# Patient Record
Sex: FEMALE | Race: WHITE | NOT HISPANIC OR LATINO | Employment: FULL TIME | URBAN - METROPOLITAN AREA
[De-identification: names, ages, dates, MRNs, and addresses within clinical notes are randomized per-mention and may not be internally consistent; named-entity substitution may affect disease eponyms.]

---

## 2017-06-28 ENCOUNTER — OFFICE VISIT (OUTPATIENT)
Dept: OPTOMETRY | Facility: CLINIC | Age: 62
End: 2017-06-28
Payer: OTHER GOVERNMENT

## 2017-06-28 DIAGNOSIS — H52.7 REFRACTIVE ERROR: ICD-10-CM

## 2017-06-28 DIAGNOSIS — H25.13 NUCLEAR SCLEROSIS, BILATERAL: Primary | ICD-10-CM

## 2017-06-28 DIAGNOSIS — H26.9 CORTICAL CATARACT: ICD-10-CM

## 2017-06-28 PROCEDURE — 92004 COMPRE OPH EXAM NEW PT 1/>: CPT | Mod: S$PBB,,, | Performed by: OPTOMETRIST

## 2017-06-28 PROCEDURE — 99201 *HC E&M-NEW PATIENT-LVL I: CPT | Mod: PBBFAC,PO | Performed by: OPTOMETRIST

## 2017-06-28 PROCEDURE — 99999 PR PBB SHADOW E&M-NEW PATIENT-LVL I: CPT | Mod: PBBFAC,,, | Performed by: OPTOMETRIST

## 2017-06-28 NOTE — LETTER
June 28, 2017      Mono Ku MD  [address] [facility] [address] [facility]  [address]  [facility]  [place] [address]           [facility] [facility]  [address] [place] [address] [address]  [place] [address]  Phone: [phone]          Patient: Suma Butterfield   MR Number: [id number]   YOB: 1955   Date of Visit: 6/28/2017       Dear Dr. Mono Ku:    Thank you for referring Suma Butterfield to me for evaluation. Attached you will find relevant portions of my assessment and plan of care.    If you have questions, please do not hesitate to call me. I look forward to following Suma Butterfield along with you.    Sincerely,    Kp Mendoza, OD    Enclosure  CC:  No Recipients    If you would like to receive this communication electronically, please contact [email or web address] or [phone] to request more information on [employer] Link access.    For providers and/or their staff who would like to refer a patient to [facility], please contact us through our one-stop-shop provider referral line, [facility], at [phone].    If you feel you have received this communication in error or would no longer like to receive these types of communications, please e-mail [email or web address]

## 2017-06-28 NOTE — PROGRESS NOTES
HPI     Presenting Complaint: Pt here today for yearly ocualr health check.     Pt uses OTC reader for small print. Pt states distance vision is stable.     (-) headaches  (-) diplopia   (-) flashes / (+) hx of floaters both eyes      Last edited by Arthur Ley on 6/28/2017  3:01 PM. (History)        ROS     Negative for: Constitutional, Gastrointestinal, Neurological, Skin,   Genitourinary, Musculoskeletal, HENT, Endocrine, Cardiovascular, Eyes,   Respiratory, Psychiatric, Allergic/Imm, Heme/Lymph    Last edited by Kp Mendoza, OD on 6/28/2017  3:23 PM. (History)        Assessment /Plan     For exam results, see Encounter Report.    Nuclear sclerosis, bilateral    Cortical cataract    Refractive error      Mild cataracts OU. Discussed possible ocular affects of cataracts. Acceptable BCVA OU. Discussed treatment options. Surgery not recommended at this time. Monitor yearly.     Pt happy with uncorrected distance vision and OTC readers prn for near. Denies refraction. Return prn for spec Rx.      RTC in 1 year for comprehensive eye exam, or sooner prn.

## 2018-12-06 ENCOUNTER — OFFICE VISIT (OUTPATIENT)
Dept: OPTOMETRY | Facility: CLINIC | Age: 63
End: 2018-12-06
Payer: OTHER GOVERNMENT

## 2018-12-06 DIAGNOSIS — H52.7 REFRACTIVE ERROR: ICD-10-CM

## 2018-12-06 DIAGNOSIS — H26.9 CORTICAL CATARACT: ICD-10-CM

## 2018-12-06 DIAGNOSIS — H25.13 NUCLEAR SCLEROSIS, BILATERAL: Primary | ICD-10-CM

## 2018-12-06 PROCEDURE — 99212 OFFICE O/P EST SF 10 MIN: CPT | Mod: PBBFAC,PO | Performed by: OPTOMETRIST

## 2018-12-06 PROCEDURE — 92014 COMPRE OPH EXAM EST PT 1/>: CPT | Mod: S$PBB,,, | Performed by: OPTOMETRIST

## 2018-12-06 PROCEDURE — 99999 PR PBB SHADOW E&M-EST. PATIENT-LVL II: CPT | Mod: PBBFAC,,, | Performed by: OPTOMETRIST

## 2018-12-06 RX ORDER — ALENDRONATE SODIUM 70 MG/1
TABLET ORAL
Refills: 5 | COMMUNITY
Start: 2018-09-28 | End: 2020-03-23 | Stop reason: SDUPTHER

## 2018-12-06 NOTE — PROGRESS NOTES
HPI     Presenting Complaint: Pt here today for yearly eye exam. Pt has noticed   distance vision is blurry. Uses OTC readers for small print.     Ophthalmic medication / drops: None    (-) headaches  (-) diplopia   (-) flashes / (-) floaters      Last edited by Arthur Ley on 12/6/2018  9:43 AM. (History)            Assessment /Plan     For exam results, see Encounter Report.    Nuclear sclerosis, bilateral    Cortical cataract    Refractive error      Mild cataracts OU. Not visually significant OU. Discussed possible ocular affects of cataracts. Acceptable BCVA OU. Discussed treatment options. Surgery not recommended at this time. Monitor yearly.     Patient overall happy with uncorrected distance vision and otc readers at near. Denies refraction at this time. Return as needed for spec Rx.      RTC in 1 year for comprehensive eye exam, or sooner prn.

## 2019-12-05 ENCOUNTER — TELEPHONE (OUTPATIENT)
Dept: FAMILY MEDICINE | Facility: CLINIC | Age: 64
End: 2019-12-05

## 2019-12-05 DIAGNOSIS — R92.8 ABNORMAL SCREENING MAMMOGRAM: Primary | ICD-10-CM

## 2019-12-05 NOTE — TELEPHONE ENCOUNTER
----- Message from Janeth Rivera MA sent at 12/2/2019  5:50 PM CST -----      ----- Message -----  From: Hilary Smith  Sent: 12/2/2019   9:17 AM CST  To: Hector Colin Staff    RADIOLOGY, D.I.S. 11/27/19

## 2019-12-05 NOTE — TELEPHONE ENCOUNTER
Pt needs a diagnostic mammo with ultrasound ordered for further evaluation of incomplete screening mammo

## 2019-12-06 ENCOUNTER — TELEPHONE (OUTPATIENT)
Dept: FAMILY MEDICINE | Facility: CLINIC | Age: 64
End: 2019-12-06

## 2019-12-06 DIAGNOSIS — R92.8 ABNORMAL MAMMOGRAM: Primary | ICD-10-CM

## 2019-12-06 NOTE — TELEPHONE ENCOUNTER
----- Message from Sissy Fuentes sent at 12/6/2019  9:33 AM CST -----  Pt wants mammogram results? Pt #[phone]

## 2019-12-06 NOTE — TELEPHONE ENCOUNTER
----- Message from Abhay Penaloza sent at 12/6/2019 11:09 AM CST -----  mammo orders are needing to go to [facility] not [facility]  Pt [phone]

## 2019-12-12 ENCOUNTER — TELEPHONE (OUTPATIENT)
Dept: FAMILY MEDICINE | Facility: CLINIC | Age: 64
End: 2019-12-12

## 2019-12-12 NOTE — TELEPHONE ENCOUNTER
----- Message from Hiral Robb sent at 12/12/2019  2:40 PM CST -----  [facility] calling for the patient they said the order is incorrect and needs someone to call them . Please call DEON at [phone] the fax number is [phone] the patients appointment is tomorrow at 10

## 2019-12-17 ENCOUNTER — TELEPHONE (OUTPATIENT)
Dept: FAMILY MEDICINE | Facility: CLINIC | Age: 64
End: 2019-12-17

## 2019-12-17 NOTE — TELEPHONE ENCOUNTER
Findings are likely benign. Radiologist recommends diagnostic mammo again in 6 mos to document stability.

## 2019-12-17 NOTE — TELEPHONE ENCOUNTER
----- Message from Janeth Rivera MA sent at 12/16/2019 12:29 PM CST -----      ----- Message -----  From: Hilary Smith  Sent: 12/16/2019  11:27 AM CST  To: Mono Ku Staff    RADIOLOGY, D.IALLEY POTTER, 12/13/19

## 2019-12-18 NOTE — TELEPHONE ENCOUNTER
Spoke with pt and advised.  Pt verbalized understanding and has reported that she has already scheduled her follow up mammo and confirmed her appt with Andrea on 12/23.

## 2019-12-23 ENCOUNTER — OFFICE VISIT (OUTPATIENT)
Dept: FAMILY MEDICINE | Facility: CLINIC | Age: 64
End: 2019-12-23
Payer: OTHER GOVERNMENT

## 2019-12-23 VITALS
HEART RATE: 68 BPM | BODY MASS INDEX: 23.59 KG/M2 | HEIGHT: 62 IN | WEIGHT: 128.19 LBS | DIASTOLIC BLOOD PRESSURE: 106 MMHG | SYSTOLIC BLOOD PRESSURE: 142 MMHG

## 2019-12-23 DIAGNOSIS — I10 ESSENTIAL HYPERTENSION: Primary | ICD-10-CM

## 2019-12-23 PROCEDURE — 99213 OFFICE O/P EST LOW 20 MIN: CPT | Mod: S$GLB,,, | Performed by: PHYSICIAN ASSISTANT

## 2019-12-23 PROCEDURE — 99213 PR OFFICE/OUTPT VISIT, EST, LEVL III, 20-29 MIN: ICD-10-PCS | Mod: S$GLB,,, | Performed by: PHYSICIAN ASSISTANT

## 2019-12-23 RX ORDER — METOPROLOL SUCCINATE 50 MG/1
50 TABLET, EXTENDED RELEASE ORAL DAILY
Qty: 30 TABLET | Refills: 2 | Status: SHIPPED | OUTPATIENT
Start: 2019-12-23 | End: 2020-03-23 | Stop reason: SDUPTHER

## 2019-12-23 RX ORDER — METOPROLOL SUCCINATE 25 MG/1
25 TABLET, EXTENDED RELEASE ORAL DAILY
Refills: 2 | COMMUNITY
Start: 2019-11-24 | End: 2019-12-23 | Stop reason: SDUPTHER

## 2019-12-23 NOTE — PATIENT INSTRUCTIONS
Established High Blood Pressure    High blood pressure (hypertension) is a chronic disease. Often, healthcare providers dont know what causes it. But it can be caused by certain health conditions and medicines.  If you have high blood pressure, you may not have any symptoms. If you do have symptoms, they may include headache, dizziness, changes in your vision, chest pain, and shortness of breath. But even without symptoms, high blood pressure thats not treated raises your risk for heart attack and stroke. High blood pressure is a serious health risk and shouldnt be ignored.  A blood pressure reading is made up of two numbers: a higher number over a lower number. The top number is the systolic pressure. The bottom number is the diastolic pressure. A normal blood pressure is a systolic pressure of  less than 120 over a diastolic pressure of less than 80. You will see your blood pressure readings written together. For example, a person with a systolic pressure of 188 and a diastolic pressure of 78 will have 118/78 written in the medical record.  High blood pressure is when either the top number is 140 or higher, or the bottom number is 90 or higher. This must be the result when taking your blood pressure a number of times. The blood pressures between normal and high are called prehypertension.  Home care  If you have high blood pressure, you should do what is listed below to lower your blood pressure. If you are taking medicines for high blood pressure, these methods may reduce or end your need for medicines in the future.  · Begin a weight-loss program if you are overweight.  · Cut back on how much salt you get in your diet. Heres how to do this:  ¨ Dont eat foods that have a lot of salt. These include olives, pickles, smoked meats, and salted potato chips.  ¨ Dont add salt to your food at the table.  ¨ Use only small amounts of salt when cooking.  · Start an exercise program. Talk with your healthcare  provider about the type of exercise program that would be best for you. It doesn't have to be hard. Even brisk walking for 20 minutes 3 times a week is a good form of exercise.  · Dont take medicines that stimulate the heart. This includes many over-the-counter cold and sinus decongestant pills and sprays, as well as diet pills. Check the warnings about hypertension on the label. Before buying any over-the-counter medicines or supplements, always ask the pharmacist about the product's potential interaction with your high blood pressure and your high blood pressure medicines.  · Stimulants such as amphetamine or cocaine could be deadly for someone with high blood pressure. Never take these.  · Limit how much caffeine you get in your diet. Switch to caffeine-free products.  · Stop smoking. If you are a long-time smoker, this can be hard. Talk to your healthcare provider about medicines and nicotine replacement options to help you. Also, enroll in a stop-smoking program to make it more likely that you will quit for good.  · Learn how to handle stress. This is an important part of any program to lower blood pressure. Learn about relaxation methods like meditation, yoga, or biofeedback.  · If your provider prescribed medicines, take them exactly as directed. Missing doses may cause your blood pressure get out of control.  · If you miss a dose or doses, check with your healthcare provider or pharmacist about what to do.  · Consider buying an automatic blood pressure machine. Ask your provider for a recommendation. You can get one of these at most pharmacies.     The American Heart Association recommends the following guidelines for home blood pressure monitoring:  · Don't smoke or drink coffee for 30 minutes before taking your blood pressure.  · Go to the bathroom before the test.  · Relax for 5 minutes before taking the measurement.  · Sit with your back supported (don't sit on a couch or soft chair); keep your feet on  the floor uncrossed. Place your arm on a solid flat surface (like a table) with the upper part of the arm at heart level. Place the middle of the cuff directly above the eye of the elbow. Check the monitor's instruction manual for an illustration.  · Take multiple readings. When you measure, take 2 to 3 readings one minute apart and record all of the results.  · Take your blood pressure at the same time every day, or as your healthcare provider recommends.  · Record the date, time, and blood pressure reading.  · Take the record with you to your next medical appointment. If your blood pressure monitor has a built-in memory, simply take the monitor with you to your next appointment.  · Call your provider if you have several high readings. Don't be frightened by a single high blood pressure reading, but if you get several high readings, check in with your healthcare provider.  · Note: When blood pressure reaches a systolic (top number) of 180 or higher OR diastolic (bottom number) of 110 or higher, seek emergency medical treatment.  Follow-up care  You will need to see your healthcare provider regularly. This is to check your blood pressure and to make changes to your medicines. Make a follow-up appointment as directed. Bring the record of your home blood pressure readings to the appointment.  When to seek medical advice  Call your healthcare provider right away if any of these occur:  · Blood pressure reaches a systolic (upper number) of 180 or higher OR a diastolic (bottom number) of 110 or higher  · Chest pain or shortness of breath  · Severe headache  · Throbbing or rushing sound in the ears  · Nosebleed  · Sudden severe pain in your belly (abdomen)  · Extreme drowsiness, confusion, or fainting  · Dizziness or spinning sensation (vertigo)  · Weakness of an arm or leg or one side of the face  · You have problems speaking or seeing   Date Last Reviewed: 12/1/2016  © 5949-8022 [employer]. [address] [place] [address]  [place] [address]. All rights reserved. This information is not intended as a substitute for professional medical care. Always follow your healthcare professional's instructions.

## 2020-01-18 LAB
ALBUMIN SERPL-MCNC: 4.7 G/DL (ref 3.6–5.1)
ALBUMIN/GLOB SERPL: 1.8 (CALC) (ref 1–2.5)
ALP SERPL-CCNC: 62 U/L (ref 33–130)
ALT SERPL-CCNC: 93 U/L (ref 6–29)
AST SERPL-CCNC: 67 U/L (ref 10–35)
BASOPHILS # BLD AUTO: 72 CELLS/UL (ref 0–200)
BASOPHILS NFR BLD AUTO: 1.3 %
BILIRUB SERPL-MCNC: 0.8 MG/DL (ref 0.2–1.2)
BUN SERPL-MCNC: 17 MG/DL (ref 7–25)
BUN/CREAT SERPL: ABNORMAL (CALC) (ref 6–22)
CALCIUM SERPL-MCNC: 9.7 MG/DL (ref 8.6–10.4)
CHLORIDE SERPL-SCNC: 102 MMOL/L (ref 98–110)
CHOLEST SERPL-MCNC: 256 MG/DL
CHOLEST/HDLC SERPL: 2.5 (CALC)
CO2 SERPL-SCNC: 26 MMOL/L (ref 20–32)
CREAT SERPL-MCNC: 0.62 MG/DL (ref 0.5–0.99)
EOSINOPHIL # BLD AUTO: 204 CELLS/UL (ref 15–500)
EOSINOPHIL NFR BLD AUTO: 3.7 %
ERYTHROCYTE [DISTWIDTH] IN BLOOD BY AUTOMATED COUNT: 11.3 % (ref 11–15)
GFRSERPLBLD MDRD-ARVRAT: 97 ML/MIN/1.73M2
GLOBULIN SER CALC-MCNC: 2.6 G/DL (CALC) (ref 1.9–3.7)
GLUCOSE SERPL-MCNC: 90 MG/DL (ref 65–99)
HCT VFR BLD AUTO: 48.6 % (ref 35–45)
HDLC SERPL-MCNC: 103 MG/DL
HGB BLD-MCNC: 17 G/DL (ref 11.7–15.5)
LDLC SERPL CALC-MCNC: 131 MG/DL (CALC)
LYMPHOCYTES # BLD AUTO: 2211 CELLS/UL (ref 850–3900)
LYMPHOCYTES NFR BLD AUTO: 40.2 %
MCH RBC QN AUTO: 35.6 PG (ref 27–33)
MCHC RBC AUTO-ENTMCNC: 35 G/DL (ref 32–36)
MCV RBC AUTO: 101.7 FL (ref 80–100)
MONOCYTES # BLD AUTO: 567 CELLS/UL (ref 200–950)
MONOCYTES NFR BLD AUTO: 10.3 %
NEUTROPHILS # BLD AUTO: 2448 CELLS/UL (ref 1500–7800)
NEUTROPHILS NFR BLD AUTO: 44.5 %
NONHDLC SERPL-MCNC: 153 MG/DL (CALC)
PLATELET # BLD AUTO: 174 THOUSAND/UL (ref 140–400)
PMV BLD REES-ECKER: 10 FL (ref 7.5–12.5)
POTASSIUM SERPL-SCNC: 4.5 MMOL/L (ref 3.5–5.3)
PROT SERPL-MCNC: 7.3 G/DL (ref 6.1–8.1)
RBC # BLD AUTO: 4.78 MILLION/UL (ref 3.8–5.1)
SODIUM SERPL-SCNC: 138 MMOL/L (ref 135–146)
TRIGL SERPL-MCNC: 110 MG/DL
TSH SERPL-ACNC: 1.01 MIU/L (ref 0.4–4.5)
WBC # BLD AUTO: 5.5 THOUSAND/UL (ref 3.8–10.8)

## 2020-01-20 LAB
ALBUMIN/CREAT UR: 13 MCG/MG CREAT
APPEARANCE UR: CLEAR
BACTERIA #/AREA URNS HPF: ABNORMAL /HPF
BACTERIA UR CULT: ABNORMAL
BACTERIA UR CULT: NORMAL
BILIRUB UR QL STRIP: NEGATIVE
COLOR UR: YELLOW
CREAT UR-MCNC: 84 MG/DL (ref 20–275)
GLUCOSE UR QL STRIP: NEGATIVE
HGB UR QL STRIP: NEGATIVE
HYALINE CASTS #/AREA URNS LPF: ABNORMAL /LPF
KETONES UR QL STRIP: ABNORMAL
LEUKOCYTE ESTERASE UR QL STRIP: ABNORMAL
MICROALBUMIN UR-MCNC: 1.1 MG/DL
NITRITE UR QL STRIP: NEGATIVE
PH UR STRIP: 7 [PH] (ref 5–8)
PROT UR QL STRIP: NEGATIVE
RBC #/AREA URNS HPF: ABNORMAL /HPF
SP GR UR STRIP: 1.02 (ref 1–1.03)
SQUAMOUS #/AREA URNS HPF: ABNORMAL /HPF
WBC #/AREA URNS HPF: ABNORMAL /HPF

## 2020-01-24 ENCOUNTER — OFFICE VISIT (OUTPATIENT)
Dept: FAMILY MEDICINE | Facility: CLINIC | Age: 65
End: 2020-01-24
Payer: OTHER GOVERNMENT

## 2020-01-24 VITALS
BODY MASS INDEX: 22.85 KG/M2 | HEART RATE: 68 BPM | SYSTOLIC BLOOD PRESSURE: 138 MMHG | DIASTOLIC BLOOD PRESSURE: 92 MMHG | HEIGHT: 62 IN | WEIGHT: 124.19 LBS

## 2020-01-24 DIAGNOSIS — I10 ESSENTIAL HYPERTENSION: Primary | ICD-10-CM

## 2020-01-24 DIAGNOSIS — Z00.00 ROUTINE PHYSICAL EXAMINATION: ICD-10-CM

## 2020-01-24 PROBLEM — B18.2 CHRONIC HEPATITIS C: Status: ACTIVE | Noted: 2017-06-08

## 2020-01-24 PROCEDURE — 99396 PREV VISIT EST AGE 40-64: CPT | Mod: S$GLB,,, | Performed by: PHYSICIAN ASSISTANT

## 2020-01-24 PROCEDURE — 99396 PR PREVENTIVE VISIT,EST,40-64: ICD-10-PCS | Mod: S$GLB,,, | Performed by: PHYSICIAN ASSISTANT

## 2020-01-24 RX ORDER — VALSARTAN 40 MG/1
40 TABLET ORAL DAILY
Qty: 30 TABLET | Refills: 2 | Status: SHIPPED | OUTPATIENT
Start: 2020-01-24 | End: 2020-03-06 | Stop reason: SDUPTHER

## 2020-01-24 NOTE — PATIENT INSTRUCTIONS
Established High Blood Pressure    High blood pressure (hypertension) is a chronic disease. Often, healthcare providers dont know what causes it. But it can be caused by certain health conditions and medicines.  If you have high blood pressure, you may not have any symptoms. If you do have symptoms, they may include headache, dizziness, changes in your vision, chest pain, and shortness of breath. But even without symptoms, high blood pressure thats not treated raises your risk for heart attack and stroke. High blood pressure is a serious health risk and shouldnt be ignored.  A blood pressure reading is made up of two numbers: a higher number over a lower number. The top number is the systolic pressure. The bottom number is the diastolic pressure. A normal blood pressure is a systolic pressure of  less than 120 over a diastolic pressure of less than 80. You will see your blood pressure readings written together. For example, a person with a systolic pressure of 188 and a diastolic pressure of 78 will have 118/78 written in the medical record.  High blood pressure is when either the top number is 140 or higher, or the bottom number is 90 or higher. This must be the result when taking your blood pressure a number of times. The blood pressures between normal and high are called prehypertension.  Home care  If you have high blood pressure, you should do what is listed below to lower your blood pressure. If you are taking medicines for high blood pressure, these methods may reduce or end your need for medicines in the future.  · Begin a weight-loss program if you are overweight.  · Cut back on how much salt you get in your diet. Heres how to do this:  ¨ Dont eat foods that have a lot of salt. These include olives, pickles, smoked meats, and salted potato chips.  ¨ Dont add salt to your food at the table.  ¨ Use only small amounts of salt when cooking.  · Start an exercise program. Talk with your healthcare  provider about the type of exercise program that would be best for you. It doesn't have to be hard. Even brisk walking for 20 minutes 3 times a week is a good form of exercise.  · Dont take medicines that stimulate the heart. This includes many over-the-counter cold and sinus decongestant pills and sprays, as well as diet pills. Check the warnings about hypertension on the label. Before buying any over-the-counter medicines or supplements, always ask the pharmacist about the product's potential interaction with your high blood pressure and your high blood pressure medicines.  · Stimulants such as amphetamine or cocaine could be deadly for someone with high blood pressure. Never take these.  · Limit how much caffeine you get in your diet. Switch to caffeine-free products.  · Stop smoking. If you are a long-time smoker, this can be hard. Talk to your healthcare provider about medicines and nicotine replacement options to help you. Also, enroll in a stop-smoking program to make it more likely that you will quit for good.  · Learn how to handle stress. This is an important part of any program to lower blood pressure. Learn about relaxation methods like meditation, yoga, or biofeedback.  · If your provider prescribed medicines, take them exactly as directed. Missing doses may cause your blood pressure get out of control.  · If you miss a dose or doses, check with your healthcare provider or pharmacist about what to do.  · Consider buying an automatic blood pressure machine. Ask your provider for a recommendation. You can get one of these at most pharmacies.     The American Heart Association recommends the following guidelines for home blood pressure monitoring:  · Don't smoke or drink coffee for 30 minutes before taking your blood pressure.  · Go to the bathroom before the test.  · Relax for 5 minutes before taking the measurement.  · Sit with your back supported (don't sit on a couch or soft chair); keep your feet on  the floor uncrossed. Place your arm on a solid flat surface (like a table) with the upper part of the arm at heart level. Place the middle of the cuff directly above the eye of the elbow. Check the monitor's instruction manual for an illustration.  · Take multiple readings. When you measure, take 2 to 3 readings one minute apart and record all of the results.  · Take your blood pressure at the same time every day, or as your healthcare provider recommends.  · Record the date, time, and blood pressure reading.  · Take the record with you to your next medical appointment. If your blood pressure monitor has a built-in memory, simply take the monitor with you to your next appointment.  · Call your provider if you have several high readings. Don't be frightened by a single high blood pressure reading, but if you get several high readings, check in with your healthcare provider.  · Note: When blood pressure reaches a systolic (top number) of 180 or higher OR diastolic (bottom number) of 110 or higher, seek emergency medical treatment.  Follow-up care  You will need to see your healthcare provider regularly. This is to check your blood pressure and to make changes to your medicines. Make a follow-up appointment as directed. Bring the record of your home blood pressure readings to the appointment.  When to seek medical advice  Call your healthcare provider right away if any of these occur:  · Blood pressure reaches a systolic (upper number) of 180 or higher OR a diastolic (bottom number) of 110 or higher  · Chest pain or shortness of breath  · Severe headache  · Throbbing or rushing sound in the ears  · Nosebleed  · Sudden severe pain in your belly (abdomen)  · Extreme drowsiness, confusion, or fainting  · Dizziness or spinning sensation (vertigo)  · Weakness of an arm or leg or one side of the face  · You have problems speaking or seeing   Date Last Reviewed: 12/1/2016  © 5568-9859 [employer]. [address] [place] [address]  [place] [address]. All rights reserved. This information is not intended as a substitute for professional medical care. Always follow your healthcare professional's instructions.

## 2020-01-24 NOTE — PROGRESS NOTES
SUBJECTIVE:    Patient ID: Suma Butterfield is a 64 y.o. female.    Chief Complaint: Hypertension (4 week follow up.....mlr) and Results (Labs completed 12/23/2019.....mlr)    64-year-old white female presents today for 4 week checkup.  I follow her along with her cardiologist, Dr. Lorenzo.  Most recently her pressure has been significantly elevated and we doubled her dose of metoprolol.  It does not appear to have had that much effect on her blood pressure as it is only slightly lower from previous visit.  She did just do some blood work for me and is here to review. Most labs are stable except for a slight elevation in her liver enzymes.  She does admit to heavy alcohol intake recently with football season coming to a close. She does admit hx of Hep C and was treated at age 52. Liver enzymes have been slightly high since. MRI and ultrasounds have been done.      Office Visit on 12/23/2019   Component Date Value Ref Range Status    WBC 01/17/2020 5.5  3.8 - 10.8 Thousand/uL Final    RBC 01/17/2020 4.78  3.80 - 5.10 Million/uL Final    Hemoglobin 01/17/2020 17.0* 11.7 - 15.5 g/dL Final    Hematocrit 01/17/2020 48.6* 35.0 - 45.0 % Final    Mean Corpuscular Volume 01/17/2020 101.7* 80.0 - 100.0 fL Final    Mean Corpuscular Hemoglobin 01/17/2020 35.6* 27.0 - 33.0 pg Final    Mean Corpuscular Hemoglobin Conc 01/17/2020 35.0  32.0 - 36.0 g/dL Final    RDW 01/17/2020 11.3  11.0 - 15.0 % Final    Platelets 01/17/2020 174  140 - 400 Thousand/uL Final    MPV 01/17/2020 10.0  7.5 - 12.5 fL Final    Neutrophils Absolute 01/17/2020 2,448  1,500 - 7,800 cells/uL Final    Lymph # 01/17/2020 2,211  850 - 3,900 cells/uL Final    Mono # 01/17/2020 567  200 - 950 cells/uL Final    Eos # 01/17/2020 204  15 - 500 cells/uL Final    Baso # 01/17/2020 72  0 - 200 cells/uL Final    Neutrophils Relative 01/17/2020 44.5  % Final    Lymph% 01/17/2020 40.2  % Final    Mono% 01/17/2020 10.3  % Final    Eosinophil% 01/17/2020  3.7  % Final    Basophil% 01/17/2020 1.3  % Final    Glucose 01/17/2020 90  65 - 99 mg/dL Final    BUN, Bld 01/17/2020 17  7 - 25 mg/dL Final    Creatinine 01/17/2020 0.62  0.50 - 0.99 mg/dL Final    eGFR if non African American 01/17/2020 97  > OR = 60 mL/min/1.73m2 Final    eGFR if African American 01/17/2020 112  > OR = 60 mL/min/1.73m2 Final    BUN/Creatinine Ratio 01/17/2020 NOT APPLICABLE  6 - 22 (calc) Final    Sodium 01/17/2020 138  135 - 146 mmol/L Final    Potassium 01/17/2020 4.5  3.5 - 5.3 mmol/L Final    Chloride 01/17/2020 102  98 - 110 mmol/L Final    CO2 01/17/2020 26  20 - 32 mmol/L Final    Calcium 01/17/2020 9.7  8.6 - 10.4 mg/dL Final    Total Protein 01/17/2020 7.3  6.1 - 8.1 g/dL Final    Albumin 01/17/2020 4.7  3.6 - 5.1 g/dL Final    Globulin, Total 01/17/2020 2.6  1.9 - 3.7 g/dL (calc) Final    Albumin/Globulin Ratio 01/17/2020 1.8  1.0 - 2.5 (calc) Final    Total Bilirubin 01/17/2020 0.8  0.2 - 1.2 mg/dL Final    Alkaline Phosphatase 01/17/2020 62  33 - 130 U/L Final    AST 01/17/2020 67* 10 - 35 U/L Final    ALT 01/17/2020 93* 6 - 29 U/L Final    Cholesterol 01/17/2020 256* <200 mg/dL Final    HDL 01/17/2020 103  >50 mg/dL Final    Triglycerides 01/17/2020 110  <150 mg/dL Final    LDL Cholesterol 01/17/2020 131* mg/dL (calc) Final    Hdl/Cholesterol Ratio 01/17/2020 2.5  <5.0 (calc) Final    Non HDL Chol. (LDL+VLDL) 01/17/2020 153* <130 mg/dL (calc) Final    TSH w/reflex to FT4 01/17/2020 1.01  0.40 - 4.50 mIU/L Final    Color, UA 01/17/2020 YELLOW  YELLOW Final    Appearance, UA 01/17/2020 CLEAR  CLEAR Final    Specific Gravity, UA 01/17/2020 1.017  1.001 - 1.035 Final    pH, UA 01/17/2020 7.0  5.0 - 8.0 Final    Glucose, UA 01/17/2020 NEGATIVE  NEGATIVE Final    Bilirubin, UA 01/17/2020 NEGATIVE  NEGATIVE Final    Ketones, UA 01/17/2020 1+* NEGATIVE Final    Occult Blood UA 01/17/2020 NEGATIVE  NEGATIVE Final    Protein, UA 01/17/2020 NEGATIVE   NEGATIVE Final    Nitrite, UA 01/17/2020 NEGATIVE  NEGATIVE Final    Leukocytes, UA 01/17/2020 TRACE* NEGATIVE Final    WBC Casts, UA 01/17/2020 0-5  < OR = 5 /HPF Final    RBC Casts, UA 01/17/2020 NONE SEEN  < OR = 2 /HPF Final    Squam Epithel, UA 01/17/2020 0-5  < OR = 5 /HPF Final    Bacteria, UA 01/17/2020 NONE SEEN  NONE SEEN /HPF Final    Hyaline Casts, UA 01/17/2020 NONE SEEN  NONE SEEN /LPF Final    Reflexive Urine Culture 01/17/2020 CULTURE INDICATED - RESULTS TO FOLLOW   Final    Creatinine, Random Ur 01/17/2020 84  20 - 275 mg/dL Final    Microalb, Ur 01/17/2020 1.1  See Note: mg/dL Final    Microalb Creat Ratio 01/17/2020 13  <30 mcg/mg creat Final    Urine Culture, Routine 01/17/2020    Final       Past Medical History:   Diagnosis Date    Hypertension      History reviewed. No pertinent surgical history.  Family History   Problem Relation Age of Onset    Glaucoma Neg Hx     Macular degeneration Neg Hx     Retinal detachment Neg Hx        Marital Status:   Alcohol History:  reports that she drinks alcohol.  Tobacco History:  reports that she has been smoking cigarettes. She has never used smokeless tobacco.  Drug History:  reports that she does not use drugs.    Review of patient's allergies indicates:  No Known Allergies    Current Outpatient Medications:     alendronate (FOSAMAX) 70 MG tablet, TK 1 T PO 1 TIME Q WK, Disp: , Rfl: 5    metoprolol succinate (TOPROL-XL) 50 MG 24 hr tablet, Take 1 tablet (50 mg total) by mouth once daily., Disp: 30 tablet, Rfl: 2    valsartan (DIOVAN) 40 MG tablet, Take 1 tablet (40 mg total) by mouth once daily., Disp: 30 tablet, Rfl: 2    Review of Systems   Constitutional: Negative for appetite change, chills, fatigue, fever and unexpected weight change.   HENT: Negative for congestion.    Respiratory: Negative for cough, chest tightness and shortness of breath.    Cardiovascular: Negative for chest pain and palpitations.   Gastrointestinal:  "Negative for abdominal distention and abdominal pain.   Endocrine: Negative for cold intolerance and heat intolerance.   Genitourinary: Negative for difficulty urinating and dysuria.   Musculoskeletal: Negative for arthralgias and back pain.   Neurological: Negative for dizziness, weakness and headaches.          Objective:      Vitals:    01/24/20 0722 01/24/20 0726   BP: (!) 136/100 (!) 138/92   Pulse: 68    Weight: 56.3 kg (124 lb 3.2 oz)    Height: 5' 2" (1.575 m)      Physical Exam   Constitutional: She is oriented to person, place, and time. She appears well-developed and well-nourished. No distress.   HENT:   Head: Normocephalic and atraumatic.   Eyes: Pupils are equal, round, and reactive to light. Conjunctivae and EOM are normal.   Neck: Normal range of motion. Neck supple. No thyromegaly present.   Cardiovascular: Normal rate, regular rhythm, normal heart sounds and intact distal pulses.   Pulmonary/Chest: Effort normal and breath sounds normal.   Abdominal: Soft. Bowel sounds are normal. She exhibits no distension. There is no tenderness.   Musculoskeletal: Normal range of motion.   Neurological: She is alert and oriented to person, place, and time. No cranial nerve deficit.   Skin: Skin is warm and dry. No erythema.   Psychiatric: She has a normal mood and affect.         Assessment:       1. Essential hypertension    2. Routine physical examination         Plan:       Essential hypertension  Comments:  Not at goal today.  I am going to start her on low-dose valsartan.  She will keep a log and follow up with me again in 6 weeks.  Orders:  -     valsartan (DIOVAN) 40 MG tablet; Take 1 tablet (40 mg total) by mouth once daily.  Dispense: 30 tablet; Refill: 2    Routine physical examination  Comments:  Overall, patient is doing well.  Labs just with minor liver elevation secondary to chronic hepatitis-C.  Underwent treatment 12 years ago.      Follow up in about 6 weeks (around 3/6/2020) for BP Check-Up.    "     1/24/2020 Hector Colin PA-C

## 2020-03-06 ENCOUNTER — OFFICE VISIT (OUTPATIENT)
Dept: FAMILY MEDICINE | Facility: CLINIC | Age: 65
End: 2020-03-06
Payer: OTHER GOVERNMENT

## 2020-03-06 VITALS
DIASTOLIC BLOOD PRESSURE: 94 MMHG | SYSTOLIC BLOOD PRESSURE: 148 MMHG | HEIGHT: 62 IN | WEIGHT: 126 LBS | HEART RATE: 64 BPM | BODY MASS INDEX: 23.19 KG/M2

## 2020-03-06 DIAGNOSIS — I10 ESSENTIAL HYPERTENSION: Primary | ICD-10-CM

## 2020-03-06 PROCEDURE — 99213 OFFICE O/P EST LOW 20 MIN: CPT | Mod: S$GLB,,, | Performed by: PHYSICIAN ASSISTANT

## 2020-03-06 PROCEDURE — 99213 PR OFFICE/OUTPT VISIT, EST, LEVL III, 20-29 MIN: ICD-10-PCS | Mod: S$GLB,,, | Performed by: PHYSICIAN ASSISTANT

## 2020-03-06 RX ORDER — VALSARTAN 80 MG/1
80 TABLET ORAL DAILY
Qty: 30 TABLET | Refills: 2 | Status: SHIPPED | OUTPATIENT
Start: 2020-03-06 | End: 2020-06-23 | Stop reason: SDUPTHER

## 2020-03-06 NOTE — PROGRESS NOTES
SUBJECTIVE:    Patient ID: Suma Butterfield is a 64 y.o. female.    Chief Complaint: Hypertension (no bottles// SW)    64-year-old white female presents today for 6 week follow-up of her blood pressure.  I started valsartan at the last visit.  It does appear that her pressure has improved somewhat, however she is not at goal yet. She is tolerating the medication just fine. OK with increasing the dose.      Office Visit on 12/23/2019   Component Date Value Ref Range Status    WBC 01/17/2020 5.5  3.8 - 10.8 Thousand/uL Final    RBC 01/17/2020 4.78  3.80 - 5.10 Million/uL Final    Hemoglobin 01/17/2020 17.0* 11.7 - 15.5 g/dL Final    Hematocrit 01/17/2020 48.6* 35.0 - 45.0 % Final    Mean Corpuscular Volume 01/17/2020 101.7* 80.0 - 100.0 fL Final    Mean Corpuscular Hemoglobin 01/17/2020 35.6* 27.0 - 33.0 pg Final    Mean Corpuscular Hemoglobin Conc 01/17/2020 35.0  32.0 - 36.0 g/dL Final    RDW 01/17/2020 11.3  11.0 - 15.0 % Final    Platelets 01/17/2020 174  140 - 400 Thousand/uL Final    MPV 01/17/2020 10.0  7.5 - 12.5 fL Final    Neutrophils Absolute 01/17/2020 2,448  1,500 - 7,800 cells/uL Final    Lymph # 01/17/2020 2,211  850 - 3,900 cells/uL Final    Mono # 01/17/2020 567  200 - 950 cells/uL Final    Eos # 01/17/2020 204  15 - 500 cells/uL Final    Baso # 01/17/2020 72  0 - 200 cells/uL Final    Neutrophils Relative 01/17/2020 44.5  % Final    Lymph% 01/17/2020 40.2  % Final    Mono% 01/17/2020 10.3  % Final    Eosinophil% 01/17/2020 3.7  % Final    Basophil% 01/17/2020 1.3  % Final    Glucose 01/17/2020 90  65 - 99 mg/dL Final    BUN, Bld 01/17/2020 17  7 - 25 mg/dL Final    Creatinine 01/17/2020 0.62  0.50 - 0.99 mg/dL Final    eGFR if non African American 01/17/2020 97  > OR = 60 mL/min/1.73m2 Final    eGFR if African American 01/17/2020 112  > OR = 60 mL/min/1.73m2 Final    BUN/Creatinine Ratio 01/17/2020 NOT APPLICABLE  6 - 22 (calc) Final    Sodium 01/17/2020 138  135 - 146 mmol/L  Final    Potassium 01/17/2020 4.5  3.5 - 5.3 mmol/L Final    Chloride 01/17/2020 102  98 - 110 mmol/L Final    CO2 01/17/2020 26  20 - 32 mmol/L Final    Calcium 01/17/2020 9.7  8.6 - 10.4 mg/dL Final    Total Protein 01/17/2020 7.3  6.1 - 8.1 g/dL Final    Albumin 01/17/2020 4.7  3.6 - 5.1 g/dL Final    Globulin, Total 01/17/2020 2.6  1.9 - 3.7 g/dL (calc) Final    Albumin/Globulin Ratio 01/17/2020 1.8  1.0 - 2.5 (calc) Final    Total Bilirubin 01/17/2020 0.8  0.2 - 1.2 mg/dL Final    Alkaline Phosphatase 01/17/2020 62  33 - 130 U/L Final    AST 01/17/2020 67* 10 - 35 U/L Final    ALT 01/17/2020 93* 6 - 29 U/L Final    Cholesterol 01/17/2020 256* <200 mg/dL Final    HDL 01/17/2020 103  >50 mg/dL Final    Triglycerides 01/17/2020 110  <150 mg/dL Final    LDL Cholesterol 01/17/2020 131* mg/dL (calc) Final    Hdl/Cholesterol Ratio 01/17/2020 2.5  <5.0 (calc) Final    Non HDL Chol. (LDL+VLDL) 01/17/2020 153* <130 mg/dL (calc) Final    TSH w/reflex to FT4 01/17/2020 1.01  0.40 - 4.50 mIU/L Final    Color, UA 01/17/2020 YELLOW  YELLOW Final    Appearance, UA 01/17/2020 CLEAR  CLEAR Final    Specific Gravity, UA 01/17/2020 1.017  1.001 - 1.035 Final    pH, UA 01/17/2020 7.0  5.0 - 8.0 Final    Glucose, UA 01/17/2020 NEGATIVE  NEGATIVE Final    Bilirubin, UA 01/17/2020 NEGATIVE  NEGATIVE Final    Ketones, UA 01/17/2020 1+* NEGATIVE Final    Occult Blood UA 01/17/2020 NEGATIVE  NEGATIVE Final    Protein, UA 01/17/2020 NEGATIVE  NEGATIVE Final    Nitrite, UA 01/17/2020 NEGATIVE  NEGATIVE Final    Leukocytes, UA 01/17/2020 TRACE* NEGATIVE Final    WBC Casts, UA 01/17/2020 0-5  < OR = 5 /HPF Final    RBC Casts, UA 01/17/2020 NONE SEEN  < OR = 2 /HPF Final    Squam Epithel, UA 01/17/2020 0-5  < OR = 5 /HPF Final    Bacteria, UA 01/17/2020 NONE SEEN  NONE SEEN /HPF Final    Hyaline Casts, UA 01/17/2020 NONE SEEN  NONE SEEN /LPF Final    Reflexive Urine Culture 01/17/2020 CULTURE INDICATED -  "RESULTS TO FOLLOW   Final    Creatinine, Random Ur 01/17/2020 84  20 - 275 mg/dL Final    Microalb, Ur 01/17/2020 1.1  See Note: mg/dL Final    Microalb Creat Ratio 01/17/2020 13  <30 mcg/mg creat Final    Urine Culture, Routine 01/17/2020    Final       Past Medical History:   Diagnosis Date    Hypertension      History reviewed. No pertinent surgical history.  Family History   Problem Relation Age of Onset    Glaucoma Neg Hx     Macular degeneration Neg Hx     Retinal detachment Neg Hx        Marital Status:   Alcohol History:  reports that she drinks alcohol.  Tobacco History:  reports that she has been smoking cigarettes. She has never used smokeless tobacco.  Drug History:  reports that she does not use drugs.    Review of patient's allergies indicates:  No Known Allergies    Current Outpatient Medications:     alendronate (FOSAMAX) 70 MG tablet, TK 1 T PO 1 TIME Q WK, Disp: , Rfl: 5    metoprolol succinate (TOPROL-XL) 50 MG 24 hr tablet, Take 1 tablet (50 mg total) by mouth once daily., Disp: 30 tablet, Rfl: 2    valsartan (DIOVAN) 80 MG tablet, Take 1 tablet (80 mg total) by mouth once daily., Disp: 30 tablet, Rfl: 2    Review of Systems   Constitutional: Negative for appetite change, chills, fatigue, fever and unexpected weight change.   HENT: Negative for congestion.    Respiratory: Negative for cough, chest tightness and shortness of breath.    Cardiovascular: Negative for chest pain and palpitations.   Gastrointestinal: Negative for abdominal distention and abdominal pain.   Endocrine: Negative for cold intolerance and heat intolerance.   Genitourinary: Negative for difficulty urinating and dysuria.   Musculoskeletal: Negative for arthralgias and back pain.   Neurological: Negative for dizziness, weakness and headaches.          Objective:      Vitals:    03/06/20 0725 03/06/20 0729   BP: (!) 152/96 (!) 148/94   Pulse: 64    Weight: 57.2 kg (126 lb)    Height: 5' 2" (1.575 m)  "     Physical Exam   Constitutional: She is oriented to person, place, and time. She appears well-developed and well-nourished. No distress.   HENT:   Head: Normocephalic and atraumatic.   Eyes: Pupils are equal, round, and reactive to light. Conjunctivae and EOM are normal.   Neck: Normal range of motion. Neck supple. No thyromegaly present.   Cardiovascular: Normal rate, regular rhythm, normal heart sounds and intact distal pulses.   Pulmonary/Chest: Effort normal and breath sounds normal.   Abdominal: Soft. Bowel sounds are normal. She exhibits no distension. There is no tenderness.   Musculoskeletal: Normal range of motion.   Neurological: She is alert and oriented to person, place, and time. No cranial nerve deficit.   Skin: Skin is warm and dry. No erythema.   Psychiatric: She has a normal mood and affect.         Assessment:       1. Essential hypertension         Plan:       Essential hypertension  Comments:  Blood pressure remains not at goal today.  I am going to double the valsartan to 80 mg and she will let me know how she does.  Will follow up in person in 3 mos  Orders:  -     valsartan (DIOVAN) 80 MG tablet; Take 1 tablet (80 mg total) by mouth once daily.  Dispense: 30 tablet; Refill: 2      Follow up in about 3 months (around 6/6/2020) for BP Check-Up.        3/6/2020 Hector Colin PA-C

## 2020-03-06 NOTE — PATIENT INSTRUCTIONS
Established High Blood Pressure    High blood pressure (hypertension) is a chronic disease. Often, healthcare providers dont know what causes it. But it can be caused by certain health conditions and medicines.  If you have high blood pressure, you may not have any symptoms. If you do have symptoms, they may include headache, dizziness, changes in your vision, chest pain, and shortness of breath. But even without symptoms, high blood pressure thats not treated raises your risk for heart attack and stroke. High blood pressure is a serious health risk and shouldnt be ignored.  A blood pressure reading is made up of two numbers: a higher number over a lower number. The top number is the systolic pressure. The bottom number is the diastolic pressure. A normal blood pressure is a systolic pressure of  less than 120 over a diastolic pressure of less than 80. You will see your blood pressure readings written together. For example, a person with a systolic pressure of 188 and a diastolic pressure of 78 will have 118/78 written in the medical record.  High blood pressure is when either the top number is 140 or higher, or the bottom number is 90 or higher. This must be the result when taking your blood pressure a number of times. The blood pressures between normal and high are called prehypertension.  Home care  If you have high blood pressure, you should do what is listed below to lower your blood pressure. If you are taking medicines for high blood pressure, these methods may reduce or end your need for medicines in the future.  · Begin a weight-loss program if you are overweight.  · Cut back on how much salt you get in your diet. Heres how to do this:  ¨ Dont eat foods that have a lot of salt. These include olives, pickles, smoked meats, and salted potato chips.  ¨ Dont add salt to your food at the table.  ¨ Use only small amounts of salt when cooking.  · Start an exercise program. Talk with your healthcare  provider about the type of exercise program that would be best for you. It doesn't have to be hard. Even brisk walking for 20 minutes 3 times a week is a good form of exercise.  · Dont take medicines that stimulate the heart. This includes many over-the-counter cold and sinus decongestant pills and sprays, as well as diet pills. Check the warnings about hypertension on the label. Before buying any over-the-counter medicines or supplements, always ask the pharmacist about the product's potential interaction with your high blood pressure and your high blood pressure medicines.  · Stimulants such as amphetamine or cocaine could be deadly for someone with high blood pressure. Never take these.  · Limit how much caffeine you get in your diet. Switch to caffeine-free products.  · Stop smoking. If you are a long-time smoker, this can be hard. Talk to your healthcare provider about medicines and nicotine replacement options to help you. Also, enroll in a stop-smoking program to make it more likely that you will quit for good.  · Learn how to handle stress. This is an important part of any program to lower blood pressure. Learn about relaxation methods like meditation, yoga, or biofeedback.  · If your provider prescribed medicines, take them exactly as directed. Missing doses may cause your blood pressure get out of control.  · If you miss a dose or doses, check with your healthcare provider or pharmacist about what to do.  · Consider buying an automatic blood pressure machine. Ask your provider for a recommendation. You can get one of these at most pharmacies.     The American Heart Association recommends the following guidelines for home blood pressure monitoring:  · Don't smoke or drink coffee for 30 minutes before taking your blood pressure.  · Go to the bathroom before the test.  · Relax for 5 minutes before taking the measurement.  · Sit with your back supported (don't sit on a couch or soft chair); keep your feet on  the floor uncrossed. Place your arm on a solid flat surface (like a table) with the upper part of the arm at heart level. Place the middle of the cuff directly above the eye of the elbow. Check the monitor's instruction manual for an illustration.  · Take multiple readings. When you measure, take 2 to 3 readings one minute apart and record all of the results.  · Take your blood pressure at the same time every day, or as your healthcare provider recommends.  · Record the date, time, and blood pressure reading.  · Take the record with you to your next medical appointment. If your blood pressure monitor has a built-in memory, simply take the monitor with you to your next appointment.  · Call your provider if you have several high readings. Don't be frightened by a single high blood pressure reading, but if you get several high readings, check in with your healthcare provider.  · Note: When blood pressure reaches a systolic (top number) of 180 or higher OR diastolic (bottom number) of 110 or higher, seek emergency medical treatment.  Follow-up care  You will need to see your healthcare provider regularly. This is to check your blood pressure and to make changes to your medicines. Make a follow-up appointment as directed. Bring the record of your home blood pressure readings to the appointment.  When to seek medical advice  Call your healthcare provider right away if any of these occur:  · Blood pressure reaches a systolic (upper number) of 180 or higher OR a diastolic (bottom number) of 110 or higher  · Chest pain or shortness of breath  · Severe headache  · Throbbing or rushing sound in the ears  · Nosebleed  · Sudden severe pain in your belly (abdomen)  · Extreme drowsiness, confusion, or fainting  · Dizziness or spinning sensation (vertigo)  · Weakness of an arm or leg or one side of the face  · You have problems speaking or seeing   Date Last Reviewed: 12/1/2016  © 9222-8702 [employer]. [address] [place] [address]  [place] [address]. All rights reserved. This information is not intended as a substitute for professional medical care. Always follow your healthcare professional's instructions.

## 2020-03-23 ENCOUNTER — PATIENT MESSAGE (OUTPATIENT)
Dept: FAMILY MEDICINE | Facility: CLINIC | Age: 65
End: 2020-03-23

## 2020-03-23 DIAGNOSIS — I10 ESSENTIAL HYPERTENSION: ICD-10-CM

## 2020-03-23 RX ORDER — ALENDRONATE SODIUM 70 MG/1
TABLET ORAL
Qty: 4 TABLET | Refills: 3 | Status: SHIPPED | OUTPATIENT
Start: 2020-03-23 | End: 2020-07-13 | Stop reason: SDUPTHER

## 2020-03-23 RX ORDER — METOPROLOL SUCCINATE 50 MG/1
50 TABLET, EXTENDED RELEASE ORAL DAILY
Qty: 30 TABLET | Refills: 2 | Status: SHIPPED | OUTPATIENT
Start: 2020-03-23 | End: 2020-06-23 | Stop reason: SDUPTHER

## 2020-05-06 ENCOUNTER — TELEPHONE (OUTPATIENT)
Dept: FAMILY MEDICINE | Facility: CLINIC | Age: 65
End: 2020-05-06

## 2020-05-06 NOTE — TELEPHONE ENCOUNTER
Spoke with pt regarding her appointment next week with Dr. Ku. States she has an iPhone 5 which is not compatible with the My Chart virtual visit. She does not have any other way of doing the visit and states it was for just a regular check up, she does not need anything so she just wants to cancel at this time. Refused a phone visit and does not want to come in.

## 2020-06-01 ENCOUNTER — HOSPITAL ENCOUNTER (OUTPATIENT)
Facility: HOSPITAL | Age: 65
Discharge: LEFT BEFORE DISCHARGE | End: 2020-06-01
Attending: EMERGENCY MEDICINE | Admitting: INTERNAL MEDICINE
Payer: OTHER GOVERNMENT

## 2020-06-01 VITALS
HEIGHT: 62 IN | SYSTOLIC BLOOD PRESSURE: 176 MMHG | RESPIRATION RATE: 20 BRPM | DIASTOLIC BLOOD PRESSURE: 83 MMHG | TEMPERATURE: 99 F | HEART RATE: 71 BPM | OXYGEN SATURATION: 97 % | BODY MASS INDEX: 23.9 KG/M2 | WEIGHT: 129.88 LBS

## 2020-06-01 DIAGNOSIS — R07.9 CHEST PAIN, UNSPECIFIED TYPE: ICD-10-CM

## 2020-06-01 DIAGNOSIS — R00.2 PALPITATIONS: ICD-10-CM

## 2020-06-01 DIAGNOSIS — I47.10 SVT (SUPRAVENTRICULAR TACHYCARDIA): Primary | ICD-10-CM

## 2020-06-01 DIAGNOSIS — R07.9 CHEST PAIN: ICD-10-CM

## 2020-06-01 DIAGNOSIS — R06.02 SOB (SHORTNESS OF BREATH): ICD-10-CM

## 2020-06-01 DIAGNOSIS — R74.01 TRANSAMINITIS: ICD-10-CM

## 2020-06-01 LAB
ALBUMIN SERPL BCP-MCNC: 4.1 G/DL (ref 3.5–5.2)
ALP SERPL-CCNC: 76 U/L (ref 55–135)
ALT SERPL W/O P-5'-P-CCNC: 327 U/L (ref 10–44)
ANION GAP SERPL CALC-SCNC: 11 MMOL/L (ref 8–16)
AST SERPL-CCNC: 383 U/L (ref 10–40)
BASOPHILS # BLD AUTO: 0.06 K/UL (ref 0–0.2)
BASOPHILS NFR BLD: 0.9 % (ref 0–1.9)
BILIRUB SERPL-MCNC: 1.5 MG/DL (ref 0.1–1)
BUN SERPL-MCNC: 16 MG/DL (ref 8–23)
CALCIUM SERPL-MCNC: 9.2 MG/DL (ref 8.7–10.5)
CHLORIDE SERPL-SCNC: 103 MMOL/L (ref 95–110)
CHOLEST SERPL-MCNC: 194 MG/DL (ref 120–199)
CHOLEST/HDLC SERPL: 2.7 {RATIO} (ref 2–5)
CO2 SERPL-SCNC: 20 MMOL/L (ref 23–29)
CREAT SERPL-MCNC: 0.7 MG/DL (ref 0.5–1.4)
DIFFERENTIAL METHOD: ABNORMAL
EOSINOPHIL # BLD AUTO: 0.1 K/UL (ref 0–0.5)
EOSINOPHIL NFR BLD: 2.1 % (ref 0–8)
ERYTHROCYTE [DISTWIDTH] IN BLOOD BY AUTOMATED COUNT: 11.4 % (ref 11.5–14.5)
EST. GFR  (AFRICAN AMERICAN): >60 ML/MIN/1.73 M^2
EST. GFR  (NON AFRICAN AMERICAN): >60 ML/MIN/1.73 M^2
GLUCOSE SERPL-MCNC: 142 MG/DL (ref 70–110)
HCT VFR BLD AUTO: 46.5 % (ref 37–48.5)
HDLC SERPL-MCNC: 73 MG/DL (ref 40–75)
HDLC SERPL: 37.6 % (ref 20–50)
HGB BLD-MCNC: 16.3 G/DL (ref 12–16)
IMM GRANULOCYTES # BLD AUTO: 0.02 K/UL (ref 0–0.04)
IMM GRANULOCYTES NFR BLD AUTO: 0.3 % (ref 0–0.5)
INR PPP: 1.1
LDLC SERPL CALC-MCNC: 106 MG/DL (ref 63–159)
LYMPHOCYTES # BLD AUTO: 2.8 K/UL (ref 1–4.8)
LYMPHOCYTES NFR BLD: 42.2 % (ref 18–48)
MAGNESIUM SERPL-MCNC: 1.9 MG/DL (ref 1.6–2.6)
MCH RBC QN AUTO: 35.7 PG (ref 27–31)
MCHC RBC AUTO-ENTMCNC: 35.1 G/DL (ref 32–36)
MCV RBC AUTO: 102 FL (ref 82–98)
MONOCYTES # BLD AUTO: 0.6 K/UL (ref 0.3–1)
MONOCYTES NFR BLD: 8.8 % (ref 4–15)
NEUTROPHILS # BLD AUTO: 3 K/UL (ref 1.8–7.7)
NEUTROPHILS NFR BLD: 45.7 % (ref 38–73)
NONHDLC SERPL-MCNC: 121 MG/DL
NRBC BLD-RTO: 0 /100 WBC
PLATELET # BLD AUTO: 175 K/UL (ref 150–350)
PMV BLD AUTO: 9.8 FL (ref 9.2–12.9)
POTASSIUM SERPL-SCNC: 4.4 MMOL/L (ref 3.5–5.1)
PROT SERPL-MCNC: 6.9 G/DL (ref 6–8.4)
PROTHROMBIN TIME: 13.5 SEC (ref 10.6–14.8)
RBC # BLD AUTO: 4.57 M/UL (ref 4–5.4)
SARS-COV-2 RDRP RESP QL NAA+PROBE: NEGATIVE
SODIUM SERPL-SCNC: 134 MMOL/L (ref 136–145)
TRIGL SERPL-MCNC: 75 MG/DL (ref 30–150)
TROPONIN I SERPL DL<=0.01 NG/ML-MCNC: <0.03 NG/ML
WBC # BLD AUTO: 6.61 K/UL (ref 3.9–12.7)

## 2020-06-01 PROCEDURE — 93005 ELECTROCARDIOGRAM TRACING: CPT | Performed by: INTERNAL MEDICINE

## 2020-06-01 PROCEDURE — 99291 CRITICAL CARE FIRST HOUR: CPT | Mod: 25

## 2020-06-01 PROCEDURE — 85025 COMPLETE CBC W/AUTO DIFF WBC: CPT

## 2020-06-01 PROCEDURE — 84484 ASSAY OF TROPONIN QUANT: CPT

## 2020-06-01 PROCEDURE — 85610 PROTHROMBIN TIME: CPT

## 2020-06-01 PROCEDURE — G0378 HOSPITAL OBSERVATION PER HR: HCPCS

## 2020-06-01 PROCEDURE — 96376 TX/PRO/DX INJ SAME DRUG ADON: CPT

## 2020-06-01 PROCEDURE — 25000003 PHARM REV CODE 250: Performed by: EMERGENCY MEDICINE

## 2020-06-01 PROCEDURE — 99900035 HC TECH TIME PER 15 MIN (STAT)

## 2020-06-01 PROCEDURE — 80061 LIPID PANEL: CPT

## 2020-06-01 PROCEDURE — 96374 THER/PROPH/DIAG INJ IV PUSH: CPT

## 2020-06-01 PROCEDURE — 80053 COMPREHEN METABOLIC PANEL: CPT

## 2020-06-01 PROCEDURE — 94761 N-INVAS EAR/PLS OXIMETRY MLT: CPT

## 2020-06-01 PROCEDURE — 63600175 PHARM REV CODE 636 W HCPCS: Performed by: EMERGENCY MEDICINE

## 2020-06-01 PROCEDURE — 83735 ASSAY OF MAGNESIUM: CPT

## 2020-06-01 PROCEDURE — 96361 HYDRATE IV INFUSION ADD-ON: CPT

## 2020-06-01 PROCEDURE — 25000003 PHARM REV CODE 250: Performed by: INTERNAL MEDICINE

## 2020-06-01 PROCEDURE — 36415 COLL VENOUS BLD VENIPUNCTURE: CPT

## 2020-06-01 PROCEDURE — U0002 COVID-19 LAB TEST NON-CDC: HCPCS

## 2020-06-01 RX ORDER — TALC
9 POWDER (GRAM) TOPICAL NIGHTLY PRN
Status: DISCONTINUED | OUTPATIENT
Start: 2020-06-01 | End: 2020-06-01 | Stop reason: HOSPADM

## 2020-06-01 RX ORDER — ADENOSINE 3 MG/ML
6 INJECTION, SOLUTION INTRAVENOUS
Status: COMPLETED | OUTPATIENT
Start: 2020-06-01 | End: 2020-06-01

## 2020-06-01 RX ORDER — METOPROLOL TARTRATE 25 MG/1
25 TABLET, FILM COATED ORAL 2 TIMES DAILY
Status: DISCONTINUED | OUTPATIENT
Start: 2020-06-01 | End: 2020-06-01 | Stop reason: HOSPADM

## 2020-06-01 RX ORDER — IBUPROFEN 200 MG
24 TABLET ORAL
Status: DISCONTINUED | OUTPATIENT
Start: 2020-06-01 | End: 2020-06-01 | Stop reason: HOSPADM

## 2020-06-01 RX ORDER — METOPROLOL SUCCINATE 25 MG/1
50 TABLET, EXTENDED RELEASE ORAL DAILY
Status: DISCONTINUED | OUTPATIENT
Start: 2020-06-01 | End: 2020-06-01

## 2020-06-01 RX ORDER — ACETAMINOPHEN 325 MG/1
650 TABLET ORAL EVERY 4 HOURS PRN
Status: DISCONTINUED | OUTPATIENT
Start: 2020-06-01 | End: 2020-06-01 | Stop reason: HOSPADM

## 2020-06-01 RX ORDER — SODIUM CHLORIDE 0.9 % (FLUSH) 0.9 %
3 SYRINGE (ML) INJECTION EVERY 6 HOURS PRN
Status: DISCONTINUED | OUTPATIENT
Start: 2020-06-01 | End: 2020-06-01 | Stop reason: HOSPADM

## 2020-06-01 RX ORDER — ONDANSETRON 4 MG/1
8 TABLET, ORALLY DISINTEGRATING ORAL EVERY 8 HOURS PRN
Status: DISCONTINUED | OUTPATIENT
Start: 2020-06-01 | End: 2020-06-01 | Stop reason: HOSPADM

## 2020-06-01 RX ORDER — ADENOSINE 3 MG/ML
12 INJECTION, SOLUTION INTRAVENOUS
Status: DISCONTINUED | OUTPATIENT
Start: 2020-06-01 | End: 2020-06-01

## 2020-06-01 RX ORDER — HYDROCODONE BITARTRATE AND ACETAMINOPHEN 5; 325 MG/1; MG/1
1 TABLET ORAL EVERY 6 HOURS PRN
Status: DISCONTINUED | OUTPATIENT
Start: 2020-06-01 | End: 2020-06-01 | Stop reason: HOSPADM

## 2020-06-01 RX ORDER — METOPROLOL TARTRATE 50 MG/1
50 TABLET ORAL
Status: DISCONTINUED | OUTPATIENT
Start: 2020-06-01 | End: 2020-06-01

## 2020-06-01 RX ORDER — IBUPROFEN 200 MG
16 TABLET ORAL
Status: DISCONTINUED | OUTPATIENT
Start: 2020-06-01 | End: 2020-06-01 | Stop reason: HOSPADM

## 2020-06-01 RX ORDER — GLUCAGON 1 MG
1 KIT INJECTION
Status: DISCONTINUED | OUTPATIENT
Start: 2020-06-01 | End: 2020-06-01 | Stop reason: HOSPADM

## 2020-06-01 RX ORDER — VALSARTAN 80 MG/1
80 TABLET ORAL DAILY
Status: DISCONTINUED | OUTPATIENT
Start: 2020-06-01 | End: 2020-06-01 | Stop reason: HOSPADM

## 2020-06-01 RX ADMIN — ADENOSINE 6 MG: 3 INJECTION, SOLUTION INTRAVENOUS at 12:06

## 2020-06-01 RX ADMIN — VALSARTAN 80 MG: 80 TABLET ORAL at 09:06

## 2020-06-01 RX ADMIN — METOPROLOL TARTRATE 25 MG: 25 TABLET, FILM COATED ORAL at 01:06

## 2020-06-01 RX ADMIN — METOPROLOL TARTRATE 25 MG: 25 TABLET, FILM COATED ORAL at 09:06

## 2020-06-01 RX ADMIN — SODIUM CHLORIDE 1000 ML: 9 INJECTION, SOLUTION INTRAVENOUS at 01:06

## 2020-06-01 RX ADMIN — ADENOSINE 6 MG: 3 INJECTION, SOLUTION INTRAVENOUS at 01:06

## 2020-06-01 NOTE — ED PROVIDER NOTES
Encounter Date: 6/1/2020       History     Chief Complaint   Patient presents with    Palpitations     started around 5pm. Pt has hx of SVT. Pt is also sob     64-year-old female with history of hypertension and SVT on metoprolol XL 50 mg once daily and valsartan in a 80 mg once daily followed by Dr. Browne presents to the ER  due to an episode of SVT that began at 5:00 p.m..  Patient reports she has been having palpitations since that time.  She has tried multiple vagal maneuvers without improvement.  She denies any chest pain she reports she feels mildly short of breath and fatigued.  She took her metoprolol at 4:00 p.m..  She reports that known causes SVT for her include stressors and she has been under a great deal of stress since her  is very sick.  She also had 1 beer today and had drank 1-2 cups of coffee in the morning.  She reports cough he does not normally cause her SVT episodes.  In the past she has had multiple episodes sometimes even as frequently as 3 times per week.        Review of patient's allergies indicates:  No Known Allergies  Past Medical History:   Diagnosis Date    Hypertension     SVT (supraventricular tachycardia)      History reviewed. No pertinent surgical history.  Family History   Problem Relation Age of Onset    Glaucoma Neg Hx     Macular degeneration Neg Hx     Retinal detachment Neg Hx      Social History     Tobacco Use    Smoking status: Current Some Day Smoker     Types: Cigarettes    Smokeless tobacco: Never Used   Substance Use Topics    Alcohol use: Yes    Drug use: Never     Review of Systems   Constitutional: Positive for activity change and fatigue. Negative for appetite change, chills, diaphoresis and fever.   HENT: Negative for congestion, postnasal drip, rhinorrhea and sore throat.    Respiratory: Positive for shortness of breath. Negative for cough and chest tightness.    Cardiovascular: Positive for palpitations. Negative for chest pain.    Gastrointestinal: Negative for abdominal pain, nausea and vomiting.   Genitourinary: Negative for flank pain.   Musculoskeletal: Negative for arthralgias, back pain, myalgias and neck pain.   Skin: Negative for pallor.   Neurological: Negative for dizziness, syncope, weakness, light-headedness and headaches.   Hematological: Does not bruise/bleed easily.   Psychiatric/Behavioral: Negative for confusion. The patient is not nervous/anxious.    All other systems reviewed and are negative.      Physical Exam     Initial Vitals [06/01/20 0030]   BP Pulse Resp Temp SpO2   113/84 (!) 145 17 97.9 °F (36.6 °C) 100 %      MAP       --         Physical Exam    Nursing note and vitals reviewed.  Constitutional: She appears well-developed and well-nourished. She is not diaphoretic. No distress.   HENT:   Head: Normocephalic and atraumatic.   Right Ear: External ear normal.   Left Ear: External ear normal.   Nose: Nose normal.   Mouth/Throat: Oropharynx is clear and moist.   Eyes: Conjunctivae and EOM are normal. Pupils are equal, round, and reactive to light.   Neck: Normal range of motion. Neck supple. No tracheal deviation present.   Cardiovascular: Normal rate, regular rhythm, normal heart sounds and intact distal pulses. Exam reveals no gallop and no friction rub.    No murmur heard.  Heart rate 149 narrow complex regular rhythm consistent with SVT  Blood pressure 113/84   Pulmonary/Chest: Breath sounds normal. No stridor. No respiratory distress. She has no wheezes. She has no rhonchi. She has no rales. She exhibits no tenderness.   Sats 100% on room air respirations 17 clear breath sounds no respiratory distress   Abdominal: Soft. Bowel sounds are normal. She exhibits no distension and no mass. There is no tenderness. There is no rebound and no guarding.   Musculoskeletal: Normal range of motion. She exhibits no edema.   Neurological: She is alert and oriented to person, place, and time. She has normal strength. No  cranial nerve deficit or sensory deficit.   Skin: Skin is warm and dry. No rash noted. No erythema. No pallor.   Psychiatric: She has a normal mood and affect. Her behavior is normal. Judgment and thought content normal.         ED Course   Procedures  Labs Reviewed   CBC W/ AUTO DIFFERENTIAL - Abnormal; Notable for the following components:       Result Value    Hemoglobin 16.3 (*)     Mean Corpuscular Volume 102 (*)     Mean Corpuscular Hemoglobin 35.7 (*)     RDW 11.4 (*)     All other components within normal limits   COMPREHENSIVE METABOLIC PANEL - Abnormal; Notable for the following components:    Sodium 134 (*)     CO2 20 (*)     Glucose 142 (*)     Total Bilirubin 1.5 (*)      (*)      (*)     All other components within normal limits   PROTIME-INR   TROPONIN I   TROPONIN I   SARS-COV-2 RNA AMPLIFICATION, QUAL     EKG Readings: (Independently Interpreted)   Rhythm: Supraventricular Tachycardia (SVT).   First EKG done at 12:33 a.m. SVT with a rate of 143 mild ST depressions in inferior and lateral leads no ST elevations.   Other EKG Interpretations: Repeat EKG after cardioversion at 12:46 a.m. normal sinus rhythm rate of 71 no ST changes no signs ischemia       Imaging Results    None          Medical Decision Making:   Independently Interpreted Test(s):   I have ordered and independently interpreted EKG Reading(s) - see prior notes  Clinical Tests:   Lab Tests: Ordered and Reviewed  The following lab test(s) were unremarkable: PT and Troponin       <> Summary of Lab: Hemoglobin 16.3  Sodium 134 bicarb 20 glucose 142    Medical Tests: Ordered and Reviewed  ED Management:  Very friendly 64-year-old female with history of hypertension and SVT who takes metoprolol 50 mg daily and last took a dose at 4:00 p.m. as well as valsartan 80 mg presents to the ER due to an episode of SVT for the past 8 hr.  Patient was unable to convert herself with vagal maneuvers at home.  She reports she has had to have  adenosine in the past.  Here are her rate was 149.  Patient had ischemia in inferior lateral leads likely rate dependent.  She denied any chest pain.  She did feel short of breath and fatigued.  Sats were 100%.  Patient was placed on cardiac monitor as well as defibrillation pads if needed.  A L of IV fluids was hung.  And patient was treated with 6 mg of adenosine.  Patient converted to normal sinus rhythm.  She developed chest heaviness afterward.  A troponin was added.  After several minutes of being in normal sinus rhythm with an EKG did not show any ischemic changes she converted back to SVT.  At this time I spoke with Dr. Colon and he agree with trying adenosine 6 mg again and then using metoprolol orally to help control her rhythm.  She was converted with a prolonged pause of more than 20 sec back into normal sinus rhythm.  She reports she no longer has chest heaviness.  But now feels generally weak.  She will be given metoprolol 25 mg orally.  Blood pressure is currently 149/71.  Due to the recurrent SVT only several minutes after conversion and her chest heaviness I believe would be best to observe the patient the rest of the evening to ensure she does not return into SVT.  I will consult Hospital Medicine for observation stay.  Hailey Mao M.D.  1:34 AM 6/1/2020    The rest the CMP has resulted AST is 383  bilirubin 1.5  Troponin was negative I have spoken with Hospital Medicine for admission  Hailey Mao M.D.  2:13 AM 6/1/2020      Other:   I have discussed this case with another health care provider.              Attending Attestation:         Attending Critical Care:   Critical Care Times:   Direct Patient Care (initial evaluation, reassessments, and time considering the case)................................................................10 minutes.   Additional History from reviewing old medical records or taking additional history from the family, EMS, PCP,  etc.......................5 minutes.   Ordering, Reviewing, and Interpreting Diagnostic Studies...............................................................................................................5 minutes.   Documentation..................................................................................................................................................................................10 minutes.   Consultation with other Physicians. .................................................................................................................................................5 minutes.   Consultation with the patient's family directly relating to the patient's condition, care, and DNR status (when patient unable)......0 minutes.   ==============================================================  · Total Critical Care Time - exclusive of procedural time: 35 minutes.  ==============================================================  Critical care was necessary to treat or prevent imminent or life-threatening deterioration of the following conditions: cardiac arrhythmia.   Critical care was time spent personally by me on the following activities: obtaining history from patient or relative, examination of patient, review of old charts, ordering lab, x-rays, and/or EKG, development of treatment plan with patient or relative, ordering and performing treatments and interventions, evaluation of patient's response to treatment, discussion with consultants, interpretation of cardiac measurements and re-evaluation of patient's conition.   Critical Care Condition: life-threatening                             Clinical Impression:       ICD-10-CM ICD-9-CM   1. SVT (supraventricular tachycardia) I47.1 427.89   2. Chest pain R07.9 786.50   3. Palpitations R00.2 785.1   4. Chest pain, unspecified type R07.9 786.50   5. SOB (shortness of breath) R06.02 786.05   6. Transaminitis R74.0 790.4             ED  Disposition Condition    Admit                           Hailey Mao MD  06/01/20 0139       Hailey Mao MD  06/01/20 0214

## 2020-06-01 NOTE — PLAN OF CARE
06/01/20 0802   PRE-TX-O2   O2 Device (Oxygen Therapy) room air   SpO2 98 %   Pulse Oximetry Type Continuous   $ Pulse Oximetry - Multiple Charge Pulse Oximetry - Multiple   Pulse 65   Resp (!) 35   Respiratory Evaluation   $ Care Plan Tech Time 15 min

## 2020-06-01 NOTE — H&P
[facility] History & Physical Examination   Patient Name: Suma Butterfield  MRN: [id number]  Patient Class: OP- Observation   Admission Date: 6/1/2020 12:27 AM  Length of Stay: 0  Attending Physician: Dao Miles MD  Primary Care Provider: Mnoo Ku MD  Face-to-Face encounter date: 06/01/2020  Code Status:  Full code  Chief Complaint: Palpitations (started around 5pm. Pt has hx of SVT. Pt is also sob)        Patient information was obtained from patient, past medical records and ER records.   HISTORY OF PRESENT ILLNESS:   Suma Butterfield is a 64 y.o. White female who  has a past medical history of Hypertension and SVT (supraventricular tachycardia).. The patient presented to [facility] on 6/1/2020 with a primary complaint of Palpitations (started around 5pm. Pt has hx of SVT. Pt is also sob)    History was obtained from the patient and ER physician Sign-out.  Patient presented to the hospital with a chief complaint of palpitations.  She has a history of supraventricular tachycardia in started noticing palpitations around 5:00 p.m..  She tried to control the with vagal maneuvers without improvement.  Her symptoms persisted for 8 hr.  Around midnight she decided to come to the emergency room for further evaluation.  In the emergency room she was found to be in narrow complex supraventricular tachycardia.  She was given 6 mg of adenosine.  She was successfully converted to normal sinus rhythm.  However after 10 min she converted back to SVT.  At that time the emergency physician called cardiologist Dr. Colon who advised to give her another dose of 6 mg adenosine.  Following that, patient had sinus pause for 15 sec with resumption of normal sinus rhythm after that.  I was called for further evaluation and possible admission.  At the time of my evaluation patient was completely asymptomatic.  She reports having 2 cups of coffee and a bare during the day.  Denies any  "energy drinks. No change in vision, hearing, headache, fever, cough, congestion, runny nose, chest pain, shortness of breath, , abdominal pain, diarrhea, constipation, vomiting, dysuria, hematuria, joint pain and back pain.     In the emergency room, patient CBC was unremarkable. CMP was unremarkable. BNP and troponin was negative. Reviewed EKG and does not show new ischemic changes.     Decision to admit was taken and patient was informed about the plan of care.   REVIEW OF SYSTEMS:   10 Point Review of System was performed and was found to be negative except for that mentioned already in the HPI above.     PAST MEDICAL HISTORY:     Past Medical History:   Diagnosis Date    Hypertension     SVT (supraventricular tachycardia)        PAST SURGICAL HISTORY:   History reviewed. No pertinent surgical history.    ALLERGIES:   Patient has no known allergies.    FAMILY HISTORY:     Family History   Problem Relation Age of Onset    Glaucoma Neg Hx     Macular degeneration Neg Hx     Retinal detachment Neg Hx        SOCIAL HISTORY:     Social History     Tobacco Use    Smoking status: Current Some Day Smoker     Types: Cigarettes    Smokeless tobacco: Never Used   Substance Use Topics    Alcohol use: Yes        Social History     Substance and Sexual Activity   Sexual Activity Yes    Partners: Male        HOME MEDICATIONS:     Prior to Admission medications    Medication Sig Start Date End Date Taking? Authorizing Provider   alendronate (FOSAMAX) 70 MG tablet TK 1 T PO 1 TIME Q WK 3/23/20  Yes Hector Colin PA-C   metoprolol succinate (TOPROL-XL) 50 MG 24 hr tablet Take 1 tablet (50 mg total) by mouth once daily. 3/23/20 6/21/20 Yes Hector Colin PA-C   valsartan (DIOVAN) 80 MG tablet Take 1 tablet (80 mg total) by mouth once daily. 3/6/20 6/4/20 Yes Hector Colin PA-C         PHYSICAL EXAM:   /70   Pulse 69   Temp 97.9 °F (36.6 °C) (Oral)   Resp 15   Ht 5' 2" (1.575 m)   Wt 57.6 kg (127 " lb)   SpO2 96%   BMI 23.23 kg/m²   Vitals Reviewed  General appearance: Well-developed, well-nourished White female in no apparent distress.  Skin: No Rash.   Neuro: Motor and sensory exams grossly intact. Good tone. Power in all 4 extremities 5/5.   HENT: Atraumatic head. Moist mucous membranes of oral cavity.  Eyes: Normal extraocular movements.   Neck: Supple. No evidence of lymphadenopathy. No thyroidomegaly.  Lungs: Clear to auscultation bilaterally. No wheezing present.   Heart: Regular rate and rhythm. S1 and S2 present with no murmurs/gallop/rub. No pedal edema. No JVD present.   Abdomen: Soft, non-distended, non-tender. No rebound tenderness/guarding. No masses or organomegaly. Bowel sounds are normal. Bladder is not palpable.   Extremities: No cyanosis, clubbing.  Psych/mental status: Alert and oriented. Cooperative. Responds appropriately to questions.   EMERGENCY DEPARTMENT LABS AND IMAGING:     Labs Reviewed   CBC W/ AUTO DIFFERENTIAL - Abnormal; Notable for the following components:       Result Value    Hemoglobin 16.3 (*)     Mean Corpuscular Volume 102 (*)     Mean Corpuscular Hemoglobin 35.7 (*)     RDW 11.4 (*)     All other components within normal limits   COMPREHENSIVE METABOLIC PANEL - Abnormal; Notable for the following components:    Sodium 134 (*)     CO2 20 (*)     Glucose 142 (*)     Total Bilirubin 1.5 (*)      (*)      (*)     All other components within normal limits   PROTIME-INR   TROPONIN I   TROPONIN I   SARS-COV-2 RNA AMPLIFICATION, QUAL   MAGNESIUM   LIPID PANEL       No orders to display       ASSESSMENT & PLAN:   Suma Butterfield is a 64 y.o. female admitted for    1. SVT: Continue metoprolol 25 mg BID. Consult cardiology. D/c in the morning after cardiology evaluation.    2. Hypertension: continue home medications.     DVT Prophylaxis: will be placed on SCD for DVT prophylaxis and will be advised to be as mobile as possible and sit in a chair as tolerated.    ________________________________________________________________________________    Discharge Planning and Disposition: No mobility needs. Ambulating well. Patient will be discharged in 1 day  Face-to-Face encounter date: 06/01/2020  Encounter included review of the medical records, interviewing and examining the patient face-to-face, discussion with family and other health care providers including emergency medicine physician, admission orders, interpreting lab/test results and formulating a plan of care.   Medical Decision Making during this encounter was  [_] Low Complexity  [x] Moderate Complexity  [ High Complexity  _________________________________________________________________________________    INPATIENT LIST OF MEDICATIONS     Current Facility-Administered Medications:     acetaminophen tablet 650 mg, 650 mg, Oral, Q4H PRN, Dao Miles MD    dextrose 50% injection 12.5 g, 12.5 g, Intravenous, PRN, Dao Miles MD    dextrose 50% injection 25 g, 25 g, Intravenous, PRN, Dao Miles MD    glucagon (human recombinant) injection 1 mg, 1 mg, Intramuscular, PRN, Dao Miles MD    glucose chewable tablet 16 g, 16 g, Oral, PRN, Dao Miles MD    glucose chewable tablet 24 g, 24 g, Oral, PRN, Dao Miles MD    HYDROcodone-acetaminophen 5-325 mg per tablet 1 tablet, 1 tablet, Oral, Q6H PRN, Dao Miles MD    melatonin tablet 9 mg, 9 mg, Oral, Nightly PRN, Dao Miles MD    metoprolol tartrate (LOPRESSOR) tablet 25 mg, 25 mg, Oral, BID, Hailey Mao MD, 25 mg at 06/01/20 0132    ondansetron disintegrating tablet 8 mg, 8 mg, Oral, Q8H PRN, Dao Miles MD    sodium chloride 0.9% flush 3 mL, 3 mL, Intravenous, Q6H PRN, Dao Miles MD    valsartan tablet 80 mg, 80 mg, Oral, Daily, Dao Miles MD    Current Outpatient Medications:     alendronate (FOSAMAX) 70 MG tablet, TK 1 T PO 1 TIME Q WK, Disp: 4 tablet, Rfl:  3    metoprolol succinate (TOPROL-XL) 50 MG 24 hr tablet, Take 1 tablet (50 mg total) by mouth once daily., Disp: 30 tablet, Rfl: 2    valsartan (DIOVAN) 80 MG tablet, Take 1 tablet (80 mg total) by mouth once daily., Disp: 30 tablet, Rfl: 2      Scheduled Meds:   metoprolol tartrate  25 mg Oral BID    valsartan  80 mg Oral Daily     Continuous Infusions:  PRN Meds:.acetaminophen, dextrose 50%, dextrose 50%, glucagon (human recombinant), glucose, glucose, HYDROcodone-acetaminophen, melatonin, ondansetron, sodium chloride 0.9%      Dao Miles  [facility] Hospitalist  06/01/2020

## 2020-06-06 NOTE — DISCHARGE SUMMARY
[facility]  Discharge Summary      Patient Name: Suma Butterfield  MRN: [id number]  Admission Date: 6/1/2020  Discharge Date and Time: 6/1/2020  2:16 PM  Attending Physician: Erlin Jo MD  Primary Care Provider: Mono Ku MD    HPI:   Suma Butterfield is a 64 y.o. White female who  has a past medical history of Hypertension and SVT (supraventricular tachycardia).. The patient presented to [facility] on 6/1/2020 with a primary complaint of Palpitations (started around 5pm. Pt has hx of SVT. Pt is also sob)  History was obtained from the patient and ER physician Sign-out.  Patient presented to the hospital with a chief complaint of palpitations.  She has a history of supraventricular tachycardia in started noticing palpitations around 5:00 p.m..  She tried to control the with vagal maneuvers without improvement.  Her symptoms persisted for 8 hr.  Around midnight she decided to come to the emergency room for further evaluation.  In the emergency room she was found to be in narrow complex supraventricular tachycardia.  She was given 6 mg of adenosine.  She was successfully converted to normal sinus rhythm.  However after 10 min she converted back to SVT.  At that time the emergency physician called cardiologist Dr. Colon who advised to give her another dose of 6 mg adenosine. Following that, patient had sinus pause for 15 sec with resumption of normal sinus rhythm after that.  I was called for further evaluation and possible admission.  At the time of my evaluation patient was completely asymptomatic.  She reports having 2 cups of coffee and a bare during the day.  Denies any energy drinks. No change in vision, hearing, headache, fever, cough, congestion, runny nose, chest pain, shortness of breath, , abdominal pain, diarrhea, constipation, vomiting, dysuria, hematuria, joint pain and back pain.   In the emergency room, patient CBC was unremarkable. CMP was  unremarkable. BNP and troponin was negative. Reviewed EKG and does not show new ischemic changes.   Decision to admit was taken and patient was informed about the plan of care.     Hospital course:  Patient was admitted to the hospital early in the morning of June 1st for supraventricular tachycardia.  The patient had improvement in symptoms rapidly and became aggravated with ongoing hospitalization.  The patient left against medical advice before being evaluated by Cardiology.      Consults:  Cardiology consult pending    Final Active Diagnoses:    Diagnosis Date Noted POA    SVT (supraventricular tachycardia) [I47.1] 06/01/2020 Yes      Problems Resolved During this Admission:     Discharged Condition: against medical advice    Disposition: Left Against Medical Adv*    Erlin Jo MD  Department of Hospital Medicine  [facility]

## 2020-06-10 ENCOUNTER — TELEPHONE (OUTPATIENT)
Dept: FAMILY MEDICINE | Facility: CLINIC | Age: 65
End: 2020-06-10

## 2020-06-10 DIAGNOSIS — I47.10 SVT (SUPRAVENTRICULAR TACHYCARDIA): Primary | ICD-10-CM

## 2020-06-18 ENCOUNTER — TELEPHONE (OUTPATIENT)
Dept: FAMILY MEDICINE | Facility: CLINIC | Age: 65
End: 2020-06-18

## 2020-06-18 NOTE — TELEPHONE ENCOUNTER
----- Message from Sylvia Marie sent at 6/18/2020  9:56 AM CDT -----  Regarding:  Referral  Contact: Suma Cotter  Pt would like to know if her  referral was approved for her Mammogram ?? She is scheduled to get her mammo done tomorrow. Please give the patient a call back .  Pt# [phone]

## 2020-06-18 NOTE — TELEPHONE ENCOUNTER
----- Message from Sylvia Marie sent at 6/18/2020  2:32 PM CDT -----  Regarding: Needs orders faxed  Contact: Jyoti martinez/ LACEY Montes needs orders faxed over to her on the patient for her Mammo 6 month follow up Her appt is tomorrow 9:45:  Fax# [phone] Luis.   Tel:# [phone]

## 2020-06-19 ENCOUNTER — TELEPHONE (OUTPATIENT)
Dept: FAMILY MEDICINE | Facility: CLINIC | Age: 65
End: 2020-06-19

## 2020-06-19 DIAGNOSIS — R92.8 ABNORMAL SCREENING MAMMOGRAM: Primary | ICD-10-CM

## 2020-06-23 DIAGNOSIS — I10 ESSENTIAL HYPERTENSION: ICD-10-CM

## 2020-06-23 RX ORDER — VALSARTAN 80 MG/1
80 TABLET ORAL DAILY
Qty: 30 TABLET | Refills: 0 | Status: SHIPPED | OUTPATIENT
Start: 2020-06-23 | End: 2020-07-13

## 2020-06-23 RX ORDER — METOPROLOL SUCCINATE 50 MG/1
50 TABLET, EXTENDED RELEASE ORAL DAILY
Qty: 30 TABLET | Refills: 2 | Status: CANCELLED | OUTPATIENT
Start: 2020-06-23 | End: 2020-09-21

## 2020-06-23 RX ORDER — VALSARTAN 80 MG/1
80 TABLET ORAL DAILY
Qty: 30 TABLET | Refills: 2 | Status: CANCELLED | OUTPATIENT
Start: 2020-06-23 | End: 2020-09-21

## 2020-06-23 RX ORDER — METOPROLOL SUCCINATE 50 MG/1
50 TABLET, EXTENDED RELEASE ORAL DAILY
Qty: 30 TABLET | Refills: 0 | Status: SHIPPED | OUTPATIENT
Start: 2020-06-23 | End: 2020-07-13 | Stop reason: SDUPTHER

## 2020-06-26 ENCOUNTER — TELEPHONE (OUTPATIENT)
Dept: FAMILY MEDICINE | Facility: CLINIC | Age: 65
End: 2020-06-26

## 2020-06-26 NOTE — TELEPHONE ENCOUNTER
----- Message from Sylvia Marie sent at 6/26/2020  9:55 AM CDT -----  Regarding: Needs new referral  Contact: Suma russo  Pt had a referral to Dr. Milian and she went to the appt. She found out the referral was only good for 1 month and she needs a new on going referral because she goes to see Dr. Parra often . The only reason she saw Dr. Milian was because Dr. FORD wasn't available.   Pt# [phone]

## 2020-07-13 ENCOUNTER — OFFICE VISIT (OUTPATIENT)
Dept: FAMILY MEDICINE | Facility: CLINIC | Age: 65
End: 2020-07-13
Payer: OTHER GOVERNMENT

## 2020-07-13 VITALS
WEIGHT: 126 LBS | BODY MASS INDEX: 23.19 KG/M2 | SYSTOLIC BLOOD PRESSURE: 126 MMHG | DIASTOLIC BLOOD PRESSURE: 82 MMHG | HEART RATE: 60 BPM | TEMPERATURE: 99 F | HEIGHT: 62 IN

## 2020-07-13 DIAGNOSIS — I10 ESSENTIAL HYPERTENSION: ICD-10-CM

## 2020-07-13 DIAGNOSIS — R74.8 ELEVATED LIVER ENZYMES: Primary | ICD-10-CM

## 2020-07-13 DIAGNOSIS — I47.10 SVT (SUPRAVENTRICULAR TACHYCARDIA): ICD-10-CM

## 2020-07-13 PROCEDURE — 99214 PR OFFICE/OUTPT VISIT, EST, LEVL IV, 30-39 MIN: ICD-10-PCS | Mod: S$GLB,,, | Performed by: PHYSICIAN ASSISTANT

## 2020-07-13 PROCEDURE — 99214 OFFICE O/P EST MOD 30 MIN: CPT | Mod: S$GLB,,, | Performed by: PHYSICIAN ASSISTANT

## 2020-07-13 RX ORDER — METOPROLOL SUCCINATE 50 MG/1
50 TABLET, EXTENDED RELEASE ORAL DAILY
Qty: 90 TABLET | Refills: 1 | Status: SHIPPED | OUTPATIENT
Start: 2020-07-13 | End: 2021-01-15 | Stop reason: SDUPTHER

## 2020-07-13 RX ORDER — VALSARTAN AND HYDROCHLOROTHIAZIDE 80; 12.5 MG/1; MG/1
TABLET, FILM COATED ORAL
COMMUNITY
Start: 2020-06-24 | End: 2020-07-13 | Stop reason: SDUPTHER

## 2020-07-13 RX ORDER — VALSARTAN AND HYDROCHLOROTHIAZIDE 80; 12.5 MG/1; MG/1
1 TABLET, FILM COATED ORAL DAILY
Qty: 90 TABLET | Refills: 1 | Status: SHIPPED | OUTPATIENT
Start: 2020-07-13 | End: 2021-03-03 | Stop reason: ALTCHOICE

## 2020-07-13 RX ORDER — ALENDRONATE SODIUM 70 MG/1
TABLET ORAL
Qty: 4 TABLET | Refills: 3 | Status: SHIPPED | OUTPATIENT
Start: 2020-07-13 | End: 2020-11-20 | Stop reason: SDUPTHER

## 2020-07-13 NOTE — PROGRESS NOTES
SUBJECTIVE:    Patient ID: Suma Butterfield is a 64 y.o. female.    Chief Complaint: Hypertension (3 month checkup//brought bottles tb )    64-year-old female presents today for checkup. Her pressure looks to be doing MUCH better. At goal today in clinic. Recent bout with SVT. Followed by DR. Milian. She was admitted and did require adenosine for conversion. Added valsartan/HCTZ to metoprolol XL. She thinks that we have good control of her pressure now and has not had any further palps/SOB.  Of note in the ER her liver enzymes had spiked from 60-70 to 400s.  Patient has a history of hepatitis C that has been treated several years back.      Admission on 06/01/2020, Discharged on 06/01/2020   Component Date Value Ref Range Status    WBC 06/01/2020 6.61  3.90 - 12.70 K/uL Final    RBC 06/01/2020 4.57  4.00 - 5.40 M/uL Final    Hemoglobin 06/01/2020 16.3* 12.0 - 16.0 g/dL Final    Hematocrit 06/01/2020 46.5  37.0 - 48.5 % Final    Mean Corpuscular Volume 06/01/2020 102* 82 - 98 fL Final    Mean Corpuscular Hemoglobin 06/01/2020 35.7* 27.0 - 31.0 pg Final    Mean Corpuscular Hemoglobin Conc 06/01/2020 35.1  32.0 - 36.0 g/dL Final    RDW 06/01/2020 11.4* 11.5 - 14.5 % Final    Platelets 06/01/2020 175  150 - 350 K/uL Final    MPV 06/01/2020 9.8  9.2 - 12.9 fL Final    Immature Granulocytes 06/01/2020 0.3  0.0 - 0.5 % Final    Gran # (ANC) 06/01/2020 3.0  1.8 - 7.7 K/uL Final    Immature Grans (Abs) 06/01/2020 0.02  0.00 - 0.04 K/uL Final    Lymph # 06/01/2020 2.8  1.0 - 4.8 K/uL Final    Mono # 06/01/2020 0.6  0.3 - 1.0 K/uL Final    Eos # 06/01/2020 0.1  0.0 - 0.5 K/uL Final    Baso # 06/01/2020 0.06  0.00 - 0.20 K/uL Final    nRBC 06/01/2020 0  0 /100 WBC Final    Gran% 06/01/2020 45.7  38.0 - 73.0 % Final    Lymph% 06/01/2020 42.2  18.0 - 48.0 % Final    Mono% 06/01/2020 8.8  4.0 - 15.0 % Final    Eosinophil% 06/01/2020 2.1  0.0 - 8.0 % Final    Basophil% 06/01/2020 0.9  0.0 - 1.9 % Final     Differential Method 06/01/2020 Automated   Final    Sodium 06/01/2020 134* 136 - 145 mmol/L Final    Potassium 06/01/2020 4.4  3.5 - 5.1 mmol/L Final    Chloride 06/01/2020 103  95 - 110 mmol/L Final    CO2 06/01/2020 20* 23 - 29 mmol/L Final    Glucose 06/01/2020 142* 70 - 110 mg/dL Final    BUN, Bld 06/01/2020 16  8 - 23 mg/dL Final    Creatinine 06/01/2020 0.7  0.5 - 1.4 mg/dL Final    Calcium 06/01/2020 9.2  8.7 - 10.5 mg/dL Final    Total Protein 06/01/2020 6.9  6.0 - 8.4 g/dL Final    Albumin 06/01/2020 4.1  3.5 - 5.2 g/dL Final    Total Bilirubin 06/01/2020 1.5* 0.1 - 1.0 mg/dL Final    Alkaline Phosphatase 06/01/2020 76  55 - 135 U/L Final    AST 06/01/2020 383* 10 - 40 U/L Final    ALT 06/01/2020 327* 10 - 44 U/L Final    Anion Gap 06/01/2020 11  8 - 16 mmol/L Final    eGFR if African American 06/01/2020 >60.0  >60 mL/min/1.73 m^2 Final    eGFR if non African American 06/01/2020 >60.0  >60 mL/min/1.73 m^2 Final    PT 06/01/2020 13.5  10.6 - 14.8 sec Final    INR 06/01/2020 1.1   Final    Troponin I 06/01/2020 <0.030  <=0.040 ng/mL Final    SARS-CoV-2 RNA, Amplification, Qual 06/01/2020 Negative  Negative Final    Magnesium 06/01/2020 1.9  1.6 - 2.6 mg/dL Final    Cholesterol 06/01/2020 194  120 - 199 mg/dL Final    Triglycerides 06/01/2020 75  30 - 150 mg/dL Final    HDL 06/01/2020 73  40 - 75 mg/dL Final    LDL Cholesterol 06/01/2020 106.0  63.0 - 159.0 mg/dL Final    Hdl/Cholesterol Ratio 06/01/2020 37.6  20.0 - 50.0 % Final    Total Cholesterol/HDL Ratio 06/01/2020 2.7  2.0 - 5.0 Final    Non-HDL Cholesterol 06/01/2020 121  mg/dL Final       Past Medical History:   Diagnosis Date    Hypertension     SVT (supraventricular tachycardia)      History reviewed. No pertinent surgical history.  Family History   Problem Relation Age of Onset    Glaucoma Neg Hx     Macular degeneration Neg Hx     Retinal detachment Neg Hx        Marital Status:   Alcohol History:  reports  "current alcohol use.  Tobacco History:  reports that she has been smoking cigarettes. She has never used smokeless tobacco.  Drug History:  reports no history of drug use.    Review of patient's allergies indicates:  No Known Allergies    Current Outpatient Medications:     alendronate (FOSAMAX) 70 MG tablet, TK 1 T PO 1 TIME Q WK, Disp: 4 tablet, Rfl: 3    metoprolol succinate (TOPROL-XL) 50 MG 24 hr tablet, Take 1 tablet (50 mg total) by mouth once daily., Disp: 90 tablet, Rfl: 1    valsartan-hydrochlorothiazide (DIOVAN-HCT) 80-12.5 mg per tablet, Take 1 tablet by mouth once daily., Disp: 90 tablet, Rfl: 1    Review of Systems   Constitutional: Negative for appetite change, chills, fatigue, fever and unexpected weight change.   HENT: Negative for congestion.    Respiratory: Negative for cough, chest tightness and shortness of breath.    Cardiovascular: Negative for chest pain and palpitations.   Gastrointestinal: Negative for abdominal distention and abdominal pain.   Endocrine: Negative for cold intolerance and heat intolerance.   Genitourinary: Negative for difficulty urinating and dysuria.   Musculoskeletal: Negative for arthralgias and back pain.   Neurological: Negative for dizziness, weakness and headaches.          Objective:      Vitals:    07/13/20 1547   BP: 126/82   Pulse: 60   Temp: 98.7 °F (37.1 °C)   Weight: 57.2 kg (126 lb)   Height: 5' 2" (1.575 m)     Physical Exam  Constitutional:       General: She is not in acute distress.     Appearance: She is well-developed.   HENT:      Head: Normocephalic and atraumatic.   Eyes:      Conjunctiva/sclera: Conjunctivae normal.      Pupils: Pupils are equal, round, and reactive to light.   Neck:      Musculoskeletal: Normal range of motion and neck supple.      Thyroid: No thyromegaly.   Cardiovascular:      Rate and Rhythm: Normal rate and regular rhythm.      Heart sounds: Normal heart sounds.   Pulmonary:      Effort: Pulmonary effort is normal.      " Breath sounds: Normal breath sounds.   Abdominal:      General: Bowel sounds are normal. There is no distension.      Palpations: Abdomen is soft.      Tenderness: There is no abdominal tenderness.   Musculoskeletal: Normal range of motion.   Skin:     General: Skin is warm and dry.      Findings: No erythema.   Neurological:      Mental Status: She is alert and oriented to person, place, and time.      Cranial Nerves: No cranial nerve deficit.           Assessment:       1. Elevated liver enzymes    2. Essential hypertension    3. SVT (supraventricular tachycardia)         Plan:       Elevated liver enzymes  Comments:  Significantly elevated from earlier this year.  Prior history of hepatitis.  Will check acute hepatitis panel.  Liver ultrasound.  Consider GI referral pend lab  Orders:  -     Hepatitis Panel, Acute; Future; Expected date: 07/13/2020  -     Comprehensive metabolic panel; Future; Expected date: 07/13/2020  -     US Abdomen Limited; Future; Expected date: 07/13/2020    Essential hypertension  Comments:  Very well controlled.  Continue as is with current regimen  Orders:  -     metoprolol succinate (TOPROL-XL) 50 MG 24 hr tablet; Take 1 tablet (50 mg total) by mouth once daily.  Dispense: 90 tablet; Refill: 1  -     valsartan-hydrochlorothiazide (DIOVAN-HCT) 80-12.5 mg per tablet; Take 1 tablet by mouth once daily.  Dispense: 90 tablet; Refill: 1    SVT (supraventricular tachycardia)  -     metoprolol succinate (TOPROL-XL) 50 MG 24 hr tablet; Take 1 tablet (50 mg total) by mouth once daily.  Dispense: 90 tablet; Refill: 1    Other orders  -     alendronate (FOSAMAX) 70 MG tablet; TK 1 T PO 1 TIME Q WK  Dispense: 4 tablet; Refill: 3      Follow up in about 4 weeks (around 8/10/2020) for f/u liver.        7/13/2020 Hector Colin PA-C

## 2020-07-15 ENCOUNTER — TELEPHONE (OUTPATIENT)
Dept: FAMILY MEDICINE | Facility: CLINIC | Age: 65
End: 2020-07-15

## 2020-07-15 ENCOUNTER — TELEPHONE (OUTPATIENT)
Dept: OPHTHALMOLOGY | Facility: CLINIC | Age: 65
End: 2020-07-15

## 2020-07-15 DIAGNOSIS — Z01.00 EYE EXAM, ROUTINE: Primary | ICD-10-CM

## 2020-07-15 NOTE — TELEPHONE ENCOUNTER
----- Message from Mely Higginbotham sent at 7/15/2020  2:51 PM CDT -----  Regarding: FW: referral request  Contact: PT  Can someone please put a referral in for Dr Mendoza. Pts insurance requires one. Thanks.   ----- Message -----  From: Noemi Mosley  Sent: 7/15/2020   1:36 PM CDT  To: Kelly Goodrich Staff  Subject: referral request                                 Hugo insructed the patient to get a referral sent in from this office to the patient PCP Dr Mono Ku..    Any additional questions, feel free to reach out.

## 2020-07-15 NOTE — TELEPHONE ENCOUNTER
----- Message from Noemi Mosley sent at 7/15/2020  1:36 PM CDT -----  Regarding: referral request  Contact: TEDDY Arias insructed the patient to get a referral sent in from this office to the patient PCP Dr Mono Ku..    Any additional questions, feel free to reach out.

## 2020-07-18 ENCOUNTER — HOSPITAL ENCOUNTER (OUTPATIENT)
Dept: RADIOLOGY | Facility: HOSPITAL | Age: 65
Discharge: HOME | End: 2020-07-18
Attending: PHYSICIAN ASSISTANT
Payer: OTHER GOVERNMENT

## 2020-07-18 DIAGNOSIS — R74.8 ELEVATED LIVER ENZYMES: ICD-10-CM

## 2020-07-18 PROCEDURE — 76705 ECHO EXAM OF ABDOMEN: CPT | Mod: TC

## 2020-07-20 ENCOUNTER — TELEPHONE (OUTPATIENT)
Dept: FAMILY MEDICINE | Facility: CLINIC | Age: 65
End: 2020-07-20

## 2020-07-20 NOTE — TELEPHONE ENCOUNTER
----- Message from Hector Colin PA-C sent at 7/19/2020 12:05 PM CDT -----  There is a small area in the liver near gallbladder that is appearing slightly abnormal. Radiology wants to recheck ultrasound in 3 mos to make sure benign and nothing we need to worry with. Lets set remind me for 3 mos to recheck u/s.

## 2020-07-20 NOTE — PROGRESS NOTES
Spoke to patient with results verbatim per Andrea. Verbalized understanding to recheck ultrasound in 3 months. Remind me created.

## 2020-07-21 LAB
ALBUMIN SERPL-MCNC: 4.5 G/DL (ref 3.6–5.1)
ALBUMIN/GLOB SERPL: 1.6 (CALC) (ref 1–2.5)
ALP SERPL-CCNC: 64 U/L (ref 37–153)
ALT SERPL-CCNC: 112 U/L (ref 6–29)
AST SERPL-CCNC: 93 U/L (ref 10–35)
BILIRUB SERPL-MCNC: 1 MG/DL (ref 0.2–1.2)
BUN SERPL-MCNC: 16 MG/DL (ref 7–25)
BUN/CREAT SERPL: ABNORMAL (CALC) (ref 6–22)
CALCIUM SERPL-MCNC: 9.8 MG/DL (ref 8.6–10.4)
CHLORIDE SERPL-SCNC: 101 MMOL/L (ref 98–110)
CO2 SERPL-SCNC: 28 MMOL/L (ref 20–32)
COMMENT: ABNORMAL
CREAT SERPL-MCNC: 0.71 MG/DL (ref 0.5–0.99)
GFRSERPLBLD MDRD-ARVRAT: 91 ML/MIN/1.73M2
GLOBULIN SER CALC-MCNC: 2.8 G/DL (CALC) (ref 1.9–3.7)
GLUCOSE SERPL-MCNC: 108 MG/DL (ref 65–99)
HAV IGM SERPL QL IA: ABNORMAL
HBV CORE IGM SERPL QL IA: ABNORMAL
HBV SURFACE AG SERPL QL IA: ABNORMAL
HCV AB S/CO SERPL IA: 23.2
HCV AB SERPL QL IA: REACTIVE
HCV RNA SERPL NAA+PROBE-ACNC: ABNORMAL IU/ML
HCV RNA SERPL NAA+PROBE-LOG IU: ABNORMAL LOG IU/ML
POTASSIUM SERPL-SCNC: 4.4 MMOL/L (ref 3.5–5.3)
PROT SERPL-MCNC: 7.3 G/DL (ref 6.1–8.1)
SODIUM SERPL-SCNC: 137 MMOL/L (ref 135–146)

## 2020-07-22 ENCOUNTER — TELEPHONE (OUTPATIENT)
Dept: FAMILY MEDICINE | Facility: CLINIC | Age: 65
End: 2020-07-22

## 2020-08-04 ENCOUNTER — TELEPHONE (OUTPATIENT)
Dept: FAMILY MEDICINE | Facility: CLINIC | Age: 65
End: 2020-08-04

## 2020-08-04 NOTE — TELEPHONE ENCOUNTER
Spoke to pt regarding her appt on 8/11. Offered virtual visit pt agreed. I was able to switch pt to a virtual visit. Pt stated she will download the [employer] juan carlos. Walked pt through the steps on download the juan carlos and setting it up to do a virtual visit. Pt stated if she has any trouble with the appt that she would give us a call

## 2020-08-11 ENCOUNTER — OFFICE VISIT (OUTPATIENT)
Dept: FAMILY MEDICINE | Facility: CLINIC | Age: 65
End: 2020-08-11
Payer: OTHER GOVERNMENT

## 2020-08-11 VITALS
HEIGHT: 62 IN | BODY MASS INDEX: 23.37 KG/M2 | WEIGHT: 127 LBS | SYSTOLIC BLOOD PRESSURE: 127 MMHG | DIASTOLIC BLOOD PRESSURE: 78 MMHG

## 2020-08-11 DIAGNOSIS — F17.210 CIGARETTE NICOTINE DEPENDENCE WITHOUT COMPLICATION: ICD-10-CM

## 2020-08-11 DIAGNOSIS — R74.8 ELEVATED LIVER ENZYMES: Primary | ICD-10-CM

## 2020-08-11 PROCEDURE — 99213 PR OFFICE/OUTPT VISIT, EST, LEVL III, 20-29 MIN: ICD-10-PCS | Mod: 95,,, | Performed by: PHYSICIAN ASSISTANT

## 2020-08-11 PROCEDURE — 99213 OFFICE O/P EST LOW 20 MIN: CPT | Mod: 95,,, | Performed by: PHYSICIAN ASSISTANT

## 2020-08-11 RX ORDER — VARENICLINE TARTRATE 0.5 (11)-1
KIT ORAL
Qty: 1 PACKAGE | Refills: 0 | Status: SHIPPED | OUTPATIENT
Start: 2020-08-11 | End: 2020-11-20

## 2020-08-11 NOTE — PATIENT INSTRUCTIONS
How to Quit Smoking  Smoking is one of the hardest habits to break. About half of all people who have ever smoked have been able to quit. Most people who still smoke want to quit. Here are some of the best ways to stop smoking.    Keep trying  Most smokers make many attempts at quitting before they are successful. Its important not to give up.  Go cold turkey  Most former smokers quit cold turkey (all at once). Trying to cut back gradually doesn't seem to work as well, perhaps because it continues the smoking habit. Also, it is possible to inhale more while smoking fewer cigarettes. This results in the same amount of nicotine in your body.  Get support  Support programs can be a big help, especially for heavy smokers. These groups offer lectures, ways to change behavior, and peer support. Here are some ways to find a support program:  · Free national quitline: 800-QUIT-NOW (334-780-6821).  · Hospital quit-smoking programs.  · American Lung Association: (429.227.1730).  · American Cancer Society (017-147-1592).  Support at home is important too. Nonsmokers can offer praise and encouragement. If the smoker in your life finds it hard to quit, encourage them to keep trying.  Over-the-counter medicines  Nicotine replacement therapy may make quitting easier. Certain aids, such as the nicotine patch, gum, and lozenges, are available without a prescription. It is best to use these under a doctors care, though. The skin patch provides a steady supply of nicotine. Nicotine gum and lozenges give temporary bursts of low levels of nicotine. Both methods reduce the craving for cigarettes. Warning: If you have nausea, vomiting, dizziness, weakness, or a fast heartbeat, stop using these products and see your doctor.  Prescription medicines  After reviewing your smoking patterns and past attempts to quit, your doctor may offer a prescription medicine such as bupropion, varenicline, a nicotine inhaler, or nasal spray. Each has  "advantages and side effects. Your doctor can review these with you.  Health benefits of quitting  The benefits of quitting start right away and keep improving the longer you go without smoking. These benefits occur at any age.  So whether you are 17 or 70, quitting is a good decision. Some of the benefits include:  · 20 minutes: Blood pressure and pulse return to normal.  · 8 hours: Oxygen levels return to normal.  · 2 days: Ability to smell and taste begin to improve as damaged nerves regrow.  · 2 to 3 weeks: Circulation and lung function improve.  · 1 to 9 months: Coughing, congestion, and shortness of breath decrease; tiredness decreases.  · 1 year: Risk of heart attack decreases by half.  · 5 years: Risk of lung cancer decreases by half; risk of stroke becomes the same as a nonsmokers.  For more on how to quit smoking, try these online resources:   · Smokefree.gov  · "Clearing the Air" booklet from the National Cancer Salol: smokefree.gov/sites/default/files/pdf/clearing-the-air-accessible.pdf  Date Last Reviewed: 3/1/2017  © 4909-3470 [employer]. [address] [place] [address]. All rights reserved. This information is not intended as a substitute for professional medical care. Always follow your healthcare professional's instructions.        "

## 2020-08-11 NOTE — PROGRESS NOTES
Subjective:        The chief complaint leading to consultation is:  Follow-up  The patient location is:  Home  Visit type: Virtual visit with synchronous audio/video or audio only  This was a video visit in lieu of in-person visit due to the coronavirus emergency. Patient acknowledged and consented to the video visit encounter.     65-year-old female presents today for 4 week follow-up after having noticed transaminase elevation.  Her AST/ALT numbers have reduced significantly in the past month.  Liver ultrasound revealed fatty infiltrate.  As well as a focal abnormality near the gallbladder.  This is going to be recheck on ultrasound in a couple months.  Her hepatitis panel was positive for prior hep C infection.  There is no active hepatitis C.  She has previously been treated.  She says that she has been doing pretty well, staying isolated at home with her son.  She is aware that we need to recheck an ultrasound in a couple months for the liver.  She also reports that she would like to quit smoking again.  She has used Chantix in the past with good effect.  Aware of side effect of vivid dreams..      History reviewed. No pertinent surgical history.  Past Medical History:   Diagnosis Date    Hypertension     SVT (supraventricular tachycardia)      Family History   Problem Relation Age of Onset    Glaucoma Neg Hx     Macular degeneration Neg Hx     Retinal detachment Neg Hx         Social History:   Marital Status:   Alcohol History:  reports current alcohol use.  Tobacco History:  reports that she has been smoking cigarettes. She has never used smokeless tobacco.  Drug History:  reports no history of drug use.    Review of patient's allergies indicates:  No Known Allergies    Current Outpatient Medications   Medication Sig Dispense Refill    alendronate (FOSAMAX) 70 MG tablet TK 1 T PO 1 TIME Q WK 4 tablet 3    metoprolol succinate (TOPROL-XL) 50 MG 24 hr tablet Take 1 tablet (50 mg total) by  mouth once daily. 90 tablet 1    valsartan-hydrochlorothiazide (DIOVAN-HCT) 80-12.5 mg per tablet Take 1 tablet by mouth once daily. 90 tablet 1    varenicline (CHANTIX STARTING MONTH BOX) 0.5 mg (11)- 1 mg (42) tablet Take one 0.5mg tab by mouth once daily X3 days,then increase to one 0.5mg tab twice daily X4 days,then increase to one 1mg tab twice daily 1 Package 0     No current facility-administered medications for this visit.        Review of Systems   Constitutional: Negative for activity change and unexpected weight change.   HENT: Negative for hearing loss, rhinorrhea and trouble swallowing.    Eyes: Negative for discharge and visual disturbance.   Respiratory: Negative for chest tightness and wheezing.    Cardiovascular: Negative for chest pain and palpitations.   Gastrointestinal: Negative for blood in stool, constipation, diarrhea and vomiting.   Endocrine: Negative for polydipsia and polyuria.   Genitourinary: Negative for difficulty urinating, dysuria, hematuria and menstrual problem.   Musculoskeletal: Negative for arthralgias, joint swelling and neck pain.   Neurological: Positive for headaches. Negative for weakness.   Psychiatric/Behavioral: Negative for confusion and dysphoric mood.         Objective:        Physical Exam:   Physical Exam  Constitutional:       General: She is not in acute distress.     Appearance: She is well-developed.   HENT:      Head: Normocephalic and atraumatic.   Eyes:      Conjunctiva/sclera: Conjunctivae normal.      Pupils: Pupils are equal, round, and reactive to light.   Neck:      Thyroid: No thyromegaly.   Pulmonary:      Effort: Pulmonary effort is normal.   Abdominal:      General: There is no distension.      Palpations: Abdomen is soft.      Tenderness: There is no abdominal tenderness.   Musculoskeletal: Normal range of motion.   Skin:     General: Skin is warm and dry.      Findings: No erythema.   Neurological:      Mental Status: She is alert and oriented  to person, place, and time.      Cranial Nerves: No cranial nerve deficit.              Assessment:       1. Elevated liver enzymes    2. Cigarette nicotine dependence without complication      Plan:   Elevated liver enzymes  Comments:  Liver enzymes greatly improved from previous numbers.  Will recheck again in a couple months.  Also recheck liver ultrasound    Cigarette nicotine dependence without complication  Comments:  Patient wishes to start back on Chantix.  Will let me know how she does and will refill prescription in 3 weeks  Orders:  -     varenicline (CHANTIX STARTING MONTH BOX) 0.5 mg (11)- 1 mg (42) tablet; Take one 0.5mg tab by mouth once daily X3 days,then increase to one 0.5mg tab twice daily X4 days,then increase to one 1mg tab twice daily  Dispense: 1 Package; Refill: 0      Follow up in about 3 months (around 11/11/2020) for checkup.    Total time spent with patient: 16min    Each patient to whom he or she provides medical services by telemedicine is:  (1) informed of the relationship between the physician and patient and the respective role of any other health care provider with respect to management of the patient; and (2) notified that he or she may decline to receive medical services by telemedicine and may withdraw from such care at any time.    This note was created using [employer] voice recognition software that occasionally misinterprets phrases or words.

## 2020-08-17 ENCOUNTER — TELEPHONE (OUTPATIENT)
Dept: FAMILY MEDICINE | Facility: CLINIC | Age: 65
End: 2020-08-17

## 2020-08-18 NOTE — TELEPHONE ENCOUNTER
I would let the patient know that her insurance excludes Chantix.  One way she would be able to qualify for it would be to set up the smoking cessation trust.  She can go on line at [email or web address] and start the process if she wants.

## 2020-08-27 ENCOUNTER — OFFICE VISIT (OUTPATIENT)
Dept: OPTOMETRY | Facility: CLINIC | Age: 65
End: 2020-08-27
Payer: OTHER GOVERNMENT

## 2020-08-27 DIAGNOSIS — H52.7 REFRACTIVE ERROR: ICD-10-CM

## 2020-08-27 DIAGNOSIS — G43.109 OCULAR MIGRAINE: ICD-10-CM

## 2020-08-27 DIAGNOSIS — H25.13 NUCLEAR SCLEROSIS, BILATERAL: ICD-10-CM

## 2020-08-27 DIAGNOSIS — Z01.00 EYE EXAM, ROUTINE: Primary | ICD-10-CM

## 2020-08-27 PROCEDURE — 92015 DETERMINE REFRACTIVE STATE: CPT | Mod: ,,, | Performed by: OPTOMETRIST

## 2020-08-27 PROCEDURE — 99213 OFFICE O/P EST LOW 20 MIN: CPT | Mod: PBBFAC,PO | Performed by: OPTOMETRIST

## 2020-08-27 PROCEDURE — 99999 PR PBB SHADOW E&M-EST. PATIENT-LVL III: ICD-10-PCS | Mod: PBBFAC,,, | Performed by: OPTOMETRIST

## 2020-08-27 PROCEDURE — 92014 PR EYE EXAM, EST PATIENT,COMPREHESV: ICD-10-PCS | Mod: S$PBB,,, | Performed by: OPTOMETRIST

## 2020-08-27 PROCEDURE — 92014 COMPRE OPH EXAM EST PT 1/>: CPT | Mod: S$PBB,,, | Performed by: OPTOMETRIST

## 2020-08-27 PROCEDURE — 92015 PR REFRACTION: ICD-10-PCS | Mod: ,,, | Performed by: OPTOMETRIST

## 2020-08-27 PROCEDURE — 99999 PR PBB SHADOW E&M-EST. PATIENT-LVL III: CPT | Mod: PBBFAC,,, | Performed by: OPTOMETRIST

## 2020-08-27 NOTE — PROGRESS NOTES
HPI     Annual Exam      Additional comments: DLE 12-18 ([facility])   ocular health exam // pt   declines refraction              Blurred Vision      Additional comments: at near only, happy w/ OTC readers --- distance [facility]   good              Headache      Additional comments: ocular migraines w/ auras -- usually on outer left   peripheral          Last edited by Delia Hernandez on 8/27/2020  3:31 PM. (History)            Assessment /Plan     For exam results, see Encounter Report.    Eye exam, routine  -     Ambulatory referral/consult to Optometry    Nuclear sclerosis, bilateral    Refractive error    Ocular migraine      Mild to moderate NS OU. Not yet significant. Discussed possible ocular affects of cataracts. Acceptable BCVA OU. Discussed treatment options. Surgery not recommended at this time. Monitor yearly.     Dispensed updated spectacle Rx. Discussed various spectacle lens options. Discussed adaptation period to new specs.  Demonstrated new spec Rx vs uncorrected vision in phoropter with patient satisfaction.    Ocular migraine, recommend keeping journal. Return if worsening or any loss of vision lasting more than 30 min to an hour. Retina flat, intact, no holes, tears, breaks, RD.       RTC in 1 year for comprehensive eye exam, or sooner prn.

## 2020-08-27 NOTE — LETTER
August 27, 2020      Hector Colin PA-C  [address] [facility]  [address]  [place] [address]           [facility] - Optometry  [address] [place] [address] [address]  [place] [address]  Phone: [phone]          Patient: Suma Butterfield   MR Number: [id number]   YOB: 1955   Date of Visit: 8/27/2020       Dear Hector Colin:    Thank you for referring Suma Butterfield to me for evaluation. Attached you will find relevant portions of my assessment and plan of care.    If you have questions, please do not hesitate to call me. I look forward to following Suma Butterfield along with you.    Sincerely,    Kp Mendoza, OD    Enclosure  CC:  No Recipients    If you would like to receive this communication electronically, please contact [email or web address] or [phone] to request more information on [employer] Link access.    For providers and/or their staff who would like to refer a patient to [facility], please contact us through our one-stop-shop provider referral line, [facility], at [phone].    If you feel you have received this communication in error or would no longer like to receive these types of communications, please e-mail [email or web address]

## 2020-10-13 ENCOUNTER — TELEPHONE (OUTPATIENT)
Dept: FAMILY MEDICINE | Facility: CLINIC | Age: 65
End: 2020-10-13

## 2020-10-13 DIAGNOSIS — R93.5 ABNORMAL ABDOMINAL ULTRASOUND: Primary | ICD-10-CM

## 2020-10-13 NOTE — TELEPHONE ENCOUNTER
LMOR that repeat abdominal ultrasound is due and this is 3 month follow-up. Asked her to call with any questions. Order pended. Updated remind me.

## 2020-10-13 NOTE — TELEPHONE ENCOUNTER
----- Message from [facility], RT sent at 7/20/2020  1:00 PM CDT -----  Regarding: Ultrasound due  Hector Colin PA-C   7/19/2020 12:05 PM    There is a small area in the liver near gallbladder that is appearing slightly abnormal. Radiology wants to recheck ultrasound in 3 mos to make sure benign and nothing we need to worry with. Lets set remind me for 3 mos to recheck u/s.

## 2020-10-27 ENCOUNTER — TELEPHONE (OUTPATIENT)
Dept: FAMILY MEDICINE | Facility: CLINIC | Age: 65
End: 2020-10-27

## 2020-10-27 NOTE — TELEPHONE ENCOUNTER
Pt scheduled 10/29. I did not realize this before creating phone call. I just noticed it wasn't done yet. Updated remind me.

## 2020-10-27 NOTE — TELEPHONE ENCOUNTER
----- Message from [facility], RT sent at 7/20/2020  1:00 PM CDT -----  Regarding: Ultrasound due  Hector Colin PA-C   7/19/2020 12:05 PM    There is a small area in the liver near gallbladder that is appearing slightly abnormal. Radiology wants to recheck ultrasound in 3 mos to make sure benign and nothing we need to worry with. Lets set remind me for 3 mos to recheck u/s.

## 2020-11-04 ENCOUNTER — HOSPITAL ENCOUNTER (OUTPATIENT)
Dept: RADIOLOGY | Facility: HOSPITAL | Age: 65
Discharge: HOME | End: 2020-11-04
Attending: PHYSICIAN ASSISTANT
Payer: OTHER GOVERNMENT

## 2020-11-04 DIAGNOSIS — R93.5 ABNORMAL ABDOMINAL ULTRASOUND: ICD-10-CM

## 2020-11-04 PROCEDURE — 76705 ECHO EXAM OF ABDOMEN: CPT | Mod: TC,PO

## 2020-11-05 LAB
ALBUMIN SERPL-MCNC: 4.4 G/DL (ref 3.6–5.1)
ALBUMIN/GLOB SERPL: 1.8 (CALC) (ref 1–2.5)
ALP SERPL-CCNC: 57 U/L (ref 37–153)
ALT SERPL-CCNC: 78 U/L (ref 6–29)
AST SERPL-CCNC: 68 U/L (ref 10–35)
BILIRUB SERPL-MCNC: 0.5 MG/DL (ref 0.2–1.2)
BUN SERPL-MCNC: 14 MG/DL (ref 7–25)
BUN/CREAT SERPL: ABNORMAL (CALC) (ref 6–22)
CALCIUM SERPL-MCNC: 9.8 MG/DL (ref 8.6–10.4)
CHLORIDE SERPL-SCNC: 101 MMOL/L (ref 98–110)
CO2 SERPL-SCNC: 31 MMOL/L (ref 20–32)
CREAT SERPL-MCNC: 0.69 MG/DL (ref 0.5–0.99)
GFRSERPLBLD MDRD-ARVRAT: 93 ML/MIN/1.73M2
GLOBULIN SER CALC-MCNC: 2.4 G/DL (CALC) (ref 1.9–3.7)
GLUCOSE SERPL-MCNC: 103 MG/DL (ref 65–99)
POTASSIUM SERPL-SCNC: 4.4 MMOL/L (ref 3.5–5.3)
PROT SERPL-MCNC: 6.8 G/DL (ref 6.1–8.1)
SODIUM SERPL-SCNC: 138 MMOL/L (ref 135–146)

## 2020-11-12 ENCOUNTER — CLINICAL SUPPORT (OUTPATIENT)
Dept: FAMILY MEDICINE | Facility: CLINIC | Age: 65
End: 2020-11-12
Payer: OTHER GOVERNMENT

## 2020-11-12 DIAGNOSIS — Z23 NEED FOR INFLUENZA VACCINATION: Primary | ICD-10-CM

## 2020-11-12 PROCEDURE — 90682 RIV4 VACC RECOMBINANT DNA IM: CPT | Mod: S$GLB,,, | Performed by: PHYSICIAN ASSISTANT

## 2020-11-12 PROCEDURE — 90471 IMMUNIZATION ADMIN: CPT | Mod: S$GLB,,, | Performed by: PHYSICIAN ASSISTANT

## 2020-11-12 PROCEDURE — 90682 FLU VACCINE - QUADRIVALENT (RECOMBINANT) PRESERVATIVE FREE: ICD-10-PCS | Mod: S$GLB,,, | Performed by: PHYSICIAN ASSISTANT

## 2020-11-12 PROCEDURE — 90471 FLU VACCINE - QUADRIVALENT (RECOMBINANT) PRESERVATIVE FREE: ICD-10-PCS | Mod: S$GLB,,, | Performed by: PHYSICIAN ASSISTANT

## 2020-11-20 ENCOUNTER — OFFICE VISIT (OUTPATIENT)
Dept: FAMILY MEDICINE | Facility: CLINIC | Age: 65
End: 2020-11-20
Payer: OTHER GOVERNMENT

## 2020-11-20 VITALS
WEIGHT: 129 LBS | HEART RATE: 72 BPM | SYSTOLIC BLOOD PRESSURE: 122 MMHG | DIASTOLIC BLOOD PRESSURE: 82 MMHG | HEIGHT: 62 IN | BODY MASS INDEX: 23.74 KG/M2

## 2020-11-20 DIAGNOSIS — M81.0 AGE RELATED OSTEOPOROSIS, UNSPECIFIED PATHOLOGICAL FRACTURE PRESENCE: ICD-10-CM

## 2020-11-20 DIAGNOSIS — K76.0 FATTY LIVER: ICD-10-CM

## 2020-11-20 DIAGNOSIS — I10 ESSENTIAL HYPERTENSION: Primary | ICD-10-CM

## 2020-11-20 PROCEDURE — 99213 PR OFFICE/OUTPT VISIT, EST, LEVL III, 20-29 MIN: ICD-10-PCS | Mod: S$GLB,,, | Performed by: PHYSICIAN ASSISTANT

## 2020-11-20 PROCEDURE — 99213 OFFICE O/P EST LOW 20 MIN: CPT | Mod: S$GLB,,, | Performed by: PHYSICIAN ASSISTANT

## 2020-11-20 RX ORDER — ALENDRONATE SODIUM 70 MG/1
TABLET ORAL
Qty: 12 TABLET | Refills: 3 | Status: SHIPPED | OUTPATIENT
Start: 2020-11-20 | End: 2021-05-21 | Stop reason: SDUPTHER

## 2020-11-20 NOTE — PATIENT INSTRUCTIONS
Exercise for a Healthier Heart  You may wonder how you can improve the health of your heart. If youre thinking about exercise, youre on the right track. You dont need to become an athlete, but you do need a certain amount of brisk exercise to help strengthen your heart. If you have been diagnosed with a heart condition, your doctor may recommend exercise to help stabilize your condition. To help make exercise a habit, choose safe, fun activities.     Exercise with a friend. When activity is fun, you're more likely to stick with it.     Be sure to check with your healthcare provider before starting an exercise program.   Why exercise?  Exercising regularly offers many healthy rewards. It can help you do all of the following:  · Improve your blood cholesterol level to help prevent further heart trouble  · Lower your blood pressure to help prevent a stroke or heart attack  · Control diabetes, or reduce your risk of getting this disease  · Improve your heart and lung function  · Reach and maintain a healthy weight  · Make your muscles stronger and more limber so you can stay active  · Prevent falls and fractures by slowing the loss of bone mass (osteoporosis)  · Manage stress better  · Reduce your blood pressure  · Improve your sense of self and your body image  Exercise tips  Ease into your routine. Set small goals. Then build on them.  Exercise on most days. Aim for a total of 150 or more minutes of moderate to  vigorous intensity activity each week. Consider 40 minutes, 3 to 4 times a week. For best results, activity should last for 40 minutes on average. It is OK to work up to the 40 minute period over time. Examples of moderate-intensity activity is walking 1 mile in 15 minutes or 30 to 45 minutes of yard work.  Step up your daily activity level. Along with your exercise program, try being more active throughout the day. Walk instead of drive. Do more household tasks or yard work.  Choose one or more  activities you enjoy. Walking is one of the easiest things you can do. You can also try swimming, riding a bike, dancing, or taking an exercise class.  Stop exercising and call your doctor if you:  · Have chest pain or feel dizzy or lightheaded  · Feel burning, tightness, pressure, or heaviness in your chest, neck, shoulders, back, or arms  · Have unusual shortness of breath  · Have increased joint or muscle pain  · Have palpitations or an irregular heartbeat   Date Last Reviewed: 5/1/2016  © 6125-6144 [employer]. [address] [place] [address]. All rights reserved. This information is not intended as a substitute for professional medical care. Always follow your healthcare professional's instructions.

## 2020-11-20 NOTE — PROGRESS NOTES
SUBJECTIVE:    Patient ID: Suma Butterfield is a 65 y.o. female.    Chief Complaint: Follow-up (no bottles, no refills p/Pt-DJ)    This is a 65-year-old female who presents today for regular follow-up.  She reports that she has been doing better with regard to diet, alcohol use.  I did recheck a right upper quadrant ultrasound.  There has been no change in her fatty liver.  And the mild prominence at the gallbladder was unchanged as well. Says that she has been taking more milk thistle as well. NO new concerns at this time.      Orders Only on 11/04/2020   Component Date Value Ref Range Status    Glucose 11/04/2020 103* 65 - 99 mg/dL Final    BUN 11/04/2020 14  7 - 25 mg/dL Final    Creatinine 11/04/2020 0.69  0.50 - 0.99 mg/dL Final    eGFR if non African American 11/04/2020 93  > OR = 60 mL/min/1.73m2 Final    eGFR if African American 11/04/2020 107  > OR = 60 mL/min/1.73m2 Final    BUN/Creatinine Ratio 11/04/2020 NOT APPLICABLE  6 - 22 (calc) Final    Sodium 11/04/2020 138  135 - 146 mmol/L Final    Potassium 11/04/2020 4.4  3.5 - 5.3 mmol/L Final    Chloride 11/04/2020 101  98 - 110 mmol/L Final    CO2 11/04/2020 31  20 - 32 mmol/L Final    Calcium 11/04/2020 9.8  8.6 - 10.4 mg/dL Final    Total Protein 11/04/2020 6.8  6.1 - 8.1 g/dL Final    Albumin 11/04/2020 4.4  3.6 - 5.1 g/dL Final    Globulin, Total 11/04/2020 2.4  1.9 - 3.7 g/dL (calc) Final    Albumin/Globulin Ratio 11/04/2020 1.8  1.0 - 2.5 (calc) Final    Total Bilirubin 11/04/2020 0.5  0.2 - 1.2 mg/dL Final    Alkaline Phosphatase 11/04/2020 57  37 - 153 U/L Final    AST 11/04/2020 68* 10 - 35 U/L Final    ALT 11/04/2020 78* 6 - 29 U/L Final   Office Visit on 07/13/2020   Component Date Value Ref Range Status    Hep A IgM 07/18/2020 NON-REACTIVE  NON-REACTIVE Final    Comment 07/18/2020 For additional information, please refer to http://education.[employer]/faq/MKN315 (This link is being provided for informational/  educational purposes only.)   Final    Hepatitis B Surface Ag 07/18/2020 NON-REACTIVE  NON-REACTIVE Final    Hep B C IgM 07/18/2020 NON-REACTIVE  NON-REACTIVE Final    Hepatitis C Ab 07/18/2020 REACTIVE* NON-REACTIVE Final    Signal/Cutoff 07/18/2020 23.20* <1.00 Final    HCV RNA, Quantitative Real Time PCR 07/18/2020 <15 NOT DETECTED  NOT DETECTED IU/mL Final    HCV RNA Quant PCR 07/18/2020 <1.18 NOT DETECTED  NOT DETECTED Log IU/mL Final    Glucose 07/18/2020 108* 65 - 99 mg/dL Final    BUN 07/18/2020 16  7 - 25 mg/dL Final    Creatinine 07/18/2020 0.71  0.50 - 0.99 mg/dL Final    eGFR if non African American 07/18/2020 91  > OR = 60 mL/min/1.73m2 Final    eGFR if African American 07/18/2020 106  > OR = 60 mL/min/1.73m2 Final    BUN/Creatinine Ratio 07/18/2020 NOT APPLICABLE  6 - 22 (calc) Final    Sodium 07/18/2020 137  135 - 146 mmol/L Final    Potassium 07/18/2020 4.4  3.5 - 5.3 mmol/L Final    Chloride 07/18/2020 101  98 - 110 mmol/L Final    CO2 07/18/2020 28  20 - 32 mmol/L Final    Calcium 07/18/2020 9.8  8.6 - 10.4 mg/dL Final    Total Protein 07/18/2020 7.3  6.1 - 8.1 g/dL Final    Albumin 07/18/2020 4.5  3.6 - 5.1 g/dL Final    Globulin, Total 07/18/2020 2.8  1.9 - 3.7 g/dL (calc) Final    Albumin/Globulin Ratio 07/18/2020 1.6  1.0 - 2.5 (calc) Final    Total Bilirubin 07/18/2020 1.0  0.2 - 1.2 mg/dL Final    Alkaline Phosphatase 07/18/2020 64  37 - 153 U/L Final    AST 07/18/2020 93* 10 - 35 U/L Final    ALT 07/18/2020 112* 6 - 29 U/L Final   Admission on 06/01/2020, Discharged on 06/01/2020   Component Date Value Ref Range Status    WBC 06/01/2020 6.61  3.90 - 12.70 K/uL Final    RBC 06/01/2020 4.57  4.00 - 5.40 M/uL Final    Hemoglobin 06/01/2020 16.3* 12.0 - 16.0 g/dL Final    Hematocrit 06/01/2020 46.5  37.0 - 48.5 % Final    MCV 06/01/2020 102* 82 - 98 fL Final    MCH 06/01/2020 35.7* 27.0 - 31.0 pg Final    MCHC 06/01/2020 35.1  32.0 - 36.0 g/dL Final    RDW  06/01/2020 11.4* 11.5 - 14.5 % Final    Platelets 06/01/2020 175  150 - 350 K/uL Final    MPV 06/01/2020 9.8  9.2 - 12.9 fL Final    Immature Granulocytes 06/01/2020 0.3  0.0 - 0.5 % Final    Gran # (ANC) 06/01/2020 3.0  1.8 - 7.7 K/uL Final    Immature Grans (Abs) 06/01/2020 0.02  0.00 - 0.04 K/uL Final    Lymph # 06/01/2020 2.8  1.0 - 4.8 K/uL Final    Mono # 06/01/2020 0.6  0.3 - 1.0 K/uL Final    Eos # 06/01/2020 0.1  0.0 - 0.5 K/uL Final    Baso # 06/01/2020 0.06  0.00 - 0.20 K/uL Final    nRBC 06/01/2020 0  0 /100 WBC Final    Gran % 06/01/2020 45.7  38.0 - 73.0 % Final    Lymph % 06/01/2020 42.2  18.0 - 48.0 % Final    Mono % 06/01/2020 8.8  4.0 - 15.0 % Final    Eosinophil % 06/01/2020 2.1  0.0 - 8.0 % Final    Basophil % 06/01/2020 0.9  0.0 - 1.9 % Final    Differential Method 06/01/2020 Automated   Final    Sodium 06/01/2020 134* 136 - 145 mmol/L Final    Potassium 06/01/2020 4.4  3.5 - 5.1 mmol/L Final    Chloride 06/01/2020 103  95 - 110 mmol/L Final    CO2 06/01/2020 20* 23 - 29 mmol/L Final    Glucose 06/01/2020 142* 70 - 110 mg/dL Final    BUN 06/01/2020 16  8 - 23 mg/dL Final    Creatinine 06/01/2020 0.7  0.5 - 1.4 mg/dL Final    Calcium 06/01/2020 9.2  8.7 - 10.5 mg/dL Final    Total Protein 06/01/2020 6.9  6.0 - 8.4 g/dL Final    Albumin 06/01/2020 4.1  3.5 - 5.2 g/dL Final    Total Bilirubin 06/01/2020 1.5* 0.1 - 1.0 mg/dL Final    Alkaline Phosphatase 06/01/2020 76  55 - 135 U/L Final    AST 06/01/2020 383* 10 - 40 U/L Final    ALT 06/01/2020 327* 10 - 44 U/L Final    Anion Gap 06/01/2020 11  8 - 16 mmol/L Final    eGFR if African American 06/01/2020 >60.0  >60 mL/min/1.73 m^2 Final    eGFR if non African American 06/01/2020 >60.0  >60 mL/min/1.73 m^2 Final    PT 06/01/2020 13.5  10.6 - 14.8 sec Final    INR 06/01/2020 1.1   Final    Troponin I 06/01/2020 <0.030  <=0.040 ng/mL Final    SARS-CoV-2 RNA, Amplification, Qual 06/01/2020 Negative  Negative Final     Magnesium 06/01/2020 1.9  1.6 - 2.6 mg/dL Final    Cholesterol 06/01/2020 194  120 - 199 mg/dL Final    Triglycerides 06/01/2020 75  30 - 150 mg/dL Final    HDL 06/01/2020 73  40 - 75 mg/dL Final    LDL Cholesterol 06/01/2020 106.0  63.0 - 159.0 mg/dL Final    HDL/Cholesterol Ratio 06/01/2020 37.6  20.0 - 50.0 % Final    Total Cholesterol/HDL Ratio 06/01/2020 2.7  2.0 - 5.0 Final    Non-HDL Cholesterol 06/01/2020 121  mg/dL Final       Past Medical History:   Diagnosis Date    Hypertension     SVT (supraventricular tachycardia)      History reviewed. No pertinent surgical history.  Family History   Problem Relation Age of Onset    Glaucoma Neg Hx     Macular degeneration Neg Hx     Retinal detachment Neg Hx        Marital Status:   Alcohol History:  reports current alcohol use.  Tobacco History:  reports that she has been smoking cigarettes. She has never used smokeless tobacco.  Drug History:  reports no history of drug use.    Review of patient's allergies indicates:  No Known Allergies    Current Outpatient Medications:     alendronate (FOSAMAX) 70 MG tablet, TK 1 T PO 1 TIME Q WK, Disp: 12 tablet, Rfl: 3    metoprolol succinate (TOPROL-XL) 50 MG 24 hr tablet, Take 1 tablet (50 mg total) by mouth once daily., Disp: 90 tablet, Rfl: 1    valsartan-hydrochlorothiazide (DIOVAN-HCT) 80-12.5 mg per tablet, Take 1 tablet by mouth once daily., Disp: 90 tablet, Rfl: 1    Review of Systems   Constitutional: Negative for appetite change, chills, fatigue, fever and unexpected weight change.   HENT: Negative for congestion.    Respiratory: Negative for cough, chest tightness and shortness of breath.    Cardiovascular: Negative for chest pain and palpitations.   Gastrointestinal: Negative for abdominal distention and abdominal pain.   Endocrine: Negative for cold intolerance and heat intolerance.   Genitourinary: Negative for difficulty urinating and dysuria.   Musculoskeletal: Negative for arthralgias  "and back pain.   Neurological: Negative for dizziness, weakness and headaches.          Objective:      Vitals:    11/20/20 1109   BP: 122/82   Pulse: 72   Weight: 58.5 kg (129 lb)   Height: 5' 2" (1.575 m)     Physical Exam  Constitutional:       General: She is not in acute distress.     Appearance: She is well-developed.   HENT:      Head: Normocephalic and atraumatic.   Eyes:      Conjunctiva/sclera: Conjunctivae normal.      Pupils: Pupils are equal, round, and reactive to light.   Neck:      Musculoskeletal: Normal range of motion and neck supple.      Thyroid: No thyromegaly.   Cardiovascular:      Rate and Rhythm: Normal rate and regular rhythm.      Heart sounds: Normal heart sounds.   Pulmonary:      Effort: Pulmonary effort is normal.      Breath sounds: Normal breath sounds.   Abdominal:      General: Bowel sounds are normal. There is no distension.      Palpations: Abdomen is soft.      Tenderness: There is no abdominal tenderness.   Musculoskeletal: Normal range of motion.   Skin:     General: Skin is warm and dry.      Findings: No erythema.   Neurological:      Mental Status: She is alert and oriented to person, place, and time.      Cranial Nerves: No cranial nerve deficit.           Assessment:       1. Essential hypertension    2. Fatty liver    3. Age related osteoporosis, unspecified pathological fracture presence         Plan:       Essential hypertension  Comments:  well managed. continue as is.    Fatty liver  Comments:  Numbers continue to improve with regard to the liver. u/s stable. continue to watch alcohol use. Treated for Hep C about 10 years ago    Age related osteoporosis, unspecified pathological fracture presence  -     alendronate (FOSAMAX) 70 MG tablet; TK 1 T PO 1 TIME Q WK  Dispense: 12 tablet; Refill: 3      Follow up in about 6 months (around 5/20/2021) for Annual Physical.        11/20/2020 Hector Colin PA-C      "

## 2021-03-03 ENCOUNTER — OFFICE VISIT (OUTPATIENT)
Dept: CARDIOLOGY | Facility: CLINIC | Age: 66
End: 2021-03-03
Payer: OTHER GOVERNMENT

## 2021-03-03 VITALS
HEART RATE: 76 BPM | SYSTOLIC BLOOD PRESSURE: 135 MMHG | OXYGEN SATURATION: 98 % | HEIGHT: 62 IN | DIASTOLIC BLOOD PRESSURE: 85 MMHG | WEIGHT: 130 LBS | BODY MASS INDEX: 23.92 KG/M2

## 2021-03-03 DIAGNOSIS — I10 ESSENTIAL HYPERTENSION: ICD-10-CM

## 2021-03-03 DIAGNOSIS — I47.10 SVT (SUPRAVENTRICULAR TACHYCARDIA): ICD-10-CM

## 2021-03-03 PROCEDURE — 99214 OFFICE O/P EST MOD 30 MIN: CPT | Mod: S$GLB,,, | Performed by: INTERNAL MEDICINE

## 2021-03-03 PROCEDURE — 99214 PR OFFICE/OUTPT VISIT, EST, LEVL IV, 30-39 MIN: ICD-10-PCS | Mod: S$GLB,,, | Performed by: INTERNAL MEDICINE

## 2021-03-03 RX ORDER — LOSARTAN POTASSIUM 25 MG/1
25 TABLET ORAL DAILY
Qty: 90 TABLET | Refills: 3 | Status: SHIPPED | OUTPATIENT
Start: 2021-03-03 | End: 2022-02-20 | Stop reason: SDUPTHER

## 2021-04-23 ENCOUNTER — TELEPHONE (OUTPATIENT)
Dept: FAMILY MEDICINE | Facility: CLINIC | Age: 66
End: 2021-04-23

## 2021-04-23 DIAGNOSIS — Z79.899 ENCOUNTER FOR LONG-TERM (CURRENT) USE OF OTHER MEDICATIONS: Primary | ICD-10-CM

## 2021-04-29 ENCOUNTER — PATIENT MESSAGE (OUTPATIENT)
Dept: RESEARCH | Facility: HOSPITAL | Age: 66
End: 2021-04-29

## 2021-05-21 ENCOUNTER — OFFICE VISIT (OUTPATIENT)
Dept: FAMILY MEDICINE | Facility: CLINIC | Age: 66
End: 2021-05-21
Payer: OTHER GOVERNMENT

## 2021-05-21 VITALS
BODY MASS INDEX: 22.82 KG/M2 | HEIGHT: 62 IN | HEART RATE: 66 BPM | WEIGHT: 124 LBS | SYSTOLIC BLOOD PRESSURE: 142 MMHG | DIASTOLIC BLOOD PRESSURE: 84 MMHG

## 2021-05-21 DIAGNOSIS — Z12.39 ENCOUNTER FOR SCREENING FOR MALIGNANT NEOPLASM OF BREAST, UNSPECIFIED SCREENING MODALITY: ICD-10-CM

## 2021-05-21 DIAGNOSIS — M81.0 AGE RELATED OSTEOPOROSIS, UNSPECIFIED PATHOLOGICAL FRACTURE PRESENCE: ICD-10-CM

## 2021-05-21 DIAGNOSIS — I47.10 SVT (SUPRAVENTRICULAR TACHYCARDIA): ICD-10-CM

## 2021-05-21 DIAGNOSIS — I10 ESSENTIAL HYPERTENSION: ICD-10-CM

## 2021-05-21 DIAGNOSIS — M75.81 TENDINITIS OF RIGHT ROTATOR CUFF: Primary | ICD-10-CM

## 2021-05-21 PROCEDURE — 99214 PR OFFICE/OUTPT VISIT, EST, LEVL IV, 30-39 MIN: ICD-10-PCS | Mod: S$GLB,,, | Performed by: PHYSICIAN ASSISTANT

## 2021-05-21 PROCEDURE — 99214 OFFICE O/P EST MOD 30 MIN: CPT | Mod: S$GLB,,, | Performed by: PHYSICIAN ASSISTANT

## 2021-05-21 RX ORDER — METOPROLOL SUCCINATE 50 MG/1
50 TABLET, EXTENDED RELEASE ORAL DAILY
Qty: 90 TABLET | Refills: 1 | Status: SHIPPED | OUTPATIENT
Start: 2021-05-21 | End: 2021-07-16 | Stop reason: SDUPTHER

## 2021-05-21 RX ORDER — ALENDRONATE SODIUM 70 MG/1
TABLET ORAL
Qty: 12 TABLET | Refills: 3 | Status: SHIPPED | OUTPATIENT
Start: 2021-05-21 | End: 2022-02-05 | Stop reason: SDUPTHER

## 2021-05-23 LAB
ALBUMIN SERPL-MCNC: 4.3 G/DL (ref 3.6–5.1)
ALBUMIN/GLOB SERPL: 1.7 (CALC) (ref 1–2.5)
ALP SERPL-CCNC: 52 U/L (ref 37–153)
ALT SERPL-CCNC: 97 U/L (ref 6–29)
AST SERPL-CCNC: 77 U/L (ref 10–35)
BASOPHILS # BLD AUTO: 63 CELLS/UL (ref 0–200)
BASOPHILS NFR BLD AUTO: 1.1 %
BILIRUB SERPL-MCNC: 0.4 MG/DL (ref 0.2–1.2)
BUN SERPL-MCNC: 11 MG/DL (ref 7–25)
BUN/CREAT SERPL: ABNORMAL (CALC) (ref 6–22)
CALCIUM SERPL-MCNC: 9.6 MG/DL (ref 8.6–10.4)
CHLORIDE SERPL-SCNC: 104 MMOL/L (ref 98–110)
CHOLEST SERPL-MCNC: 222 MG/DL
CHOLEST/HDLC SERPL: 2.8 (CALC)
CO2 SERPL-SCNC: 27 MMOL/L (ref 20–32)
CREAT SERPL-MCNC: 0.66 MG/DL (ref 0.5–0.99)
EOSINOPHIL # BLD AUTO: 154 CELLS/UL (ref 15–500)
EOSINOPHIL NFR BLD AUTO: 2.7 %
ERYTHROCYTE [DISTWIDTH] IN BLOOD BY AUTOMATED COUNT: 11.3 % (ref 11–15)
GLOBULIN SER CALC-MCNC: 2.5 G/DL (CALC) (ref 1.9–3.7)
GLUCOSE SERPL-MCNC: 98 MG/DL (ref 65–99)
HCT VFR BLD AUTO: 45.5 % (ref 35–45)
HDLC SERPL-MCNC: 80 MG/DL
HGB BLD-MCNC: 15.5 G/DL (ref 11.7–15.5)
LDLC SERPL CALC-MCNC: 119 MG/DL (CALC)
LYMPHOCYTES # BLD AUTO: 2679 CELLS/UL (ref 850–3900)
LYMPHOCYTES NFR BLD AUTO: 47 %
MCH RBC QN AUTO: 35.7 PG (ref 27–33)
MCHC RBC AUTO-ENTMCNC: 34.1 G/DL (ref 32–36)
MCV RBC AUTO: 104.8 FL (ref 80–100)
MONOCYTES # BLD AUTO: 695 CELLS/UL (ref 200–950)
MONOCYTES NFR BLD AUTO: 12.2 %
NEUTROPHILS # BLD AUTO: 2109 CELLS/UL (ref 1500–7800)
NEUTROPHILS NFR BLD AUTO: 37 %
NONHDLC SERPL-MCNC: 142 MG/DL (CALC)
PLATELET # BLD AUTO: 151 THOUSAND/UL (ref 140–400)
PMV BLD REES-ECKER: 9.9 FL (ref 7.5–12.5)
POTASSIUM SERPL-SCNC: 4.2 MMOL/L (ref 3.5–5.3)
PROT SERPL-MCNC: 6.8 G/DL (ref 6.1–8.1)
RBC # BLD AUTO: 4.34 MILLION/UL (ref 3.8–5.1)
SODIUM SERPL-SCNC: 139 MMOL/L (ref 135–146)
TRIGL SERPL-MCNC: 119 MG/DL
TSH SERPL-ACNC: 0.8 MIU/L (ref 0.4–4.5)
WBC # BLD AUTO: 5.7 THOUSAND/UL (ref 3.8–10.8)

## 2021-05-24 ENCOUNTER — TELEPHONE (OUTPATIENT)
Dept: FAMILY MEDICINE | Facility: CLINIC | Age: 66
End: 2021-05-24

## 2021-05-27 LAB
ALBUMIN/CREAT UR: ABNORMAL MCG/MG CREAT
APPEARANCE UR: CLEAR
BACTERIA #/AREA URNS HPF: ABNORMAL /HPF
BACTERIA UR CULT: ABNORMAL
BILIRUB UR QL STRIP: NEGATIVE
COLOR UR: YELLOW
CREAT UR-MCNC: 10 MG/DL (ref 20–275)
GLUCOSE UR QL STRIP: NEGATIVE
HGB UR QL STRIP: NEGATIVE
HYALINE CASTS #/AREA URNS LPF: ABNORMAL /LPF
KETONES UR QL STRIP: NEGATIVE
LEUKOCYTE ESTERASE UR QL STRIP: NEGATIVE
MICROALBUMIN UR-MCNC: <0.2 MG/DL
NITRITE UR QL STRIP: NEGATIVE
PH UR STRIP: 6 [PH] (ref 5–8)
PROT UR QL STRIP: NEGATIVE
RBC #/AREA URNS HPF: ABNORMAL /HPF
SP GR UR STRIP: 1 (ref 1–1.03)
SQUAMOUS #/AREA URNS HPF: ABNORMAL /HPF
WBC #/AREA URNS HPF: ABNORMAL /HPF

## 2021-06-04 ENCOUNTER — CLINICAL SUPPORT (OUTPATIENT)
Dept: REHABILITATION | Facility: HOSPITAL | Age: 66
End: 2021-06-04
Payer: OTHER GOVERNMENT

## 2021-06-04 DIAGNOSIS — M25.611 DECREASED RIGHT SHOULDER RANGE OF MOTION: ICD-10-CM

## 2021-06-04 DIAGNOSIS — M62.89 MUSCLE TIGHTNESS: ICD-10-CM

## 2021-06-04 DIAGNOSIS — R29.898 UPPER EXTREMITY WEAKNESS: ICD-10-CM

## 2021-06-04 PROCEDURE — 97110 THERAPEUTIC EXERCISES: CPT | Mod: PN

## 2021-06-04 PROCEDURE — 97161 PT EVAL LOW COMPLEX 20 MIN: CPT | Mod: PN

## 2021-06-04 PROCEDURE — 97140 MANUAL THERAPY 1/> REGIONS: CPT | Mod: PN

## 2021-06-05 PROBLEM — R29.898 UPPER EXTREMITY WEAKNESS: Status: ACTIVE | Noted: 2021-06-05

## 2021-06-05 PROBLEM — M25.611 DECREASED RIGHT SHOULDER RANGE OF MOTION: Status: ACTIVE | Noted: 2021-06-05

## 2021-06-05 PROBLEM — M62.89 MUSCLE TIGHTNESS: Status: ACTIVE | Noted: 2021-06-05

## 2021-06-15 ENCOUNTER — CLINICAL SUPPORT (OUTPATIENT)
Dept: REHABILITATION | Facility: HOSPITAL | Age: 66
End: 2021-06-15
Payer: OTHER GOVERNMENT

## 2021-06-15 DIAGNOSIS — M62.89 MUSCLE TIGHTNESS: ICD-10-CM

## 2021-06-15 DIAGNOSIS — R29.898 UPPER EXTREMITY WEAKNESS: ICD-10-CM

## 2021-06-15 DIAGNOSIS — M25.611 DECREASED RIGHT SHOULDER RANGE OF MOTION: ICD-10-CM

## 2021-06-15 PROCEDURE — 97110 THERAPEUTIC EXERCISES: CPT | Mod: PN

## 2021-06-17 ENCOUNTER — CLINICAL SUPPORT (OUTPATIENT)
Dept: REHABILITATION | Facility: HOSPITAL | Age: 66
End: 2021-06-17
Payer: OTHER GOVERNMENT

## 2021-06-17 DIAGNOSIS — M62.89 MUSCLE TIGHTNESS: ICD-10-CM

## 2021-06-17 DIAGNOSIS — M25.611 DECREASED RIGHT SHOULDER RANGE OF MOTION: ICD-10-CM

## 2021-06-17 DIAGNOSIS — R29.898 UPPER EXTREMITY WEAKNESS: ICD-10-CM

## 2021-06-17 PROCEDURE — 97110 THERAPEUTIC EXERCISES: CPT | Mod: PN

## 2021-06-21 ENCOUNTER — HOSPITAL ENCOUNTER (OUTPATIENT)
Dept: RADIOLOGY | Facility: HOSPITAL | Age: 66
Discharge: HOME | End: 2021-06-21
Attending: PHYSICIAN ASSISTANT
Payer: OTHER GOVERNMENT

## 2021-06-21 DIAGNOSIS — Z12.39 ENCOUNTER FOR SCREENING FOR MALIGNANT NEOPLASM OF BREAST, UNSPECIFIED SCREENING MODALITY: ICD-10-CM

## 2021-06-21 PROCEDURE — 77067 SCR MAMMO BI INCL CAD: CPT | Mod: TC,PO

## 2021-06-22 ENCOUNTER — CLINICAL SUPPORT (OUTPATIENT)
Dept: REHABILITATION | Facility: HOSPITAL | Age: 66
End: 2021-06-22
Payer: OTHER GOVERNMENT

## 2021-06-22 DIAGNOSIS — M62.89 MUSCLE TIGHTNESS: ICD-10-CM

## 2021-06-22 DIAGNOSIS — R29.898 UPPER EXTREMITY WEAKNESS: ICD-10-CM

## 2021-06-22 DIAGNOSIS — M25.611 DECREASED RIGHT SHOULDER RANGE OF MOTION: ICD-10-CM

## 2021-06-22 PROCEDURE — 97140 MANUAL THERAPY 1/> REGIONS: CPT | Mod: PN

## 2021-06-22 PROCEDURE — 97110 THERAPEUTIC EXERCISES: CPT | Mod: PN

## 2021-06-29 ENCOUNTER — DOCUMENTATION ONLY (OUTPATIENT)
Dept: REHABILITATION | Facility: HOSPITAL | Age: 66
End: 2021-06-29

## 2021-06-29 ENCOUNTER — CLINICAL SUPPORT (OUTPATIENT)
Dept: REHABILITATION | Facility: HOSPITAL | Age: 66
End: 2021-06-29
Payer: OTHER GOVERNMENT

## 2021-06-29 DIAGNOSIS — R29.898 UPPER EXTREMITY WEAKNESS: ICD-10-CM

## 2021-06-29 DIAGNOSIS — M62.89 MUSCLE TIGHTNESS: ICD-10-CM

## 2021-06-29 DIAGNOSIS — M25.611 DECREASED RIGHT SHOULDER RANGE OF MOTION: ICD-10-CM

## 2021-06-29 PROCEDURE — 97110 THERAPEUTIC EXERCISES: CPT | Mod: PN,CQ

## 2021-07-01 ENCOUNTER — CLINICAL SUPPORT (OUTPATIENT)
Dept: REHABILITATION | Facility: HOSPITAL | Age: 66
End: 2021-07-01
Payer: OTHER GOVERNMENT

## 2021-07-01 DIAGNOSIS — R29.898 UPPER EXTREMITY WEAKNESS: ICD-10-CM

## 2021-07-01 DIAGNOSIS — M25.611 DECREASED RIGHT SHOULDER RANGE OF MOTION: ICD-10-CM

## 2021-07-01 DIAGNOSIS — M62.89 MUSCLE TIGHTNESS: ICD-10-CM

## 2021-07-01 PROCEDURE — 97110 THERAPEUTIC EXERCISES: CPT | Mod: PN,CQ

## 2021-07-06 ENCOUNTER — TELEPHONE (OUTPATIENT)
Dept: CARDIOLOGY | Facility: CLINIC | Age: 66
End: 2021-07-06

## 2021-07-09 ENCOUNTER — CLINICAL SUPPORT (OUTPATIENT)
Dept: REHABILITATION | Facility: HOSPITAL | Age: 66
End: 2021-07-09
Payer: OTHER GOVERNMENT

## 2021-07-09 DIAGNOSIS — M62.89 MUSCLE TIGHTNESS: ICD-10-CM

## 2021-07-09 DIAGNOSIS — M25.611 DECREASED RIGHT SHOULDER RANGE OF MOTION: ICD-10-CM

## 2021-07-09 DIAGNOSIS — R29.898 UPPER EXTREMITY WEAKNESS: ICD-10-CM

## 2021-07-09 PROCEDURE — 97140 MANUAL THERAPY 1/> REGIONS: CPT | Mod: PN

## 2021-07-09 PROCEDURE — 97110 THERAPEUTIC EXERCISES: CPT | Mod: PN

## 2021-07-09 PROCEDURE — 97112 NEUROMUSCULAR REEDUCATION: CPT | Mod: PN

## 2021-07-13 ENCOUNTER — DOCUMENTATION ONLY (OUTPATIENT)
Dept: REHABILITATION | Facility: HOSPITAL | Age: 66
End: 2021-07-13

## 2021-07-14 ENCOUNTER — OFFICE VISIT (OUTPATIENT)
Dept: CARDIOLOGY | Facility: CLINIC | Age: 66
End: 2021-07-14
Payer: OTHER GOVERNMENT

## 2021-07-14 VITALS
BODY MASS INDEX: 22.45 KG/M2 | HEIGHT: 62 IN | WEIGHT: 122 LBS | SYSTOLIC BLOOD PRESSURE: 122 MMHG | OXYGEN SATURATION: 97 % | HEART RATE: 69 BPM | DIASTOLIC BLOOD PRESSURE: 80 MMHG

## 2021-07-14 DIAGNOSIS — B18.2 CHRONIC HEPATITIS C WITHOUT HEPATIC COMA: ICD-10-CM

## 2021-07-14 DIAGNOSIS — I10 ESSENTIAL HYPERTENSION: ICD-10-CM

## 2021-07-14 DIAGNOSIS — I47.10 SVT (SUPRAVENTRICULAR TACHYCARDIA): ICD-10-CM

## 2021-07-14 DIAGNOSIS — E78.00 HYPERCHOLESTEROLEMIA: Chronic | ICD-10-CM

## 2021-07-14 PROCEDURE — 99214 PR OFFICE/OUTPT VISIT, EST, LEVL IV, 30-39 MIN: ICD-10-PCS | Mod: S$GLB,,, | Performed by: INTERNAL MEDICINE

## 2021-07-14 PROCEDURE — 99214 OFFICE O/P EST MOD 30 MIN: CPT | Mod: S$GLB,,, | Performed by: INTERNAL MEDICINE

## 2021-07-15 ENCOUNTER — CLINICAL SUPPORT (OUTPATIENT)
Dept: REHABILITATION | Facility: HOSPITAL | Age: 66
End: 2021-07-15
Payer: OTHER GOVERNMENT

## 2021-07-15 DIAGNOSIS — M25.611 DECREASED RIGHT SHOULDER RANGE OF MOTION: ICD-10-CM

## 2021-07-15 DIAGNOSIS — R29.898 UPPER EXTREMITY WEAKNESS: ICD-10-CM

## 2021-07-15 DIAGNOSIS — M62.89 MUSCLE TIGHTNESS: ICD-10-CM

## 2021-07-15 PROCEDURE — 97110 THERAPEUTIC EXERCISES: CPT | Mod: PN,CQ

## 2021-07-15 PROCEDURE — 97112 NEUROMUSCULAR REEDUCATION: CPT | Mod: PN,CQ

## 2021-07-19 ENCOUNTER — CLINICAL SUPPORT (OUTPATIENT)
Dept: REHABILITATION | Facility: HOSPITAL | Age: 66
End: 2021-07-19
Payer: OTHER GOVERNMENT

## 2021-07-19 DIAGNOSIS — M25.611 DECREASED RIGHT SHOULDER RANGE OF MOTION: ICD-10-CM

## 2021-07-19 DIAGNOSIS — R29.898 UPPER EXTREMITY WEAKNESS: ICD-10-CM

## 2021-07-19 DIAGNOSIS — M62.89 MUSCLE TIGHTNESS: ICD-10-CM

## 2021-07-19 PROCEDURE — 97110 THERAPEUTIC EXERCISES: CPT | Mod: PN,CQ

## 2021-07-19 PROCEDURE — 97140 MANUAL THERAPY 1/> REGIONS: CPT | Mod: PN,CQ

## 2021-07-22 ENCOUNTER — CLINICAL SUPPORT (OUTPATIENT)
Dept: REHABILITATION | Facility: HOSPITAL | Age: 66
End: 2021-07-22
Payer: OTHER GOVERNMENT

## 2021-07-22 DIAGNOSIS — M25.611 DECREASED RIGHT SHOULDER RANGE OF MOTION: ICD-10-CM

## 2021-07-22 DIAGNOSIS — R29.898 UPPER EXTREMITY WEAKNESS: ICD-10-CM

## 2021-07-22 DIAGNOSIS — M62.89 MUSCLE TIGHTNESS: ICD-10-CM

## 2021-07-22 PROCEDURE — 97140 MANUAL THERAPY 1/> REGIONS: CPT | Mod: PN

## 2021-07-22 PROCEDURE — 97110 THERAPEUTIC EXERCISES: CPT | Mod: PN

## 2021-08-03 ENCOUNTER — OFFICE VISIT (OUTPATIENT)
Dept: FAMILY MEDICINE | Facility: CLINIC | Age: 66
End: 2021-08-03
Payer: OTHER GOVERNMENT

## 2021-08-03 VITALS
HEIGHT: 62 IN | DIASTOLIC BLOOD PRESSURE: 86 MMHG | HEART RATE: 72 BPM | BODY MASS INDEX: 22.45 KG/M2 | WEIGHT: 122 LBS | SYSTOLIC BLOOD PRESSURE: 122 MMHG

## 2021-08-03 DIAGNOSIS — M25.611 DECREASED RIGHT SHOULDER RANGE OF MOTION: ICD-10-CM

## 2021-08-03 DIAGNOSIS — I10 ESSENTIAL HYPERTENSION: Primary | ICD-10-CM

## 2021-08-03 PROCEDURE — 99213 PR OFFICE/OUTPT VISIT, EST, LEVL III, 20-29 MIN: ICD-10-PCS | Mod: S$GLB,,, | Performed by: PHYSICIAN ASSISTANT

## 2021-08-03 PROCEDURE — 99213 OFFICE O/P EST LOW 20 MIN: CPT | Mod: S$GLB,,, | Performed by: PHYSICIAN ASSISTANT

## 2021-11-09 ENCOUNTER — PATIENT MESSAGE (OUTPATIENT)
Dept: FAMILY MEDICINE | Facility: CLINIC | Age: 66
End: 2021-11-09
Payer: OTHER GOVERNMENT

## 2022-01-04 ENCOUNTER — TELEPHONE (OUTPATIENT)
Dept: OPTOMETRY | Facility: CLINIC | Age: 67
End: 2022-01-04
Payer: OTHER GOVERNMENT

## 2022-01-04 ENCOUNTER — PATIENT MESSAGE (OUTPATIENT)
Dept: FAMILY MEDICINE | Facility: CLINIC | Age: 67
End: 2022-01-04
Payer: OTHER GOVERNMENT

## 2022-01-04 DIAGNOSIS — Z01.00 EYE EXAM, ROUTINE: Primary | ICD-10-CM

## 2022-01-04 NOTE — TELEPHONE ENCOUNTER
Name: Philly Triana  Seen for follow up of hyperPTH and hypothyroidism.    HPI:  1. Hyperparathyroidism status post parathyroidectomy:  Philly Triana is a 53 year old female who presents for the f/u evaluation of hyperPTH.  She underwent parathyroidectomy. Underwent Excision of right inferior and superior parathyroid glands, left neck exploration 3/14/16.  Final pathology revealed two right-sided parathyroid adenomas, weighing 140 mg in 330 mg, respectively. One of two parathyroid glands were identified on the left side, and this appeared grossly normal.  PTH dropped from 93 to 23 following surgery.     Following that repeat PTH was 109. In the setting of low normal vit D.  Vit D dose was increased to 2000 IU in 7/2016 and currently she takes 2000 IU/day  Vit D and PTH improved in follow up labs    No FH of MEN syndrome, parathyroid adenoma or MTC  Family History of pituitary adenoma, pancreas tumors, Zollinger-Hernandez syndrome, pheochromocytoma. No  History of Cancer:No  Thiazide Diuretic:No  Lithium use:No  Kidney stones:Yes (Please explain): h/o kidney stones. Follows up with urology.  Average Daily Calcium intake: 16 oz of milk every day. Minimal yogurt and cheese. Ice cream sparingly.  Ca and Vit D supplementation: Not on calcium supplements. Takes vit D supplement 2000 international units per day.     2. Hypothyroidism:  -- Currently she is on levothyroxine 150  g per day and Cytomel 5  g per day. Recent labs in normal range.  Clinically no major complaints.     3. Weight gain:  Body mass index is 35.67 kg/(m^2).   Wt Readings from Last 2 Encounters:   07/19/17 89.9 kg (198 lb 3.2 oz)   05/23/17 89.8 kg (197 lb 14.4 oz)   in previous visit 24 hr UFC was recommended but not done yet.  The patient is advised to Make better food choices: reduce carbs, Reduce portion size, weight loss and exercise 3-4 times a week.        PMH/PSH:  Past Medical History:   Diagnosis Date     ADHD (attention deficit  Spoke to pt to confirm that referrals are needed for all appts for  [facility].  Pt will call PCP to have referral put in system.   hyperactivity disorder)      Anxiety and depression      Arthritis     Kienbous right wrist, arthritis R knee     Dysthymic disorder      Esophageal reflux      Essential hypertension, benign      High serum parathyroid hormone (PTH) 10/8/2015     Hypercalcemia 10/8/2015     Other chronic pain     stenosis of the cervical, thoracici and lumbar spine     Sleep apnea      Uncomplicated asthma     exercise induced and from cats     Unspecified hypothyroidism      Past Surgical History:   Procedure Laterality Date     C NONSPECIFIC PROCEDURE      c section x 1     C NONSPECIFIC PROCEDURE      varcose veins stripped     CARPAL TUNNEL RELEASE RT/LT      bilat carpal tunnel     FUSION CERVICAL ANTERIOR ONE LEVEL Left 2015    Procedure: FUSION CERVICAL ANTERIOR ONE LEVEL;  Surgeon: Conrad Manley MD;  Location: [facility] OR     HC REMOVAL OF TONSILS,<11 Y/O       MAMMOPLASTY REDUCTION       PARATHYROIDECTOMY N/A 3/14/2016    Procedure: PARATHYROIDECTOMY;  Surgeon: Fermin Barnes MD;  Location: [facility] OR     WRIST SURGERY       Family Hx:  Family History   Problem Relation Age of Onset     HEART DISEASE Father           Hypertension Mother      Breast Cancer Mother      dx age 67     Chronic Obstructive Pulmonary Disease Mother      PAD     Hypertension Sister      Breast Cancer Paternal Grandmother           CANCER Maternal Grandfather       lung cancer       Social Hx:  Social History     Social History     Marital status: Single     Spouse name: N/A     Number of children: 1     Years of education: 12     Occupational History     Day Care      Self-employed     Social History Main Topics     Smoking status: Former Smoker     Years: 20.00     Smokeless tobacco: Never Used      Comment: quit smoking       Alcohol use 0.0 oz/week     0 Standard drinks or equivalent per week      Comment: BEER WEEKLY     Drug use: No     Sexual activity: Not Currently     Other Topics Concern      "Parent/Sibling W/ Cabg, Mi Or Angioplasty Before 65f 55m? Yes     dad  age 41 heart attack     Social History Narrative          MEDICATIONS:  has a current medication list which includes the following prescription(s): oxycodone, liothyronine, albuterol, levothyroxine, order for dme, oxycodone, gabapentin, metoprolol, valacyclovir, omeprazole, lisdexamfetamine dimesylate, medroxyprogesterone, cholecalciferol, zolpidem, aripiprazole, citalopram, and ibuprofen.    ROS     ROS: 10 point ROS neg other than the symptoms noted above in the HPI.    Physical Exam   VS: /80 (BP Location: Left arm, Patient Position: Chair, Cuff Size: Adult Large)  Pulse 69  Temp 98.5  F (36.9  C) (Oral)  Ht 1.588 m (5' 2.5\")  Wt 89.9 kg (198 lb 3.2 oz)  SpO2 97%  BMI 35.67 kg/m2 /80 (BP Location: Left arm, Patient Position: Chair, Cuff Size: Adult Large)  Pulse 69  Temp 98.5  F (36.9  C) (Oral)  Ht 1.588 m (5' 2.5\")  Wt 89.9 kg (198 lb 3.2 oz)  SpO2 97%  BMI 35.67 kg/m2    GENERAL: AXOX3, NAD, well dressed, answering questions appropriately, appears stated age.  HEENT: OP clear, no LAD, no TM, non-tender, no exopthalmous, no proptosis, EOMI, no lig lag, no retraction  NECK: Thyroid normal in size, non tender, no nodules were palpated  CV: RRR, no rubs, gallops, no murmurs  LUNGS: CTAB, no wheezes, rales, or ronchi  ABDOMEN: +BS  EXTREMITIES: no edema, +pulses, no rashes, no lesions  NEUROLOGY: CN grossly intact, + DTR upper and lower extremity, no tremors  MSK: grossly intact  SKIN: no rashes, no lesions    LABS:  Component      Latest Ref Rng & Units 2017   T4 Free      0.76 - 1.46 ng/dL 0.93   TSH      0.40 - 4.00 mU/L 2.49   Free T3      2.3 - 4.2 pg/mL 2.4   Parathyroid Hormone Intact      12 - 72 pg/mL 79 (H)   Phosphorus      2.5 - 4.5 mg/dL 2.9   Magnesium      1.6 - 2.3 mg/dL 2.4 (H)   Vitamin D Deficiency screening      20 - 75 ug/L 62   Calcium      8.5 - 10.1 mg/dL 9.6     BMP:  Last Basic Metabolic " Panel:  Lab Results   Component Value Date     04/10/2017      Lab Results   Component Value Date    POTASSIUM 4.2 04/10/2017     Lab Results   Component Value Date    CHLORIDE 108 04/10/2017     Lab Results   Component Value Date    LURDES 9.2 04/10/2017     Lab Results   Component Value Date    CO2 22 04/10/2017     Lab Results   Component Value Date    BUN 10 04/10/2017     Lab Results   Component Value Date    CR 0.82 04/10/2017     Lab Results   Component Value Date    GLC 92 04/10/2017       Calcium:  ENDO CALCIUM LABS-[facility] Latest Ref Rng & Units 4/10/2017 2/11/2017   CALCIUM 8.5 - 10.1 mg/dL 9.2 8.9     ENDO CALCIUM LABS-[facility] Latest Ref Rng & Units 11/10/2016 7/25/2016   CALCIUM 8.5 - 10.1 mg/dL 9.0 9.0     ENDO CALCIUM LABS[facility] Latest Ref Rng & Units 3/15/2016   CALCIUM 8.5 - 10.1 mg/dL 8.7     PTH:  ENDO CALCIUM LABS[facility] Latest Ref Rng & Units 4/10/2017 2/11/2017   PARATHYROID HORMONE INTACT 12 - 72 pg/mL 73 (H) 65     ENDO CALCIUM LABS[facility] Latest Ref Rng 11/10/2016 7/25/2016   PARATHYROID HORMONE INTACT 12 - 72 pg/mL 78 (H) 108 (H)       Vitamin D:  Lab Results   Component Value Date    VITDT 62 06/27/2017    VITDT 42 04/10/2017    VITDT 44 02/11/2017    VITDT 37 11/10/2016    VITDT 31 07/25/2016       TFTs:  END[facility] THYROID LABS[facility] Latest Ref Rng 11/10/2016 8/10/2016   TSH 0.40 - 4.00 mU/L 3.86 0.58   T4 FREE 0.76 - 1.46 ng/dL 0.88 0.90   FREE T3 2.3 - 4.2 pg/mL     TRIIODOTHYRONINE(T3) 60 - 181 ng/dL 100 101   THYR PEROXIDASE SKYE <35 IU/mL       END[facility] THYROID LAB[facility] Latest Ref Rng 7/25/2016 1/21/2016   TSH 0.40 - 4.00 mU/L 0.30 (L) 0.06 (L)   T4 FREE 0.76 - 1.46 ng/dL 0.94 1.12   FREE T3 2.3 - 4.2 pg/mL     TRIIODOTHYRONINE(T3) 60 - 181 ng/dL 99    THYR PEROXIDASE SKYE <35 IU/mL  114 (H)     ENDO THYROID LABS-[facility] Latest Ref Rng 10/8/2015 3/21/2015   TSH 0.40 - 4.00 mU/L 4.90 (H) 0.56   T4 FREE 0.76 - 1.46 ng/dL 0.82 0.90   FREE T3 2.3 - 4.2 pg/mL     TRIIODOTHYRONINE(T3) 60 - 181 ng/dL  112     ENDO  THYROID LABS-[facility] Latest Ref Rng 11/16/2013   TSH 0.40 - 4.00 mU/L 1.79   T4 FREE 0.76 - 1.46 ng/dL    FREE T3 2.3 - 4.2 pg/mL    TRIIODOTHYRONINE(T3) 60 - 181 ng/dL      CT Abdomen:  CT ABDOMEN/PELVIS WITHOUT CONTRAST 8/7/2015 2:58 PM  HISTORY: Gross hematuria.  TECHNIQUE: Scans were obtained from the diaphragm through the pelvis  without IV contrast.  COMPARISON: None.  FINDINGS: Mild hydronephrosis right kidney with dilatation of the  uppermost right ureter to the level of L4 where there is a 0.2 cm  obstructing calculus. Multiple small calcifications in both kidneys  which are consistent with nonobstructing calculi. No evidence for  ureteral obstruction or calculus on the left. Probable small cysts in  the liver. Liver, spleen, and pancreas are otherwise normal. Duodenal  diverticula. Colon and small bowel are unremarkable. Remainder of the  scan is negative.  IMPRESSION  IMPRESSION:   1. 0.2 cm obstructing calculus in the upper right ureter with mild  hydronephrosis.  2. Bilateral nonobstructing nephrolithiasis.  3. Duodenal diverticula.  4. Remainder of the scan is negative.      NM Parathyroid Scan:  NUCLEAR MEDICINE PARATHYROID SCAN 10/27/2015 2:12 PM   HISTORY: Hyperparathyroidism.  COMPARISON: None.  TECHNIQUE: 20.0 mCi Tc99m sestamibi were injected intravenously.  Anterior and bilateral anterior oblique planar images of the neck were  obtained at 20 minutes and 3 hours post injection.  FINDINGS: A subtle focus of slight relatively increased radiotracer  uptake projecting over the upper aspect of the right lobe of the  thyroid gland on the 20 minute images that is not visualized on the 3  hour images. The remainder of the radiotracer distribution throughout  the neck and upper chest is physiologic.  IMPRESSION  IMPRESSION:   1. A subtle focus of slight relatively increased radiotracer uptake  projecting over the upper aspect of the right lobe of the thyroid  gland. This is equivocal for a parathyroid  adenoma.  2. No other foci of abnormal radiotracer uptake that are suspicious  for a parathyroid adenoma.    Surgical path:  SPECIMEN(S):   A: Nodule, right inferior neck   B: Parathyroid gland, left inferior   C: Nodule, left superior neck   D: Nodule, right superior neck   E: Parathyroid gland, right superior   F: Nodules, left neck vs parathyroid     FINAL DIAGNOSIS:   A: Right inferior neck nodule, parathyroidectomy-   - Enlarge hypercellular parathyroid gland (0.14 gm); benign.   - See comment.     B: Left inferior parathyroid gland, biopsy-   - Parathyroid tissue present (0.002 gm); benign.   - See comment.     C: Left superior neck nodule, biopsy-   - Lymphoid tissue present, consistent with lymph node; no parathyroid   tissue identified; benign.     D: Right superior neck nodule, biopsy-   - Lymphoid tissue present, consistent with lymph node; no parathyroid   tissue identified; benign.     E: Right superior parathyroid gland, parathyroidectomy-   - Enlarge hypercellular parathyroid gland (0.33 gm); benign.   - See comment.     F: Left neck nodule, biopsy-   - Lymphoid tissue present, consistent with lymph node; no parathyroid   tissue identified.     COMMENT:   The features suggest multi-gland disease, compatible with parathyroid   hyperplasia.  However, both specimens A and E demonstrate nodular   hypercellular parathyroid nodules with eccentrically displaced   relatively normal-appearing parathyroid glandular tissue, raising the   possibility of multiple adenomas.  Please correlate with post operative   parathyroid hormone levels.  Surgery note is unavailable for review at   this time.     US thyroid:  ULTRASOUND THYROID April 26, 2017 1:38 PM      HISTORY: Atrophy of thyroid (acquired).      COMPARISON: Thyroid ultrasound 7/25/2015.     FINDINGS: Thyroid ultrasound demonstrates a normal sized gland. The  right lobe measures 3.9 x 0.9 x 1.2 cm. The left lobe measures 3.8 x  1.2 x 1.1 cm. The isthmus  mildly thickened at 0.7 cm, previously 0.8  cm. Thyroid parenchyma is heterogeneous in echotexture.     Thyroid nodules as follows:   Right Lobe: None.     Isthmus: None.     Left Lobe: None.         IMPRESSION: Normal-sized thyroid gland which is heterogeneous in  appearance. No discrete thyroid nodule is appreciated. Isthmus remains  mildly thickened in AP dimension. No change since prior exam.     All pertinent notes, labs, and images personally reviewed by me.     A/P  Ms.Lori Gala Triana is a 53 year old here for the evaluation of hyperacalemia with high PTH levels.    1. Hyperparathyroidism s/p parathyroidectomy:  -- PTH stable. Slightly high.  Calcium is in normal range  -- continue vit D to 4000 IU/day. Takes OTC  -- labs in 6 months  No FH of MEN syndrome, MTC.  Can consider screening for MEN syndrome given h/o >1 adenoma on surgical path    2.  Hypothyroidism (TPO +):  -- recent labs normal. Clinically looks euthyroid.  -- continue levothyroxine 150  g per day and continue Cytomel 5  g per day  -- Repeat labs in 6 months.     3. Obesity:  Body mass index is 35.67 kg/(m^2).  Not exercising.  Needs to cut down on carbs  H/o sleep apnea- does not wear CPAP  -- dieticina referral- she did not follow up  -- sleep study referral- she did not follow up. She snores at night.  -- 24 hr UFC--she did not follow up  Encouraged f/u  -- The patient is advised to Make better food choices: reduce carbs, Reduce portion size, weight loss and exercise 3-4 times a week.    4. Pain in neck:  -- thyroid US- was normal      More than 50% of the time spent with Ms. Triana on counseling / coordinating her care.  Total appointment time was 30 minutes.      Follow-up:  Follow up 3 months.    Ramila Tiwari MD  Endocrinology   [facility]    CC: Bola Roberts    Disclaimer: This note consists of symbols derived from keyboarding, dictation and/or voice recognition software. As a result, there may be errors in the  script that have gone undetected. Please consider this when interpreting information found in this chart.

## 2022-01-04 NOTE — TELEPHONE ENCOUNTER
----- Message from Bernadine Alcazar sent at 1/4/2022  9:26 AM CST -----  Type: Needs Medical Advice  Who Called:  Patient  Best Call Back Number:   Additional Information: Calling to find out if she would need a referral for her upcoming appointment. She is already an established patient of Dr Mendoza

## 2022-01-20 ENCOUNTER — TELEPHONE (OUTPATIENT)
Dept: FAMILY MEDICINE | Facility: CLINIC | Age: 67
End: 2022-01-20
Payer: OTHER GOVERNMENT

## 2022-01-20 NOTE — TELEPHONE ENCOUNTER
----- Message from Christina Madsen sent at 1/20/2022 11:32 AM CST -----  Patient called and stated that he son has an appointment with Andrea on 03/30 at 4 pm and she was wondering if she can come at the same time so she does not have to take two days off please give her a call at [phone]

## 2022-01-26 ENCOUNTER — OFFICE VISIT (OUTPATIENT)
Dept: OPTOMETRY | Facility: CLINIC | Age: 67
End: 2022-01-26
Payer: OTHER GOVERNMENT

## 2022-01-26 DIAGNOSIS — Z01.00 EYE EXAM, ROUTINE: Primary | ICD-10-CM

## 2022-01-26 DIAGNOSIS — H25.13 NUCLEAR SCLEROSIS, BILATERAL: ICD-10-CM

## 2022-01-26 DIAGNOSIS — H52.7 REFRACTIVE ERROR: ICD-10-CM

## 2022-01-26 DIAGNOSIS — G43.109 OCULAR MIGRAINE: ICD-10-CM

## 2022-01-26 PROCEDURE — 99213 OFFICE O/P EST LOW 20 MIN: CPT | Mod: PBBFAC,PO | Performed by: OPTOMETRIST

## 2022-01-26 PROCEDURE — 92014 COMPRE OPH EXAM EST PT 1/>: CPT | Mod: S$PBB,,, | Performed by: OPTOMETRIST

## 2022-01-26 PROCEDURE — 99999 PR PBB SHADOW E&M-EST. PATIENT-LVL III: CPT | Mod: PBBFAC,,, | Performed by: OPTOMETRIST

## 2022-01-26 PROCEDURE — 92015 DETERMINE REFRACTIVE STATE: CPT | Mod: ,,, | Performed by: OPTOMETRIST

## 2022-01-26 PROCEDURE — 99999 PR PBB SHADOW E&M-EST. PATIENT-LVL III: ICD-10-PCS | Mod: PBBFAC,,, | Performed by: OPTOMETRIST

## 2022-01-26 PROCEDURE — 92015 PR REFRACTION: ICD-10-PCS | Mod: ,,, | Performed by: OPTOMETRIST

## 2022-01-26 PROCEDURE — 92014 PR EYE EXAM, EST PATIENT,COMPREHESV: ICD-10-PCS | Mod: S$PBB,,, | Performed by: OPTOMETRIST

## 2022-01-26 NOTE — PROGRESS NOTES
HPI     Annual Exam      Additional comments: LDE: 08/2020              Comments     67 YO female presents today for an annual eye exam. Patient states that   everything seems to be blurred distance and near. Also has ocular   migraines that are happening more frequently lately.     OTC tears OU PRN           Last edited by Maggie Alejandra, PCT on 1/26/2022  8:30 AM. (History)            Assessment /Plan     For exam results, see Encounter Report.    Eye exam, routine  -     Ambulatory referral/consult to Optometry    Nuclear sclerosis, bilateral    Refractive error    Ocular migraine      1. Eye exam, routine    2. Nuclear sclerosis, bilateral  Mild, not yet significant. Discussed possible ocular affects of cataracts. Acceptable BCVA OU. Discussed treatment options. Surgery not recommended at this time. Monitor yearly.     3. Refractive error  Dispensed updated spectacle Rx. Discussed various spectacle lens options, trifocal vs PALs. Recommend anti-glare coating. Discussed adaptation period to new specs.  Demonstrated new spec Rx vs uncorrected vision in phoropter with patient satisfaction.    4. Ocular migraine  Recommend specs full time wear and keeping journal of occurrences. Return if worsening or any loss of vision lasting more than 30 min to an hour. Retina flat, intact, no holes, tears, breaks, RD.       RTC in 1 year for comprehensive eye exam, or sooner prn.

## 2022-02-05 DIAGNOSIS — M81.0 AGE RELATED OSTEOPOROSIS, UNSPECIFIED PATHOLOGICAL FRACTURE PRESENCE: ICD-10-CM

## 2022-02-07 RX ORDER — ALENDRONATE SODIUM 70 MG/1
TABLET ORAL
Qty: 12 TABLET | Refills: 3 | Status: SHIPPED | OUTPATIENT
Start: 2022-02-07 | End: 2022-05-03 | Stop reason: SDUPTHER

## 2022-02-18 ENCOUNTER — PATIENT MESSAGE (OUTPATIENT)
Dept: CARDIOLOGY | Facility: CLINIC | Age: 67
End: 2022-02-18
Payer: OTHER GOVERNMENT

## 2022-02-22 RX ORDER — LOSARTAN POTASSIUM 25 MG/1
25 TABLET ORAL DAILY
Qty: 90 TABLET | Refills: 3 | Status: SHIPPED | OUTPATIENT
Start: 2022-02-22 | End: 2022-05-03

## 2022-03-14 ENCOUNTER — TELEPHONE (OUTPATIENT)
Dept: FAMILY MEDICINE | Facility: CLINIC | Age: 67
End: 2022-03-14
Payer: OTHER GOVERNMENT

## 2022-03-14 DIAGNOSIS — E78.00 HYPERCHOLESTEROLEMIA: ICD-10-CM

## 2022-03-14 DIAGNOSIS — Z79.899 ENCOUNTER FOR LONG-TERM (CURRENT) USE OF OTHER MEDICATIONS: Primary | ICD-10-CM

## 2022-03-14 DIAGNOSIS — I10 ESSENTIAL HYPERTENSION: ICD-10-CM

## 2022-03-28 ENCOUNTER — TELEPHONE (OUTPATIENT)
Dept: FAMILY MEDICINE | Facility: CLINIC | Age: 67
End: 2022-03-28
Payer: OTHER GOVERNMENT

## 2022-03-28 NOTE — TELEPHONE ENCOUNTER
Her labs are ordered and in the system. Can she come get these done first before placing referral? Need to make sure reason is no obvious for me to treat before sending to derm or another specialist to workup hair loss.

## 2022-03-28 NOTE — TELEPHONE ENCOUNTER
Pt had an appointment on 3/31/22. Pt has been r/s. Pt is requesting a referral for her hair loss. Please load if ok. Send back to me

## 2022-03-30 LAB
ALBUMIN SERPL-MCNC: 4.3 G/DL (ref 3.6–5.1)
ALBUMIN/CREAT UR: 15 MCG/MG CREAT
ALBUMIN/GLOB SERPL: 1.7 (CALC) (ref 1–2.5)
ALP SERPL-CCNC: 58 U/L (ref 37–153)
ALT SERPL-CCNC: 76 U/L (ref 6–29)
APPEARANCE UR: CLEAR
AST SERPL-CCNC: 60 U/L (ref 10–35)
BACTERIA #/AREA URNS HPF: NORMAL /HPF
BACTERIA UR CULT: NORMAL
BASOPHILS # BLD AUTO: 83 CELLS/UL (ref 0–200)
BASOPHILS NFR BLD AUTO: 1.6 %
BILIRUB SERPL-MCNC: 0.8 MG/DL (ref 0.2–1.2)
BILIRUB UR QL STRIP: NEGATIVE
BUN SERPL-MCNC: 14 MG/DL (ref 7–25)
BUN/CREAT SERPL: ABNORMAL (CALC) (ref 6–22)
CALCIUM SERPL-MCNC: 9.2 MG/DL (ref 8.6–10.4)
CHLORIDE SERPL-SCNC: 103 MMOL/L (ref 98–110)
CHOLEST SERPL-MCNC: 227 MG/DL
CHOLEST/HDLC SERPL: 2.4 (CALC)
CO2 SERPL-SCNC: 30 MMOL/L (ref 20–32)
COLOR UR: YELLOW
CREAT SERPL-MCNC: 0.73 MG/DL (ref 0.5–0.99)
CREAT UR-MCNC: 13 MG/DL (ref 20–275)
EOSINOPHIL # BLD AUTO: 192 CELLS/UL (ref 15–500)
EOSINOPHIL NFR BLD AUTO: 3.7 %
ERYTHROCYTE [DISTWIDTH] IN BLOOD BY AUTOMATED COUNT: 11.4 % (ref 11–15)
GLOBULIN SER CALC-MCNC: 2.5 G/DL (CALC) (ref 1.9–3.7)
GLUCOSE SERPL-MCNC: 98 MG/DL (ref 65–99)
GLUCOSE UR QL STRIP: NEGATIVE
HCT VFR BLD AUTO: 46.8 % (ref 35–45)
HDLC SERPL-MCNC: 94 MG/DL
HGB BLD-MCNC: 16.2 G/DL (ref 11.7–15.5)
HGB UR QL STRIP: NEGATIVE
HYALINE CASTS #/AREA URNS LPF: NORMAL /LPF
KETONES UR QL STRIP: NEGATIVE
LDLC SERPL CALC-MCNC: 114 MG/DL (CALC)
LEUKOCYTE ESTERASE UR QL STRIP: NEGATIVE
LYMPHOCYTES # BLD AUTO: 2402 CELLS/UL (ref 850–3900)
LYMPHOCYTES NFR BLD AUTO: 46.2 %
MCH RBC QN AUTO: 36.3 PG (ref 27–33)
MCHC RBC AUTO-ENTMCNC: 34.6 G/DL (ref 32–36)
MCV RBC AUTO: 104.9 FL (ref 80–100)
MICROALBUMIN UR-MCNC: 0.2 MG/DL
MONOCYTES # BLD AUTO: 473 CELLS/UL (ref 200–950)
MONOCYTES NFR BLD AUTO: 9.1 %
NEUTROPHILS # BLD AUTO: 2049 CELLS/UL (ref 1500–7800)
NEUTROPHILS NFR BLD AUTO: 39.4 %
NITRITE UR QL STRIP: NEGATIVE
NONHDLC SERPL-MCNC: 133 MG/DL (CALC)
PH UR STRIP: 7.5 [PH] (ref 5–8)
PLATELET # BLD AUTO: 154 THOUSAND/UL (ref 140–400)
PMV BLD REES-ECKER: 10.2 FL (ref 7.5–12.5)
POTASSIUM SERPL-SCNC: 4.5 MMOL/L (ref 3.5–5.3)
PROT SERPL-MCNC: 6.8 G/DL (ref 6.1–8.1)
PROT UR QL STRIP: NEGATIVE
RBC # BLD AUTO: 4.46 MILLION/UL (ref 3.8–5.1)
RBC #/AREA URNS HPF: NORMAL /HPF
SODIUM SERPL-SCNC: 140 MMOL/L (ref 135–146)
SP GR UR STRIP: 1 (ref 1–1.03)
SQUAMOUS #/AREA URNS HPF: NORMAL /HPF
TRIGL SERPL-MCNC: 90 MG/DL
TSH SERPL-ACNC: 1.78 MIU/L (ref 0.4–4.5)
WBC # BLD AUTO: 5.2 THOUSAND/UL (ref 3.8–10.8)
WBC #/AREA URNS HPF: NORMAL /HPF

## 2022-04-04 ENCOUNTER — PATIENT MESSAGE (OUTPATIENT)
Dept: FAMILY MEDICINE | Facility: CLINIC | Age: 67
End: 2022-04-04
Payer: OTHER GOVERNMENT

## 2022-04-05 ENCOUNTER — PATIENT MESSAGE (OUTPATIENT)
Dept: FAMILY MEDICINE | Facility: CLINIC | Age: 67
End: 2022-04-05
Payer: OTHER GOVERNMENT

## 2022-04-05 ENCOUNTER — TELEPHONE (OUTPATIENT)
Dept: FAMILY MEDICINE | Facility: CLINIC | Age: 67
End: 2022-04-05
Payer: OTHER GOVERNMENT

## 2022-04-05 NOTE — TELEPHONE ENCOUNTER
Pt states she really want to wait for an appointment to see rhonda. Pt states there is no swelling, redness, heat at site. Pt states she just has intermediate leg pain that worsens when she stands. Pt advised if she starts to have swelling, redness, or increased pain to go to ER. She voiced understanding.

## 2022-04-05 NOTE — TELEPHONE ENCOUNTER
----- Message from Deana Kerns sent at 4/5/2022  1:51 PM CDT -----  - pt would like to talk to bayron   [phone]

## 2022-04-05 NOTE — TELEPHONE ENCOUNTER
Pt sister calling very upset that we have not offered pt an appointment. Pt was unaware that her sister contacted us regarding her care. Advised sister that I will call pt. Pt states leg pain has worsened that she is now unable to walk. Advised pt to go to the ER. Pt voiced understanding.

## 2022-04-07 ENCOUNTER — OFFICE VISIT (OUTPATIENT)
Dept: FAMILY MEDICINE | Facility: CLINIC | Age: 67
End: 2022-04-07
Payer: OTHER GOVERNMENT

## 2022-04-07 ENCOUNTER — HOSPITAL ENCOUNTER (OUTPATIENT)
Dept: RADIOLOGY | Facility: HOSPITAL | Age: 67
Discharge: HOME | End: 2022-04-07
Attending: PHYSICIAN ASSISTANT
Payer: OTHER GOVERNMENT

## 2022-04-07 VITALS
HEART RATE: 78 BPM | DIASTOLIC BLOOD PRESSURE: 82 MMHG | BODY MASS INDEX: 22.63 KG/M2 | SYSTOLIC BLOOD PRESSURE: 128 MMHG | HEIGHT: 62 IN | WEIGHT: 123 LBS | OXYGEN SATURATION: 98 %

## 2022-04-07 DIAGNOSIS — M54.16 LUMBAR RADICULOPATHY: Primary | ICD-10-CM

## 2022-04-07 DIAGNOSIS — L65.9 HAIR LOSS: ICD-10-CM

## 2022-04-07 DIAGNOSIS — M54.16 LUMBAR RADICULOPATHY: ICD-10-CM

## 2022-04-07 PROCEDURE — 99213 OFFICE O/P EST LOW 20 MIN: CPT | Mod: S$GLB,,, | Performed by: PHYSICIAN ASSISTANT

## 2022-04-07 PROCEDURE — 72110 X-RAY EXAM L-2 SPINE 4/>VWS: CPT | Mod: TC,PO

## 2022-04-07 PROCEDURE — 99213 PR OFFICE/OUTPT VISIT, EST, LEVL III, 20-29 MIN: ICD-10-PCS | Mod: S$GLB,,, | Performed by: PHYSICIAN ASSISTANT

## 2022-04-07 RX ORDER — ASCORBIC ACID 1000 MG
175 TABLET ORAL DAILY
COMMUNITY

## 2022-04-07 RX ORDER — METHOCARBAMOL 750 MG/1
750 TABLET, FILM COATED ORAL 4 TIMES DAILY
Qty: 40 TABLET | Refills: 0 | Status: SHIPPED | OUTPATIENT
Start: 2022-04-07 | End: 2022-04-17

## 2022-04-07 RX ORDER — METHYLPREDNISOLONE 4 MG/1
TABLET ORAL
Qty: 1 EACH | Refills: 0 | Status: SHIPPED | OUTPATIENT
Start: 2022-04-07 | End: 2022-04-28

## 2022-04-07 RX ORDER — FERROUS SULFATE, DRIED 160(50) MG
1 TABLET, EXTENDED RELEASE ORAL DAILY
COMMUNITY

## 2022-04-07 RX ORDER — AMOXICILLIN 500 MG
1 CAPSULE ORAL DAILY
COMMUNITY

## 2022-04-07 RX ORDER — CHOLECALCIFEROL (VITAMIN D3) 125 MCG
5000 CAPSULE ORAL DAILY
COMMUNITY

## 2022-04-07 RX ORDER — VITAMIN E 268 MG
400 CAPSULE ORAL DAILY
COMMUNITY

## 2022-04-07 RX ORDER — ZINC GLUCONATE 50 MG
50 TABLET ORAL DAILY
COMMUNITY

## 2022-04-07 RX ORDER — LANOLIN ALCOHOL/MO/W.PET/CERES
100 CREAM (GRAM) TOPICAL DAILY
COMMUNITY

## 2022-04-07 NOTE — PROGRESS NOTES
"  SUBJECTIVE:    Patient ID: Suma Butterfield is a 66 y.o. female.    Chief Complaint: Leg Pain (Right entire leg pain//hair loss worsening//brought med bottles//tc)    This is a 66-year-old female who presents today with chief complaint right leg pain. She reports pain down the entire leg. Numbness/tingling occasional. Denies any loss of bowel/bladder function. Pain is more like a "burning". Chiropractor had mentioned an arthritic lower back. Has not had any evaluation recently. She brings to my attention (as she is usually wearing a hat) The hair loss issue. Worsening. More noticeable. Nioxin shampoo/minoxidil not helpful at this point. Ready for further evaluation.      Telephone on 03/14/2022   Component Date Value Ref Range Status    WBC 03/29/2022 5.2  3.8 - 10.8 Thousand/uL Final    RBC 03/29/2022 4.46  3.80 - 5.10 Million/uL Final    Hemoglobin 03/29/2022 16.2 (A) 11.7 - 15.5 g/dL Final    Hematocrit 03/29/2022 46.8 (A) 35.0 - 45.0 % Final    MCV 03/29/2022 104.9 (A) 80.0 - 100.0 fL Final    MCH 03/29/2022 36.3 (A) 27.0 - 33.0 pg Final    MCHC 03/29/2022 34.6  32.0 - 36.0 g/dL Final    RDW 03/29/2022 11.4  11.0 - 15.0 % Final    Platelets 03/29/2022 154  140 - 400 Thousand/uL Final    MPV 03/29/2022 10.2  7.5 - 12.5 fL Final    Neutrophils, Abs 03/29/2022 2,049  1,500 - 7,800 cells/uL Final    Lymph # 03/29/2022 2,402  850 - 3,900 cells/uL Final    Mono # 03/29/2022 473  200 - 950 cells/uL Final    Eos # 03/29/2022 192  15 - 500 cells/uL Final    Baso # 03/29/2022 83  0 - 200 cells/uL Final    Neutrophils Relative 03/29/2022 39.4  % Final    Lymph % 03/29/2022 46.2  % Final    Mono % 03/29/2022 9.1  % Final    Eosinophil % 03/29/2022 3.7  % Final    Basophil % 03/29/2022 1.6  % Final    Glucose 03/29/2022 98  65 - 99 mg/dL Final    BUN 03/29/2022 14  7 - 25 mg/dL Final    Creatinine 03/29/2022 0.73  0.50 - 0.99 mg/dL Final    eGFR if non African American 03/29/2022 87  > OR = 60 " mL/min/1.73m2 Final    eGFR if African American 03/29/2022 101  > OR = 60 mL/min/1.73m2 Final    BUN/Creatinine Ratio 03/29/2022 NOT APPLICABLE  6 - 22 (calc) Final    Sodium 03/29/2022 140  135 - 146 mmol/L Final    Potassium 03/29/2022 4.5  3.5 - 5.3 mmol/L Final    Chloride 03/29/2022 103  98 - 110 mmol/L Final    CO2 03/29/2022 30  20 - 32 mmol/L Final    Calcium 03/29/2022 9.2  8.6 - 10.4 mg/dL Final    Total Protein 03/29/2022 6.8  6.1 - 8.1 g/dL Final    Albumin 03/29/2022 4.3  3.6 - 5.1 g/dL Final    Globulin, Total 03/29/2022 2.5  1.9 - 3.7 g/dL (calc) Final    Albumin/Globulin Ratio 03/29/2022 1.7  1.0 - 2.5 (calc) Final    Total Bilirubin 03/29/2022 0.8  0.2 - 1.2 mg/dL Final    Alkaline Phosphatase 03/29/2022 58  37 - 153 U/L Final    AST 03/29/2022 60 (A) 10 - 35 U/L Final    ALT 03/29/2022 76 (A) 6 - 29 U/L Final    Cholesterol 03/29/2022 227 (A) <200 mg/dL Final    HDL 03/29/2022 94  > OR = 50 mg/dL Final    Triglycerides 03/29/2022 90  <150 mg/dL Final    LDL Cholesterol 03/29/2022 114 (A) mg/dL (calc) Final    HDL/Cholesterol Ratio 03/29/2022 2.4  <5.0 (calc) Final    Non HDL Chol. (LDL+VLDL) 03/29/2022 133 (A) <130 mg/dL (calc) Final    TSH w/reflex to FT4 03/29/2022 1.78  0.40 - 4.50 mIU/L Final    Color, UA 03/29/2022 YELLOW  YELLOW Final    Appearance, UA 03/29/2022 CLEAR  CLEAR Final    Specific [place], UA 03/29/2022 1.003  1.001 - 1.035 Final    pH, UA 03/29/2022 7.5  5.0 - 8.0 Final    Glucose, UA 03/29/2022 NEGATIVE  NEGATIVE Final    Bilirubin, UA 03/29/2022 NEGATIVE  NEGATIVE Final    Ketones, UA 03/29/2022 NEGATIVE  NEGATIVE Final    Occult Blood UA 03/29/2022 NEGATIVE  NEGATIVE Final    Protein, UA 03/29/2022 NEGATIVE  NEGATIVE Final    Nitrite, UA 03/29/2022 NEGATIVE  NEGATIVE Final    Leukocytes, UA 03/29/2022 NEGATIVE  NEGATIVE Final    WBC Casts, UA 03/29/2022 NONE SEEN  < OR = 5 /HPF Final    RBC Casts, UA 03/29/2022 NONE SEEN  < OR = 2 /HPF Final     Squam Epithel, UA 03/29/2022 NONE SEEN  < OR = 5 /HPF Final    Bacteria, UA 03/29/2022 NONE SEEN  NONE SEEN /HPF Final    Hyaline Casts, UA 03/29/2022 NONE SEEN  NONE SEEN /LPF Final    Reflexive Urine Culture 03/29/2022    Final    Creatinine, Urine 03/29/2022 13 (A) 20 - 275 mg/dL Final    Microalb, Ur 03/29/2022 0.2  See Note: mg/dL Final    Microalb/Creat Ratio 03/29/2022 15  <30 mcg/mg creat Final       Past Medical History:   Diagnosis Date    Hepatitis C     Hypertension     SVT (supraventricular tachycardia)      No past surgical history on file.  Family History   Problem Relation Age of Onset    Glaucoma Neg Hx     Macular degeneration Neg Hx     Retinal detachment Neg Hx        Marital Status:   Alcohol History:  reports current alcohol use.  Tobacco History:  reports that she has quit smoking. Her smoking use included cigarettes. She has never used smokeless tobacco.  Drug History:  reports no history of drug use.    Review of patient's allergies indicates:  No Known Allergies    Current Outpatient Medications:     alendronate (FOSAMAX) 70 MG tablet, TK 1 T PO 1 TIME Q WK, Disp: 12 tablet, Rfl: 3    biotin 5,000 mcg TbDL, Take 5,000 mcg by mouth once daily., Disp: , Rfl:     calcium-vitamin D3 (CALCIUM 500 + D) 500 mg-5 mcg (200 unit) per tablet, Take 1 tablet by mouth 2 (two) times daily with meals., Disp: , Rfl:     cyanocobalamin (VITAMIN B-12) 1000 MCG tablet, Take 100 mcg by mouth once daily., Disp: , Rfl:     losartan (COZAAR) 25 MG tablet, Take 1 tablet (25 mg total) by mouth once daily., Disp: 90 tablet, Rfl: 3    metoprolol succinate (TOPROL-XL) 50 MG 24 hr tablet, Take 1 tablet (50 mg total) by mouth once daily., Disp: 90 tablet, Rfl: 1    milk thistle 175 mg tablet, Take 175 mg by mouth once daily., Disp: , Rfl:     omega-3 fatty acids/fish oil (FISH OIL-OMEGA-3 FATTY ACIDS) 300-1,000 mg capsule, Take 1 capsule by mouth once daily., Disp: , Rfl:     vitamin E 400  "UNIT capsule, Take 400 Units by mouth once daily., Disp: , Rfl:     zinc gluconate 50 mg tablet, Take 50 mg by mouth once daily., Disp: , Rfl:     losartan (COZAAR) 25 MG tablet, Take 1 tablet (25 mg total) by mouth once daily., Disp: 90 tablet, Rfl: 3    methocarbamoL (ROBAXIN) 750 MG Tab, Take 1 tablet (750 mg total) by mouth 4 (four) times daily. for 10 days, Disp: 40 tablet, Rfl: 0    methylPREDNISolone (MEDROL DOSEPACK) 4 mg tablet, use as directed, Disp: 1 each, Rfl: 0    Review of Systems   Musculoskeletal: Positive for back pain and myalgias.          Objective:      Vitals:    04/07/22 0921   BP: 128/82   Pulse: 78   SpO2: 98%   Weight: 55.8 kg (123 lb)   Height: 5' 2" (1.575 m)     Physical Exam  Constitutional:       General: She is not in acute distress.     Appearance: She is well-developed.   HENT:      Head: Normocephalic and atraumatic.   Eyes:      Conjunctiva/sclera: Conjunctivae normal.      Pupils: Pupils are equal, round, and reactive to light.   Neck:      Thyroid: No thyromegaly.   Cardiovascular:      Rate and Rhythm: Normal rate and regular rhythm.      Heart sounds: Normal heart sounds.   Pulmonary:      Effort: Pulmonary effort is normal.      Breath sounds: Normal breath sounds.   Abdominal:      General: Bowel sounds are normal. There is no distension.      Palpations: Abdomen is soft.      Tenderness: There is no abdominal tenderness.   Musculoskeletal:      Cervical back: Normal range of motion and neck supple.      Lumbar back: Spasms and tenderness present. Decreased range of motion.      Comments: Negative Rt SLR   Skin:     General: Skin is warm and dry.      Findings: No erythema.   Neurological:      Mental Status: She is alert and oriented to person, place, and time.      Cranial Nerves: No cranial nerve deficit.           Assessment:       1. Lumbar radiculopathy    2. Hair loss         Plan:       Lumbar radiculopathy  Comments:  check xrays now. medrol/robaxin to use " daily for the next week. PT pending xray results. Will consider MRI if sxs no better.  Orders:  -     X-Ray Lumbar Spine 5 View; Future; Expected date: 04/07/2022  -     methylPREDNISolone (MEDROL DOSEPACK) 4 mg tablet; use as directed  Dispense: 1 each; Refill: 0  -     methocarbamoL (ROBAXIN) 750 MG Tab; Take 1 tablet (750 mg total) by mouth 4 (four) times daily. for 10 days  Dispense: 40 tablet; Refill: 0    Hair loss  -     Ambulatory referral/consult to Dermatology      Follow up if symptoms worsen or fail to improve, for pending workup.        4/7/2022 Hector Colin PA-C

## 2022-04-08 ENCOUNTER — TELEPHONE (OUTPATIENT)
Dept: PULMONOLOGY | Facility: CLINIC | Age: 67
End: 2022-04-08
Payer: OTHER GOVERNMENT

## 2022-04-08 NOTE — TELEPHONE ENCOUNTER
Patient scheduled.       ----- Message from Dolly Jules MA sent at 4/8/2022  2:00 PM CDT -----  Patient is being referred to Dr Kaba for Hair loss [L65.9]. Please contact patient and make an appointment.      Thanks,    ROSEMARY Alberto

## 2022-05-03 ENCOUNTER — OFFICE VISIT (OUTPATIENT)
Dept: FAMILY MEDICINE | Facility: CLINIC | Age: 67
End: 2022-05-03
Payer: OTHER GOVERNMENT

## 2022-05-03 VITALS
BODY MASS INDEX: 23.07 KG/M2 | DIASTOLIC BLOOD PRESSURE: 94 MMHG | SYSTOLIC BLOOD PRESSURE: 140 MMHG | HEIGHT: 62 IN | WEIGHT: 125.38 LBS | HEART RATE: 69 BPM | OXYGEN SATURATION: 96 %

## 2022-05-03 DIAGNOSIS — I47.10 SVT (SUPRAVENTRICULAR TACHYCARDIA): ICD-10-CM

## 2022-05-03 DIAGNOSIS — I10 ESSENTIAL HYPERTENSION: ICD-10-CM

## 2022-05-03 DIAGNOSIS — M81.0 AGE RELATED OSTEOPOROSIS, UNSPECIFIED PATHOLOGICAL FRACTURE PRESENCE: ICD-10-CM

## 2022-05-03 PROCEDURE — 99214 PR OFFICE/OUTPT VISIT, EST, LEVL IV, 30-39 MIN: ICD-10-PCS | Mod: S$GLB,,, | Performed by: PHYSICIAN ASSISTANT

## 2022-05-03 PROCEDURE — 99214 OFFICE O/P EST MOD 30 MIN: CPT | Mod: S$GLB,,, | Performed by: PHYSICIAN ASSISTANT

## 2022-05-03 RX ORDER — LOSARTAN POTASSIUM 50 MG/1
50 TABLET ORAL DAILY
Qty: 90 TABLET | Refills: 1 | Status: SHIPPED | OUTPATIENT
Start: 2022-05-03 | End: 2022-08-05 | Stop reason: SDUPTHER

## 2022-05-03 RX ORDER — ALENDRONATE SODIUM 70 MG/1
TABLET ORAL
Qty: 12 TABLET | Refills: 3 | Status: SHIPPED | OUTPATIENT
Start: 2022-05-03 | End: 2022-10-03 | Stop reason: SDUPTHER

## 2022-05-03 RX ORDER — METOPROLOL SUCCINATE 50 MG/1
50 TABLET, EXTENDED RELEASE ORAL DAILY
Qty: 90 TABLET | Refills: 1 | Status: SHIPPED | OUTPATIENT
Start: 2022-05-03 | End: 2022-08-16

## 2022-05-03 NOTE — PROGRESS NOTES
SUBJECTIVE:    Patient ID: Suma Butterfield is a 66 y.o. female.    Chief Complaint: Hypertension (No bottles.//Pt is here for a B/P check and f/u appointment.//Pt states that right leg pain is getting better.//KYLAH)    This is a 66-year-old female who presents today for 4 week follow-up regarding low back pain and right leg pain. Reports that the steroids and muscle relaxants did help significantly. But after completing the medicine pain returned somewhat. She does not feel that it warrants any further intervention. RT sided burning has resolved.      Telephone on 03/14/2022   Component Date Value Ref Range Status    WBC 03/29/2022 5.2  3.8 - 10.8 Thousand/uL Final    RBC 03/29/2022 4.46  3.80 - 5.10 Million/uL Final    Hemoglobin 03/29/2022 16.2 (A) 11.7 - 15.5 g/dL Final    Hematocrit 03/29/2022 46.8 (A) 35.0 - 45.0 % Final    MCV 03/29/2022 104.9 (A) 80.0 - 100.0 fL Final    MCH 03/29/2022 36.3 (A) 27.0 - 33.0 pg Final    MCHC 03/29/2022 34.6  32.0 - 36.0 g/dL Final    RDW 03/29/2022 11.4  11.0 - 15.0 % Final    Platelets 03/29/2022 154  140 - 400 Thousand/uL Final    MPV 03/29/2022 10.2  7.5 - 12.5 fL Final    Neutrophils, Abs 03/29/2022 2,049  1,500 - 7,800 cells/uL Final    Lymph # 03/29/2022 2,402  850 - 3,900 cells/uL Final    Mono # 03/29/2022 473  200 - 950 cells/uL Final    Eos # 03/29/2022 192  15 - 500 cells/uL Final    Baso # 03/29/2022 83  0 - 200 cells/uL Final    Neutrophils Relative 03/29/2022 39.4  % Final    Lymph % 03/29/2022 46.2  % Final    Mono % 03/29/2022 9.1  % Final    Eosinophil % 03/29/2022 3.7  % Final    Basophil % 03/29/2022 1.6  % Final    Glucose 03/29/2022 98  65 - 99 mg/dL Final    BUN 03/29/2022 14  7 - 25 mg/dL Final    Creatinine 03/29/2022 0.73  0.50 - 0.99 mg/dL Final    eGFR if non African American 03/29/2022 87  > OR = 60 mL/min/1.73m2 Final    eGFR if African American 03/29/2022 101  > OR = 60 mL/min/1.73m2 Final    BUN/Creatinine Ratio 03/29/2022  NOT APPLICABLE  6 - 22 (calc) Final    Sodium 03/29/2022 140  135 - 146 mmol/L Final    Potassium 03/29/2022 4.5  3.5 - 5.3 mmol/L Final    Chloride 03/29/2022 103  98 - 110 mmol/L Final    CO2 03/29/2022 30  20 - 32 mmol/L Final    Calcium 03/29/2022 9.2  8.6 - 10.4 mg/dL Final    Total Protein 03/29/2022 6.8  6.1 - 8.1 g/dL Final    Albumin 03/29/2022 4.3  3.6 - 5.1 g/dL Final    Globulin, Total 03/29/2022 2.5  1.9 - 3.7 g/dL (calc) Final    Albumin/Globulin Ratio 03/29/2022 1.7  1.0 - 2.5 (calc) Final    Total Bilirubin 03/29/2022 0.8  0.2 - 1.2 mg/dL Final    Alkaline Phosphatase 03/29/2022 58  37 - 153 U/L Final    AST 03/29/2022 60 (A) 10 - 35 U/L Final    ALT 03/29/2022 76 (A) 6 - 29 U/L Final    Cholesterol 03/29/2022 227 (A) <200 mg/dL Final    HDL 03/29/2022 94  > OR = 50 mg/dL Final    Triglycerides 03/29/2022 90  <150 mg/dL Final    LDL Cholesterol 03/29/2022 114 (A) mg/dL (calc) Final    HDL/Cholesterol Ratio 03/29/2022 2.4  <5.0 (calc) Final    Non HDL Chol. (LDL+VLDL) 03/29/2022 133 (A) <130 mg/dL (calc) Final    TSH w/reflex to FT4 03/29/2022 1.78  0.40 - 4.50 mIU/L Final    Color, UA 03/29/2022 YELLOW  YELLOW Final    Appearance, UA 03/29/2022 CLEAR  CLEAR Final    Specific [place], UA 03/29/2022 1.003  1.001 - 1.035 Final    pH, UA 03/29/2022 7.5  5.0 - 8.0 Final    Glucose, UA 03/29/2022 NEGATIVE  NEGATIVE Final    Bilirubin, UA 03/29/2022 NEGATIVE  NEGATIVE Final    Ketones, UA 03/29/2022 NEGATIVE  NEGATIVE Final    Occult Blood UA 03/29/2022 NEGATIVE  NEGATIVE Final    Protein, UA 03/29/2022 NEGATIVE  NEGATIVE Final    Nitrite, UA 03/29/2022 NEGATIVE  NEGATIVE Final    Leukocytes, UA 03/29/2022 NEGATIVE  NEGATIVE Final    WBC Casts, UA 03/29/2022 NONE SEEN  < OR = 5 /HPF Final    RBC Casts, UA 03/29/2022 NONE SEEN  < OR = 2 /HPF Final    Squam Epithel, UA 03/29/2022 NONE SEEN  < OR = 5 /HPF Final    Bacteria, UA 03/29/2022 NONE SEEN  NONE SEEN /HPF Final     Hyaline Casts, UA 03/29/2022 NONE SEEN  NONE SEEN /LPF Final    Reflexive Urine Culture 03/29/2022    Final    Creatinine, Urine 03/29/2022 13 (A) 20 - 275 mg/dL Final    Microalb, Ur 03/29/2022 0.2  See Note: mg/dL Final    Microalb/Creat Ratio 03/29/2022 15  <30 mcg/mg creat Final       Past Medical History:   Diagnosis Date    Hepatitis C     Hypertension     SVT (supraventricular tachycardia)      History reviewed. No pertinent surgical history.  Family History   Problem Relation Age of Onset    Glaucoma Neg Hx     Macular degeneration Neg Hx     Retinal detachment Neg Hx        Marital Status:   Alcohol History:  reports current alcohol use.  Tobacco History:  reports that she has quit smoking. Her smoking use included cigarettes. She has never used smokeless tobacco.  Drug History:  reports no history of drug use.    Review of patient's allergies indicates:  No Known Allergies    Current Outpatient Medications:     alendronate (FOSAMAX) 70 MG tablet, TK 1 T PO 1 TIME Q WK, Disp: 12 tablet, Rfl: 3    biotin 5,000 mcg TbDL, Take 5,000 mcg by mouth once daily., Disp: , Rfl:     calcium-vitamin D3 (CALCIUM 500 + D) 500 mg-5 mcg (200 unit) per tablet, Take 1 tablet by mouth 2 (two) times daily with meals., Disp: , Rfl:     cyanocobalamin (VITAMIN B-12) 1000 MCG tablet, Take 100 mcg by mouth once daily., Disp: , Rfl:     losartan (COZAAR) 50 MG tablet, Take 1 tablet (50 mg total) by mouth once daily., Disp: 90 tablet, Rfl: 1    metoprolol succinate (TOPROL-XL) 50 MG 24 hr tablet, Take 1 tablet (50 mg total) by mouth once daily., Disp: 90 tablet, Rfl: 1    milk thistle 175 mg tablet, Take 175 mg by mouth once daily., Disp: , Rfl:     omega-3 fatty acids/fish oil (FISH OIL-OMEGA-3 FATTY ACIDS) 300-1,000 mg capsule, Take 1 capsule by mouth once daily., Disp: , Rfl:     vitamin E 400 UNIT capsule, Take 400 Units by mouth once daily., Disp: , Rfl:     zinc gluconate 50 mg tablet, Take 50 mg by  "mouth once daily., Disp: , Rfl:     Review of Systems   Constitutional: Negative for appetite change, chills, fatigue, fever and unexpected weight change.   HENT: Negative for congestion.    Respiratory: Negative for cough, chest tightness and shortness of breath.    Cardiovascular: Negative for chest pain and palpitations.   Gastrointestinal: Negative for abdominal distention and abdominal pain.   Endocrine: Negative for cold intolerance and heat intolerance.   Genitourinary: Negative for difficulty urinating and dysuria.   Musculoskeletal: Negative for arthralgias and back pain.   Neurological: Negative for dizziness, weakness and headaches.          Objective:      Vitals:    05/03/22 1515 05/03/22 1519   BP: (!) 142/94 (!) 140/94   Pulse: 69    SpO2: 96%    Weight: 56.9 kg (125 lb 6.4 oz)    Height: 5' 2" (1.575 m)      Physical Exam  Constitutional:       General: She is not in acute distress.     Appearance: She is well-developed.   HENT:      Head: Normocephalic and atraumatic.   Eyes:      Conjunctiva/sclera: Conjunctivae normal.      Pupils: Pupils are equal, round, and reactive to light.   Neck:      Thyroid: No thyromegaly.   Cardiovascular:      Rate and Rhythm: Normal rate and regular rhythm.      Heart sounds: Normal heart sounds.   Pulmonary:      Effort: Pulmonary effort is normal.      Breath sounds: Normal breath sounds.   Abdominal:      General: Bowel sounds are normal. There is no distension.      Palpations: Abdomen is soft.      Tenderness: There is no abdominal tenderness.   Musculoskeletal:         General: Normal range of motion.      Cervical back: Normal range of motion and neck supple.   Skin:     General: Skin is warm and dry.      Findings: No erythema.   Neurological:      Mental Status: She is alert and oriented to person, place, and time.      Cranial Nerves: No cranial nerve deficit.           Assessment:       1. Essential hypertension    2. SVT (supraventricular tachycardia)  "   3. Age related osteoporosis, unspecified pathological fracture presence         Plan:       Essential hypertension  Comments:  Pressure is not controlled. Managed by cards. I am going to increase her losartan now to 50mg. f/u in 3 mos.  Orders:  -     losartan (COZAAR) 50 MG tablet; Take 1 tablet (50 mg total) by mouth once daily.  Dispense: 90 tablet; Refill: 1  -     metoprolol succinate (TOPROL-XL) 50 MG 24 hr tablet; Take 1 tablet (50 mg total) by mouth once daily.  Dispense: 90 tablet; Refill: 1    SVT (supraventricular tachycardia)  -     metoprolol succinate (TOPROL-XL) 50 MG 24 hr tablet; Take 1 tablet (50 mg total) by mouth once daily.  Dispense: 90 tablet; Refill: 1    Age related osteoporosis, unspecified pathological fracture presence  Comments:  maintain on fosamax as is.  Orders:  -     alendronate (FOSAMAX) 70 MG tablet; TK 1 T PO 1 TIME Q WK  Dispense: 12 tablet; Refill: 3      No follow-ups on file.        5/3/2022 Hector Colin PA-C

## 2022-05-12 ENCOUNTER — OFFICE VISIT (OUTPATIENT)
Dept: DERMATOLOGY | Facility: CLINIC | Age: 67
End: 2022-05-12
Payer: OTHER GOVERNMENT

## 2022-05-12 DIAGNOSIS — R71.8 ELEVATED MCV: ICD-10-CM

## 2022-05-12 DIAGNOSIS — L65.9 ALOPECIA: Primary | ICD-10-CM

## 2022-05-12 PROCEDURE — 11104 PR PUNCH BIOPSY, SKIN, SINGLE LESION: ICD-10-PCS | Mod: S$PBB,,, | Performed by: STUDENT IN AN ORGANIZED HEALTH CARE EDUCATION/TRAINING PROGRAM

## 2022-05-12 PROCEDURE — 99204 OFFICE O/P NEW MOD 45 MIN: CPT | Mod: 25,S$PBB,, | Performed by: STUDENT IN AN ORGANIZED HEALTH CARE EDUCATION/TRAINING PROGRAM

## 2022-05-12 PROCEDURE — 99999 PR PBB SHADOW E&M-EST. PATIENT-LVL III: ICD-10-PCS | Mod: PBBFAC,,, | Performed by: STUDENT IN AN ORGANIZED HEALTH CARE EDUCATION/TRAINING PROGRAM

## 2022-05-12 PROCEDURE — 88305 TISSUE EXAM BY PATHOLOGIST: CPT | Mod: 26,,, | Performed by: DERMATOLOGY

## 2022-05-12 PROCEDURE — 11104 PUNCH BX SKIN SINGLE LESION: CPT | Mod: S$PBB,,, | Performed by: STUDENT IN AN ORGANIZED HEALTH CARE EDUCATION/TRAINING PROGRAM

## 2022-05-12 PROCEDURE — 88313 SPECIAL STAINS GROUP 2: CPT | Performed by: DERMATOLOGY

## 2022-05-12 PROCEDURE — 11104 PUNCH BX SKIN SINGLE LESION: CPT | Mod: PBBFAC,PO | Performed by: STUDENT IN AN ORGANIZED HEALTH CARE EDUCATION/TRAINING PROGRAM

## 2022-05-12 PROCEDURE — 99204 PR OFFICE/OUTPT VISIT, NEW, LEVL IV, 45-59 MIN: ICD-10-PCS | Mod: 25,S$PBB,, | Performed by: STUDENT IN AN ORGANIZED HEALTH CARE EDUCATION/TRAINING PROGRAM

## 2022-05-12 PROCEDURE — 88313 PR  SPECIAL STAINS,GROUP II: ICD-10-PCS | Mod: 26,,, | Performed by: DERMATOLOGY

## 2022-05-12 PROCEDURE — 88312 SPECIAL STAINS GROUP 1: CPT | Mod: 26,,, | Performed by: DERMATOLOGY

## 2022-05-12 PROCEDURE — 88312 PR  SPECIAL STAINS,GROUP I: ICD-10-PCS | Mod: 26,,, | Performed by: DERMATOLOGY

## 2022-05-12 PROCEDURE — 88313 SPECIAL STAINS GROUP 2: CPT | Mod: 26,,, | Performed by: DERMATOLOGY

## 2022-05-12 PROCEDURE — 99213 OFFICE O/P EST LOW 20 MIN: CPT | Mod: PBBFAC,PO,25 | Performed by: STUDENT IN AN ORGANIZED HEALTH CARE EDUCATION/TRAINING PROGRAM

## 2022-05-12 PROCEDURE — 88305 TISSUE EXAM BY PATHOLOGIST: CPT | Performed by: DERMATOLOGY

## 2022-05-12 PROCEDURE — 99999 PR PBB SHADOW E&M-EST. PATIENT-LVL III: CPT | Mod: PBBFAC,,, | Performed by: STUDENT IN AN ORGANIZED HEALTH CARE EDUCATION/TRAINING PROGRAM

## 2022-05-12 PROCEDURE — 88312 SPECIAL STAINS GROUP 1: CPT | Performed by: DERMATOLOGY

## 2022-05-12 PROCEDURE — 88305 TISSUE EXAM BY PATHOLOGIST: ICD-10-PCS | Mod: 26,,, | Performed by: DERMATOLOGY

## 2022-05-12 NOTE — PATIENT INSTRUCTIONS
Punch Biopsy Wound Care    Your doctor has performed a punch biopsy today.  A band aid and antibiotic ointment has been placed over the site.  This should remain in place for 24 hours.  It is recommended that you keep the area dry for the first 24 hours.  After 24 hours, you may remove the band aid and wash the area with warm soap and water and apply Vaseline jelly.  Many patients prefer to use Neosporin or Bacitracin ointment.  This is acceptable; however know that you can develop an allergy to this medication even if you have used it safely for years.  It is important to keep the area moist.  Letting it dry out and get air slows healing time, will worsen the scar, and make it more difficult to remove the stitches if they were placed.  Band aid is optional after first 24 hours.      If you notice increasing redness, tenderness, pain, or yellow drainage at the biopsy or surgical site, please notify your doctor.  These are signs of an infection.    If your biopsy/surgical site is bleeding, apply firm pressure for 15 minutes straight.  Repeat for another 15 minutes, if it is still bleeding.   If the surgical site continues to bleed, then please contact your doctor.      [address] [facility] [place] [address]/ [phone] FAX/ [email or web address]

## 2022-05-12 NOTE — PROGRESS NOTES
Subjective:       Patient ID:  Suma Butterfield is a 66 y.o. female who presents for   Chief Complaint   Patient presents with    Hair Loss     New patient    Patient here today for hair loss x several years with increased thinning in the past 6 months. Taking biotin supplements and using Minoxidil foam daily.    About 2 years ago she first noticed that her hair was thinning. Never noticed redness, flaking. Slowly more and more has been falling out. Does not noticed increased scar in her brush.     She started minoxidil 5% about 6-8 months.   Mom thinning hair in 70s. Dad with full head of hair.     transaminitis- Has hx of treated hep C s/p treatment, drinks alcohol daily    Current Outpatient Medications:     alendronate (FOSAMAX) 70 MG tablet, TK 1 T PO 1 TIME Q WK, Disp: 12 tablet, Rfl: 3    biotin 5,000 mcg TbDL, Take 5,000 mcg by mouth once daily., Disp: , Rfl:     calcium-vitamin D3 (OS- + D3) 500 mg-5 mcg (200 unit) per tablet, Take 1 tablet by mouth 2 (two) times daily with meals., Disp: , Rfl:     cyanocobalamin (VITAMIN B-12) 1000 MCG tablet, Take 100 mcg by mouth once daily., Disp: , Rfl:     losartan (COZAAR) 50 MG tablet, Take 1 tablet (50 mg total) by mouth once daily., Disp: 90 tablet, Rfl: 1    metoprolol succinate (TOPROL-XL) 50 MG 24 hr tablet, Take 1 tablet (50 mg total) by mouth once daily., Disp: 90 tablet, Rfl: 1    milk thistle 175 mg tablet, Take 175 mg by mouth once daily., Disp: , Rfl:     omega-3 fatty acids/fish oil (FISH OIL-OMEGA-3 FATTY ACIDS) 300-1,000 mg capsule, Take 1 capsule by mouth once daily., Disp: , Rfl:     vitamin E 400 UNIT capsule, Take 400 Units by mouth once daily., Disp: , Rfl:     zinc gluconate 50 mg tablet, Take 50 mg by mouth once daily., Disp: , Rfl:         Review of Systems   Constitutional: Negative for fever and chills.   Respiratory: Negative for cough and shortness of breath.    Gastrointestinal: Negative for nausea and vomiting.         Objective:    Physical Exam   Constitutional: She appears well-developed and well-nourished.   Neurological: She is alert and oriented to person, place, and time.   Psychiatric: She has a normal mood and affect.          Diagram Legend     Erythematous scaling macule/papule c/w actinic keratosis       Vascular papule c/w angioma      Pigmented verrucoid papule/plaque c/w seborrheic keratosis      Yellow umbilicated papule c/w sebaceous hyperplasia      Irregularly shaped tan macule c/w lentigo     1-2 mm smooth white papules consistent with Milia      Movable subcutaneous cyst with punctum c/w epidermal inclusion cyst      Subcutaneous movable cyst c/w pilar cyst      Firm pink to brown papule c/w dermatofibroma      Pedunculated fleshy papule(s) c/w skin tag(s)      Evenly pigmented macule c/w junctional nevus     Mildly variegated pigmented, slightly irregular-bordered macule c/w mildly atypical nevus      Flesh colored to evenly pigmented papule c/w intradermal nevus       Pink pearly papule/plaque c/w basal cell carcinoma      Erythematous hyperkeratotic cursted plaque c/w SCC      Surgical scar with no sign of skin cancer recurrence      Open and closed comedones      Inflammatory papules and pustules      Verrucoid papule consistent consistent with wart     Erythematous eczematous patches and plaques     Dystrophic onycholytic nail with subungual debris c/w onychomycosis     Umbilicated papule    Erythematous-base heme-crusted tan verrucoid plaque consistent with inflamed seborrheic keratosis     Erythematous Silvery Scaling Plaque c/w Psoriasis     See annotation                      Assessment / Plan:      Pathology Orders:     Normal Orders This Visit    Specimen to Pathology, Dermatology     Questions:    Procedure Type: Dermatology and skin neoplasms    Number of Specimens: 1    ------------------------: -------------------------    Spec 1 Procedure: Biopsy    Spec 1 Clinical Impression: ALOPECIA  PLEASE PROCESS 1 VERTICAL AND 1 HORIZONTAL dx: AA vs. trichotillania vs. LPP r/o scarring process    Spec 1 Source: scalp    Release to patient:         Alopecia  -     ZINC; Future; Expected date: 05/12/2022  -     VITAMIN B12; Future; Expected date: 05/12/2022  -     IRON AND TIBC; Future; Expected date: 05/12/2022  -     FERRITIN; Future; Expected date: 05/12/2022  -     Specimen to Pathology, Dermatology  Punch biopsy procedure note:  Punch biopsy performed after verbal consent obtained. Area marked and prepped with alcohol. Approximately 1cc of 3% lidocaine with epinephrine injected. 4 mm disposable punch used to remove lesion. Hemostasis obtained and biopsy site closed with 1 - 2 Prolene sutures. Wound care instructions reviewed with patient and handout given.  CBC reviewed- elevated H/H, elevated MCV  CMP - elevated LFTs- hx of hep C and current daily drinker.   Continue rogaine for now.     Elevated MCV  -     VITAMIN B12; Future; Expected date: 05/12/2022  -     IRON AND TIBC; Future; Expected date: 05/12/2022  -     FERRITIN; Future; Expected date: 05/12/2022           2 weeks for s/r  No follow-ups on file.

## 2022-05-14 ENCOUNTER — PATIENT MESSAGE (OUTPATIENT)
Dept: DERMATOLOGY | Facility: CLINIC | Age: 67
End: 2022-05-14
Payer: OTHER GOVERNMENT

## 2022-05-20 ENCOUNTER — LAB VISIT (OUTPATIENT)
Dept: LAB | Facility: HOSPITAL | Age: 67
End: 2022-05-20
Attending: STUDENT IN AN ORGANIZED HEALTH CARE EDUCATION/TRAINING PROGRAM
Payer: OTHER GOVERNMENT

## 2022-05-20 DIAGNOSIS — R71.8 ELEVATED MCV: ICD-10-CM

## 2022-05-20 DIAGNOSIS — L65.9 ALOPECIA: ICD-10-CM

## 2022-05-20 LAB
FERRITIN SERPL-MCNC: 984 NG/ML (ref 20–300)
IRON SERPL-MCNC: 141 UG/DL (ref 30–160)
SATURATED IRON: 36 % (ref 20–50)
TOTAL IRON BINDING CAPACITY: 388 UG/DL (ref 250–450)
TRANSFERRIN SERPL-MCNC: 262 MG/DL (ref 200–375)
VIT B12 SERPL-MCNC: >2000 PG/ML (ref 210–950)

## 2022-05-20 PROCEDURE — 84630 ASSAY OF ZINC: CPT | Performed by: STUDENT IN AN ORGANIZED HEALTH CARE EDUCATION/TRAINING PROGRAM

## 2022-05-20 PROCEDURE — 84466 ASSAY OF TRANSFERRIN: CPT | Performed by: STUDENT IN AN ORGANIZED HEALTH CARE EDUCATION/TRAINING PROGRAM

## 2022-05-20 PROCEDURE — 82607 VITAMIN B-12: CPT | Performed by: STUDENT IN AN ORGANIZED HEALTH CARE EDUCATION/TRAINING PROGRAM

## 2022-05-20 PROCEDURE — 36415 COLL VENOUS BLD VENIPUNCTURE: CPT | Performed by: STUDENT IN AN ORGANIZED HEALTH CARE EDUCATION/TRAINING PROGRAM

## 2022-05-20 PROCEDURE — 82728 ASSAY OF FERRITIN: CPT | Performed by: STUDENT IN AN ORGANIZED HEALTH CARE EDUCATION/TRAINING PROGRAM

## 2022-05-23 LAB — ZINC SERPL-MCNC: 79 UG/DL (ref 60–130)

## 2022-05-25 DIAGNOSIS — R79.89 ELEVATED FERRITIN: Primary | ICD-10-CM

## 2022-05-27 ENCOUNTER — CLINICAL SUPPORT (OUTPATIENT)
Dept: DERMATOLOGY | Facility: CLINIC | Age: 67
End: 2022-05-27
Payer: OTHER GOVERNMENT

## 2022-05-27 DIAGNOSIS — L65.9 ALOPECIA: Primary | ICD-10-CM

## 2022-05-27 PROCEDURE — 99024 POSTOP FOLLOW-UP VISIT: CPT | Mod: ,,, | Performed by: STUDENT IN AN ORGANIZED HEALTH CARE EDUCATION/TRAINING PROGRAM

## 2022-05-27 PROCEDURE — 99024 PR POST-OP FOLLOW-UP VISIT: ICD-10-PCS | Mod: ,,, | Performed by: STUDENT IN AN ORGANIZED HEALTH CARE EDUCATION/TRAINING PROGRAM

## 2022-05-31 ENCOUNTER — TELEPHONE (OUTPATIENT)
Dept: DERMATOLOGY | Facility: CLINIC | Age: 67
End: 2022-05-31
Payer: OTHER GOVERNMENT

## 2022-05-31 LAB
FINAL PATHOLOGIC DIAGNOSIS: NORMAL
GROSS: NORMAL
Lab: NORMAL
MICROSCOPIC EXAM: NORMAL

## 2022-05-31 NOTE — TELEPHONE ENCOUNTER
Noted.     ----- Message from Sonia Brady sent at 5/31/2022 10:16 AM CDT -----  Regarding:  Authorization  Good morning,     Our office received a referral for Hem/Onc ( Dr. Villa) or the patient listed above, we are just waiting on  authorization before we call the patient to schedule. Thank you for your assistance.     For any questions please call our office at [phone].

## 2022-06-29 ENCOUNTER — OFFICE VISIT (OUTPATIENT)
Dept: DERMATOLOGY | Facility: CLINIC | Age: 67
End: 2022-06-29
Payer: OTHER GOVERNMENT

## 2022-06-29 DIAGNOSIS — L64.9 ANDROGENIC ALOPECIA: Primary | ICD-10-CM

## 2022-06-29 DIAGNOSIS — R74.01 TRANSAMINITIS: ICD-10-CM

## 2022-06-29 DIAGNOSIS — L66.9 SCARRING ALOPECIA: ICD-10-CM

## 2022-06-29 DIAGNOSIS — R79.89 ELEVATED FERRITIN: ICD-10-CM

## 2022-06-29 DIAGNOSIS — D58.2 ELEVATED HEMOGLOBIN: ICD-10-CM

## 2022-06-29 PROCEDURE — 99999 PR PBB SHADOW E&M-EST. PATIENT-LVL II: CPT | Mod: PBBFAC,,, | Performed by: STUDENT IN AN ORGANIZED HEALTH CARE EDUCATION/TRAINING PROGRAM

## 2022-06-29 PROCEDURE — 99999 PR PBB SHADOW E&M-EST. PATIENT-LVL II: ICD-10-PCS | Mod: PBBFAC,,, | Performed by: STUDENT IN AN ORGANIZED HEALTH CARE EDUCATION/TRAINING PROGRAM

## 2022-06-29 PROCEDURE — 99214 OFFICE O/P EST MOD 30 MIN: CPT | Mod: 25,S$PBB,, | Performed by: STUDENT IN AN ORGANIZED HEALTH CARE EDUCATION/TRAINING PROGRAM

## 2022-06-29 PROCEDURE — 11900 INJECT SKIN LESIONS </W 7: CPT | Mod: PBBFAC,PO | Performed by: STUDENT IN AN ORGANIZED HEALTH CARE EDUCATION/TRAINING PROGRAM

## 2022-06-29 PROCEDURE — 99212 OFFICE O/P EST SF 10 MIN: CPT | Mod: PBBFAC,PO | Performed by: STUDENT IN AN ORGANIZED HEALTH CARE EDUCATION/TRAINING PROGRAM

## 2022-06-29 PROCEDURE — 11900 INJECT SKIN LESIONS </W 7: CPT | Mod: S$PBB,,, | Performed by: STUDENT IN AN ORGANIZED HEALTH CARE EDUCATION/TRAINING PROGRAM

## 2022-06-29 PROCEDURE — 99214 PR OFFICE/OUTPT VISIT, EST, LEVL IV, 30-39 MIN: ICD-10-PCS | Mod: 25,S$PBB,, | Performed by: STUDENT IN AN ORGANIZED HEALTH CARE EDUCATION/TRAINING PROGRAM

## 2022-06-29 PROCEDURE — 11900 PR INJECTION INTO SKIN LESIONS, UP TO 7: ICD-10-PCS | Mod: S$PBB,,, | Performed by: STUDENT IN AN ORGANIZED HEALTH CARE EDUCATION/TRAINING PROGRAM

## 2022-06-29 RX ORDER — FLUOCINONIDE TOPICAL SOLUTION USP, 0.05% 0.5 MG/ML
SOLUTION TOPICAL
Qty: 60 ML | Refills: 2 | Status: SHIPPED | OUTPATIENT
Start: 2022-06-29 | End: 2022-10-14 | Stop reason: SDUPTHER

## 2022-06-29 RX ORDER — FINASTERIDE 5 MG/1
5 TABLET, FILM COATED ORAL DAILY
Qty: 30 TABLET | Refills: 11 | Status: SHIPPED | OUTPATIENT
Start: 2022-06-29 | End: 2023-06-29 | Stop reason: SDUPTHER

## 2022-06-29 NOTE — PROGRESS NOTES
Subjective:       Patient ID:  Suma Butterfield is a 66 y.o. female who presents for   Chief Complaint   Patient presents with    Follow-up     Discuss bx and treatment     LOV 5/12/22    Patient here today to discuss biopsy results and treatment. She recently started men's Rogaine over whole scalp.     Final Pathologic Diagnosis Skin, scalp, biopsies:   -Cicatricial Alopecia, see comment   Comment: Given the clinical differential central centrifugal cicatricial   alopecia seems most appropriate in this context there are findings of   minaturized hairs present which represent a coexisting androgenic alopecia.   The findings of fibrosis of follicle tracts are nonspecfic, this could   represent a burnt out inflammatory hair process   Comment: Interp By Laura Armenta M.D., Signed on 05/31/2022 at 16:52    No hx of cancer, post-menopausal      transaminitis- Has hx of treated hep C s/p treatment, drinks alcohol daily        Review of Systems   Constitutional: Negative for fever, chills and fatigue.   Respiratory: Negative for cough and shortness of breath.    Gastrointestinal: Negative for nausea and vomiting.        Objective:    Physical Exam   Constitutional: She appears well-developed and well-nourished.   Neurological: She is alert and oriented to person, place, and time.   Psychiatric: She has a normal mood and affect.   Skin:   Areas Examined (abnormalities noted in diagram):   Scalp / Hair Palpated and Inspected              Diagram Legend     Erythematous scaling macule/papule c/w actinic keratosis       Vascular papule c/w angioma      Pigmented verrucoid papule/plaque c/w seborrheic keratosis      Yellow umbilicated papule c/w sebaceous hyperplasia      Irregularly shaped tan macule c/w lentigo     1-2 mm smooth white papules consistent with Milia      Movable subcutaneous cyst with punctum c/w epidermal inclusion cyst      Subcutaneous movable cyst c/w pilar cyst      Firm pink to brown papule c/w  dermatofibroma      Pedunculated fleshy papule(s) c/w skin tag(s)      Evenly pigmented macule c/w junctional nevus     Mildly variegated pigmented, slightly irregular-bordered macule c/w mildly atypical nevus      Flesh colored to evenly pigmented papule c/w intradermal nevus       Pink pearly papule/plaque c/w basal cell carcinoma      Erythematous hyperkeratotic cursted plaque c/w SCC      Surgical scar with no sign of skin cancer recurrence      Open and closed comedones      Inflammatory papules and pustules      Verrucoid papule consistent consistent with wart     Erythematous eczematous patches and plaques     Dystrophic onycholytic nail with subungual debris c/w onychomycosis     Umbilicated papule    Erythematous-base heme-crusted tan verrucoid plaque consistent with inflamed seborrheic keratosis     Erythematous Silvery Scaling Plaque c/w Psoriasis     See annotation              Assessment / Plan:        Alopecia  -     fluocinonide (LIDEX) 0.05 % external solution; Use on AA daily  Dispense: 60 mL; Refill: 2  -     finasteride (PROSCAR) 5 mg tablet; Take 1 tablet (5 mg total) by mouth once daily.  Dispense: 30 tablet; Refill: 11  - biopsy showed multifactorial alopecia- significant scarring likely due to CCCA as well as a component of androgenic alopecia  - discussed multiple treatment options as well as camouflage option- she has ordered a wig but it is back ordered  - discussed PO minoxidil, PO finasteride, ILK, topical steroids  - will start with ILK today + finasteride +  Continue rogaine 5% solution + start lidex solution daily   Intralesional Kenalog 5mg/cc (5.5 cc total) injected into >1 lesions on the scalp today after obtaining verbal consent including risk of surrounding hypopigmentation. Patient tolerated procedure well.    Units: 3  NDC for Kenalog 10mg/cc:  6281-4436-11  Finasteride is not approved by the FDA for use in women, however multiple studies have shown benefit when used to treat  female pattern hair loss in combination with topical minoxidil. Side effects are infrequent, usually mild, and reversible even with maintenance of treatment. They include decreased libido, breast tenderness, and headache. In premenopausal women, irregular menstruation and spotting may occur. Pregnancy must be avoided while on this medication (risk of feminization of the male fetus)    Transaminitis, elevated ferritin, elevated hemoglobin  - daily drinker, hx of hep C  - recommend f/u with PCP to further evaluate and treat- she expressed understanding and will call her pcp             No follow-ups on file.

## 2022-07-04 DIAGNOSIS — I10 ESSENTIAL HYPERTENSION: ICD-10-CM

## 2022-07-04 DIAGNOSIS — I47.10 SVT (SUPRAVENTRICULAR TACHYCARDIA): ICD-10-CM

## 2022-07-04 RX ORDER — METOPROLOL SUCCINATE 50 MG/1
50 TABLET, EXTENDED RELEASE ORAL DAILY
Qty: 90 TABLET | Refills: 1 | Status: CANCELLED | OUTPATIENT
Start: 2022-07-04 | End: 2022-10-02

## 2022-07-12 DIAGNOSIS — Z12.31 ENCOUNTER FOR SCREENING MAMMOGRAM FOR MALIGNANT NEOPLASM OF BREAST: Primary | ICD-10-CM

## 2022-07-15 ENCOUNTER — TELEPHONE (OUTPATIENT)
Dept: FAMILY MEDICINE | Facility: CLINIC | Age: 67
End: 2022-07-15

## 2022-07-15 DIAGNOSIS — L65.9 HAIR LOSS: Primary | ICD-10-CM

## 2022-07-15 NOTE — TELEPHONE ENCOUNTER
----- Message from Purnima Levin MA sent at 7/15/2022  9:07 AM CDT -----  Pt calling in need of a new referral for Dr. Mandy Kaba so she can be seen. CB#

## 2022-07-30 ENCOUNTER — PATIENT MESSAGE (OUTPATIENT)
Dept: FAMILY MEDICINE | Facility: CLINIC | Age: 67
End: 2022-07-30

## 2022-07-30 DIAGNOSIS — Z12.31 SCREENING MAMMOGRAM FOR BREAST CANCER: Primary | ICD-10-CM

## 2022-08-05 ENCOUNTER — OFFICE VISIT (OUTPATIENT)
Dept: FAMILY MEDICINE | Facility: CLINIC | Age: 67
End: 2022-08-05
Payer: MEDICARE

## 2022-08-05 VITALS
BODY MASS INDEX: 22.82 KG/M2 | DIASTOLIC BLOOD PRESSURE: 94 MMHG | WEIGHT: 124 LBS | HEART RATE: 68 BPM | HEIGHT: 62 IN | SYSTOLIC BLOOD PRESSURE: 142 MMHG

## 2022-08-05 DIAGNOSIS — I10 ESSENTIAL HYPERTENSION: Primary | ICD-10-CM

## 2022-08-05 PROCEDURE — 3066F NEPHROPATHY DOC TX: CPT | Mod: CPTII,S$GLB,, | Performed by: PHYSICIAN ASSISTANT

## 2022-08-05 PROCEDURE — 4010F PR ACE/ARB THEARPY RXD/TAKEN: ICD-10-PCS | Mod: CPTII,S$GLB,, | Performed by: PHYSICIAN ASSISTANT

## 2022-08-05 PROCEDURE — 3080F DIAST BP >= 90 MM HG: CPT | Mod: CPTII,S$GLB,, | Performed by: PHYSICIAN ASSISTANT

## 2022-08-05 PROCEDURE — 99213 OFFICE O/P EST LOW 20 MIN: CPT | Mod: S$GLB,,, | Performed by: PHYSICIAN ASSISTANT

## 2022-08-05 PROCEDURE — 3077F PR MOST RECENT SYSTOLIC BLOOD PRESSURE >= 140 MM HG: ICD-10-PCS | Mod: CPTII,S$GLB,, | Performed by: PHYSICIAN ASSISTANT

## 2022-08-05 PROCEDURE — 3008F PR BODY MASS INDEX (BMI) DOCUMENTED: ICD-10-PCS | Mod: CPTII,S$GLB,, | Performed by: PHYSICIAN ASSISTANT

## 2022-08-05 PROCEDURE — 99213 PR OFFICE/OUTPT VISIT, EST, LEVL III, 20-29 MIN: ICD-10-PCS | Mod: S$GLB,,, | Performed by: PHYSICIAN ASSISTANT

## 2022-08-05 PROCEDURE — 3080F PR MOST RECENT DIASTOLIC BLOOD PRESSURE >= 90 MM HG: ICD-10-PCS | Mod: CPTII,S$GLB,, | Performed by: PHYSICIAN ASSISTANT

## 2022-08-05 PROCEDURE — 3061F PR NEG MICROALBUMINURIA RESULT DOCUMENTED/REVIEW: ICD-10-PCS | Mod: CPTII,S$GLB,, | Performed by: PHYSICIAN ASSISTANT

## 2022-08-05 PROCEDURE — 4010F ACE/ARB THERAPY RXD/TAKEN: CPT | Mod: CPTII,S$GLB,, | Performed by: PHYSICIAN ASSISTANT

## 2022-08-05 PROCEDURE — 3066F PR DOCUMENTATION OF TREATMENT FOR NEPHROPATHY: ICD-10-PCS | Mod: CPTII,S$GLB,, | Performed by: PHYSICIAN ASSISTANT

## 2022-08-05 PROCEDURE — 3008F BODY MASS INDEX DOCD: CPT | Mod: CPTII,S$GLB,, | Performed by: PHYSICIAN ASSISTANT

## 2022-08-05 PROCEDURE — 1159F MED LIST DOCD IN RCRD: CPT | Mod: CPTII,S$GLB,, | Performed by: PHYSICIAN ASSISTANT

## 2022-08-05 PROCEDURE — 1159F PR MEDICATION LIST DOCUMENTED IN MEDICAL RECORD: ICD-10-PCS | Mod: CPTII,S$GLB,, | Performed by: PHYSICIAN ASSISTANT

## 2022-08-05 PROCEDURE — 3077F SYST BP >= 140 MM HG: CPT | Mod: CPTII,S$GLB,, | Performed by: PHYSICIAN ASSISTANT

## 2022-08-05 PROCEDURE — 3061F NEG MICROALBUMINURIA REV: CPT | Mod: CPTII,S$GLB,, | Performed by: PHYSICIAN ASSISTANT

## 2022-08-05 RX ORDER — LOSARTAN POTASSIUM 100 MG/1
100 TABLET ORAL DAILY
Qty: 30 TABLET | Refills: 2 | Status: SHIPPED | OUTPATIENT
Start: 2022-08-05 | End: 2022-10-29 | Stop reason: SDUPTHER

## 2022-08-05 NOTE — PROGRESS NOTES
"  SUBJECTIVE:    Patient ID: Suma Butterfield is a 66 y.o. female.    Chief Complaint: Hypertension (Did not bring bottles//mammogram scheduled for 8/10//colonoscopy in the works//bs)    This is a 66-year-old female who presents today for hypertension follow-up.  I increased her losartan to 50 mg at the last visit. She does maintain metoprolol 50mg also. She is open to increasing the dose. Reports home readings are in line with what I am getting here as well.      Lab Visit on 05/20/2022   Component Date Value Ref Range Status    Zinc 05/20/2022 79  60 - 130 ug/dL Final    Vitamin B-12 05/20/2022 >2000 (A) 210 - 950 pg/mL Final    Iron 05/20/2022 141  30 - 160 ug/dL Final    Transferrin 05/20/2022 262  200 - 375 mg/dL Final    TIBC 05/20/2022 388  250 - 450 ug/dL Final    Saturated Iron 05/20/2022 36  20 - 50 % Final    Ferritin 05/20/2022 984 (A) 20.0 - 300.0 ng/mL Final   Office Visit on 05/12/2022   Component Date Value Ref Range Status    Final Pathologic Diagnosis 05/12/2022    Final                    Value:Skin, scalp, biopsies:  -Cicatricial Alopecia, see comment  Comment: Given the clinical differential central centrifugal cicatricial  alopecia seems most appropriate in this context there are findings of  minaturized hairs present which represent a coexisting androgenic alopecia.  The findings of fibrosis of follicle tracts are nonspecfic, this could  represent a burnt out inflammatory hair process      Gross 05/12/2022    Final                    Value:Container Label: Clinic Number/AP Number:  [id number], and "scalp-alopecia"  Received in formalin are 2 pieces of 3 x 3 mm punch biopsy fragments of  hair-bearing tan skin, each excised to a depth of 3 mm.  One fragment is  bisected vertically, while the other fragment is bisected horizontally with  the cut surfaces inked red.  Entirely submitted in 2974 for text as follows:  A:  For vertical sectioning  B:  For horizontal sectioning, red ink " JUANCARLOS May      Microscopic Exam 05/12/2022    Final                    Value:Sections show skin of scalp with a moderate to severe decrease in the number  of hair follicles which are replaced by  broad fibrous tract remenants.  Of  the hair follicles present in sections the majority are minaturized.  Horizontal sections show absence of sebaceous glands with prominent  perifollicular fibrosis and numerous minaturized  follicles of various  diameter along with increased ratio of vellus to anagen and vellus to telogen  hair follicles.  Several deeper levels were examined.  PAS staining is  negative for fungal organisms. A Verhoff-van Gieson stain fails to show a  preserved elastic sheath within the fibrous tracts. All controls are  adequate.  Scarring: moderate to severe  Inflammation: mild, perivascular  Alopecia: moderate to severe      Disclaimer 05/12/2022    Final                    Value:Unless the case is a 'gross only' or additional testing only, the final  diagnosis for each specimen is based on a microscopic examination of  appropriate tissue sections.     Telephone on 03/14/2022   Component Date Value Ref Range Status    WBC 03/29/2022 5.2  3.8 - 10.8 Thousand/uL Final    RBC 03/29/2022 4.46  3.80 - 5.10 Million/uL Final    Hemoglobin 03/29/2022 16.2 (A) 11.7 - 15.5 g/dL Final    Hematocrit 03/29/2022 46.8 (A) 35.0 - 45.0 % Final    MCV 03/29/2022 104.9 (A) 80.0 - 100.0 fL Final    MCH 03/29/2022 36.3 (A) 27.0 - 33.0 pg Final    MCHC 03/29/2022 34.6  32.0 - 36.0 g/dL Final    RDW 03/29/2022 11.4  11.0 - 15.0 % Final    Platelets 03/29/2022 154  140 - 400 Thousand/uL Final    MPV 03/29/2022 10.2  7.5 - 12.5 fL Final    Neutrophils, Abs 03/29/2022 2,049  1,500 - 7,800 cells/uL Final    Lymph # 03/29/2022 2,402  850 - 3,900 cells/uL Final    Mono # 03/29/2022 473  200 - 950 cells/uL Final    Eos # 03/29/2022 192  15 - 500 cells/uL Final    Baso # 03/29/2022 83  0 - 200 cells/uL  Final    Neutrophils Relative 03/29/2022 39.4  % Final    Lymph % 03/29/2022 46.2  % Final    Mono % 03/29/2022 9.1  % Final    Eosinophil % 03/29/2022 3.7  % Final    Basophil % 03/29/2022 1.6  % Final    Glucose 03/29/2022 98  65 - 99 mg/dL Final    BUN 03/29/2022 14  7 - 25 mg/dL Final    Creatinine 03/29/2022 0.73  0.50 - 0.99 mg/dL Final    eGFR if non African American 03/29/2022 87  > OR = 60 mL/min/1.73m2 Final    eGFR if African American 03/29/2022 101  > OR = 60 mL/min/1.73m2 Final    BUN/Creatinine Ratio 03/29/2022 NOT APPLICABLE  6 - 22 (calc) Final    Sodium 03/29/2022 140  135 - 146 mmol/L Final    Potassium 03/29/2022 4.5  3.5 - 5.3 mmol/L Final    Chloride 03/29/2022 103  98 - 110 mmol/L Final    CO2 03/29/2022 30  20 - 32 mmol/L Final    Calcium 03/29/2022 9.2  8.6 - 10.4 mg/dL Final    Total Protein 03/29/2022 6.8  6.1 - 8.1 g/dL Final    Albumin 03/29/2022 4.3  3.6 - 5.1 g/dL Final    Globulin, Total 03/29/2022 2.5  1.9 - 3.7 g/dL (calc) Final    Albumin/Globulin Ratio 03/29/2022 1.7  1.0 - 2.5 (calc) Final    Total Bilirubin 03/29/2022 0.8  0.2 - 1.2 mg/dL Final    Alkaline Phosphatase 03/29/2022 58  37 - 153 U/L Final    AST 03/29/2022 60 (A) 10 - 35 U/L Final    ALT 03/29/2022 76 (A) 6 - 29 U/L Final    Cholesterol 03/29/2022 227 (A) <200 mg/dL Final    HDL 03/29/2022 94  > OR = 50 mg/dL Final    Triglycerides 03/29/2022 90  <150 mg/dL Final    LDL Cholesterol 03/29/2022 114 (A) mg/dL (calc) Final    HDL/Cholesterol Ratio 03/29/2022 2.4  <5.0 (calc) Final    Non HDL Chol. (LDL+VLDL) 03/29/2022 133 (A) <130 mg/dL (calc) Final    TSH w/reflex to FT4 03/29/2022 1.78  0.40 - 4.50 mIU/L Final    Color, UA 03/29/2022 YELLOW  YELLOW Final    Appearance, UA 03/29/2022 CLEAR  CLEAR Final    Specific [place], UA 03/29/2022 1.003  1.001 - 1.035 Final    pH, UA 03/29/2022 7.5  5.0 - 8.0 Final    Glucose, UA 03/29/2022 NEGATIVE  NEGATIVE Final    Bilirubin, UA 03/29/2022  NEGATIVE  NEGATIVE Final    Ketones, UA 03/29/2022 NEGATIVE  NEGATIVE Final    Occult Blood UA 03/29/2022 NEGATIVE  NEGATIVE Final    Protein, UA 03/29/2022 NEGATIVE  NEGATIVE Final    Nitrite, UA 03/29/2022 NEGATIVE  NEGATIVE Final    Leukocytes, UA 03/29/2022 NEGATIVE  NEGATIVE Final    WBC Casts, UA 03/29/2022 NONE SEEN  < OR = 5 /HPF Final    RBC Casts, UA 03/29/2022 NONE SEEN  < OR = 2 /HPF Final    Squam Epithel, UA 03/29/2022 NONE SEEN  < OR = 5 /HPF Final    Bacteria, UA 03/29/2022 NONE SEEN  NONE SEEN /HPF Final    Hyaline Casts, UA 03/29/2022 NONE SEEN  NONE SEEN /LPF Final    Reflexive Urine Culture 03/29/2022    Final    Creatinine, Urine 03/29/2022 13 (A) 20 - 275 mg/dL Final    Microalb, Ur 03/29/2022 0.2  See Note: mg/dL Final    Microalb/Creat Ratio 03/29/2022 15  <30 mcg/mg creat Final       Past Medical History:   Diagnosis Date    Hepatitis C     Hypertension     SVT (supraventricular tachycardia)      History reviewed. No pertinent surgical history.  Family History   Problem Relation Age of Onset    Glaucoma Neg Hx     Macular degeneration Neg Hx     Retinal detachment Neg Hx        Marital Status:   Alcohol History:  reports current alcohol use.  Tobacco History:  reports that she has quit smoking. Her smoking use included cigarettes. She has never used smokeless tobacco.  Drug History:  reports no history of drug use.    Review of patient's allergies indicates:  No Known Allergies    Current Outpatient Medications:     alendronate (FOSAMAX) 70 MG tablet, TK 1 T PO 1 TIME Q WK, Disp: 12 tablet, Rfl: 3    biotin 5,000 mcg TbDL, Take 5,000 mcg by mouth once daily., Disp: , Rfl:     calcium-vitamin D3 (OS- + D3) 500 mg-5 mcg (200 unit) per tablet, Take 1 tablet by mouth 2 (two) times daily with meals., Disp: , Rfl:     cyanocobalamin (VITAMIN B-12) 1000 MCG tablet, Take 100 mcg by mouth once daily., Disp: , Rfl:     finasteride (PROSCAR) 5 mg tablet, Take 1  "tablet (5 mg total) by mouth once daily., Disp: 30 tablet, Rfl: 11    fluocinonide (LIDEX) 0.05 % external solution, Use on AA daily, Disp: 60 mL, Rfl: 2    losartan (COZAAR) 100 MG tablet, Take 1 tablet (100 mg total) by mouth once daily., Disp: 30 tablet, Rfl: 2    metoprolol succinate (TOPROL-XL) 50 MG 24 hr tablet, Take 1 tablet (50 mg total) by mouth once daily., Disp: 90 tablet, Rfl: 1    milk thistle 175 mg tablet, Take 175 mg by mouth once daily., Disp: , Rfl:     omega-3 fatty acids/fish oil (FISH OIL-OMEGA-3 FATTY ACIDS) 300-1,000 mg capsule, Take 1 capsule by mouth once daily., Disp: , Rfl:     vitamin E 400 UNIT capsule, Take 400 Units by mouth once daily., Disp: , Rfl:     zinc gluconate 50 mg tablet, Take 50 mg by mouth once daily., Disp: , Rfl:     Review of Systems   Constitutional: Negative for appetite change, chills, fatigue, fever and unexpected weight change.   HENT: Negative for congestion.    Respiratory: Negative for cough, chest tightness and shortness of breath.    Cardiovascular: Negative for chest pain and palpitations.   Gastrointestinal: Negative for abdominal distention and abdominal pain.   Endocrine: Negative for cold intolerance and heat intolerance.   Genitourinary: Negative for difficulty urinating and dysuria.   Musculoskeletal: Negative for arthralgias and back pain.   Neurological: Negative for dizziness, weakness and headaches.          Objective:      Vitals:    08/05/22 0805 08/05/22 0810 08/05/22 0833   BP: (!) 142/88 (!) 146/100 (!) 142/94   Pulse: 68  68   Weight: 56.2 kg (124 lb)     Height: 5' 2" (1.575 m)       Physical Exam  Constitutional:       General: She is not in acute distress.     Appearance: She is well-developed.   HENT:      Head: Normocephalic and atraumatic.   Eyes:      Conjunctiva/sclera: Conjunctivae normal.      Pupils: Pupils are equal, round, and reactive to light.   Neck:      Thyroid: No thyromegaly.   Cardiovascular:      Rate and Rhythm: " Normal rate and regular rhythm.      Heart sounds: Normal heart sounds.   Pulmonary:      Effort: Pulmonary effort is normal.      Breath sounds: Normal breath sounds.   Abdominal:      General: Bowel sounds are normal. There is no distension.      Palpations: Abdomen is soft.      Tenderness: There is no abdominal tenderness.   Musculoskeletal:         General: Normal range of motion.      Cervical back: Normal range of motion and neck supple.   Skin:     General: Skin is warm and dry.      Findings: No erythema.   Neurological:      Mental Status: She is alert and oriented to person, place, and time.      Cranial Nerves: No cranial nerve deficit.           Assessment:       1. Essential hypertension         Plan:       Essential hypertension  Comments:  Pressure is not controlled. Managed by cards. I am going to increase her losartan now to 100mg. could add HCTZ if needed. Pt to call in 2 weeks.  Orders:  -     losartan (COZAAR) 100 MG tablet; Take 1 tablet (100 mg total) by mouth once daily.  Dispense: 30 tablet; Refill: 2      Follow up in about 6 weeks (around 9/16/2022) for BP Check-Up.        8/5/2022 Hector Colin PA-C

## 2022-08-10 ENCOUNTER — OFFICE VISIT (OUTPATIENT)
Dept: DERMATOLOGY | Facility: CLINIC | Age: 67
End: 2022-08-10
Payer: MEDICARE

## 2022-08-10 ENCOUNTER — HOSPITAL ENCOUNTER (OUTPATIENT)
Dept: RADIOLOGY | Facility: HOSPITAL | Age: 67
Discharge: HOME | End: 2022-08-10
Attending: PHYSICIAN ASSISTANT
Payer: MEDICARE

## 2022-08-10 VITALS — HEIGHT: 62 IN | WEIGHT: 123.88 LBS | BODY MASS INDEX: 22.8 KG/M2

## 2022-08-10 DIAGNOSIS — L66.8 CENTRAL CENTRIFUGAL SCARRING ALOPECIA: ICD-10-CM

## 2022-08-10 DIAGNOSIS — L64.9 ANDROGENIC ALOPECIA: Primary | ICD-10-CM

## 2022-08-10 DIAGNOSIS — Z12.31 ENCOUNTER FOR SCREENING MAMMOGRAM FOR MALIGNANT NEOPLASM OF BREAST: ICD-10-CM

## 2022-08-10 DIAGNOSIS — R79.89 ELEVATED FERRITIN: ICD-10-CM

## 2022-08-10 DIAGNOSIS — R74.01 TRANSAMINITIS: ICD-10-CM

## 2022-08-10 PROCEDURE — 99212 PR OFFICE/OUTPT VISIT, EST, LEVL II, 10-19 MIN: ICD-10-PCS | Mod: 25,S$GLB,, | Performed by: STUDENT IN AN ORGANIZED HEALTH CARE EDUCATION/TRAINING PROGRAM

## 2022-08-10 PROCEDURE — 4010F ACE/ARB THERAPY RXD/TAKEN: CPT | Mod: CPTII,S$GLB,, | Performed by: STUDENT IN AN ORGANIZED HEALTH CARE EDUCATION/TRAINING PROGRAM

## 2022-08-10 PROCEDURE — 3288F PR FALLS RISK ASSESSMENT DOCUMENTED: ICD-10-PCS | Mod: CPTII,S$GLB,, | Performed by: STUDENT IN AN ORGANIZED HEALTH CARE EDUCATION/TRAINING PROGRAM

## 2022-08-10 PROCEDURE — 1101F PT FALLS ASSESS-DOCD LE1/YR: CPT | Mod: CPTII,S$GLB,, | Performed by: STUDENT IN AN ORGANIZED HEALTH CARE EDUCATION/TRAINING PROGRAM

## 2022-08-10 PROCEDURE — 1159F PR MEDICATION LIST DOCUMENTED IN MEDICAL RECORD: ICD-10-PCS | Mod: CPTII,S$GLB,, | Performed by: STUDENT IN AN ORGANIZED HEALTH CARE EDUCATION/TRAINING PROGRAM

## 2022-08-10 PROCEDURE — 1160F RVW MEDS BY RX/DR IN RCRD: CPT | Mod: CPTII,S$GLB,, | Performed by: STUDENT IN AN ORGANIZED HEALTH CARE EDUCATION/TRAINING PROGRAM

## 2022-08-10 PROCEDURE — 77067 SCR MAMMO BI INCL CAD: CPT | Mod: TC,PO

## 2022-08-10 PROCEDURE — 11901 PR INJECTION, SKIN LESIONS, 8 OR MORE: ICD-10-PCS | Mod: S$GLB,,, | Performed by: STUDENT IN AN ORGANIZED HEALTH CARE EDUCATION/TRAINING PROGRAM

## 2022-08-10 PROCEDURE — 99999 PR PBB SHADOW E&M-EST. PATIENT-LVL III: CPT | Mod: PBBFAC,,, | Performed by: STUDENT IN AN ORGANIZED HEALTH CARE EDUCATION/TRAINING PROGRAM

## 2022-08-10 PROCEDURE — 4010F PR ACE/ARB THEARPY RXD/TAKEN: ICD-10-PCS | Mod: CPTII,S$GLB,, | Performed by: STUDENT IN AN ORGANIZED HEALTH CARE EDUCATION/TRAINING PROGRAM

## 2022-08-10 PROCEDURE — 1159F MED LIST DOCD IN RCRD: CPT | Mod: CPTII,S$GLB,, | Performed by: STUDENT IN AN ORGANIZED HEALTH CARE EDUCATION/TRAINING PROGRAM

## 2022-08-10 PROCEDURE — 1160F PR REVIEW ALL MEDS BY PRESCRIBER/CLIN PHARMACIST DOCUMENTED: ICD-10-PCS | Mod: CPTII,S$GLB,, | Performed by: STUDENT IN AN ORGANIZED HEALTH CARE EDUCATION/TRAINING PROGRAM

## 2022-08-10 PROCEDURE — 3066F NEPHROPATHY DOC TX: CPT | Mod: CPTII,S$GLB,, | Performed by: STUDENT IN AN ORGANIZED HEALTH CARE EDUCATION/TRAINING PROGRAM

## 2022-08-10 PROCEDURE — 3061F NEG MICROALBUMINURIA REV: CPT | Mod: CPTII,S$GLB,, | Performed by: STUDENT IN AN ORGANIZED HEALTH CARE EDUCATION/TRAINING PROGRAM

## 2022-08-10 PROCEDURE — 11901 INJECT SKIN LESIONS >7: CPT | Mod: S$GLB,,, | Performed by: STUDENT IN AN ORGANIZED HEALTH CARE EDUCATION/TRAINING PROGRAM

## 2022-08-10 PROCEDURE — 3288F FALL RISK ASSESSMENT DOCD: CPT | Mod: CPTII,S$GLB,, | Performed by: STUDENT IN AN ORGANIZED HEALTH CARE EDUCATION/TRAINING PROGRAM

## 2022-08-10 PROCEDURE — 99212 OFFICE O/P EST SF 10 MIN: CPT | Mod: 25,S$GLB,, | Performed by: STUDENT IN AN ORGANIZED HEALTH CARE EDUCATION/TRAINING PROGRAM

## 2022-08-10 PROCEDURE — 3061F PR NEG MICROALBUMINURIA RESULT DOCUMENTED/REVIEW: ICD-10-PCS | Mod: CPTII,S$GLB,, | Performed by: STUDENT IN AN ORGANIZED HEALTH CARE EDUCATION/TRAINING PROGRAM

## 2022-08-10 PROCEDURE — 3066F PR DOCUMENTATION OF TREATMENT FOR NEPHROPATHY: ICD-10-PCS | Mod: CPTII,S$GLB,, | Performed by: STUDENT IN AN ORGANIZED HEALTH CARE EDUCATION/TRAINING PROGRAM

## 2022-08-10 PROCEDURE — 99999 PR PBB SHADOW E&M-EST. PATIENT-LVL III: ICD-10-PCS | Mod: PBBFAC,,, | Performed by: STUDENT IN AN ORGANIZED HEALTH CARE EDUCATION/TRAINING PROGRAM

## 2022-08-10 PROCEDURE — 1101F PR PT FALLS ASSESS DOC 0-1 FALLS W/OUT INJ PAST YR: ICD-10-PCS | Mod: CPTII,S$GLB,, | Performed by: STUDENT IN AN ORGANIZED HEALTH CARE EDUCATION/TRAINING PROGRAM

## 2022-08-10 NOTE — PROGRESS NOTES
Subjective:       Patient ID:  Suma Butterfield is a 67 y.o. female who presents for   Chief Complaint   Patient presents with    Follow-up     Hair loss     LOV 6/29/22    Patient here today for f/u on hair loss.  Patient states she is unsure if she has any new growth.  Taking finasteride 5mg daily and using Lidex solution nightly.  Using rogaine 5% daily.   ILK 5mg/ml at last visit.  Has gotten a wig.       Final Pathologic Diagnosis     Skin, scalp, biopsies:   -Cicatricial Alopecia, see comment   Comment: Given the clinical differential central centrifugal cicatricial   alopecia seems most appropriate in this context there are findings of   minaturized hairs present which represent a coexisting androgenic alopecia.   The findings of fibrosis of follicle tracts are nonspecfic, this could   represent a burnt out inflammatory hair process   Comment: Interp By Laura Armenta M.D., Signed on 05/31/2022 at 16:52     No hx of cancer, post-menopausal        transaminitis- Has hx of treated hep C s/p treatment, drinks alcohol daily      Review of Systems   Constitutional: Negative for fever, chills and fatigue.   Respiratory: Negative for cough and shortness of breath.    Gastrointestinal: Negative for nausea and vomiting.        Objective:    Physical Exam   Constitutional: She appears well-developed and well-nourished.   Neurological: She is alert and oriented to person, place, and time.   Psychiatric: She has a normal mood and affect.          Diagram Legend     Erythematous scaling macule/papule c/w actinic keratosis       Vascular papule c/w angioma      Pigmented verrucoid papule/plaque c/w seborrheic keratosis      Yellow umbilicated papule c/w sebaceous hyperplasia      Irregularly shaped tan macule c/w lentigo     1-2 mm smooth white papules consistent with Milia      Movable subcutaneous cyst with punctum c/w epidermal inclusion cyst      Subcutaneous movable cyst c/w pilar cyst      Firm pink to brown papule  c/w dermatofibroma      Pedunculated fleshy papule(s) c/w skin tag(s)      Evenly pigmented macule c/w junctional nevus     Mildly variegated pigmented, slightly irregular-bordered macule c/w mildly atypical nevus      Flesh colored to evenly pigmented papule c/w intradermal nevus       Pink pearly papule/plaque c/w basal cell carcinoma      Erythematous hyperkeratotic cursted plaque c/w SCC      Surgical scar with no sign of skin cancer recurrence      Open and closed comedones      Inflammatory papules and pustules      Verrucoid papule consistent consistent with wart     Erythematous eczematous patches and plaques     Dystrophic onycholytic nail with subungual debris c/w onychomycosis     Umbilicated papule    Erythematous-base heme-crusted tan verrucoid plaque consistent with inflamed seborrheic keratosis     Erythematous Silvery Scaling Plaque c/w Psoriasis     See annotation              Assessment / Plan:         Central centrifugal scarring alopecia  Androgenic alopecia  -     Ambulatory referral/consult to Dermatology  - biopsy consistent with CCCA w/ co-existing androgenic alopecia  - some improvement s/p ILK at last visit   - will proceed w/ ilk today  - she has been using lidex solution qday and OTC rogaine qday.   - continue finasteride  Finasteride is not approved by the FDA for use in women, however multiple studies have shown benefit when used to treat female pattern hair loss in combination with topical minoxidil. Side effects are infrequent, usually mild, and reversible even with maintenance of treatment. They include decreased libido, breast tenderness, and headache. In premenopausal women, irregular menstruation and spotting may occur. Pregnancy must be avoided while on this medication (risk of feminization of the male fetus)  Intralesional Kenalog 5mg/cc (4 cc total) injected into 14 lesions on the scalp today after obtaining verbal consent including risk of surrounding hypopigmentation. Patient  tolerated procedure well.    Units: 3  NDC for Kenalog 10mg/cc:  6920-2976-23      Transaminitis  Elevated ferritin  - sent message to Dr. Ku and SHAWN Colin about significantly elevated ferritin and elevated LFTs. She should have this worked-up.   - reviewed importance of f/u.          No follow-ups on file.

## 2022-08-15 ENCOUNTER — PATIENT MESSAGE (OUTPATIENT)
Dept: FAMILY MEDICINE | Facility: CLINIC | Age: 67
End: 2022-08-15

## 2022-08-16 ENCOUNTER — CLINICAL SUPPORT (OUTPATIENT)
Dept: FAMILY MEDICINE | Facility: CLINIC | Age: 67
End: 2022-08-16
Payer: MEDICARE

## 2022-08-16 VITALS — DIASTOLIC BLOOD PRESSURE: 100 MMHG | SYSTOLIC BLOOD PRESSURE: 170 MMHG

## 2022-08-16 RX ORDER — HYDROCHLOROTHIAZIDE 12.5 MG/1
12.5 TABLET ORAL DAILY
Qty: 90 TABLET | Refills: 1 | Status: SHIPPED | OUTPATIENT
Start: 2022-08-16 | End: 2022-10-29 | Stop reason: SDUPTHER

## 2022-08-16 RX ORDER — METOPROLOL SUCCINATE 100 MG/1
100 TABLET, EXTENDED RELEASE ORAL DAILY
Qty: 90 TABLET | Refills: 1 | Status: SHIPPED | OUTPATIENT
Start: 2022-08-16 | End: 2023-01-06 | Stop reason: SDUPTHER

## 2022-08-16 NOTE — PROGRESS NOTES
Pt is here for a nurse visit blood pressure check. Pt states that since she has started taking the Losartan, she has been having some heart palpation.   Per Andrea, continue taken the losartan 100 mg. Andrea is increasing her Metoprolol from 50 mg to 100 mg and adding HCTZ 12.5 mg.  Will set up prescription for Andrea to sign.

## 2022-08-16 NOTE — TELEPHONE ENCOUNTER
Pt is here for a nurse visit blood pressure check. Per Andrea, continue taken the losartan 100 mg. Andrea is increasing her Metoprolol from 50 mg to 100 mg and adding HCTZ 12.5 mg.

## 2022-08-30 ENCOUNTER — CLINICAL SUPPORT (OUTPATIENT)
Dept: FAMILY MEDICINE | Facility: CLINIC | Age: 67
End: 2022-08-30
Payer: MEDICARE

## 2022-08-30 VITALS — OXYGEN SATURATION: 97 % | SYSTOLIC BLOOD PRESSURE: 132 MMHG | DIASTOLIC BLOOD PRESSURE: 86 MMHG | HEART RATE: 68 BPM

## 2022-08-30 NOTE — PROGRESS NOTES
Patient here for B/P NV. Blood pressure acceptable per rhonda. She did not bring B/P machine, but did bring her pressure readings log. To continue on current medications and return at scheduled appointment.

## 2022-10-03 ENCOUNTER — OFFICE VISIT (OUTPATIENT)
Dept: DERMATOLOGY | Facility: CLINIC | Age: 67
End: 2022-10-03
Payer: MEDICARE

## 2022-10-03 ENCOUNTER — OFFICE VISIT (OUTPATIENT)
Dept: FAMILY MEDICINE | Facility: CLINIC | Age: 67
End: 2022-10-03
Payer: MEDICARE

## 2022-10-03 VITALS
SYSTOLIC BLOOD PRESSURE: 138 MMHG | WEIGHT: 125 LBS | DIASTOLIC BLOOD PRESSURE: 78 MMHG | OXYGEN SATURATION: 98 % | HEART RATE: 72 BPM | RESPIRATION RATE: 19 BRPM | BODY MASS INDEX: 22.86 KG/M2

## 2022-10-03 DIAGNOSIS — D58.2 ELEVATED FERRITIN, HEMOGLOBIN, AND RED BLOOD CELL COUNT: ICD-10-CM

## 2022-10-03 DIAGNOSIS — L64.9 ANDROGENIC ALOPECIA: ICD-10-CM

## 2022-10-03 DIAGNOSIS — I10 ESSENTIAL HYPERTENSION: ICD-10-CM

## 2022-10-03 DIAGNOSIS — R71.8 ELEVATED FERRITIN, HEMOGLOBIN, AND RED BLOOD CELL COUNT: ICD-10-CM

## 2022-10-03 DIAGNOSIS — M81.0 AGE RELATED OSTEOPOROSIS, UNSPECIFIED PATHOLOGICAL FRACTURE PRESENCE: ICD-10-CM

## 2022-10-03 DIAGNOSIS — L66.9 CICATRICIAL ALOPECIA: Primary | ICD-10-CM

## 2022-10-03 DIAGNOSIS — L65.9 ALOPECIA: Primary | ICD-10-CM

## 2022-10-03 DIAGNOSIS — R79.89 ELEVATED FERRITIN, HEMOGLOBIN, AND RED BLOOD CELL COUNT: ICD-10-CM

## 2022-10-03 PROCEDURE — 4010F PR ACE/ARB THEARPY RXD/TAKEN: ICD-10-PCS | Mod: CPTII,S$GLB,, | Performed by: PHYSICIAN ASSISTANT

## 2022-10-03 PROCEDURE — 4010F ACE/ARB THERAPY RXD/TAKEN: CPT | Mod: CPTII,S$GLB,, | Performed by: PHYSICIAN ASSISTANT

## 2022-10-03 PROCEDURE — 4010F ACE/ARB THERAPY RXD/TAKEN: CPT | Mod: CPTII,S$GLB,, | Performed by: STUDENT IN AN ORGANIZED HEALTH CARE EDUCATION/TRAINING PROGRAM

## 2022-10-03 PROCEDURE — 3078F DIAST BP <80 MM HG: CPT | Mod: CPTII,S$GLB,, | Performed by: PHYSICIAN ASSISTANT

## 2022-10-03 PROCEDURE — 3061F NEG MICROALBUMINURIA REV: CPT | Mod: CPTII,S$GLB,, | Performed by: STUDENT IN AN ORGANIZED HEALTH CARE EDUCATION/TRAINING PROGRAM

## 2022-10-03 PROCEDURE — 3288F FALL RISK ASSESSMENT DOCD: CPT | Mod: CPTII,S$GLB,, | Performed by: STUDENT IN AN ORGANIZED HEALTH CARE EDUCATION/TRAINING PROGRAM

## 2022-10-03 PROCEDURE — 1159F MED LIST DOCD IN RCRD: CPT | Mod: CPTII,S$GLB,, | Performed by: PHYSICIAN ASSISTANT

## 2022-10-03 PROCEDURE — 3288F PR FALLS RISK ASSESSMENT DOCUMENTED: ICD-10-PCS | Mod: CPTII,S$GLB,, | Performed by: STUDENT IN AN ORGANIZED HEALTH CARE EDUCATION/TRAINING PROGRAM

## 2022-10-03 PROCEDURE — 1159F PR MEDICATION LIST DOCUMENTED IN MEDICAL RECORD: ICD-10-PCS | Mod: CPTII,S$GLB,, | Performed by: STUDENT IN AN ORGANIZED HEALTH CARE EDUCATION/TRAINING PROGRAM

## 2022-10-03 PROCEDURE — 3066F NEPHROPATHY DOC TX: CPT | Mod: CPTII,S$GLB,, | Performed by: STUDENT IN AN ORGANIZED HEALTH CARE EDUCATION/TRAINING PROGRAM

## 2022-10-03 PROCEDURE — 1160F PR REVIEW ALL MEDS BY PRESCRIBER/CLIN PHARMACIST DOCUMENTED: ICD-10-PCS | Mod: CPTII,S$GLB,, | Performed by: STUDENT IN AN ORGANIZED HEALTH CARE EDUCATION/TRAINING PROGRAM

## 2022-10-03 PROCEDURE — 3008F BODY MASS INDEX DOCD: CPT | Mod: CPTII,S$GLB,, | Performed by: PHYSICIAN ASSISTANT

## 2022-10-03 PROCEDURE — 1159F PR MEDICATION LIST DOCUMENTED IN MEDICAL RECORD: ICD-10-PCS | Mod: CPTII,S$GLB,, | Performed by: PHYSICIAN ASSISTANT

## 2022-10-03 PROCEDURE — 3061F PR NEG MICROALBUMINURIA RESULT DOCUMENTED/REVIEW: ICD-10-PCS | Mod: CPTII,S$GLB,, | Performed by: STUDENT IN AN ORGANIZED HEALTH CARE EDUCATION/TRAINING PROGRAM

## 2022-10-03 PROCEDURE — 3061F PR NEG MICROALBUMINURIA RESULT DOCUMENTED/REVIEW: ICD-10-PCS | Mod: CPTII,S$GLB,, | Performed by: PHYSICIAN ASSISTANT

## 2022-10-03 PROCEDURE — 3061F NEG MICROALBUMINURIA REV: CPT | Mod: CPTII,S$GLB,, | Performed by: PHYSICIAN ASSISTANT

## 2022-10-03 PROCEDURE — 11901 PR INJECTION, SKIN LESIONS, 8 OR MORE: ICD-10-PCS | Mod: S$GLB,,, | Performed by: STUDENT IN AN ORGANIZED HEALTH CARE EDUCATION/TRAINING PROGRAM

## 2022-10-03 PROCEDURE — 4010F PR ACE/ARB THEARPY RXD/TAKEN: ICD-10-PCS | Mod: CPTII,S$GLB,, | Performed by: STUDENT IN AN ORGANIZED HEALTH CARE EDUCATION/TRAINING PROGRAM

## 2022-10-03 PROCEDURE — 99999 PR PBB SHADOW E&M-EST. PATIENT-LVL II: ICD-10-PCS | Mod: PBBFAC,,, | Performed by: STUDENT IN AN ORGANIZED HEALTH CARE EDUCATION/TRAINING PROGRAM

## 2022-10-03 PROCEDURE — 3075F PR MOST RECENT SYSTOLIC BLOOD PRESS GE 130-139MM HG: ICD-10-PCS | Mod: CPTII,S$GLB,, | Performed by: PHYSICIAN ASSISTANT

## 2022-10-03 PROCEDURE — 1160F RVW MEDS BY RX/DR IN RCRD: CPT | Mod: CPTII,S$GLB,, | Performed by: STUDENT IN AN ORGANIZED HEALTH CARE EDUCATION/TRAINING PROGRAM

## 2022-10-03 PROCEDURE — 99214 PR OFFICE/OUTPT VISIT, EST, LEVL IV, 30-39 MIN: ICD-10-PCS | Mod: S$GLB,,, | Performed by: PHYSICIAN ASSISTANT

## 2022-10-03 PROCEDURE — 3008F PR BODY MASS INDEX (BMI) DOCUMENTED: ICD-10-PCS | Mod: CPTII,S$GLB,, | Performed by: PHYSICIAN ASSISTANT

## 2022-10-03 PROCEDURE — 3066F PR DOCUMENTATION OF TREATMENT FOR NEPHROPATHY: ICD-10-PCS | Mod: CPTII,S$GLB,, | Performed by: PHYSICIAN ASSISTANT

## 2022-10-03 PROCEDURE — 99999 PR PBB SHADOW E&M-EST. PATIENT-LVL II: CPT | Mod: PBBFAC,,, | Performed by: STUDENT IN AN ORGANIZED HEALTH CARE EDUCATION/TRAINING PROGRAM

## 2022-10-03 PROCEDURE — 1159F MED LIST DOCD IN RCRD: CPT | Mod: CPTII,S$GLB,, | Performed by: STUDENT IN AN ORGANIZED HEALTH CARE EDUCATION/TRAINING PROGRAM

## 2022-10-03 PROCEDURE — 3078F PR MOST RECENT DIASTOLIC BLOOD PRESSURE < 80 MM HG: ICD-10-PCS | Mod: CPTII,S$GLB,, | Performed by: PHYSICIAN ASSISTANT

## 2022-10-03 PROCEDURE — 3066F PR DOCUMENTATION OF TREATMENT FOR NEPHROPATHY: ICD-10-PCS | Mod: CPTII,S$GLB,, | Performed by: STUDENT IN AN ORGANIZED HEALTH CARE EDUCATION/TRAINING PROGRAM

## 2022-10-03 PROCEDURE — 3075F SYST BP GE 130 - 139MM HG: CPT | Mod: CPTII,S$GLB,, | Performed by: PHYSICIAN ASSISTANT

## 2022-10-03 PROCEDURE — 1101F PR PT FALLS ASSESS DOC 0-1 FALLS W/OUT INJ PAST YR: ICD-10-PCS | Mod: CPTII,S$GLB,, | Performed by: STUDENT IN AN ORGANIZED HEALTH CARE EDUCATION/TRAINING PROGRAM

## 2022-10-03 PROCEDURE — 99214 OFFICE O/P EST MOD 30 MIN: CPT | Mod: S$GLB,,, | Performed by: PHYSICIAN ASSISTANT

## 2022-10-03 PROCEDURE — 3066F NEPHROPATHY DOC TX: CPT | Mod: CPTII,S$GLB,, | Performed by: PHYSICIAN ASSISTANT

## 2022-10-03 PROCEDURE — 1101F PT FALLS ASSESS-DOCD LE1/YR: CPT | Mod: CPTII,S$GLB,, | Performed by: STUDENT IN AN ORGANIZED HEALTH CARE EDUCATION/TRAINING PROGRAM

## 2022-10-03 PROCEDURE — 99213 OFFICE O/P EST LOW 20 MIN: CPT | Mod: 25,S$GLB,, | Performed by: STUDENT IN AN ORGANIZED HEALTH CARE EDUCATION/TRAINING PROGRAM

## 2022-10-03 PROCEDURE — 11901 INJECT SKIN LESIONS >7: CPT | Mod: S$GLB,,, | Performed by: STUDENT IN AN ORGANIZED HEALTH CARE EDUCATION/TRAINING PROGRAM

## 2022-10-03 PROCEDURE — 99213 PR OFFICE/OUTPT VISIT, EST, LEVL III, 20-29 MIN: ICD-10-PCS | Mod: 25,S$GLB,, | Performed by: STUDENT IN AN ORGANIZED HEALTH CARE EDUCATION/TRAINING PROGRAM

## 2022-10-03 RX ORDER — ALENDRONATE SODIUM 70 MG/1
TABLET ORAL
Qty: 12 TABLET | Refills: 3 | Status: SHIPPED | OUTPATIENT
Start: 2022-10-03 | End: 2023-05-05 | Stop reason: SDUPTHER

## 2022-10-03 NOTE — PROGRESS NOTES
Subjective:       Patient ID:  Suma Butterfield is a 67 y.o. female who presents for   Chief Complaint   Patient presents with    Hair Loss     LOV 8/10/22    Patient here today for f/u on hair loss.  Patient states she has noticed some new growth and some longer pieces.  Taking finasteride 5mg daily and using Lidex solution nightly.  Using rogaine 5% daily.   ILK at last visit.        Final Pathologic Diagnosis     Skin, scalp, biopsies:   -Cicatricial Alopecia, see comment   Comment: Given the clinical differential central centrifugal cicatricial   alopecia seems most appropriate in this context there are findings of   minaturized hairs present which represent a coexisting androgenic alopecia.   The findings of fibrosis of follicle tracts are nonspecfic, this could   represent a burnt out inflammatory hair process   Comment: Interp By Laura Armenta M.D., Signed on 05/31/2022 at 16:52     No hx of cancer, post-menopausal        transaminitis- Has hx of treated hep C s/p treatment, drinks alcohol daily- did see GI doc outside [facility]- will get records     Has SVT and uncontrolled htn- but working w/ her PCP to get it under better control-       Review of Systems   Constitutional:  Negative for fever, chills and fatigue.   Respiratory:  Negative for cough and shortness of breath.    Gastrointestinal:  Negative for nausea and vomiting.      Objective:    Physical Exam   Constitutional: She appears well-developed and well-nourished.   Neurological: She is alert and oriented to person, place, and time.   Psychiatric: She has a normal mood and affect.        Diagram Legend     Erythematous scaling macule/papule c/w actinic keratosis       Vascular papule c/w angioma      Pigmented verrucoid papule/plaque c/w seborrheic keratosis      Yellow umbilicated papule c/w sebaceous hyperplasia      Irregularly shaped tan macule c/w lentigo     1-2 mm smooth white papules consistent with Milia      Movable subcutaneous cyst with  punctum c/w epidermal inclusion cyst      Subcutaneous movable cyst c/w pilar cyst      Firm pink to brown papule c/w dermatofibroma      Pedunculated fleshy papule(s) c/w skin tag(s)      Evenly pigmented macule c/w junctional nevus     Mildly variegated pigmented, slightly irregular-bordered macule c/w mildly atypical nevus      Flesh colored to evenly pigmented papule c/w intradermal nevus       Pink pearly papule/plaque c/w basal cell carcinoma      Erythematous hyperkeratotic cursted plaque c/w SCC      Surgical scar with no sign of skin cancer recurrence      Open and closed comedones      Inflammatory papules and pustules      Verrucoid papule consistent consistent with wart     Erythematous eczematous patches and plaques     Dystrophic onycholytic nail with subungual debris c/w onychomycosis     Umbilicated papule    Erythematous-base heme-crusted tan verrucoid plaque consistent with inflamed seborrheic keratosis     Erythematous Silvery Scaling Plaque c/w Psoriasis     See annotation                  Assessment / Plan:        Cicatricial alopecia  Intralesional Kenalog 5mg/cc (4 cc total) injected into 14 lesions on the scalp today after obtaining verbal consent including risk of surrounding hypopigmentation. Patient tolerated procedure well.    Units: 3  NDC for Kenalog 10mg/cc:  7182-4680-97    Androgenic alopecia- Improving!  - currently using rogaine 5% solution 1-2 daily  - taking finasteride 5mg daily  - discussed low dose minoxidil and spironolactone, however w/ hx of HTN on 3 anti-hypertensives and also w/ hx of SVT will not add either today  -Finasteride is not approved by the FDA for use in women, however multiple studies have shown benefit when used to treat female pattern hair loss in combination with topical minoxidil. Side effects are infrequent, usually mild, and reversible even with maintenance of treatment. They include decreased libido, breast tenderness, and headache. In premenopausal  women, irregular menstruation and spotting may occur. Pregnancy must be avoided while on this medication (risk of feminization of the male fetus)    Will get records from GI       6 weeks  No follow-ups on file.

## 2022-10-03 NOTE — PROGRESS NOTES
"  SUBJECTIVE:    Patient ID: Suma Butterfield is a 67 y.o. female.    Chief Complaint: Hypertension (/)    This is a 67-year-old female who presents today for blood pressure follow-up.  Overall pressure is looking much better. Reports that when she checks at home it is almost always controlled. Compliant with losartan and HCTZ. Continues with metoprolol as well. Very happy with some results regarding the allopecia. Finasteride and rogaine shampoo. Kenalog injections intralesional seem to be helping also. Will maintain f/u with her in 6 weeks. Occasional aches and pains getting better as she moves. C-scope set up for Nov 21. GI wants crp and sed rate for further evaluation. Was referred to heme/onc from derm but appears that the referral was not ever scheduled placed for Dr. Alba.      Lab Visit on 05/20/2022   Component Date Value Ref Range Status    Zinc 05/20/2022 79  60 - 130 ug/dL Final    Vitamin B-12 05/20/2022 >2000 (H)  210 - 950 pg/mL Final    Iron 05/20/2022 141  30 - 160 ug/dL Final    Transferrin 05/20/2022 262  200 - 375 mg/dL Final    TIBC 05/20/2022 388  250 - 450 ug/dL Final    Saturated Iron 05/20/2022 36  20 - 50 % Final    Ferritin 05/20/2022 984 (H)  20.0 - 300.0 ng/mL Final   Office Visit on 05/12/2022   Component Date Value Ref Range Status    Final Pathologic Diagnosis 05/12/2022    Final                    Value:Skin, scalp, biopsies:  -Cicatricial Alopecia, see comment  Comment: Given the clinical differential central centrifugal cicatricial  alopecia seems most appropriate in this context there are findings of  minaturized hairs present which represent a coexisting androgenic alopecia.  The findings of fibrosis of follicle tracts are nonspecfic, this could  represent a burnt out inflammatory hair process      Gross 05/12/2022    Final                    Value:Container Label: Clinic Number/AP Number:  [id number], and "scalp-alopecia"  Received in formalin are 2 pieces of 3 x 3 mm punch biopsy " fragments of  hair-bearing tan skin, each excised to a depth of 3 mm.  One fragment is  bisected vertically, while the other fragment is bisected horizontally with  the cut surfaces inked red.  Entirely submitted in 2974 for text as follows:  A:  For vertical sectioning  B:  For horizontal sectioning, red ink down.  JUANCARLOS Bansal      Microscopic Exam 05/12/2022    Final                    Value:Sections show skin of scalp with a moderate to severe decrease in the number  of hair follicles which are replaced by  broad fibrous tract remenants.  Of  the hair follicles present in sections the majority are minaturized.  Horizontal sections show absence of sebaceous glands with prominent  perifollicular fibrosis and numerous minaturized  follicles of various  diameter along with increased ratio of vellus to anagen and vellus to telogen  hair follicles.  Several deeper levels were examined.  PAS staining is  negative for fungal organisms. A Verhoff-van Gieson stain fails to show a  preserved elastic sheath within the fibrous tracts. All controls are  adequate.  Scarring: moderate to severe  Inflammation: mild, perivascular  Alopecia: moderate to severe      Disclaimer 05/12/2022    Final                    Value:Unless the case is a 'gross only' or additional testing only, the final  diagnosis for each specimen is based on a microscopic examination of  appropriate tissue sections.         Past Medical History:   Diagnosis Date    Hepatitis C     Hypertension     SVT (supraventricular tachycardia)      No past surgical history on file.  Family History   Problem Relation Age of Onset    Glaucoma Neg Hx     Macular degeneration Neg Hx     Retinal detachment Neg Hx        Marital Status:   Alcohol History:  reports current alcohol use.  Tobacco History:  reports that she has quit smoking. Her smoking use included cigarettes. She has never used smokeless tobacco.  Drug History:  reports no history of drug  use.    Review of patient's allergies indicates:   Allergen Reactions    Succinylcholine      **Pseudocholinesterase**       Current Outpatient Medications:     biotin 5,000 mcg TbDL, Take 5,000 mcg by mouth once daily., Disp: , Rfl:     calcium-vitamin D3 (OS- + D3) 500 mg-5 mcg (200 unit) per tablet, Take 1 tablet by mouth 2 (two) times daily with meals., Disp: , Rfl:     finasteride (PROSCAR) 5 mg tablet, Take 1 tablet (5 mg total) by mouth once daily., Disp: 30 tablet, Rfl: 11    fluocinonide (LIDEX) 0.05 % external solution, Use on AA daily, Disp: 60 mL, Rfl: 2    hydroCHLOROthiazide (HYDRODIURIL) 12.5 MG Tab, Take 1 tablet (12.5 mg total) by mouth once daily., Disp: 90 tablet, Rfl: 1    losartan (COZAAR) 100 MG tablet, Take 1 tablet (100 mg total) by mouth once daily., Disp: 30 tablet, Rfl: 2    metoprolol succinate (TOPROL-XL) 100 MG 24 hr tablet, Take 1 tablet (100 mg total) by mouth once daily., Disp: 90 tablet, Rfl: 1    milk thistle 175 mg tablet, Take 175 mg by mouth once daily., Disp: , Rfl:     omega-3 fatty acids/fish oil (FISH OIL-OMEGA-3 FATTY ACIDS) 300-1,000 mg capsule, Take 1 capsule by mouth once daily., Disp: , Rfl:     vitamin E 400 UNIT capsule, Take 400 Units by mouth once daily., Disp: , Rfl:     zinc gluconate 50 mg tablet, Take 50 mg by mouth once daily., Disp: , Rfl:     alendronate (FOSAMAX) 70 MG tablet, TK 1 T PO 1 TIME Q WK, Disp: 12 tablet, Rfl: 3    cyanocobalamin (VITAMIN B-12) 1000 MCG tablet, Take 100 mcg by mouth once daily., Disp: , Rfl:     Review of Systems   Constitutional:  Negative for appetite change, chills, fatigue, fever and unexpected weight change.   HENT:  Negative for congestion.    Respiratory:  Negative for cough, chest tightness and shortness of breath.    Cardiovascular:  Negative for chest pain and palpitations.   Gastrointestinal:  Negative for abdominal distention and abdominal pain.   Endocrine: Negative for cold intolerance and heat intolerance.    Genitourinary:  Negative for difficulty urinating and dysuria.   Musculoskeletal:  Negative for arthralgias and back pain.   Neurological:  Negative for dizziness, weakness and headaches.        Objective:      Vitals:    10/03/22 1201 10/03/22 1236   BP: (!) 142/80 138/78   Pulse: 74 72   Resp: 19    SpO2: 98%    Weight: 56.7 kg (125 lb)      Physical Exam  Constitutional:       General: She is not in acute distress.     Appearance: She is well-developed.   HENT:      Head: Normocephalic and atraumatic.   Eyes:      Conjunctiva/sclera: Conjunctivae normal.      Pupils: Pupils are equal, round, and reactive to light.   Neck:      Thyroid: No thyromegaly.   Cardiovascular:      Rate and Rhythm: Normal rate and regular rhythm.      Heart sounds: Normal heart sounds.   Pulmonary:      Effort: Pulmonary effort is normal.      Breath sounds: Normal breath sounds.   Abdominal:      General: Bowel sounds are normal. There is no distension.      Palpations: Abdomen is soft.      Tenderness: There is no abdominal tenderness.   Musculoskeletal:         General: Normal range of motion.      Cervical back: Normal range of motion and neck supple.   Skin:     General: Skin is warm and dry.      Findings: No erythema.      Comments: Alopecia to the scalp- improving   Neurological:      Mental Status: She is alert and oriented to person, place, and time.      Cranial Nerves: No cranial nerve deficit.         Assessment:       1. Alopecia    2. Age related osteoporosis, unspecified pathological fracture presence    3. Essential hypertension    4. Elevated ferritin, hemoglobin, and red blood cell count         Plan:       Alopecia  Comments:  Improved! following with derm. maintain finasteride and IL kenalog as is. seems to be helping and seeing thicker hair.    Age related osteoporosis, unspecified pathological fracture presence  Comments:  maintain on fosamax as is.  Orders:  -     alendronate (FOSAMAX) 70 MG tablet; TK 1 T PO 1  TIME Q WK  Dispense: 12 tablet; Refill: 3    Essential hypertension  Comments:  BP is finally looking a great deal better. Continue to monitor at home. keep meds at present    Elevated ferritin, hemoglobin, and red blood cell count  Comments:  will refer to heme/onc for further eval. originally was referred by derm but referral didnt go through.  Orders:  -     Ambulatory referral/consult to Hematology / Oncology; Future; Expected date: 10/03/2022    Follow up in about 3 months (around 1/3/2023).        10/3/2022 Hector Colin PA-C

## 2022-10-13 PROBLEM — R71.8 ELEVATED MCV: Status: ACTIVE | Noted: 2022-10-13

## 2022-10-13 PROBLEM — D75.1 POLYCYTHEMIA: Status: ACTIVE | Noted: 2022-10-13

## 2022-10-13 PROBLEM — R79.89 ELEVATED FERRITIN: Status: ACTIVE | Noted: 2022-10-13

## 2022-10-13 NOTE — PROGRESS NOTES
"[facility] Hematolgy/Oncology  History & Physical    Subjective:      Patient ID:   NAME: Suma Butterfield : 1955     67 y.o. female    Referring Doc: Hector Colin PA*  Other Physicians: Pavan Leonard; Jun        Chief Complaint: polycythemia/elevated MCV      HPI:  67 y.o. female with diagnosis of polycythemia and elevated MCV/ferritin who has been referred by Hector Colin PA* for evaluation by medical hematology/oncology. She has had progressive hair loss with alopecia for which she has been seeing Dr Estrella Kaba. She has been on steroid injections, rogaine-like cream and proscar.     She is feeling "all right"; she has some chronic hip issues and lumbar arthritis; no CP, SOB, HA's or N/V    She has colonoscopy with DrTrainor set for next month.    She works in IT at [employer]    Former smoker with cigarettes; wine at night    Discussed covid19 and she has been vaccinated                  ROS:   GEN: normal without any fever, night sweats or weight loss  HEENT: normal with no HA's, sore throat, stiff neck, changes in vision  CV: normal with no CP, SOB, PND, GAY or orthopnea  PULM: normal with no SOB, cough, hemoptysis, sputum or pleuritic pain  GI: normal with no abdominal pain, nausea, vomiting, constipation, diarrhea, melanotic stools, BRBPR, or hematemesis  : normal with no hematuria, dysuria  BREAST: normal with no mass, discharge, pain  SKIN: normal with no rash, erythema, bruising, or swelling       Past Medical/Surgical History:  Past Medical History:   Diagnosis Date    Elevated ferritin 10/13/2022    Elevated MCV 10/13/2022    Hepatitis C     Hypertension     Polycythemia 10/13/2022    SVT (supraventricular tachycardia)      History reviewed. No pertinent surgical history.      Allergies:  See records    Social/Family History:  Social History     Socioeconomic History    Marital status:    Tobacco Use    Smoking status: Former     Types: Cigarettes    Smokeless tobacco: " Never   Substance and Sexual Activity    Alcohol use: Yes    Drug use: Never    Sexual activity: Yes     Partners: Male     Social Determinants of Health     Financial Resource Strain: Low Risk     Difficulty of Paying Living Expenses: Not hard at all   Food Insecurity: No Food Insecurity    Worried About Running Out of Food in the Last Year: Never true    Ran Out of Food in the Last Year: Never true   Transportation Needs: No Transportation Needs    Lack of Transportation (Medical): No    Lack of Transportation (Non-Medical): No   Physical Activity: Insufficiently Active    Days of Exercise per Week: 3 days    Minutes of Exercise per Session: 20 min   Stress: No Stress Concern Present    Feeling of Stress : Only a little   Social Connections: Unknown    Frequency of Communication with Friends and Family: More than three times a week    Frequency of Social Gatherings with Friends and Family: More than three times a week    Attends Club or Organization Meetings: Never    Marital Status:    Housing Stability: Low Risk     Unable to Pay for Housing in the Last Year: No    Number of Places Lived in the Last Year: 1    Unstable Housing in the Last Year: No     Family History   Problem Relation Age of Onset    Glaucoma Neg Hx     Macular degeneration Neg Hx     Retinal detachment Neg Hx          Medications:    Current Outpatient Medications:     alendronate (FOSAMAX) 70 MG tablet, TK 1 T PO 1 TIME Q WK, Disp: 12 tablet, Rfl: 3    biotin 5,000 mcg TbDL, Take 5,000 mcg by mouth once daily., Disp: , Rfl:     calcium-vitamin D3 (OS- + D3) 500 mg-5 mcg (200 unit) per tablet, Take 1 tablet by mouth 2 (two) times daily with meals., Disp: , Rfl:     cyanocobalamin (VITAMIN B-12) 1000 MCG tablet, Take 100 mcg by mouth once daily., Disp: , Rfl:     finasteride (PROSCAR) 5 mg tablet, Take 1 tablet (5 mg total) by mouth once daily., Disp: 30 tablet, Rfl: 11    fluocinonide (LIDEX) 0.05 % external solution, Use on AA  "daily, Disp: 60 mL, Rfl: 2    hydroCHLOROthiazide (HYDRODIURIL) 12.5 MG Tab, Take 1 tablet (12.5 mg total) by mouth once daily., Disp: 90 tablet, Rfl: 1    losartan (COZAAR) 100 MG tablet, Take 1 tablet (100 mg total) by mouth once daily., Disp: 30 tablet, Rfl: 2    metoprolol succinate (TOPROL-XL) 100 MG 24 hr tablet, Take 1 tablet (100 mg total) by mouth once daily., Disp: 90 tablet, Rfl: 1    milk thistle 175 mg tablet, Take 175 mg by mouth once daily., Disp: , Rfl:     omega-3 fatty acids/fish oil (FISH OIL-OMEGA-3 FATTY ACIDS) 300-1,000 mg capsule, Take 1 capsule by mouth once daily., Disp: , Rfl:     vitamin E 400 UNIT capsule, Take 400 Units by mouth once daily., Disp: , Rfl:     zinc gluconate 50 mg tablet, Take 50 mg by mouth once daily., Disp: , Rfl:       Pathology:   Cancer Staging   No matching staging information was found for the patient.      Objective:   Vitals:  Blood pressure (!) 140/72, pulse 66, temperature 98.1 °F (36.7 °C), resp. rate 18, height 5' 1" (1.549 m), weight 56.3 kg (124 lb 3.2 oz).    Physical Examination:   GEN: no apparent distress, comfortable; AAOx3  HEAD: atraumatic and normocephalic; alopecia  EYES: no pallor, no icterus, PERRLA  ENT: OMM, no pharyngeal erythema, external ears WNL; no nasal discharge; no thrush  NECK: no masses, thyroid normal, trachea midline, no LAD/LN's, supple  CV: RRR with no murmur; normal pulse; normal S1 and S2; no pedal edema  CHEST: Normal respiratory effort; CTAB; normal breath sounds; no wheeze or crackles  ABDOM: nontender and nondistended; soft; normal bowel sounds; no rebound/guarding  MUSC/Skeletal: ROM normal; no crepitus; joints normal; no deformities or arthropathy  EXTREM: no clubbing, cyanosis, inflammation or swelling  SKIN: no rashes, lesions, ulcers, petechiae or subcutaneous nodules  : no cam  NEURO: grossly intact; motor/sensory WNL; AAOx3; no tremors  PSYCH: normal mood, affect and behavior  LYMPH: normal cervical, " supraclavicular, axillary and groin LN's      Labs:   Lab Results   Component Value Date    WBC 5.2 03/29/2022    HGB 16.2 (H) 03/29/2022    HCT 46.8 (H) 03/29/2022    .9 (H) 03/29/2022     03/29/2022    CMP  Sodium   Date Value Ref Range Status   03/29/2022 140 135 - 146 mmol/L Final     Potassium   Date Value Ref Range Status   03/29/2022 4.5 3.5 - 5.3 mmol/L Final     Chloride   Date Value Ref Range Status   03/29/2022 103 98 - 110 mmol/L Final     CO2   Date Value Ref Range Status   03/29/2022 30 20 - 32 mmol/L Final     Glucose   Date Value Ref Range Status   03/29/2022 98 65 - 99 mg/dL Final     Comment:                   Fasting reference interval          BUN   Date Value Ref Range Status   03/29/2022 14 7 - 25 mg/dL Final     Creatinine   Date Value Ref Range Status   03/29/2022 0.73 0.50 - 0.99 mg/dL Final     Comment:     For patients >49 years of age, the reference limit  for Creatinine is approximately 13% higher for people  identified as -American.          Calcium   Date Value Ref Range Status   03/29/2022 9.2 8.6 - 10.4 mg/dL Final     Total Protein   Date Value Ref Range Status   03/29/2022 6.8 6.1 - 8.1 g/dL Final     Albumin   Date Value Ref Range Status   03/29/2022 4.3 3.6 - 5.1 g/dL Final     Total Bilirubin   Date Value Ref Range Status   03/29/2022 0.8 0.2 - 1.2 mg/dL Final     Alkaline Phosphatase   Date Value Ref Range Status   07/18/2020 64 37 - 153 U/L Final     AST   Date Value Ref Range Status   03/29/2022 60 (H) 10 - 35 U/L Final     ALT   Date Value Ref Range Status   03/29/2022 76 (H) 6 - 29 U/L Final     Anion Gap   Date Value Ref Range Status   06/01/2020 11 8 - 16 mmol/L Final     eGFR if    Date Value Ref Range Status   03/29/2022 101 > OR = 60 mL/min/1.73m2 Final     eGFR if non    Date Value Ref Range Status   03/29/2022 87 > OR = 60 mL/min/1.73m2 Final     Lab Results   Component Value Date    IRON 141 05/20/2022     TRANSFERRIN 262 05/20/2022    TIBC 388 05/20/2022    FESATURATED 36 05/20/2022      Ferritin 20.0 - 300.0 ng/mL 984 High      Lab Results   Component Value Date    [id number] >2000 (H) 05/20/2022        Radiology/Diagnostic Studies:          All lab results and imaging results have been reviewed and discussed with the patient    Assessment:   (1) 67 y.o. female with diagnosis of polycythemia and elevated MCV/ferritin who has been referred by Hector Colin PA* for evaluation by medical hematology/oncology.   - polycythemia since 2020 at least - suspect she has a secondary polycythemia process due to the chronic tobacco history  - former smoker who quit about a year ago  - elevated ferritin on labs in  May 2022 - possible acute phase reactant or due to the underlying liver hx  - order hemochromatosis panel    (2) HTN and hypercholesterolemia    (3) SVT - followed by Dr Browne    (4) Chronic hep C at age 52 s/p antiviral therapy - followed by Natalya    (5) Severe Alopecia - followed by Dr Estrella Kaba with derm    VISIT DIAGNOSES:              Elevated ferritin    Elevated MCV    Polycythemia    Elevated ferritin, hemoglobin, and red blood cell count  Comments:  will refer to heme/onc for further eval. originally was referred by derm but referral didnt go through.  Orders:  -     Ambulatory referral/consult to Hematology / Oncology          Plan:     PLAN:  Order US of liver and spleen; CXR  2. Retic, erythropoietin, LDH, JAK2, hemochromatosis panel, AFP  3. ABG blood gas  4. Proceed with f/u with Dr Barahona with planned colonoscopy in Nov 2022  5. F/u with PCP, Derm and GI  RTC in  4 weeks   Fax note to Hector Colin PA*; Estrella Kaba, Jun    COVID-19 Discussion:    I had long discussion with patient and any applicable family about the COVID-19 coronavirus epidemic and the recommended precautions with regard to cancer and/or hematology patients. I have re-iterated the CDC recommendations for  adequate hand washing, use of hand -like products, and coughing into elbow, etc. In addition, especially for our patients who are on chemotherapy and/or our otherwise immunocompromised patients, I have recommended avoidance of crowds, including movie theaters, restaurants, churches, etc. I have recommended avoidance of any sick or symptomatic family members and/or friends. I have also recommended avoidance of any raw and unwashed food products, and general avoidance of food items that have not been prepared by themselves. The patient has been asked to call us immediately with any symptom developments, issues, questions or other general concerns.       I have explained and the patient understands all of  the current recommendation(s). I have answered all of their questions to the best of my ability and to their complete satisfaction.             Thank you for allowing me to participate in this patient's care. Please call with any questions or concerns.    Electronically signed Adan Baum MD    Answers submitted by the patient for this visit:  Review of Systems Questionnaire (Submitted on 10/13/2022)  appetite change : No  unexpected weight change: No  mouth sores: No  visual disturbance: No  cough: No  shortness of breath: No  chest pain: No  abdominal pain: No  diarrhea: No  frequency: No  back pain: No  rash: No  headaches: No  adenopathy: No  nervous/ anxious: No

## 2022-10-14 ENCOUNTER — PATIENT MESSAGE (OUTPATIENT)
Dept: DERMATOLOGY | Facility: CLINIC | Age: 67
End: 2022-10-14
Payer: MEDICARE

## 2022-10-14 ENCOUNTER — OFFICE VISIT (OUTPATIENT)
Dept: HEMATOLOGY/ONCOLOGY | Facility: CLINIC | Age: 67
End: 2022-10-14
Payer: MEDICARE

## 2022-10-14 VITALS
TEMPERATURE: 98 F | HEIGHT: 61 IN | DIASTOLIC BLOOD PRESSURE: 72 MMHG | SYSTOLIC BLOOD PRESSURE: 140 MMHG | BODY MASS INDEX: 23.45 KG/M2 | WEIGHT: 124.19 LBS | RESPIRATION RATE: 18 BRPM | HEART RATE: 66 BPM

## 2022-10-14 DIAGNOSIS — D52.0 DIETARY FOLATE DEFICIENCY ANEMIA: ICD-10-CM

## 2022-10-14 DIAGNOSIS — D58.2 ELEVATED FERRITIN, HEMOGLOBIN, AND RED BLOOD CELL COUNT: ICD-10-CM

## 2022-10-14 DIAGNOSIS — R79.89 ELEVATED FERRITIN, HEMOGLOBIN, AND RED BLOOD CELL COUNT: ICD-10-CM

## 2022-10-14 DIAGNOSIS — R79.89 ELEVATED FERRITIN: ICD-10-CM

## 2022-10-14 DIAGNOSIS — R97.8 OTHER ABNORMAL TUMOR MARKERS: ICD-10-CM

## 2022-10-14 DIAGNOSIS — R71.8 ELEVATED FERRITIN, HEMOGLOBIN, AND RED BLOOD CELL COUNT: ICD-10-CM

## 2022-10-14 DIAGNOSIS — D75.1 POLYCYTHEMIA: ICD-10-CM

## 2022-10-14 DIAGNOSIS — R71.8 ELEVATED MCV: ICD-10-CM

## 2022-10-14 PROCEDURE — 3077F SYST BP >= 140 MM HG: CPT | Mod: CPTII,S$GLB,, | Performed by: INTERNAL MEDICINE

## 2022-10-14 PROCEDURE — 4010F PR ACE/ARB THEARPY RXD/TAKEN: ICD-10-PCS | Mod: CPTII,S$GLB,, | Performed by: INTERNAL MEDICINE

## 2022-10-14 PROCEDURE — 3288F FALL RISK ASSESSMENT DOCD: CPT | Mod: CPTII,S$GLB,, | Performed by: INTERNAL MEDICINE

## 2022-10-14 PROCEDURE — 3077F PR MOST RECENT SYSTOLIC BLOOD PRESSURE >= 140 MM HG: ICD-10-PCS | Mod: CPTII,S$GLB,, | Performed by: INTERNAL MEDICINE

## 2022-10-14 PROCEDURE — 99203 OFFICE O/P NEW LOW 30 MIN: CPT | Mod: S$GLB,,, | Performed by: INTERNAL MEDICINE

## 2022-10-14 PROCEDURE — 4010F ACE/ARB THERAPY RXD/TAKEN: CPT | Mod: CPTII,S$GLB,, | Performed by: INTERNAL MEDICINE

## 2022-10-14 PROCEDURE — 3078F PR MOST RECENT DIASTOLIC BLOOD PRESSURE < 80 MM HG: ICD-10-PCS | Mod: CPTII,S$GLB,, | Performed by: INTERNAL MEDICINE

## 2022-10-14 PROCEDURE — 99203 PR OFFICE/OUTPT VISIT, NEW, LEVL III, 30-44 MIN: ICD-10-PCS | Mod: S$GLB,,, | Performed by: INTERNAL MEDICINE

## 2022-10-14 PROCEDURE — 1159F PR MEDICATION LIST DOCUMENTED IN MEDICAL RECORD: ICD-10-PCS | Mod: CPTII,S$GLB,, | Performed by: INTERNAL MEDICINE

## 2022-10-14 PROCEDURE — 3066F NEPHROPATHY DOC TX: CPT | Mod: CPTII,S$GLB,, | Performed by: INTERNAL MEDICINE

## 2022-10-14 PROCEDURE — 1160F PR REVIEW ALL MEDS BY PRESCRIBER/CLIN PHARMACIST DOCUMENTED: ICD-10-PCS | Mod: CPTII,S$GLB,, | Performed by: INTERNAL MEDICINE

## 2022-10-14 PROCEDURE — 1101F PR PT FALLS ASSESS DOC 0-1 FALLS W/OUT INJ PAST YR: ICD-10-PCS | Mod: CPTII,S$GLB,, | Performed by: INTERNAL MEDICINE

## 2022-10-14 PROCEDURE — 1101F PT FALLS ASSESS-DOCD LE1/YR: CPT | Mod: CPTII,S$GLB,, | Performed by: INTERNAL MEDICINE

## 2022-10-14 PROCEDURE — 3288F PR FALLS RISK ASSESSMENT DOCUMENTED: ICD-10-PCS | Mod: CPTII,S$GLB,, | Performed by: INTERNAL MEDICINE

## 2022-10-14 PROCEDURE — 1160F RVW MEDS BY RX/DR IN RCRD: CPT | Mod: CPTII,S$GLB,, | Performed by: INTERNAL MEDICINE

## 2022-10-14 PROCEDURE — 3061F PR NEG MICROALBUMINURIA RESULT DOCUMENTED/REVIEW: ICD-10-PCS | Mod: CPTII,S$GLB,, | Performed by: INTERNAL MEDICINE

## 2022-10-14 PROCEDURE — 3066F PR DOCUMENTATION OF TREATMENT FOR NEPHROPATHY: ICD-10-PCS | Mod: CPTII,S$GLB,, | Performed by: INTERNAL MEDICINE

## 2022-10-14 PROCEDURE — 3061F NEG MICROALBUMINURIA REV: CPT | Mod: CPTII,S$GLB,, | Performed by: INTERNAL MEDICINE

## 2022-10-14 PROCEDURE — 3078F DIAST BP <80 MM HG: CPT | Mod: CPTII,S$GLB,, | Performed by: INTERNAL MEDICINE

## 2022-10-14 PROCEDURE — 1159F MED LIST DOCD IN RCRD: CPT | Mod: CPTII,S$GLB,, | Performed by: INTERNAL MEDICINE

## 2022-10-17 ENCOUNTER — TELEPHONE (OUTPATIENT)
Dept: HEMATOLOGY/ONCOLOGY | Facility: CLINIC | Age: 67
End: 2022-10-17

## 2022-10-17 DIAGNOSIS — R71.8 ELEVATED MCV: Primary | ICD-10-CM

## 2022-10-17 DIAGNOSIS — D75.1 POLYCYTHEMIA: ICD-10-CM

## 2022-10-17 NOTE — TELEPHONE ENCOUNTER
Orders placed for CXR and US liver and spleen per Dr. Baum's verbal orders. Roslyn aware to send all needed documents.

## 2022-10-26 LAB
AFP-TM SERPL-MCNC: 7.5 NG/ML
ALBUMIN SERPL-MCNC: 4.3 G/DL (ref 3.6–5.1)
ALBUMIN/GLOB SERPL: 1.7 (CALC) (ref 1–2.5)
ALP SERPL-CCNC: 62 U/L (ref 37–153)
ALT SERPL-CCNC: 94 U/L (ref 6–29)
AST SERPL-CCNC: 74 U/L (ref 10–35)
BASOPHILS # BLD AUTO: 57 CELLS/UL (ref 0–200)
BASOPHILS NFR BLD AUTO: 0.7 %
BILIRUB SERPL-MCNC: 0.6 MG/DL (ref 0.2–1.2)
BLOCK/SPECIMEN ID: NORMAL
BUN SERPL-MCNC: 18 MG/DL (ref 7–25)
BUN/CREAT SERPL: ABNORMAL (CALC) (ref 6–22)
CALCIUM SERPL-MCNC: 9.9 MG/DL (ref 8.6–10.4)
CHLORIDE SERPL-SCNC: 101 MMOL/L (ref 98–110)
CLINICAL INFO: NORMAL
CO2 SERPL-SCNC: 26 MMOL/L (ref 20–32)
CREAT SERPL-MCNC: 0.76 MG/DL (ref 0.5–1.05)
EGFR: 87 ML/MIN/1.73M2
EOSINOPHIL # BLD AUTO: 139 CELLS/UL (ref 15–500)
EOSINOPHIL NFR BLD AUTO: 1.7 %
EPO SERPL-ACNC: 10.5 MIU/ML (ref 2.6–18.5)
ERYTHROCYTE [DISTWIDTH] IN BLOOD BY AUTOMATED COUNT: 11.8 % (ref 11–15)
FERRITIN SERPL-MCNC: 865 NG/ML (ref 16–288)
FOLATE RBC-MCNC: 401 NG/ML RBC
FOLATE SERPL-MCNC: 10.4 NG/ML
GLOBULIN SER CALC-MCNC: 2.6 G/DL (CALC) (ref 1.9–3.7)
GLUCOSE SERPL-MCNC: 105 MG/DL (ref 65–139)
HCT VFR BLD AUTO: 44 % (ref 35–45)
HFE GENE MUT ANL BLD/T: NORMAL
HGB BLD-MCNC: 15.5 G/DL (ref 11.7–15.5)
IRON SATN MFR SERPL: 46 % (CALC) (ref 16–45)
IRON SERPL-MCNC: 152 MCG/DL (ref 45–160)
JAK2 P.V617F BLD/T QL: NOT DETECTED
LDH SERPL P TO L-CCNC: 182 U/L (ref 120–250)
LYMPHOCYTES # BLD AUTO: 2698 CELLS/UL (ref 850–3900)
LYMPHOCYTES NFR BLD AUTO: 32.9 %
MCH RBC QN AUTO: 36.3 PG (ref 27–33)
MCHC RBC AUTO-ENTMCNC: 35.2 G/DL (ref 32–36)
MCV RBC AUTO: 103 FL (ref 80–100)
METHYLMALONATE SERPL-SCNC: 145 NMOL/L (ref 87–318)
MONOCYTES # BLD AUTO: 689 CELLS/UL (ref 200–950)
MONOCYTES NFR BLD AUTO: 8.4 %
NARRATIVE DIAGNOSTIC REPORT-IMP: NORMAL
NEUTROPHILS # BLD AUTO: 4617 CELLS/UL (ref 1500–7800)
NEUTROPHILS NFR BLD AUTO: 56.3 %
PLATELET # BLD AUTO: 162 THOUSAND/UL (ref 140–400)
PMV BLD REES-ECKER: 10.1 FL (ref 7.5–12.5)
POTASSIUM SERPL-SCNC: 3.8 MMOL/L (ref 3.5–5.3)
PROT SERPL-MCNC: 6.9 G/DL (ref 6.1–8.1)
RBC # BLD AUTO: 4.27 MILLION/UL (ref 3.8–5.1)
RETICS #: ABNORMAL CELLS/UL (ref 20000–80000)
RETICS/RBC NFR AUTO: 2.1 %
SERVICE CMNT 05-IMP: NORMAL
SODIUM SERPL-SCNC: 136 MMOL/L (ref 135–146)
SPECIMEN SOURCE: NORMAL
TIBC SERPL-MCNC: 332 MCG/DL (CALC) (ref 250–450)
WBC # BLD AUTO: 8.2 THOUSAND/UL (ref 3.8–10.8)

## 2022-10-28 ENCOUNTER — HOSPITAL ENCOUNTER (OUTPATIENT)
Dept: RADIOLOGY | Facility: HOSPITAL | Age: 67
Discharge: HOME | End: 2022-10-28
Attending: INTERNAL MEDICINE
Payer: MEDICARE

## 2022-10-28 DIAGNOSIS — D75.1 POLYCYTHEMIA: ICD-10-CM

## 2022-10-28 DIAGNOSIS — R71.8 ELEVATED MCV: ICD-10-CM

## 2022-10-28 PROCEDURE — 71046 X-RAY EXAM CHEST 2 VIEWS: CPT | Mod: TC,PO

## 2022-10-28 PROCEDURE — 76700 US EXAM ABDOM COMPLETE: CPT | Mod: TC,PO

## 2022-11-03 ENCOUNTER — TELEPHONE (OUTPATIENT)
Dept: FAMILY MEDICINE | Facility: CLINIC | Age: 67
End: 2022-11-03

## 2022-11-03 NOTE — TELEPHONE ENCOUNTER
----- Message from Deana Kerns sent at 11/3/2022 10:53 AM CDT -----  Vm- pt needs refill on losartan   [phone]

## 2022-11-03 NOTE — TELEPHONE ENCOUNTER
----- Message from Deana Kerns sent at 11/3/2022 10:53 AM CDT -----  Vm- pt needs refill on losartan   [phone]

## 2022-11-15 ENCOUNTER — OFFICE VISIT (OUTPATIENT)
Dept: DERMATOLOGY | Facility: CLINIC | Age: 67
End: 2022-11-15
Payer: MEDICARE

## 2022-11-15 DIAGNOSIS — L66.9 CICATRICIAL ALOPECIA: ICD-10-CM

## 2022-11-15 DIAGNOSIS — L64.9 ANDROGENIC ALOPECIA: Primary | ICD-10-CM

## 2022-11-15 PROCEDURE — 99999 PR PBB SHADOW E&M-EST. PATIENT-LVL II: ICD-10-PCS | Mod: PBBFAC,,, | Performed by: STUDENT IN AN ORGANIZED HEALTH CARE EDUCATION/TRAINING PROGRAM

## 2022-11-15 PROCEDURE — 4010F PR ACE/ARB THEARPY RXD/TAKEN: ICD-10-PCS | Mod: CPTII,S$GLB,, | Performed by: STUDENT IN AN ORGANIZED HEALTH CARE EDUCATION/TRAINING PROGRAM

## 2022-11-15 PROCEDURE — 3066F PR DOCUMENTATION OF TREATMENT FOR NEPHROPATHY: ICD-10-PCS | Mod: CPTII,S$GLB,, | Performed by: STUDENT IN AN ORGANIZED HEALTH CARE EDUCATION/TRAINING PROGRAM

## 2022-11-15 PROCEDURE — 99213 OFFICE O/P EST LOW 20 MIN: CPT | Mod: S$GLB,,, | Performed by: STUDENT IN AN ORGANIZED HEALTH CARE EDUCATION/TRAINING PROGRAM

## 2022-11-15 PROCEDURE — 1159F MED LIST DOCD IN RCRD: CPT | Mod: CPTII,S$GLB,, | Performed by: STUDENT IN AN ORGANIZED HEALTH CARE EDUCATION/TRAINING PROGRAM

## 2022-11-15 PROCEDURE — 3288F FALL RISK ASSESSMENT DOCD: CPT | Mod: CPTII,S$GLB,, | Performed by: STUDENT IN AN ORGANIZED HEALTH CARE EDUCATION/TRAINING PROGRAM

## 2022-11-15 PROCEDURE — 3288F PR FALLS RISK ASSESSMENT DOCUMENTED: ICD-10-PCS | Mod: CPTII,S$GLB,, | Performed by: STUDENT IN AN ORGANIZED HEALTH CARE EDUCATION/TRAINING PROGRAM

## 2022-11-15 PROCEDURE — 3061F NEG MICROALBUMINURIA REV: CPT | Mod: CPTII,S$GLB,, | Performed by: STUDENT IN AN ORGANIZED HEALTH CARE EDUCATION/TRAINING PROGRAM

## 2022-11-15 PROCEDURE — 1159F PR MEDICATION LIST DOCUMENTED IN MEDICAL RECORD: ICD-10-PCS | Mod: CPTII,S$GLB,, | Performed by: STUDENT IN AN ORGANIZED HEALTH CARE EDUCATION/TRAINING PROGRAM

## 2022-11-15 PROCEDURE — 1160F RVW MEDS BY RX/DR IN RCRD: CPT | Mod: CPTII,S$GLB,, | Performed by: STUDENT IN AN ORGANIZED HEALTH CARE EDUCATION/TRAINING PROGRAM

## 2022-11-15 PROCEDURE — 3061F PR NEG MICROALBUMINURIA RESULT DOCUMENTED/REVIEW: ICD-10-PCS | Mod: CPTII,S$GLB,, | Performed by: STUDENT IN AN ORGANIZED HEALTH CARE EDUCATION/TRAINING PROGRAM

## 2022-11-15 PROCEDURE — 1160F PR REVIEW ALL MEDS BY PRESCRIBER/CLIN PHARMACIST DOCUMENTED: ICD-10-PCS | Mod: CPTII,S$GLB,, | Performed by: STUDENT IN AN ORGANIZED HEALTH CARE EDUCATION/TRAINING PROGRAM

## 2022-11-15 PROCEDURE — 4010F ACE/ARB THERAPY RXD/TAKEN: CPT | Mod: CPTII,S$GLB,, | Performed by: STUDENT IN AN ORGANIZED HEALTH CARE EDUCATION/TRAINING PROGRAM

## 2022-11-15 PROCEDURE — 1101F PR PT FALLS ASSESS DOC 0-1 FALLS W/OUT INJ PAST YR: ICD-10-PCS | Mod: CPTII,S$GLB,, | Performed by: STUDENT IN AN ORGANIZED HEALTH CARE EDUCATION/TRAINING PROGRAM

## 2022-11-15 PROCEDURE — 99213 PR OFFICE/OUTPT VISIT, EST, LEVL III, 20-29 MIN: ICD-10-PCS | Mod: S$GLB,,, | Performed by: STUDENT IN AN ORGANIZED HEALTH CARE EDUCATION/TRAINING PROGRAM

## 2022-11-15 PROCEDURE — 3066F NEPHROPATHY DOC TX: CPT | Mod: CPTII,S$GLB,, | Performed by: STUDENT IN AN ORGANIZED HEALTH CARE EDUCATION/TRAINING PROGRAM

## 2022-11-15 PROCEDURE — 99999 PR PBB SHADOW E&M-EST. PATIENT-LVL II: CPT | Mod: PBBFAC,,, | Performed by: STUDENT IN AN ORGANIZED HEALTH CARE EDUCATION/TRAINING PROGRAM

## 2022-11-15 PROCEDURE — 1101F PT FALLS ASSESS-DOCD LE1/YR: CPT | Mod: CPTII,S$GLB,, | Performed by: STUDENT IN AN ORGANIZED HEALTH CARE EDUCATION/TRAINING PROGRAM

## 2022-11-15 NOTE — PROGRESS NOTES
Subjective:       Patient ID:  Suma Butterfield is a 67 y.o. female who presents for   Chief Complaint   Patient presents with    Follow-up     Hair loss     LOV 10/3/22    Patient here today for f/u on hair loss.  Patient states it is hard to tell if there has been new growth. Feels the same.   Taking finasteride 5mg daily and using Lidex solution nightly.  Using rogaine 5% daily.   ILK at last visit.      Final Pathologic Diagnosis     Skin, scalp, biopsies:   -Cicatricial Alopecia, see comment   Comment: Given the clinical differential central centrifugal cicatricial   alopecia seems most appropriate in this context there are findings of   minaturized hairs present which represent a coexisting androgenic alopecia.   The findings of fibrosis of follicle tracts are nonspecfic, this could   represent a burnt out inflammatory hair process   Comment: Interp By Laura Armenta M.D., Signed on 05/31/2022 at 16:52     No hx of cancer, post-menopausal     transaminitis- Has hx of treated hep C s/p treatment, drinks alcohol daily- following with outside GI  Also following with Dr. Baum for elevated ferritin, RBC count  Had liver ultrasound which should hepatic steatosis      Has SVT and uncontrolled htn- but working w/ her PCP to get it under better control    Review of Systems   Constitutional:  Negative for fever, chills and fatigue.   Respiratory:  Negative for cough and shortness of breath.    Gastrointestinal:  Negative for nausea and vomiting.      Objective:    Physical Exam   Constitutional: She appears well-developed and well-nourished.   Neurological: She is alert and oriented to person, place, and time.   Psychiatric: She has a normal mood and affect.   Skin:   Areas Examined (abnormalities noted in diagram):   Scalp / Hair Palpated and Inspected            Diagram Legend     Erythematous scaling macule/papule c/w actinic keratosis       Vascular papule c/w angioma      Pigmented verrucoid papule/plaque c/w  seborrheic keratosis      Yellow umbilicated papule c/w sebaceous hyperplasia      Irregularly shaped tan macule c/w lentigo     1-2 mm smooth white papules consistent with Milia      Movable subcutaneous cyst with punctum c/w epidermal inclusion cyst      Subcutaneous movable cyst c/w pilar cyst      Firm pink to brown papule c/w dermatofibroma      Pedunculated fleshy papule(s) c/w skin tag(s)      Evenly pigmented macule c/w junctional nevus     Mildly variegated pigmented, slightly irregular-bordered macule c/w mildly atypical nevus      Flesh colored to evenly pigmented papule c/w intradermal nevus       Pink pearly papule/plaque c/w basal cell carcinoma      Erythematous hyperkeratotic cursted plaque c/w SCC      Surgical scar with no sign of skin cancer recurrence      Open and closed comedones      Inflammatory papules and pustules      Verrucoid papule consistent consistent with wart     Erythematous eczematous patches and plaques     Dystrophic onycholytic nail with subungual debris c/w onychomycosis     Umbilicated papule    Erythematous-base heme-crusted tan verrucoid plaque consistent with inflamed seborrheic keratosis     Erythematous Silvery Scaling Plaque c/w Psoriasis     See annotation  Today        Previous                        Assessment / Plan:        Androgenic alopecia  Cicatricial alopecia  - tolerating finasteride 5mg daily   - continue rogaine 5% BID  - discussed low dose minoxidil and spironolactone, however w/ hx of HTN on 3 anti-hypertensives and also w/ hx of SVT will not add either right now  - consider asking cards to add minoxidil for BP control and hair growth  -Finasteride is not approved by the FDA for use in women, however multiple studies have shown benefit when used to treat female pattern hair loss in combination with topical minoxidil. Side effects are infrequent, usually mild, and reversible even with maintenance of treatment. They include decreased libido, breast  tenderness, and headache. In premenopausal women, irregular menstruation and spotting may occur. Pregnancy must be avoided while on this medication (risk of feminization of the male fetus)  - improving with PO, oral and ILK, no evidence of infection and has had multiple rounds of ILK- will monitor without ILK   - f/u in 3-4 months         No follow-ups on file.

## 2022-11-18 ENCOUNTER — TELEPHONE (OUTPATIENT)
Dept: HEMATOLOGY/ONCOLOGY | Facility: CLINIC | Age: 67
End: 2022-11-18

## 2022-11-21 NOTE — PROGRESS NOTES
[facility] Hematology/Oncology  PROGRESS NOTE - 2nd Follow-up Visit      Subjective:       Patient ID:   NAME: Suma Butterfield : 1955     67 y.o. female    Referring Doc: Hector Colin PA*  Other Physicians: Pavan Leonard; Jun        Chief Complaint: polycythemia/elevated MCV f/u      History of Present Illness:     Patient returns today for a 2nd regularly scheduled follow-up visit.  The patient is here today to go over the results of the recently ordered labs, tests and studies. She is here by herself, She had colonoscopy with Dr Barahona yesterday and had three benign polyps removed.     She is feeling ok; energy levels are fine; no CP, SOB, HA's or N/V; breathing ok      Discussed covid19 and she has been vaccinated      ROS:   GEN: normal without any fever, night sweats or weight loss  HEENT: normal with no HA's, sore throat, stiff neck, changes in vision  CV: normal with no CP, SOB, PND, GAY or orthopnea  PULM: normal with no SOB, cough, hemoptysis, sputum or pleuritic pain  GI: normal with no abdominal pain, nausea, vomiting, constipation, diarrhea, melanotic stools, BRBPR, or hematemesis  : normal with no hematuria, dysuria  BREAST: normal with no mass, discharge, pain  SKIN: normal with no rash, erythema, bruising, or swelling    Pain Scale: 0    Allergies:  Review of patient's allergies indicates:   Allergen Reactions    Succinylcholine      **Pseudocholinesterase**       Medications:    Current Outpatient Medications:     alendronate (FOSAMAX) 70 MG tablet, TK 1 T PO 1 TIME Q WK, Disp: 12 tablet, Rfl: 3    biotin 5,000 mcg TbDL, Take 5,000 mcg by mouth once daily., Disp: , Rfl:     calcium-vitamin D3 (OS- + D3) 500 mg-5 mcg (200 unit) per tablet, Take 1 tablet by mouth 2 (two) times daily with meals., Disp: , Rfl:     cyanocobalamin (VITAMIN B-12) 1000 MCG tablet, Take 100 mcg by mouth once daily., Disp: , Rfl:     finasteride (PROSCAR) 5 mg tablet, Take 1 tablet (5 mg total) by  "mouth once daily., Disp: 30 tablet, Rfl: 11    fluocinonide (LIDEX) 0.05 % external solution, Use on AA daily, Disp: 60 mL, Rfl: 2    hydroCHLOROthiazide (HYDRODIURIL) 12.5 MG Tab, Take 1 tablet (12.5 mg total) by mouth once daily., Disp: 90 tablet, Rfl: 1    losartan (COZAAR) 100 MG tablet, Take 1 tablet (100 mg total) by mouth once daily., Disp: 30 tablet, Rfl: 2    metoprolol succinate (TOPROL-XL) 100 MG 24 hr tablet, Take 1 tablet (100 mg total) by mouth once daily., Disp: 90 tablet, Rfl: 1    milk thistle 175 mg tablet, Take 175 mg by mouth once daily., Disp: , Rfl:     omega-3 fatty acids/fish oil (FISH OIL-OMEGA-3 FATTY ACIDS) 300-1,000 mg capsule, Take 1 capsule by mouth once daily., Disp: , Rfl:     vitamin E 400 UNIT capsule, Take 400 Units by mouth once daily., Disp: , Rfl:     zinc gluconate 50 mg tablet, Take 50 mg by mouth once daily., Disp: , Rfl:     PMHx/PSHx Updates:  See patient's last visit with me on 10/14/2022.  See H&P on 10/14/2022        Pathology:   Cancer Staging   No matching staging information was found for the patient.          Objective:     Vitals:  Blood pressure 139/84, pulse 66, temperature 97.4 °F (36.3 °C), resp. rate 18, height 5' 1" (1.549 m), weight 56.1 kg (123 lb 9.6 oz).    Physical Examination:   GEN: no apparent distress, comfortable; AAOx3  HEAD: atraumatic and normocephalic; alopecia  EYES: no pallor, no icterus, PERRLA  ENT: OMM, no pharyngeal erythema, external ears WNL; no nasal discharge; no thrush  NECK: no masses, thyroid normal, trachea midline, no LAD/LN's, supple  CV: RRR with no murmur; normal pulse; normal S1 and S2; no pedal edema  CHEST: Normal respiratory effort; CTAB; normal breath sounds; no wheeze or crackles  ABDOM: nontender and nondistended; soft; normal bowel sounds; no rebound/guarding  MUSC/Skeletal: ROM normal; no crepitus; joints normal; no deformities or arthropathy  EXTREM: no clubbing, cyanosis, inflammation or swelling  SKIN: no rashes, " lesions, ulcers, petechiae or subcutaneous nodules  : no cam  NEURO: grossly intact; motor/sensory WNL; AAOx3; no tremors  PSYCH: normal mood, affect and behavior  LYMPH: normal cervical, supraclavicular, axillary and groin LN's            Labs:     Lab Results   Component Value Date    WBC 5.0 11/19/2022    HGB 16.1 (H) 11/19/2022    HCT 46.5 (H) 11/19/2022    .9 (H) 11/19/2022     11/19/2022       CMP  Sodium   Date Value Ref Range Status   11/19/2022 137 135 - 146 mmol/L Final     Potassium   Date Value Ref Range Status   11/19/2022 4.2 3.5 - 5.3 mmol/L Final     Chloride   Date Value Ref Range Status   11/19/2022 101 98 - 110 mmol/L Final     CO2   Date Value Ref Range Status   11/19/2022 26 20 - 32 mmol/L Final     Glucose   Date Value Ref Range Status   11/19/2022 94 65 - 139 mg/dL Final     Comment:               Non-fasting reference interval          BUN   Date Value Ref Range Status   11/19/2022 13 7 - 25 mg/dL Final     Creatinine   Date Value Ref Range Status   11/19/2022 0.67 0.50 - 1.05 mg/dL Final     Calcium   Date Value Ref Range Status   11/19/2022 9.5 8.6 - 10.4 mg/dL Final     Total Protein   Date Value Ref Range Status   11/19/2022 6.8 6.1 - 8.1 g/dL Final     Albumin   Date Value Ref Range Status   11/19/2022 4.2 3.6 - 5.1 g/dL Final     Total Bilirubin   Date Value Ref Range Status   11/19/2022 0.7 0.2 - 1.2 mg/dL Final     Alkaline Phosphatase   Date Value Ref Range Status   07/18/2020 64 37 - 153 U/L Final     AST   Date Value Ref Range Status   11/19/2022 47 (H) 10 - 35 U/L Final     ALT   Date Value Ref Range Status   11/19/2022 52 (H) 6 - 29 U/L Final     Anion Gap   Date Value Ref Range Status   06/01/2020 11 8 - 16 mmol/L Final     eGFR   Date Value Ref Range Status   11/19/2022 97 > OR = 60 mL/min/1.73m2 Final     Comment:     The eGFR is based on the CKD-EPI 2021 equation. To calculate   the new eGFR from a previous Creatinine or Cystatin C  result, go to  https://www.kidney.org/professionals/  kdoqi/gfr%5Fcalculator       Lab Results   Component Value Date    IRON 187 (H) 11/19/2022    TRANSFERRIN 262 05/20/2022    TIBC 352 11/19/2022    LABIRON 53 (H) 11/19/2022    FESATURATED 36 05/20/2022      Ferritin 16 - 288 ng/mL 567 High        JAK2 V617F Mutation NOT DETECTED     DNA Mutation Analysis See Below    Comment: RESULT: POSITIVE FOR ONE HFE GENE PATHOGENIC VARIANT: H63D (HETEROZYGOTE)    This patient is negative for the   C282Y pathogenic variant    AFP ng/mL 7.5 High      Erythropoietin 2.6 - 18.5 mIU/mL 10.5       - 250 U/L 182      Retic % 2.1     Folate (packed RBC's) >280 ng/mL       Methylmalonic Acid 87 - 318 nmol/L 145      Folate ng/mL 10.4          Radiology/Diagnostic Studies:    CXR    Result Date: 10/28/2022  CHEST PA AND LATERAL CLINICAL DATA:Polycythemia. Comparison to November 2017. FINDINGS: PA and lateral views demonstrate normal heart size. The mediastinum is unremarkable. There are no infiltrates. There is blunting of the lateral costophrenic angle on the left which could reflect a trace amount of pleural fluid or pleural scarring. Osseous structures are unremarkable. IMPRESSION: 1. Slight blunting of the costophrenic angle on the left which could reflect a trace amount of pleural fluid or pleural scarring. 2. No other significant findings.      US Abdomen Complete    Result Date: 10/28/2022  Abdominal ultrasound complete CLINICAL DATA: Polycythemia FINDINGS: Sonographic assessment of the abdomen is compared to November 2020. The pancreas, aorta, and inferior vena cava are unremarkable. The liver is diffusely echogenic, similar to the prior study, compatible with fatty infiltration. Hepatopedal flow is noted within the portal vein. The gallbladder and biliary tree are within normal limits. The common bile duct measures 3 mm. The spleen and bilateral kidneys are within normal limits. There is no free fluid. IMPRESSION: 1. Hepatic  steatosis. 2. No other significant findings.        I have reviewed all available lab results and radiology reports.    Assessment/Plan:   (1) 67 y.o. female with diagnosis of polycythemia and elevated MCV/ferritin who has been referred by Hector Colin PA* for evaluation by medical hematology/oncology.   - polycythemia since 2020 at least - suspect she has a secondary polycythemia process due to the chronic tobacco history  - former smoker who quit about a year ago  - elevated ferritin on labs in  May 2022 - possible acute phase reactant or due to the underlying liver hx  - order hemochromatosis panel    11/22/2022:  - she has H63D heterozygous HFE gene abnormality  - JAK2 was negative  - set up phlebotomy monthly for now     (2) HTN and hypercholesterolemia     (3) SVT - followed by Dr Browne     (4) Chronic hep C at age 52 s/p antiviral therapy - followed by Natalya  - AFP was a little elevated at 7.5  - US with hepatic steatosis     (5) Severe Alopecia - followed by Dr Estrella Kaba with derm      VISIT DIAGNOSES:      Elevated MCV    Polycythemia    Chronic hepatitis C without hepatic coma    Elevated ferritin        PLAN:  Set up phlebotomies monthly for now, monitor labs monthly  Discussed the genetic implications of the HFE gene abnormality in siblings and children  consider US and AFP every 6 months  4.. F/u with PCP, Derm and GI  RTC in  3 months  Fax note to Hector Colin PA*; Estrella Kaba, Jun    Discussion:     COVID-19 Discussion:    I had long discussion with patient and any applicable family about the COVID-19 coronavirus epidemic and the recommended precautions with regard to cancer and/or hematology patients. I have re-iterated the CDC recommendations for adequate hand washing, use of hand -like products, and coughing into elbow, etc. In addition, especially for our patients who are on chemotherapy and/or our otherwise immunocompromised patients, I have recommended  avoidance of crowds, including movie theaters, restaurants, churches, etc. I have recommended avoidance of any sick or symptomatic family members and/or friends. I have also recommended avoidance of any raw and unwashed food products, and general avoidance of food items that have not been prepared by themselves. The patient has been asked to call us immediately with any symptom developments, issues, questions or other general concerns.     I spent over 25 mins of time with the patient. Reviewed results of the recently ordered labs, tests and studies; made directives with regards to the results. Over half of this time was spent couseling and coordinating care.    I have explained all of the above in detail and the patient understands all of the current recommendation(s). I have answered all of their questions to the best of my ability and to their complete satisfaction.   The patient is to continue with the current management plan.            Electronically signed by Adan Baum MD

## 2022-11-22 ENCOUNTER — OFFICE VISIT (OUTPATIENT)
Dept: HEMATOLOGY/ONCOLOGY | Facility: CLINIC | Age: 67
End: 2022-11-22
Payer: MEDICARE

## 2022-11-22 VITALS
BODY MASS INDEX: 23.34 KG/M2 | WEIGHT: 123.63 LBS | DIASTOLIC BLOOD PRESSURE: 84 MMHG | RESPIRATION RATE: 18 BRPM | TEMPERATURE: 97 F | SYSTOLIC BLOOD PRESSURE: 139 MMHG | HEIGHT: 61 IN | HEART RATE: 66 BPM

## 2022-11-22 DIAGNOSIS — R79.89 ELEVATED FERRITIN: ICD-10-CM

## 2022-11-22 DIAGNOSIS — R71.8 ELEVATED MCV: Primary | ICD-10-CM

## 2022-11-22 DIAGNOSIS — D75.1 POLYCYTHEMIA: ICD-10-CM

## 2022-11-22 DIAGNOSIS — Z14.8 HEMOCHROMATOSIS CARRIER: ICD-10-CM

## 2022-11-22 DIAGNOSIS — B18.2 CHRONIC HEPATITIS C WITHOUT HEPATIC COMA: ICD-10-CM

## 2022-11-22 PROCEDURE — 3066F PR DOCUMENTATION OF TREATMENT FOR NEPHROPATHY: ICD-10-PCS | Mod: CPTII,S$GLB,, | Performed by: INTERNAL MEDICINE

## 2022-11-22 PROCEDURE — 3008F PR BODY MASS INDEX (BMI) DOCUMENTED: ICD-10-PCS | Mod: CPTII,S$GLB,, | Performed by: INTERNAL MEDICINE

## 2022-11-22 PROCEDURE — 1160F RVW MEDS BY RX/DR IN RCRD: CPT | Mod: CPTII,S$GLB,, | Performed by: INTERNAL MEDICINE

## 2022-11-22 PROCEDURE — 1101F PT FALLS ASSESS-DOCD LE1/YR: CPT | Mod: CPTII,S$GLB,, | Performed by: INTERNAL MEDICINE

## 2022-11-22 PROCEDURE — 99215 PR OFFICE/OUTPT VISIT, EST, LEVL V, 40-54 MIN: ICD-10-PCS | Mod: S$GLB,,, | Performed by: INTERNAL MEDICINE

## 2022-11-22 PROCEDURE — 1160F PR REVIEW ALL MEDS BY PRESCRIBER/CLIN PHARMACIST DOCUMENTED: ICD-10-PCS | Mod: CPTII,S$GLB,, | Performed by: INTERNAL MEDICINE

## 2022-11-22 PROCEDURE — 4010F ACE/ARB THERAPY RXD/TAKEN: CPT | Mod: CPTII,S$GLB,, | Performed by: INTERNAL MEDICINE

## 2022-11-22 PROCEDURE — 3288F FALL RISK ASSESSMENT DOCD: CPT | Mod: CPTII,S$GLB,, | Performed by: INTERNAL MEDICINE

## 2022-11-22 PROCEDURE — 3075F SYST BP GE 130 - 139MM HG: CPT | Mod: CPTII,S$GLB,, | Performed by: INTERNAL MEDICINE

## 2022-11-22 PROCEDURE — 3061F NEG MICROALBUMINURIA REV: CPT | Mod: CPTII,S$GLB,, | Performed by: INTERNAL MEDICINE

## 2022-11-22 PROCEDURE — 4010F PR ACE/ARB THEARPY RXD/TAKEN: ICD-10-PCS | Mod: CPTII,S$GLB,, | Performed by: INTERNAL MEDICINE

## 2022-11-22 PROCEDURE — 3066F NEPHROPATHY DOC TX: CPT | Mod: CPTII,S$GLB,, | Performed by: INTERNAL MEDICINE

## 2022-11-22 PROCEDURE — 1101F PR PT FALLS ASSESS DOC 0-1 FALLS W/OUT INJ PAST YR: ICD-10-PCS | Mod: CPTII,S$GLB,, | Performed by: INTERNAL MEDICINE

## 2022-11-22 PROCEDURE — 1159F MED LIST DOCD IN RCRD: CPT | Mod: CPTII,S$GLB,, | Performed by: INTERNAL MEDICINE

## 2022-11-22 PROCEDURE — 3008F BODY MASS INDEX DOCD: CPT | Mod: CPTII,S$GLB,, | Performed by: INTERNAL MEDICINE

## 2022-11-22 PROCEDURE — 3079F PR MOST RECENT DIASTOLIC BLOOD PRESSURE 80-89 MM HG: ICD-10-PCS | Mod: CPTII,S$GLB,, | Performed by: INTERNAL MEDICINE

## 2022-11-22 PROCEDURE — 3288F PR FALLS RISK ASSESSMENT DOCUMENTED: ICD-10-PCS | Mod: CPTII,S$GLB,, | Performed by: INTERNAL MEDICINE

## 2022-11-22 PROCEDURE — 3061F PR NEG MICROALBUMINURIA RESULT DOCUMENTED/REVIEW: ICD-10-PCS | Mod: CPTII,S$GLB,, | Performed by: INTERNAL MEDICINE

## 2022-11-22 PROCEDURE — 99215 OFFICE O/P EST HI 40 MIN: CPT | Mod: S$GLB,,, | Performed by: INTERNAL MEDICINE

## 2022-11-22 PROCEDURE — 3079F DIAST BP 80-89 MM HG: CPT | Mod: CPTII,S$GLB,, | Performed by: INTERNAL MEDICINE

## 2022-11-22 PROCEDURE — 1159F PR MEDICATION LIST DOCUMENTED IN MEDICAL RECORD: ICD-10-PCS | Mod: CPTII,S$GLB,, | Performed by: INTERNAL MEDICINE

## 2022-11-22 PROCEDURE — 3075F PR MOST RECENT SYSTOLIC BLOOD PRESS GE 130-139MM HG: ICD-10-PCS | Mod: CPTII,S$GLB,, | Performed by: INTERNAL MEDICINE

## 2022-12-23 ENCOUNTER — TELEPHONE (OUTPATIENT)
Dept: FAMILY MEDICINE | Facility: CLINIC | Age: 67
End: 2022-12-23

## 2022-12-23 DIAGNOSIS — I10 ESSENTIAL HYPERTENSION: ICD-10-CM

## 2022-12-23 DIAGNOSIS — Z79.899 ENCOUNTER FOR LONG-TERM (CURRENT) USE OF OTHER MEDICATIONS: Primary | ICD-10-CM

## 2022-12-23 DIAGNOSIS — E78.00 HYPERCHOLESTEROLEMIA: ICD-10-CM

## 2022-12-28 LAB
ALBUMIN SERPL-MCNC: 4.3 G/DL (ref 3.6–5.1)
ALBUMIN/GLOB SERPL: 1.7 (CALC) (ref 1–2.5)
ALP SERPL-CCNC: 61 U/L (ref 37–153)
ALT SERPL-CCNC: 58 U/L (ref 6–29)
APPEARANCE UR: CLEAR
AST SERPL-CCNC: 52 U/L (ref 10–35)
BACTERIA #/AREA URNS HPF: NORMAL /HPF
BACTERIA UR CULT: NORMAL
BASOPHILS # BLD AUTO: 59 CELLS/UL (ref 0–200)
BASOPHILS NFR BLD AUTO: 1.1 %
BILIRUB SERPL-MCNC: 0.9 MG/DL (ref 0.2–1.2)
BILIRUB UR QL STRIP: NEGATIVE
BUN SERPL-MCNC: 15 MG/DL (ref 7–25)
BUN/CREAT SERPL: ABNORMAL (CALC) (ref 6–22)
CALCIUM SERPL-MCNC: 9.6 MG/DL (ref 8.6–10.4)
CHLORIDE SERPL-SCNC: 99 MMOL/L (ref 98–110)
CHOLEST SERPL-MCNC: 226 MG/DL
CHOLEST/HDLC SERPL: 2.5 (CALC)
CO2 SERPL-SCNC: 28 MMOL/L (ref 20–32)
COLOR UR: YELLOW
CREAT SERPL-MCNC: 0.66 MG/DL (ref 0.5–1.05)
EGFR: 97 ML/MIN/1.73M2
EOSINOPHIL # BLD AUTO: 130 CELLS/UL (ref 15–500)
EOSINOPHIL NFR BLD AUTO: 2.4 %
ERYTHROCYTE [DISTWIDTH] IN BLOOD BY AUTOMATED COUNT: 11.6 % (ref 11–15)
FERRITIN SERPL-MCNC: 500 NG/ML (ref 16–288)
GLOBULIN SER CALC-MCNC: 2.6 G/DL (CALC) (ref 1.9–3.7)
GLUCOSE SERPL-MCNC: 112 MG/DL (ref 65–99)
GLUCOSE UR QL STRIP: NEGATIVE
HCT VFR BLD AUTO: 45.9 % (ref 35–45)
HDLC SERPL-MCNC: 90 MG/DL
HGB BLD-MCNC: 16 G/DL (ref 11.7–15.5)
HGB UR QL STRIP: NEGATIVE
HYALINE CASTS #/AREA URNS LPF: NORMAL /LPF
IRON SATN MFR SERPL: 41 % (CALC) (ref 16–45)
IRON SERPL-MCNC: 141 MCG/DL (ref 45–160)
KETONES UR QL STRIP: NEGATIVE
LDLC SERPL CALC-MCNC: 117 MG/DL (CALC)
LEUKOCYTE ESTERASE UR QL STRIP: NEGATIVE
LYMPHOCYTES # BLD AUTO: 1858 CELLS/UL (ref 850–3900)
LYMPHOCYTES NFR BLD AUTO: 34.4 %
MCH RBC QN AUTO: 37.4 PG (ref 27–33)
MCHC RBC AUTO-ENTMCNC: 34.9 G/DL (ref 32–36)
MCV RBC AUTO: 107.2 FL (ref 80–100)
MONOCYTES # BLD AUTO: 545 CELLS/UL (ref 200–950)
MONOCYTES NFR BLD AUTO: 10.1 %
NEUTROPHILS # BLD AUTO: 2808 CELLS/UL (ref 1500–7800)
NEUTROPHILS NFR BLD AUTO: 52 %
NITRITE UR QL STRIP: NEGATIVE
NONHDLC SERPL-MCNC: 136 MG/DL (CALC)
PH UR STRIP: 8 [PH] (ref 5–8)
PLATELET # BLD AUTO: 155 THOUSAND/UL (ref 140–400)
PMV BLD REES-ECKER: 9.8 FL (ref 7.5–12.5)
POTASSIUM SERPL-SCNC: 4.1 MMOL/L (ref 3.5–5.3)
PROT SERPL-MCNC: 6.9 G/DL (ref 6.1–8.1)
PROT UR QL STRIP: NEGATIVE
RBC # BLD AUTO: 4.28 MILLION/UL (ref 3.8–5.1)
RBC #/AREA URNS HPF: NORMAL /HPF
SERVICE CMNT-IMP: NORMAL
SODIUM SERPL-SCNC: 136 MMOL/L (ref 135–146)
SP GR UR STRIP: 1.01 (ref 1–1.03)
SQUAMOUS #/AREA URNS HPF: NORMAL /HPF
TIBC SERPL-MCNC: 347 MCG/DL (CALC) (ref 250–450)
TRIGL SERPL-MCNC: 91 MG/DL
TSH SERPL-ACNC: 0.94 MIU/L (ref 0.4–4.5)
WBC # BLD AUTO: 5.4 THOUSAND/UL (ref 3.8–10.8)
WBC #/AREA URNS HPF: NORMAL /HPF

## 2022-12-30 ENCOUNTER — TELEPHONE (OUTPATIENT)
Dept: HEMATOLOGY/ONCOLOGY | Facility: CLINIC | Age: 67
End: 2022-12-30

## 2022-12-30 NOTE — TELEPHONE ENCOUNTER
Per Dr. Baum's orders, I spoke to patient and made her aware Hgb 16.0, inquired as to if she has had recent phlebotomy done, patient states she had one done back in November. Informed her that with her Hgb at 16.0, Dr. Baum does recommend that she have a phlebotomy done at this time. Verbalized understanding and states she will not be able to have done until next week due to work and the holidays.

## 2022-12-30 NOTE — TELEPHONE ENCOUNTER
----- Message from Adan Baum MD sent at 12/29/2022  5:50 PM CST -----  Yes please    ----- Message -----  From: Yancy Stark RN  Sent: 12/29/2022   3:45 PM CST  To: Adan Baum MD    She has standing lab orders for phlebotomies Q 4 weeks for Hgb greater than 14.9. would you like me to call her and have her get phlebotomy?  ----- Message -----  From: Adan Baum MD  Sent: 12/28/2022   3:27 PM CST  To: Sarika Arellano Staff    Does she get periodic phlebotomies?

## 2023-01-04 ENCOUNTER — TELEPHONE (OUTPATIENT)
Dept: FAMILY MEDICINE | Facility: CLINIC | Age: 68
End: 2023-01-04

## 2023-01-04 NOTE — TELEPHONE ENCOUNTER
----- Message from Janeth Rivera MA sent at 1/4/2023  4:02 PM CST -----    ----- Message -----  From: Deana Kerns  Sent: 1/4/2023   3:57 PM CST  To: Mono Scruggs    - pt is calling kaylan back   [phone]

## 2023-01-04 NOTE — TELEPHONE ENCOUNTER
----- Message from Katina Mehta sent at 1/4/2023  3:15 PM CST -----  Pt returning a phone call. [phone]

## 2023-01-06 ENCOUNTER — OFFICE VISIT (OUTPATIENT)
Dept: FAMILY MEDICINE | Facility: CLINIC | Age: 68
End: 2023-01-06
Payer: MEDICARE

## 2023-01-06 VITALS
WEIGHT: 127 LBS | SYSTOLIC BLOOD PRESSURE: 128 MMHG | HEART RATE: 60 BPM | HEIGHT: 61 IN | DIASTOLIC BLOOD PRESSURE: 88 MMHG | BODY MASS INDEX: 23.98 KG/M2

## 2023-01-06 DIAGNOSIS — Z78.0 MENOPAUSE: ICD-10-CM

## 2023-01-06 DIAGNOSIS — E83.110 HEMOCHROMATOSIS ASSOCIATED WITH MUTATION IN HFE GENE: ICD-10-CM

## 2023-01-06 DIAGNOSIS — L65.9 ALOPECIA: ICD-10-CM

## 2023-01-06 DIAGNOSIS — Z13.820 SCREENING FOR OSTEOPOROSIS: ICD-10-CM

## 2023-01-06 DIAGNOSIS — I10 ESSENTIAL HYPERTENSION: Primary | ICD-10-CM

## 2023-01-06 PROCEDURE — 3074F SYST BP LT 130 MM HG: CPT | Mod: CPTII,S$GLB,, | Performed by: PHYSICIAN ASSISTANT

## 2023-01-06 PROCEDURE — 99214 PR OFFICE/OUTPT VISIT, EST, LEVL IV, 30-39 MIN: ICD-10-PCS | Mod: S$GLB,,, | Performed by: PHYSICIAN ASSISTANT

## 2023-01-06 PROCEDURE — 99214 OFFICE O/P EST MOD 30 MIN: CPT | Mod: S$GLB,,, | Performed by: PHYSICIAN ASSISTANT

## 2023-01-06 PROCEDURE — 3008F PR BODY MASS INDEX (BMI) DOCUMENTED: ICD-10-PCS | Mod: CPTII,S$GLB,, | Performed by: PHYSICIAN ASSISTANT

## 2023-01-06 PROCEDURE — 3079F DIAST BP 80-89 MM HG: CPT | Mod: CPTII,S$GLB,, | Performed by: PHYSICIAN ASSISTANT

## 2023-01-06 PROCEDURE — 1159F MED LIST DOCD IN RCRD: CPT | Mod: CPTII,S$GLB,, | Performed by: PHYSICIAN ASSISTANT

## 2023-01-06 PROCEDURE — 1159F PR MEDICATION LIST DOCUMENTED IN MEDICAL RECORD: ICD-10-PCS | Mod: CPTII,S$GLB,, | Performed by: PHYSICIAN ASSISTANT

## 2023-01-06 PROCEDURE — 3008F BODY MASS INDEX DOCD: CPT | Mod: CPTII,S$GLB,, | Performed by: PHYSICIAN ASSISTANT

## 2023-01-06 PROCEDURE — 3079F PR MOST RECENT DIASTOLIC BLOOD PRESSURE 80-89 MM HG: ICD-10-PCS | Mod: CPTII,S$GLB,, | Performed by: PHYSICIAN ASSISTANT

## 2023-01-06 PROCEDURE — 3074F PR MOST RECENT SYSTOLIC BLOOD PRESSURE < 130 MM HG: ICD-10-PCS | Mod: CPTII,S$GLB,, | Performed by: PHYSICIAN ASSISTANT

## 2023-01-06 RX ORDER — METOPROLOL SUCCINATE 100 MG/1
100 TABLET, EXTENDED RELEASE ORAL DAILY
Qty: 90 TABLET | Refills: 1 | Status: ON HOLD | OUTPATIENT
Start: 2023-01-06 | End: 2023-03-11 | Stop reason: HOSPADM

## 2023-01-06 RX ORDER — HYDROCHLOROTHIAZIDE 12.5 MG/1
12.5 TABLET ORAL DAILY
Qty: 90 TABLET | Refills: 1 | Status: ON HOLD | OUTPATIENT
Start: 2023-01-06 | End: 2023-03-11 | Stop reason: HOSPADM

## 2023-01-06 NOTE — PROGRESS NOTES
SUBJECTIVE:    Patient ID: Suma Butterfield is a 67 y.o. female.    Chief Complaint: Hypertension (No bottles, Dexa ordered, declined Flu vaccine, Lab-results,abc)    This is a 67-year-old female who presents today for regular hypertension follow-up. Reports that she has been doing pretty well. Feels that pressure is looking better at home since she has not had any further HA. Does not monitor there. She is seeing some decent improvement with the alopecia. She has stopped ILK and just proceeding with po medication. She has been getting monthly phlebotomy and will plan to meet back with Dr. Baum next month.      Telephone on 12/23/2022   Component Date Value Ref Range Status    Cholesterol 12/27/2022 226 (H)  <200 mg/dL Final    HDL 12/27/2022 90  > OR = 50 mg/dL Final    Triglycerides 12/27/2022 91  <150 mg/dL Final    LDL Cholesterol 12/27/2022 117 (H)  mg/dL (calc) Final    HDL/Cholesterol Ratio 12/27/2022 2.5  <5.0 (calc) Final    Non HDL Chol. (LDL+VLDL) 12/27/2022 136 (H)  <130 mg/dL (calc) Final    Color, UA 12/27/2022 YELLOW  YELLOW Final    Appearance, UA 12/27/2022 CLEAR  CLEAR Final    Specific Gravity, UA 12/27/2022 1.011  1.001 - 1.035 Final    pH, UA 12/27/2022 8.0  5.0 - 8.0 Final    Glucose, UA 12/27/2022 NEGATIVE  NEGATIVE Final    Bilirubin, UA 12/27/2022 NEGATIVE  NEGATIVE Final    Ketones, UA 12/27/2022 NEGATIVE  NEGATIVE Final    Occult Blood UA 12/27/2022 NEGATIVE  NEGATIVE Final    Protein, UA 12/27/2022 NEGATIVE  NEGATIVE Final    Nitrite, UA 12/27/2022 NEGATIVE  NEGATIVE Final    Leukocytes, UA 12/27/2022 NEGATIVE  NEGATIVE Final    WBC Casts, UA 12/27/2022 NONE SEEN  < OR = 5 /HPF Final    RBC Casts, UA 12/27/2022 NONE SEEN  < OR = 2 /HPF Final    Squam Epithel, UA 12/27/2022 0-5  < OR = 5 /HPF Final    Bacteria, UA 12/27/2022 NONE SEEN  NONE SEEN /HPF Final    Hyaline Casts, UA 12/27/2022 NONE SEEN  NONE SEEN /LPF Final    Service Cmt: 12/27/2022    Final    Reflexive Urine Culture  12/27/2022    Final    TSH w/reflex to FT4 12/27/2022 0.94  0.40 - 4.50 mIU/L Final   Office Visit on 11/22/2022   Component Date Value Ref Range Status    WBC 12/27/2022 5.4  3.8 - 10.8 Thousand/uL Final    RBC 12/27/2022 4.28  3.80 - 5.10 Million/uL Final    Hemoglobin 12/27/2022 16.0 (H)  11.7 - 15.5 g/dL Final    Hematocrit 12/27/2022 45.9 (H)  35.0 - 45.0 % Final    MCV 12/27/2022 107.2 (H)  80.0 - 100.0 fL Final    MCH 12/27/2022 37.4 (H)  27.0 - 33.0 pg Final    MCHC 12/27/2022 34.9  32.0 - 36.0 g/dL Final    RDW 12/27/2022 11.6  11.0 - 15.0 % Final    Platelets 12/27/2022 155  140 - 400 Thousand/uL Final    MPV 12/27/2022 9.8  7.5 - 12.5 fL Final    Neutrophils, Abs 12/27/2022 2,808  1,500 - 7,800 cells/uL Final    Lymph # 12/27/2022 1,858  850 - 3,900 cells/uL Final    Mono # 12/27/2022 545  200 - 950 cells/uL Final    Eos # 12/27/2022 130  15 - 500 cells/uL Final    Baso # 12/27/2022 59  0 - 200 cells/uL Final    Neutrophils Relative 12/27/2022 52  % Final    Lymph % 12/27/2022 34.4  % Final    Mono % 12/27/2022 10.1  % Final    Eosinophil % 12/27/2022 2.4  % Final    Basophil % 12/27/2022 1.1  % Final    Glucose 12/27/2022 112 (H)  65 - 99 mg/dL Final    BUN 12/27/2022 15  7 - 25 mg/dL Final    Creatinine 12/27/2022 0.66  0.50 - 1.05 mg/dL Final    eGFR 12/27/2022 97  > OR = 60 mL/min/1.73m2 Final    BUN/Creatinine Ratio 12/27/2022 NOT APPLICABLE  6 - 22 (calc) Final    Sodium 12/27/2022 136  135 - 146 mmol/L Final    Potassium 12/27/2022 4.1  3.5 - 5.3 mmol/L Final    Chloride 12/27/2022 99  98 - 110 mmol/L Final    CO2 12/27/2022 28  20 - 32 mmol/L Final    Calcium 12/27/2022 9.6  8.6 - 10.4 mg/dL Final    Total Protein 12/27/2022 6.9  6.1 - 8.1 g/dL Final    Albumin 12/27/2022 4.3  3.6 - 5.1 g/dL Final    Globulin, Total 12/27/2022 2.6  1.9 - 3.7 g/dL (calc) Final    Albumin/Globulin Ratio 12/27/2022 1.7  1.0 - 2.5 (calc) Final    Total Bilirubin 12/27/2022 0.9  0.2 - 1.2 mg/dL Final    Alkaline  Phosphatase 12/27/2022 61  37 - 153 U/L Final    AST 12/27/2022 52 (H)  10 - 35 U/L Final    ALT 12/27/2022 58 (H)  6 - 29 U/L Final    Iron 12/27/2022 141  45 - 160 mcg/dL Final    TIBC 12/27/2022 347  250 - 450 mcg/dL (calc) Final    Iron Saturation 12/27/2022 41  16 - 45 % (calc) Final    Ferritin 12/27/2022 500 (H)  16 - 288 ng/mL Final   Ancillary Orders on 11/11/2022   Component Date Value Ref Range Status    Glucose 11/19/2022 94  65 - 139 mg/dL Final    BUN 11/19/2022 13  7 - 25 mg/dL Final    Creatinine 11/19/2022 0.67  0.50 - 1.05 mg/dL Final    eGFR 11/19/2022 97  > OR = 60 mL/min/1.73m2 Final    BUN/Creatinine Ratio 11/19/2022 NOT APPLICABLE  6 - 22 (calc) Final    Sodium 11/19/2022 137  135 - 146 mmol/L Final    Potassium 11/19/2022 4.2  3.5 - 5.3 mmol/L Final    Chloride 11/19/2022 101  98 - 110 mmol/L Final    CO2 11/19/2022 26  20 - 32 mmol/L Final    Calcium 11/19/2022 9.5  8.6 - 10.4 mg/dL Final    Total Protein 11/19/2022 6.8  6.1 - 8.1 g/dL Final    Albumin 11/19/2022 4.2  3.6 - 5.1 g/dL Final    Globulin, Total 11/19/2022 2.6  1.9 - 3.7 g/dL (calc) Final    Albumin/Globulin Ratio 11/19/2022 1.6  1.0 - 2.5 (calc) Final    Total Bilirubin 11/19/2022 0.7  0.2 - 1.2 mg/dL Final    Alkaline Phosphatase 11/19/2022 64  37 - 153 U/L Final    AST 11/19/2022 47 (H)  10 - 35 U/L Final    ALT 11/19/2022 52 (H)  6 - 29 U/L Final    WBC 11/19/2022 5.0  3.8 - 10.8 Thousand/uL Final    RBC 11/19/2022 4.35  3.80 - 5.10 Million/uL Final    Hemoglobin 11/19/2022 16.1 (H)  11.7 - 15.5 g/dL Final    Hematocrit 11/19/2022 46.5 (H)  35.0 - 45.0 % Final    MCV 11/19/2022 106.9 (H)  80.0 - 100.0 fL Final    MCH 11/19/2022 37.0 (H)  27.0 - 33.0 pg Final    MCHC 11/19/2022 34.6  32.0 - 36.0 g/dL Final    RDW 11/19/2022 11.8  11.0 - 15.0 % Final    Platelets 11/19/2022 153  140 - 400 Thousand/uL Final    MPV 11/19/2022 10.1  7.5 - 12.5 fL Final    Neutrophils, Abs 11/19/2022 2,180  1,500 - 7,800 cells/uL Final    Lymph #  11/19/2022 2,150  850 - 3,900 cells/uL Final    Mono # 11/19/2022 510  200 - 950 cells/uL Final    Eos # 11/19/2022 100  15 - 500 cells/uL Final    Baso # 11/19/2022 60  0 - 200 cells/uL Final    Neutrophils Relative 11/19/2022 43.6  % Final    Lymph % 11/19/2022 43.0  % Final    Mono % 11/19/2022 10.2  % Final    Eosinophil % 11/19/2022 2.0  % Final    Basophil % 11/19/2022 1.2  % Final    Ferritin 11/19/2022 567 (H)  16 - 288 ng/mL Final    Iron 11/19/2022 187 (H)  45 - 160 mcg/dL Final    TIBC 11/19/2022 352  250 - 450 mcg/dL (calc) Final    Iron Saturation 11/19/2022 53 (H)  16 - 45 % (calc) Final   Office Visit on 10/14/2022   Component Date Value Ref Range Status    Glucose 10/14/2022 105  65 - 139 mg/dL Final    BUN 10/14/2022 18  7 - 25 mg/dL Final    Creatinine 10/14/2022 0.76  0.50 - 1.05 mg/dL Final    eGFR 10/14/2022 87  > OR = 60 mL/min/1.73m2 Final    BUN/Creatinine Ratio 10/14/2022 NOT APPLICABLE  6 - 22 (calc) Final    Sodium 10/14/2022 136  135 - 146 mmol/L Final    Potassium 10/14/2022 3.8  3.5 - 5.3 mmol/L Final    Chloride 10/14/2022 101  98 - 110 mmol/L Final    CO2 10/14/2022 26  20 - 32 mmol/L Final    Calcium 10/14/2022 9.9  8.6 - 10.4 mg/dL Final    Total Protein 10/14/2022 6.9  6.1 - 8.1 g/dL Final    Albumin 10/14/2022 4.3  3.6 - 5.1 g/dL Final    Globulin, Total 10/14/2022 2.6  1.9 - 3.7 g/dL (calc) Final    Albumin/Globulin Ratio 10/14/2022 1.7  1.0 - 2.5 (calc) Final    Total Bilirubin 10/14/2022 0.6  0.2 - 1.2 mg/dL Final    Alkaline Phosphatase 10/14/2022 62  37 - 153 U/L Final    AST 10/14/2022 74 (H)  10 - 35 U/L Final    ALT 10/14/2022 94 (H)  6 - 29 U/L Final    WBC 10/14/2022 8.2  3.8 - 10.8 Thousand/uL Final    RBC 10/14/2022 4.27  3.80 - 5.10 Million/uL Final    Hemoglobin 10/14/2022 15.5  11.7 - 15.5 g/dL Final    Hematocrit 10/14/2022 44.0  35.0 - 45.0 % Final    MCV 10/14/2022 103.0 (H)  80.0 - 100.0 fL Final    MCH 10/14/2022 36.3 (H)  27.0 - 33.0 pg Final    MCHC  10/14/2022 35.2  32.0 - 36.0 g/dL Final    RDW 10/14/2022 11.8  11.0 - 15.0 % Final    Platelets 10/14/2022 162  140 - 400 Thousand/uL Final    MPV 10/14/2022 10.1  7.5 - 12.5 fL Final    Neutrophils, Abs 10/14/2022 4,617  1,500 - 7,800 cells/uL Final    Lymph # 10/14/2022 2,698  850 - 3,900 cells/uL Final    Mono # 10/14/2022 689  200 - 950 cells/uL Final    Eos # 10/14/2022 139  15 - 500 cells/uL Final    Baso # 10/14/2022 57  0 - 200 cells/uL Final    Neutrophils Relative 10/14/2022 56.3  % Final    Lymph % 10/14/2022 32.9  % Final    Mono % 10/14/2022 8.4  % Final    Eosinophil % 10/14/2022 1.7  % Final    Basophil % 10/14/2022 0.7  % Final    Ferritin 10/14/2022 865 (H)  16 - 288 ng/mL Final    Iron 10/14/2022 152  45 - 160 mcg/dL Final    TIBC 10/14/2022 332  250 - 450 mcg/dL (calc) Final    Iron Saturation 10/14/2022 46 (H)  16 - 45 % (calc) Final    Folate 10/14/2022 10.4  ng/mL Final    Methylmalonic Acid 10/14/2022 145  87 - 318 nmol/L Final    Folate (packed RBC's) 10/14/2022 401  >280 ng/mL RBC Final    Retic 10/14/2022 2.1  % Final    Retic Count Abs 10/14/2022 89,670 (H)  20,000 - 80,000 cells/uL Final    LD 10/14/2022 182  120 - 250 U/L Final    Erythropoietin 10/14/2022 10.5  2.6 - 18.5 mIU/mL Final    AFP 10/14/2022 7.5 (H)  ng/mL Final    DNA Mutation Analysis 10/14/2022 See Below   Final    Clinical information 10/14/2022 NG   Final    Specimen Source 10/14/2022 NG   Final    Block/Specimen ID 10/14/2022 NG   Final    JAK2 [id number] Mutation 10/14/2022 NOT DETECTED  NOT DETECTED Final    Interpretation 10/14/2022 SEE NOTE   Final    Assay Details 10/14/2022 SEE NOTE   Final       Past Medical History:   Diagnosis Date    Elevated ferritin 10/13/2022    Elevated MCV 10/13/2022    Hepatitis C     Hypertension     Polycythemia 10/13/2022    SVT (supraventricular tachycardia)      No past surgical history on file.  Family History   Problem Relation Age of Onset    Glaucoma Neg Hx     Macular degeneration  "Neg Hx     Retinal detachment Neg Hx        Marital Status:   Alcohol History:  reports current alcohol use.  Tobacco History:  reports that she has quit smoking. Her smoking use included cigarettes. She has never used smokeless tobacco.  Drug History:  reports no history of drug use.    Review of patient's allergies indicates:   Allergen Reactions    Succinylcholine      **Pseudocholinesterase**       Current Outpatient Medications:     alendronate (FOSAMAX) 70 MG tablet, TK 1 T PO 1 TIME Q WK, Disp: 12 tablet, Rfl: 3    biotin 5,000 mcg TbDL, Take 5,000 mcg by mouth once daily., Disp: , Rfl:     calcium-vitamin D3 (OS- + D3) 500 mg-5 mcg (200 unit) per tablet, Take 1 tablet by mouth 2 (two) times daily with meals., Disp: , Rfl:     cyanocobalamin (VITAMIN B-12) 1000 MCG tablet, Take 100 mcg by mouth once daily., Disp: , Rfl:     finasteride (PROSCAR) 5 mg tablet, Take 1 tablet (5 mg total) by mouth once daily., Disp: 30 tablet, Rfl: 11    fluocinonide (LIDEX) 0.05 % external solution, Use on AA daily, Disp: 60 mL, Rfl: 2    losartan (COZAAR) 100 MG tablet, Take 1 tablet (100 mg total) by mouth once daily., Disp: 30 tablet, Rfl: 2    milk thistle 175 mg tablet, Take 175 mg by mouth once daily., Disp: , Rfl:     omega-3 fatty acids/fish oil (FISH OIL-OMEGA-3 FATTY ACIDS) 300-1,000 mg capsule, Take 1 capsule by mouth once daily., Disp: , Rfl:     vitamin E 400 UNIT capsule, Take 400 Units by mouth once daily., Disp: , Rfl:     zinc gluconate 50 mg tablet, Take 50 mg by mouth once daily., Disp: , Rfl:     hydroCHLOROthiazide (HYDRODIURIL) 12.5 MG Tab, Take 1 tablet (12.5 mg total) by mouth once daily., Disp: 90 tablet, Rfl: 1    metoprolol succinate (TOPROL-XL) 100 MG 24 hr tablet, Take 1 tablet (100 mg total) by mouth once daily., Disp: 90 tablet, Rfl: 1    Review of Systems       Objective:      Vitals:    01/06/23 0921   BP: 128/88   Pulse: 60   Weight: 57.6 kg (127 lb)   Height: 5' 1" (1.549 m) "     Physical Exam      Assessment:       1. Essential hypertension    2. Screening for osteoporosis    3. Menopause    4. 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 we will maintain as is now with present medication.  Orders:  -     hydroCHLOROthiazide (HYDRODIURIL) 12.5 MG Tab; Take 1 tablet (12.5 mg total) by mouth once daily.  Dispense: 90 tablet; Refill: 1  -     metoprolol succinate (TOPROL-XL) 100 MG 24 hr tablet; Take 1 tablet (100 mg total) by mouth once daily.  Dispense: 90 tablet; Refill: 1    Screening for osteoporosis  Comments:  due to DXA scan now. will maintain alendronate. assess for improvement in bone density  Orders:  -     DXA Bone Density Spine And Hip; Future; Expected date: 01/06/2023    Menopause  -     DXA Bone Density Spine And Hip; Future; Expected date: 01/06/2023    Alopecia  Comments:  severe. following with Dr. Aura rose.    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 Baum. recent notes reviewed.       Follow up in about 4 months (around 5/6/2023) for BP Check-Up.        1/6/2023 Hector Colin PA-C

## 2023-01-10 ENCOUNTER — HOSPITAL ENCOUNTER (OUTPATIENT)
Dept: RADIOLOGY | Facility: HOSPITAL | Age: 68
Discharge: HOME | End: 2023-01-10
Attending: PHYSICIAN ASSISTANT
Payer: MEDICARE

## 2023-01-10 DIAGNOSIS — Z78.0 MENOPAUSE: ICD-10-CM

## 2023-01-10 DIAGNOSIS — Z13.820 SCREENING FOR OSTEOPOROSIS: ICD-10-CM

## 2023-01-10 PROCEDURE — 77080 DXA BONE DENSITY AXIAL: CPT | Mod: TC,PO

## 2023-01-31 DIAGNOSIS — I10 ESSENTIAL HYPERTENSION: ICD-10-CM

## 2023-01-31 RX ORDER — LOSARTAN POTASSIUM 100 MG/1
100 TABLET ORAL DAILY
Qty: 30 TABLET | Refills: 2 | Status: SHIPPED | OUTPATIENT
Start: 2023-01-31 | End: 2023-05-09 | Stop reason: SDUPTHER

## 2023-02-27 ENCOUNTER — TELEPHONE (OUTPATIENT)
Dept: HEMATOLOGY/ONCOLOGY | Facility: CLINIC | Age: 68
End: 2023-02-27

## 2023-02-27 NOTE — PROGRESS NOTES
[facility] Hematology/Oncology  PROGRESS NOTE - Follow-up Visit      Subjective:       Patient ID:   NAME: Suma Butterfield : 1955     67 y.o. female    Referring Doc: Hector Colin PA*  Other Physicians: Pavan Leonard; Jun        Chief Complaint: polycythemia/elevated MCV f/u      History of Present Illness:     Patient returns today for a regularly scheduled follow-up visit.  The patient is here today to go over the results of the recently ordered labs, tests and studies.     She is here by herself,     She previously had colonoscopy with Dr Barahona  and had three benign polyps removed.     She is feeling ok; energy levels are fine; no CP, SOB, HA's or N/V; breathing ok    She has been getting phlebotomies monthly      Discussed covid19 and she has been vaccinated      ROS:   GEN: normal without any fever, night sweats or weight loss  HEENT: normal with no HA's, sore throat, stiff neck, changes in vision  CV: normal with no CP, SOB, PND, GAY or orthopnea  PULM: normal with no SOB, cough, hemoptysis, sputum or pleuritic pain  GI: normal with no abdominal pain, nausea, vomiting, constipation, diarrhea, melanotic stools, BRBPR, or hematemesis  : normal with no hematuria, dysuria  BREAST: normal with no mass, discharge, pain  SKIN: normal with no rash, erythema, bruising, or swelling    Pain Scale: 0    Allergies:  Review of patient's allergies indicates:   Allergen Reactions    Succinylcholine      **Pseudocholinesterase**       Medications:    Current Outpatient Medications:     alendronate (FOSAMAX) 70 MG tablet, TK 1 T PO 1 TIME Q WK, Disp: 12 tablet, Rfl: 3    biotin 5,000 mcg TbDL, Take 5,000 mcg by mouth once daily., Disp: , Rfl:     calcium-vitamin D3 (OS- + D3) 500 mg-5 mcg (200 unit) per tablet, Take 1 tablet by mouth 2 (two) times daily with meals., Disp: , Rfl:     cyanocobalamin (VITAMIN B-12) 1000 MCG tablet, Take 100 mcg by mouth once daily., Disp: , Rfl:     finasteride  "(PROSCAR) 5 mg tablet, Take 1 tablet (5 mg total) by mouth once daily., Disp: 30 tablet, Rfl: 11    fluocinonide (LIDEX) 0.05 % external solution, APPLY TOPICALLY TO THE AFFECTED AREA DAILY, Disp: 60 mL, Rfl: 2    hydroCHLOROthiazide (HYDRODIURIL) 12.5 MG Tab, Take 1 tablet (12.5 mg total) by mouth once daily., Disp: 90 tablet, Rfl: 1    losartan (COZAAR) 100 MG tablet, Take 1 tablet (100 mg total) by mouth once daily., Disp: 30 tablet, Rfl: 2    metoprolol succinate (TOPROL-XL) 100 MG 24 hr tablet, Take 1 tablet (100 mg total) by mouth once daily., Disp: 90 tablet, Rfl: 1    milk thistle 175 mg tablet, Take 175 mg by mouth once daily., Disp: , Rfl:     omega-3 fatty acids/fish oil (FISH OIL-OMEGA-3 FATTY ACIDS) 300-1,000 mg capsule, Take 1 capsule by mouth once daily., Disp: , Rfl:     vitamin E 400 UNIT capsule, Take 400 Units by mouth once daily., Disp: , Rfl:     zinc gluconate 50 mg tablet, Take 50 mg by mouth once daily., Disp: , Rfl:     PMHx/PSHx Updates:  See patient's last visit with me on 11/22/2022.  See H&P on 10/14/2022        Pathology:   Cancer Staging   No matching staging information was found for the patient.          Objective:     Vitals:  Blood pressure (!) 143/85, pulse 66, temperature 97.6 °F (36.4 °C), resp. rate 18, height 5' 1" (1.549 m), weight 57.2 kg (126 lb).    Physical Examination:   GEN: no apparent distress, comfortable; AAOx3  HEAD: atraumatic and normocephalic; alopecia  EYES: no pallor, no icterus, PERRLA  ENT: OMM, no pharyngeal erythema, external ears WNL; no nasal discharge; no thrush  NECK: no masses, thyroid normal, trachea midline, no LAD/LN's, supple  CV: RRR with no murmur; normal pulse; normal S1 and S2; no pedal edema  CHEST: Normal respiratory effort; CTAB; normal breath sounds; no wheeze or crackles  ABDOM: nontender and nondistended; soft; normal bowel sounds; no rebound/guarding  MUSC/Skeletal: ROM normal; no crepitus; joints normal; no deformities or " arthropathy  EXTREM: no clubbing, cyanosis, inflammation or swelling  SKIN: no rashes, lesions, ulcers, petechiae or subcutaneous nodules  : no cam  NEURO: grossly intact; motor/sensory WNL; AAOx3; no tremors  PSYCH: normal mood, affect and behavior  LYMPH: normal cervical, supraclavicular, axillary and groin LN's            Labs:     Lab Results   Component Value Date    WBC 5.4 12/27/2022    HGB 16.0 (H) 12/27/2022    HCT 45.9 (H) 12/27/2022    .2 (H) 12/27/2022     12/27/2022       CMP  Sodium   Date Value Ref Range Status   12/27/2022 136 135 - 146 mmol/L Final     Potassium   Date Value Ref Range Status   12/27/2022 4.1 3.5 - 5.3 mmol/L Final     Chloride   Date Value Ref Range Status   12/27/2022 99 98 - 110 mmol/L Final     CO2   Date Value Ref Range Status   12/27/2022 28 20 - 32 mmol/L Final     Glucose   Date Value Ref Range Status   12/27/2022 112 (H) 65 - 99 mg/dL Final     Comment:                   Fasting reference interval     For someone without known diabetes, a glucose value  between 100 and 125 mg/dL is consistent with  prediabetes and should be confirmed with a  follow-up test.          BUN   Date Value Ref Range Status   12/27/2022 15 7 - 25 mg/dL Final     Creatinine   Date Value Ref Range Status   12/27/2022 0.66 0.50 - 1.05 mg/dL Final     Calcium   Date Value Ref Range Status   12/27/2022 9.6 8.6 - 10.4 mg/dL Final     Total Protein   Date Value Ref Range Status   12/27/2022 6.9 6.1 - 8.1 g/dL Final     Albumin   Date Value Ref Range Status   12/27/2022 4.3 3.6 - 5.1 g/dL Final     Total Bilirubin   Date Value Ref Range Status   12/27/2022 0.9 0.2 - 1.2 mg/dL Final     Alkaline Phosphatase   Date Value Ref Range Status   07/18/2020 64 37 - 153 U/L Final     AST   Date Value Ref Range Status   12/27/2022 52 (H) 10 - 35 U/L Final     ALT   Date Value Ref Range Status   12/27/2022 58 (H) 6 - 29 U/L Final     Anion Gap   Date Value Ref Range Status   06/01/2020 11 8 - 16  mmol/L Final     eGFR   Date Value Ref Range Status   12/27/2022 97 > OR = 60 mL/min/1.73m2 Final     Comment:     The eGFR is based on the CKD-EPI 2021 equation. To calculate   the new eGFR from a previous Creatinine or Cystatin C  result, go to https://www.kidney.org/professionals/  kdoqi/[email or web address]       Lab Results   Component Value Date    IRON 141 12/27/2022    TRANSFERRIN 262 05/20/2022    TIBC 347 12/27/2022    LABIRON 41 12/27/2022    FESATURATED 36 05/20/2022      Ferritin 16 - 288 ng/mL 567 High        JAK2 V617F Mutation NOT DETECTED     DNA Mutation Analysis See Below    Comment: RESULT: POSITIVE FOR ONE HFE GENE PATHOGENIC VARIANT: H63D (HETEROZYGOTE)    This patient is negative for the   C282Y pathogenic variant    AFP ng/mL 7.5 High      Erythropoietin 2.6 - 18.5 mIU/mL 10.5       - 250 U/L 182      Retic % 2.1     Folate (packed RBC's) >280 ng/mL       Methylmalonic Acid 87 - 318 nmol/L 145      Folate ng/mL 10.4          Radiology/Diagnostic Studies:    CXR    Result Date: 10/28/2022  CHEST PA AND LATERAL CLINICAL DATA:Polycythemia. Comparison to November 2017. FINDINGS: PA and lateral views demonstrate normal heart size. The mediastinum is unremarkable. There are no infiltrates. There is blunting of the lateral costophrenic angle on the left which could reflect a trace amount of pleural fluid or pleural scarring. Osseous structures are unremarkable. IMPRESSION: 1. Slight blunting of the costophrenic angle on the left which could reflect a trace amount of pleural fluid or pleural scarring. 2. No other significant findings.      US Abdomen Complete    Result Date: 10/28/2022  Abdominal ultrasound complete CLINICAL DATA: Polycythemia FINDINGS: Sonographic assessment of the abdomen is compared to November 2020. The pancreas, aorta, and inferior vena cava are unremarkable. The liver is diffusely echogenic, similar to the prior study, compatible with fatty infiltration. Hepatopedal  flow is noted within the portal vein. The gallbladder and biliary tree are within normal limits. The common bile duct measures 3 mm. The spleen and bilateral kidneys are within normal limits. There is no free fluid. IMPRESSION: 1. Hepatic steatosis. 2. No other significant findings.        I have reviewed all available lab results and radiology reports.    Assessment/Plan:   (1) 67 y.o. female with diagnosis of polycythemia and elevated MCV/ferritin who has been referred by Hector Colin PA* for evaluation by medical hematology/oncology.   - polycythemia since 2020 at least - suspect she has a secondary polycythemia process due to the chronic tobacco history  - former smoker who quit about a year ago  - elevated ferritin on labs in  May 2022 - possible acute phase reactant or due to the underlying liver hx  - order hemochromatosis panel    11/22/2022:  - she has H63D heterozygous HFE gene abnormality  - JAK2 was negative  - set up phlebotomy monthly for now    2/28/2023:  - she has been getting phlebotomies monthly - feeling better overall  - due for repeat labs     (2) HTN and hypercholesterolemia     (3) SVT - followed by Dr Browne     (4) Chronic hep C at age 52 s/p antiviral therapy - followed by Natalya  - AFP was a little elevated at 7.5  - US with hepatic steatosis     (5) Severe Alopecia - followed by Dr Estrella Kaba with derm      VISIT DIAGNOSES:      Polycythemia    Elevated MCV    Elevated ferritin    Chronic hepatitis C without hepatic coma          PLAN:  continue phlebotomies monthly for now, monitor labs monthly  Discussed the genetic implications of the HFE gene abnormality in siblings and children  consider US and AFP every 6 months (due April 2023)  4.. F/u with PCP, Derm and GI  RTC in  3 months  Fax note to Hector Colin PA*; Estrella Kaba, Jun    Discussion:     COVID-19 Discussion:    I had long discussion with patient and any applicable family about the COVID-19  coronavirus epidemic and the recommended precautions with regard to cancer and/or hematology patients. I have re-iterated the CDC recommendations for adequate hand washing, use of hand -like products, and coughing into elbow, etc. In addition, especially for our patients who are on chemotherapy and/or our otherwise immunocompromised patients, I have recommended avoidance of crowds, including movie theaters, restaurants, churches, etc. I have recommended avoidance of any sick or symptomatic family members and/or friends. I have also recommended avoidance of any raw and unwashed food products, and general avoidance of food items that have not been prepared by themselves. The patient has been asked to call us immediately with any symptom developments, issues, questions or other general concerns.     I spent over 25 mins of time with the patient. Reviewed results of the recently ordered labs, tests and studies; made directives with regards to the results. Over half of this time was spent couseling and coordinating care.    I have explained all of the above in detail and the patient understands all of the current recommendation(s). I have answered all of their questions to the best of my ability and to their complete satisfaction.   The patient is to continue with the current management plan.            Electronically signed by Adan Baum MD

## 2023-02-28 ENCOUNTER — OFFICE VISIT (OUTPATIENT)
Dept: HEMATOLOGY/ONCOLOGY | Facility: CLINIC | Age: 68
End: 2023-02-28
Payer: MEDICARE

## 2023-02-28 ENCOUNTER — OFFICE VISIT (OUTPATIENT)
Dept: DERMATOLOGY | Facility: CLINIC | Age: 68
End: 2023-02-28
Payer: MEDICARE

## 2023-02-28 VITALS
SYSTOLIC BLOOD PRESSURE: 143 MMHG | RESPIRATION RATE: 18 BRPM | HEART RATE: 66 BPM | HEIGHT: 61 IN | WEIGHT: 126 LBS | BODY MASS INDEX: 23.79 KG/M2 | DIASTOLIC BLOOD PRESSURE: 85 MMHG | TEMPERATURE: 98 F

## 2023-02-28 DIAGNOSIS — D75.1 POLYCYTHEMIA: Primary | ICD-10-CM

## 2023-02-28 DIAGNOSIS — R79.89 ELEVATED FERRITIN: ICD-10-CM

## 2023-02-28 DIAGNOSIS — L64.9 ANDROGENIC ALOPECIA: ICD-10-CM

## 2023-02-28 DIAGNOSIS — L66.9 CICATRICIAL ALOPECIA: Primary | ICD-10-CM

## 2023-02-28 DIAGNOSIS — B18.2 CHRONIC HEPATITIS C WITHOUT HEPATIC COMA: ICD-10-CM

## 2023-02-28 DIAGNOSIS — R71.8 ELEVATED MCV: ICD-10-CM

## 2023-02-28 PROCEDURE — 4010F ACE/ARB THERAPY RXD/TAKEN: CPT | Mod: CPTII,S$GLB,, | Performed by: INTERNAL MEDICINE

## 2023-02-28 PROCEDURE — 3079F PR MOST RECENT DIASTOLIC BLOOD PRESSURE 80-89 MM HG: ICD-10-PCS | Mod: CPTII,S$GLB,, | Performed by: INTERNAL MEDICINE

## 2023-02-28 PROCEDURE — 99999 PR PBB SHADOW E&M-EST. PATIENT-LVL II: ICD-10-PCS | Mod: PBBFAC,,, | Performed by: STUDENT IN AN ORGANIZED HEALTH CARE EDUCATION/TRAINING PROGRAM

## 2023-02-28 PROCEDURE — 3077F PR MOST RECENT SYSTOLIC BLOOD PRESSURE >= 140 MM HG: ICD-10-PCS | Mod: CPTII,S$GLB,, | Performed by: INTERNAL MEDICINE

## 2023-02-28 PROCEDURE — 1101F PT FALLS ASSESS-DOCD LE1/YR: CPT | Mod: CPTII,S$GLB,, | Performed by: STUDENT IN AN ORGANIZED HEALTH CARE EDUCATION/TRAINING PROGRAM

## 2023-02-28 PROCEDURE — 99213 OFFICE O/P EST LOW 20 MIN: CPT | Mod: 25,S$GLB,, | Performed by: STUDENT IN AN ORGANIZED HEALTH CARE EDUCATION/TRAINING PROGRAM

## 2023-02-28 PROCEDURE — 11900 PR INJECTION INTO SKIN LESIONS, UP TO 7: ICD-10-PCS | Mod: S$GLB,,, | Performed by: STUDENT IN AN ORGANIZED HEALTH CARE EDUCATION/TRAINING PROGRAM

## 2023-02-28 PROCEDURE — 1126F PR PAIN SEVERITY QUANTIFIED, NO PAIN PRESENT: ICD-10-PCS | Mod: CPTII,S$GLB,, | Performed by: INTERNAL MEDICINE

## 2023-02-28 PROCEDURE — 3288F PR FALLS RISK ASSESSMENT DOCUMENTED: ICD-10-PCS | Mod: CPTII,S$GLB,, | Performed by: INTERNAL MEDICINE

## 2023-02-28 PROCEDURE — 3008F BODY MASS INDEX DOCD: CPT | Mod: CPTII,S$GLB,, | Performed by: INTERNAL MEDICINE

## 2023-02-28 PROCEDURE — 4010F PR ACE/ARB THEARPY RXD/TAKEN: ICD-10-PCS | Mod: CPTII,S$GLB,, | Performed by: INTERNAL MEDICINE

## 2023-02-28 PROCEDURE — 1159F PR MEDICATION LIST DOCUMENTED IN MEDICAL RECORD: ICD-10-PCS | Mod: CPTII,S$GLB,, | Performed by: INTERNAL MEDICINE

## 2023-02-28 PROCEDURE — 99213 PR OFFICE/OUTPT VISIT, EST, LEVL III, 20-29 MIN: ICD-10-PCS | Mod: 25,S$GLB,, | Performed by: STUDENT IN AN ORGANIZED HEALTH CARE EDUCATION/TRAINING PROGRAM

## 2023-02-28 PROCEDURE — 1101F PR PT FALLS ASSESS DOC 0-1 FALLS W/OUT INJ PAST YR: ICD-10-PCS | Mod: CPTII,S$GLB,, | Performed by: INTERNAL MEDICINE

## 2023-02-28 PROCEDURE — 3288F FALL RISK ASSESSMENT DOCD: CPT | Mod: CPTII,S$GLB,, | Performed by: STUDENT IN AN ORGANIZED HEALTH CARE EDUCATION/TRAINING PROGRAM

## 2023-02-28 PROCEDURE — 1159F MED LIST DOCD IN RCRD: CPT | Mod: CPTII,S$GLB,, | Performed by: INTERNAL MEDICINE

## 2023-02-28 PROCEDURE — 99213 PR OFFICE/OUTPT VISIT, EST, LEVL III, 20-29 MIN: ICD-10-PCS | Mod: S$GLB,,, | Performed by: INTERNAL MEDICINE

## 2023-02-28 PROCEDURE — 1126F AMNT PAIN NOTED NONE PRSNT: CPT | Mod: CPTII,S$GLB,, | Performed by: STUDENT IN AN ORGANIZED HEALTH CARE EDUCATION/TRAINING PROGRAM

## 2023-02-28 PROCEDURE — 1159F PR MEDICATION LIST DOCUMENTED IN MEDICAL RECORD: ICD-10-PCS | Mod: CPTII,S$GLB,, | Performed by: STUDENT IN AN ORGANIZED HEALTH CARE EDUCATION/TRAINING PROGRAM

## 2023-02-28 PROCEDURE — 3079F DIAST BP 80-89 MM HG: CPT | Mod: CPTII,S$GLB,, | Performed by: INTERNAL MEDICINE

## 2023-02-28 PROCEDURE — 4010F ACE/ARB THERAPY RXD/TAKEN: CPT | Mod: CPTII,S$GLB,, | Performed by: STUDENT IN AN ORGANIZED HEALTH CARE EDUCATION/TRAINING PROGRAM

## 2023-02-28 PROCEDURE — 3288F FALL RISK ASSESSMENT DOCD: CPT | Mod: CPTII,S$GLB,, | Performed by: INTERNAL MEDICINE

## 2023-02-28 PROCEDURE — 1126F PR PAIN SEVERITY QUANTIFIED, NO PAIN PRESENT: ICD-10-PCS | Mod: CPTII,S$GLB,, | Performed by: STUDENT IN AN ORGANIZED HEALTH CARE EDUCATION/TRAINING PROGRAM

## 2023-02-28 PROCEDURE — 3008F PR BODY MASS INDEX (BMI) DOCUMENTED: ICD-10-PCS | Mod: CPTII,S$GLB,, | Performed by: INTERNAL MEDICINE

## 2023-02-28 PROCEDURE — 3077F SYST BP >= 140 MM HG: CPT | Mod: CPTII,S$GLB,, | Performed by: INTERNAL MEDICINE

## 2023-02-28 PROCEDURE — 1101F PR PT FALLS ASSESS DOC 0-1 FALLS W/OUT INJ PAST YR: ICD-10-PCS | Mod: CPTII,S$GLB,, | Performed by: STUDENT IN AN ORGANIZED HEALTH CARE EDUCATION/TRAINING PROGRAM

## 2023-02-28 PROCEDURE — 1126F AMNT PAIN NOTED NONE PRSNT: CPT | Mod: CPTII,S$GLB,, | Performed by: INTERNAL MEDICINE

## 2023-02-28 PROCEDURE — 4010F PR ACE/ARB THEARPY RXD/TAKEN: ICD-10-PCS | Mod: CPTII,S$GLB,, | Performed by: STUDENT IN AN ORGANIZED HEALTH CARE EDUCATION/TRAINING PROGRAM

## 2023-02-28 PROCEDURE — 1160F PR REVIEW ALL MEDS BY PRESCRIBER/CLIN PHARMACIST DOCUMENTED: ICD-10-PCS | Mod: CPTII,S$GLB,, | Performed by: STUDENT IN AN ORGANIZED HEALTH CARE EDUCATION/TRAINING PROGRAM

## 2023-02-28 PROCEDURE — 99213 OFFICE O/P EST LOW 20 MIN: CPT | Mod: S$GLB,,, | Performed by: INTERNAL MEDICINE

## 2023-02-28 PROCEDURE — 3288F PR FALLS RISK ASSESSMENT DOCUMENTED: ICD-10-PCS | Mod: CPTII,S$GLB,, | Performed by: STUDENT IN AN ORGANIZED HEALTH CARE EDUCATION/TRAINING PROGRAM

## 2023-02-28 PROCEDURE — 1160F PR REVIEW ALL MEDS BY PRESCRIBER/CLIN PHARMACIST DOCUMENTED: ICD-10-PCS | Mod: CPTII,S$GLB,, | Performed by: INTERNAL MEDICINE

## 2023-02-28 PROCEDURE — 11900 INJECT SKIN LESIONS </W 7: CPT | Mod: S$GLB,,, | Performed by: STUDENT IN AN ORGANIZED HEALTH CARE EDUCATION/TRAINING PROGRAM

## 2023-02-28 PROCEDURE — 99999 PR PBB SHADOW E&M-EST. PATIENT-LVL II: CPT | Mod: PBBFAC,,, | Performed by: STUDENT IN AN ORGANIZED HEALTH CARE EDUCATION/TRAINING PROGRAM

## 2023-02-28 PROCEDURE — 1160F RVW MEDS BY RX/DR IN RCRD: CPT | Mod: CPTII,S$GLB,, | Performed by: INTERNAL MEDICINE

## 2023-02-28 PROCEDURE — 1159F MED LIST DOCD IN RCRD: CPT | Mod: CPTII,S$GLB,, | Performed by: STUDENT IN AN ORGANIZED HEALTH CARE EDUCATION/TRAINING PROGRAM

## 2023-02-28 PROCEDURE — 1160F RVW MEDS BY RX/DR IN RCRD: CPT | Mod: CPTII,S$GLB,, | Performed by: STUDENT IN AN ORGANIZED HEALTH CARE EDUCATION/TRAINING PROGRAM

## 2023-02-28 PROCEDURE — 1101F PT FALLS ASSESS-DOCD LE1/YR: CPT | Mod: CPTII,S$GLB,, | Performed by: INTERNAL MEDICINE

## 2023-02-28 RX ORDER — FLUOCINONIDE TOPICAL SOLUTION USP, 0.05% 0.5 MG/ML
SOLUTION TOPICAL
Qty: 60 ML | Refills: 2 | Status: SHIPPED | OUTPATIENT
Start: 2023-02-28 | End: 2023-05-09 | Stop reason: SDUPTHER

## 2023-02-28 NOTE — PROGRESS NOTES
Subjective:       Patient ID:  Suma Butterfield is a 67 y.o. female who presents for   Chief Complaint   Patient presents with    Follow-up     Hair loss     LOV 11/15/22    Patient here today for f/u on hair loss.  Patient states she has noticed new growth.   Taking finasteride 5mg daily and using Lidex solution nightly.  Using rogaine 5% daily.         Final Pathologic Diagnosis     Skin, scalp, biopsies:   -Cicatricial Alopecia, see comment   Comment: Given the clinical differential central centrifugal cicatricial   alopecia seems most appropriate in this context there are findings of   minaturized hairs present which represent a coexisting androgenic alopecia.   The findings of fibrosis of follicle tracts are nonspecfic, this could   represent a burnt out inflammatory hair process   Comment: Interp By Laura Armenta M.D., Signed on 05/31/2022 at 16:52     No hx of cancer, post-menopausal     transaminitis- Has hx of treated hep C s/p treatment, drinks alcohol daily- following with outside GI  Also following with Dr. Baum for elevated ferritin, RBC count  Had liver ultrasound which should hepatic steatosis      Has SVT and uncontrolled htn- but working w/ her PCP to get it under better control      Review of Systems   Constitutional:  Negative for fever, chills and fatigue.   Respiratory:  Negative for cough and shortness of breath.    Gastrointestinal:  Negative for nausea and vomiting.      Objective:    Physical Exam   Constitutional: She appears well-developed and well-nourished.   Neurological: She is alert and oriented to person, place, and time.   Psychiatric: She has a normal mood and affect.        Diagram Legend     Erythematous scaling macule/papule c/w actinic keratosis       Vascular papule c/w angioma      Pigmented verrucoid papule/plaque c/w seborrheic keratosis      Yellow umbilicated papule c/w sebaceous hyperplasia      Irregularly shaped tan macule c/w lentigo     1-2 mm smooth white  papules consistent with Milia      Movable subcutaneous cyst with punctum c/w epidermal inclusion cyst      Subcutaneous movable cyst c/w pilar cyst      Firm pink to brown papule c/w dermatofibroma      Pedunculated fleshy papule(s) c/w skin tag(s)      Evenly pigmented macule c/w junctional nevus     Mildly variegated pigmented, slightly irregular-bordered macule c/w mildly atypical nevus      Flesh colored to evenly pigmented papule c/w intradermal nevus       Pink pearly papule/plaque c/w basal cell carcinoma      Erythematous hyperkeratotic cursted plaque c/w SCC      Surgical scar with no sign of skin cancer recurrence      Open and closed comedones      Inflammatory papules and pustules      Verrucoid papule consistent consistent with wart     Erythematous eczematous patches and plaques     Dystrophic onycholytic nail with subungual debris c/w onychomycosis     Umbilicated papule    Erythematous-base heme-crusted tan verrucoid plaque consistent with inflamed seborrheic keratosis     Erythematous Silvery Scaling Plaque c/w Psoriasis     See annotation            today            Assessment / Plan:        Cicatricial alopecia  Androgenic alopecia  -     fluocinonide (LIDEX) 0.05 % external solution; APPLY TOPICALLY TO THE AFFECTED AREA DAILY  Dispense: 60 mL; Refill: 2  - continue finasteride 5mg daily   - continue rogaine 5% BID  - discussed low dose minoxidil and spironolactone, however w/ hx of HTN on 3 anti-hypertensives and also w/ hx of SVT will not add either right now  -Finasteride is not approved by the FDA for use in women, however multiple studies have shown benefit when used to treat female pattern hair loss in combination with topical minoxidil. Side effects are infrequent, usually mild, and reversible even with maintenance of treatment. They include decreased libido, breast tenderness, and headache. In premenopausal women, irregular menstruation and spotting may occur. Pregnancy must be avoided  while on this medication (risk of feminization of the male fetus)  Intralesional Kenalog 3.3 mg/cc (6 cc total) injected into 7 lesions on the scalp today after obtaining verbal consent including risk of surrounding hypopigmentation. Patient tolerated procedure well.    Units: 2  NDC for Kenalog 10mg/cc:  4148-3247-41           8 weeks  No follow-ups on file.

## 2023-03-05 DIAGNOSIS — I10 ESSENTIAL HYPERTENSION: ICD-10-CM

## 2023-03-05 RX ORDER — METOPROLOL SUCCINATE 100 MG/1
100 TABLET, EXTENDED RELEASE ORAL DAILY
Qty: 90 TABLET | Refills: 1 | Status: CANCELLED | OUTPATIENT
Start: 2023-03-05 | End: 2024-03-04

## 2023-03-08 ENCOUNTER — PATIENT MESSAGE (OUTPATIENT)
Dept: HEMATOLOGY/ONCOLOGY | Facility: CLINIC | Age: 68
End: 2023-03-08

## 2023-03-08 ENCOUNTER — TELEPHONE (OUTPATIENT)
Dept: HEMATOLOGY/ONCOLOGY | Facility: CLINIC | Age: 68
End: 2023-03-08

## 2023-03-08 ENCOUNTER — TELEPHONE (OUTPATIENT)
Dept: FAMILY MEDICINE | Facility: CLINIC | Age: 68
End: 2023-03-08

## 2023-03-08 ENCOUNTER — CLINICAL SUPPORT (OUTPATIENT)
Dept: CARDIOLOGY | Facility: HOSPITAL | Age: 68
DRG: 281 | End: 2023-03-08
Attending: HOSPITALIST
Payer: MEDICARE

## 2023-03-08 ENCOUNTER — HOSPITAL ENCOUNTER (INPATIENT)
Facility: HOSPITAL | Age: 68
LOS: 3 days | Discharge: HOME | DRG: 281 | End: 2023-03-11
Attending: EMERGENCY MEDICINE | Admitting: HOSPITALIST
Payer: MEDICARE

## 2023-03-08 VITALS — HEIGHT: 61 IN | BODY MASS INDEX: 23.22 KG/M2 | WEIGHT: 123 LBS

## 2023-03-08 DIAGNOSIS — R07.9 CHEST PAIN: ICD-10-CM

## 2023-03-08 DIAGNOSIS — I21.4 NSTEMI (NON-ST ELEVATED MYOCARDIAL INFARCTION): Primary | ICD-10-CM

## 2023-03-08 LAB
ALBUMIN SERPL BCP-MCNC: 4.9 G/DL (ref 3.5–5.2)
ALP SERPL-CCNC: 60 U/L (ref 55–135)
ALT SERPL W/O P-5'-P-CCNC: 96 U/L (ref 10–44)
ANION GAP SERPL CALC-SCNC: 11 MMOL/L (ref 8–16)
APTT BLDCRRT: 24.9 SEC (ref 21–32)
APTT BLDCRRT: 62.8 SEC (ref 21–32)
APTT BLDCRRT: 62.8 SEC (ref 21–32)
APTT BLDCRRT: >150 SEC (ref 21–32)
AST SERPL-CCNC: 112 U/L (ref 10–40)
BASOPHILS # BLD AUTO: 0.07 K/UL (ref 0–0.2)
BASOPHILS NFR BLD: 1 % (ref 0–1.9)
BILIRUB SERPL-MCNC: 1 MG/DL (ref 0.1–1)
BNP SERPL-MCNC: 218 PG/ML (ref 0–99)
BUN SERPL-MCNC: 10 MG/DL (ref 8–23)
CALCIUM SERPL-MCNC: 10.3 MG/DL (ref 8.7–10.5)
CHLORIDE SERPL-SCNC: 97 MMOL/L (ref 95–110)
CO2 SERPL-SCNC: 25 MMOL/L (ref 23–29)
CREAT SERPL-MCNC: 0.6 MG/DL (ref 0.5–1.4)
DIFFERENTIAL METHOD: ABNORMAL
EOSINOPHIL # BLD AUTO: 0.1 K/UL (ref 0–0.5)
EOSINOPHIL NFR BLD: 1.4 % (ref 0–8)
ERYTHROCYTE [DISTWIDTH] IN BLOOD BY AUTOMATED COUNT: 11.3 % (ref 11.5–14.5)
EST. GFR  (NO RACE VARIABLE): >60 ML/MIN/1.73 M^2
GLUCOSE SERPL-MCNC: 115 MG/DL (ref 70–110)
HCT VFR BLD AUTO: 42.9 % (ref 37–48.5)
HGB BLD-MCNC: 15.7 G/DL (ref 12–16)
IMM GRANULOCYTES # BLD AUTO: 0.03 K/UL (ref 0–0.04)
IMM GRANULOCYTES NFR BLD AUTO: 0.4 % (ref 0–0.5)
INR PPP: 1 (ref 0.8–1.2)
LYMPHOCYTES # BLD AUTO: 1.9 K/UL (ref 1–4.8)
LYMPHOCYTES NFR BLD: 26.1 % (ref 18–48)
MCH RBC QN AUTO: 37.4 PG (ref 27–31)
MCHC RBC AUTO-ENTMCNC: 36.6 G/DL (ref 32–36)
MCV RBC AUTO: 102 FL (ref 82–98)
MONOCYTES # BLD AUTO: 0.8 K/UL (ref 0.3–1)
MONOCYTES NFR BLD: 10.3 % (ref 4–15)
NEUTROPHILS # BLD AUTO: 4.4 K/UL (ref 1.8–7.7)
NEUTROPHILS NFR BLD: 60.8 % (ref 38–73)
NRBC BLD-RTO: 0 /100 WBC
PLATELET # BLD AUTO: 189 K/UL (ref 150–450)
PMV BLD AUTO: 9.2 FL (ref 9.2–12.9)
POTASSIUM SERPL-SCNC: 3.5 MMOL/L (ref 3.5–5.1)
PROT SERPL-MCNC: 8.1 G/DL (ref 6–8.4)
PROTHROMBIN TIME: 10.5 SEC (ref 9–12.5)
RBC # BLD AUTO: 4.2 M/UL (ref 4–5.4)
SODIUM SERPL-SCNC: 133 MMOL/L (ref 136–145)
TROPONIN I SERPL HS-MCNC: 2374.8 PG/ML (ref 0–14.9)
TROPONIN I SERPL HS-MCNC: 3316.5 PG/ML (ref 0–14.9)
TROPONIN I SERPL HS-MCNC: 4785.9 PG/ML (ref 0–14.9)
WBC # BLD AUTO: 7.28 K/UL (ref 3.9–12.7)

## 2023-03-08 PROCEDURE — 93306 ECHO (CUPID ONLY): ICD-10-PCS | Mod: 26,,, | Performed by: INTERNAL MEDICINE

## 2023-03-08 PROCEDURE — 93010 ELECTROCARDIOGRAM REPORT: CPT | Mod: ,,, | Performed by: INTERNAL MEDICINE

## 2023-03-08 PROCEDURE — 99285 EMERGENCY DEPT VISIT HI MDM: CPT | Mod: 25

## 2023-03-08 PROCEDURE — 25000003 PHARM REV CODE 250: Performed by: HOSPITALIST

## 2023-03-08 PROCEDURE — 93306 TTE W/DOPPLER COMPLETE: CPT | Mod: 26,,, | Performed by: INTERNAL MEDICINE

## 2023-03-08 PROCEDURE — 85025 COMPLETE CBC W/AUTO DIFF WBC: CPT | Performed by: PHYSICIAN ASSISTANT

## 2023-03-08 PROCEDURE — 99223 PR INITIAL HOSPITAL CARE,LEVL III: ICD-10-PCS | Mod: ,,, | Performed by: INTERNAL MEDICINE

## 2023-03-08 PROCEDURE — 25000003 PHARM REV CODE 250: Performed by: PHYSICIAN ASSISTANT

## 2023-03-08 PROCEDURE — 63600175 PHARM REV CODE 636 W HCPCS: Performed by: HOSPITALIST

## 2023-03-08 PROCEDURE — 83880 ASSAY OF NATRIURETIC PEPTIDE: CPT | Performed by: PHYSICIAN ASSISTANT

## 2023-03-08 PROCEDURE — 93005 ELECTROCARDIOGRAM TRACING: CPT | Performed by: INTERNAL MEDICINE

## 2023-03-08 PROCEDURE — 94761 N-INVAS EAR/PLS OXIMETRY MLT: CPT

## 2023-03-08 PROCEDURE — 94799 UNLISTED PULMONARY SVC/PX: CPT

## 2023-03-08 PROCEDURE — 99223 1ST HOSP IP/OBS HIGH 75: CPT | Mod: ,,, | Performed by: INTERNAL MEDICINE

## 2023-03-08 PROCEDURE — 84484 ASSAY OF TROPONIN QUANT: CPT | Mod: 91 | Performed by: PHYSICIAN ASSISTANT

## 2023-03-08 PROCEDURE — 93010 EKG 12-LEAD: ICD-10-PCS | Mod: ,,, | Performed by: INTERNAL MEDICINE

## 2023-03-08 PROCEDURE — 21000000 HC CCU ICU ROOM CHARGE

## 2023-03-08 PROCEDURE — 99900035 HC TECH TIME PER 15 MIN (STAT)

## 2023-03-08 PROCEDURE — 80053 COMPREHEN METABOLIC PANEL: CPT | Performed by: PHYSICIAN ASSISTANT

## 2023-03-08 PROCEDURE — 36415 COLL VENOUS BLD VENIPUNCTURE: CPT | Performed by: HOSPITALIST

## 2023-03-08 PROCEDURE — 84484 ASSAY OF TROPONIN QUANT: CPT | Performed by: HOSPITALIST

## 2023-03-08 PROCEDURE — 85610 PROTHROMBIN TIME: CPT | Performed by: PHYSICIAN ASSISTANT

## 2023-03-08 PROCEDURE — 85730 THROMBOPLASTIN TIME PARTIAL: CPT | Mod: 91 | Performed by: HOSPITALIST

## 2023-03-08 PROCEDURE — 93306 TTE W/DOPPLER COMPLETE: CPT

## 2023-03-08 RX ORDER — ACETAMINOPHEN 325 MG/1
650 TABLET ORAL EVERY 8 HOURS PRN
Status: DISCONTINUED | OUTPATIENT
Start: 2023-03-08 | End: 2023-03-11 | Stop reason: HOSPADM

## 2023-03-08 RX ORDER — LANOLIN ALCOHOL/MO/W.PET/CERES
800 CREAM (GRAM) TOPICAL
Status: DISCONTINUED | OUTPATIENT
Start: 2023-03-08 | End: 2023-03-11 | Stop reason: HOSPADM

## 2023-03-08 RX ORDER — POLYETHYLENE GLYCOL 3350 17 G/17G
17 POWDER, FOR SOLUTION ORAL 2 TIMES DAILY
Status: DISCONTINUED | OUTPATIENT
Start: 2023-03-08 | End: 2023-03-11 | Stop reason: HOSPADM

## 2023-03-08 RX ORDER — SODIUM,POTASSIUM PHOSPHATES 280-250MG
2 POWDER IN PACKET (EA) ORAL
Status: DISCONTINUED | OUTPATIENT
Start: 2023-03-08 | End: 2023-03-11 | Stop reason: HOSPADM

## 2023-03-08 RX ORDER — SODIUM CHLORIDE 0.9 % (FLUSH) 0.9 %
10 SYRINGE (ML) INJECTION EVERY 12 HOURS PRN
Status: DISCONTINUED | OUTPATIENT
Start: 2023-03-08 | End: 2023-03-11 | Stop reason: HOSPADM

## 2023-03-08 RX ORDER — SIMETHICONE 80 MG
1 TABLET,CHEWABLE ORAL 4 TIMES DAILY PRN
Status: DISCONTINUED | OUTPATIENT
Start: 2023-03-08 | End: 2023-03-11 | Stop reason: HOSPADM

## 2023-03-08 RX ORDER — ATORVASTATIN CALCIUM 40 MG/1
40 TABLET, FILM COATED ORAL NIGHTLY
Status: DISCONTINUED | OUTPATIENT
Start: 2023-03-08 | End: 2023-03-11 | Stop reason: HOSPADM

## 2023-03-08 RX ORDER — ACETAMINOPHEN 325 MG/1
650 TABLET ORAL EVERY 4 HOURS PRN
Status: DISCONTINUED | OUTPATIENT
Start: 2023-03-08 | End: 2023-03-11 | Stop reason: HOSPADM

## 2023-03-08 RX ORDER — SODIUM CHLORIDE 0.9 % (FLUSH) 0.9 %
2 SYRINGE (ML) INJECTION
Status: DISCONTINUED | OUTPATIENT
Start: 2023-03-08 | End: 2023-03-11 | Stop reason: HOSPADM

## 2023-03-08 RX ORDER — HEPARIN SODIUM,PORCINE/D5W 25000/250
0-40 INTRAVENOUS SOLUTION INTRAVENOUS CONTINUOUS
Status: DISCONTINUED | OUTPATIENT
Start: 2023-03-08 | End: 2023-03-10

## 2023-03-08 RX ORDER — HEPARIN SODIUM 5000 [USP'U]/ML
60 INJECTION, SOLUTION INTRAVENOUS; SUBCUTANEOUS
Status: COMPLETED | OUTPATIENT
Start: 2023-03-08 | End: 2023-03-08

## 2023-03-08 RX ORDER — FAMOTIDINE 20 MG/1
20 TABLET, FILM COATED ORAL 2 TIMES DAILY
Status: DISCONTINUED | OUTPATIENT
Start: 2023-03-08 | End: 2023-03-11 | Stop reason: HOSPADM

## 2023-03-08 RX ORDER — ASPIRIN 81 MG/1
81 TABLET ORAL DAILY
Status: DISCONTINUED | OUTPATIENT
Start: 2023-03-09 | End: 2023-03-11 | Stop reason: HOSPADM

## 2023-03-08 RX ORDER — HEPARIN SODIUM 10000 [USP'U]/100ML
12 INJECTION, SOLUTION INTRAVENOUS CONTINUOUS
Status: DISCONTINUED | OUTPATIENT
Start: 2023-03-08 | End: 2023-03-08

## 2023-03-08 RX ORDER — METOPROLOL SUCCINATE 25 MG/1
100 TABLET, EXTENDED RELEASE ORAL
Status: DISCONTINUED | OUTPATIENT
Start: 2023-03-08 | End: 2023-03-08

## 2023-03-08 RX ORDER — MORPHINE SULFATE 4 MG/ML
4 INJECTION, SOLUTION INTRAMUSCULAR; INTRAVENOUS EVERY 6 HOURS PRN
Status: DISCONTINUED | OUTPATIENT
Start: 2023-03-08 | End: 2023-03-11 | Stop reason: HOSPADM

## 2023-03-08 RX ORDER — HYDROCHLOROTHIAZIDE 12.5 MG/1
12.5 TABLET ORAL DAILY
Status: DISCONTINUED | OUTPATIENT
Start: 2023-03-08 | End: 2023-03-08

## 2023-03-08 RX ORDER — TALC
6 POWDER (GRAM) TOPICAL NIGHTLY PRN
Status: DISCONTINUED | OUTPATIENT
Start: 2023-03-08 | End: 2023-03-11 | Stop reason: HOSPADM

## 2023-03-08 RX ORDER — ONDANSETRON 2 MG/ML
4 INJECTION INTRAMUSCULAR; INTRAVENOUS EVERY 8 HOURS PRN
Status: DISCONTINUED | OUTPATIENT
Start: 2023-03-08 | End: 2023-03-11 | Stop reason: HOSPADM

## 2023-03-08 RX ORDER — IBUPROFEN 200 MG
16 TABLET ORAL
Status: DISCONTINUED | OUTPATIENT
Start: 2023-03-08 | End: 2023-03-11 | Stop reason: HOSPADM

## 2023-03-08 RX ORDER — IBUPROFEN 200 MG
24 TABLET ORAL
Status: DISCONTINUED | OUTPATIENT
Start: 2023-03-08 | End: 2023-03-11 | Stop reason: HOSPADM

## 2023-03-08 RX ORDER — LOSARTAN POTASSIUM 25 MG/1
100 TABLET ORAL
Status: DISCONTINUED | OUTPATIENT
Start: 2023-03-08 | End: 2023-03-08

## 2023-03-08 RX ORDER — HYDRALAZINE HYDROCHLORIDE 20 MG/ML
10 INJECTION INTRAMUSCULAR; INTRAVENOUS EVERY 6 HOURS PRN
Status: DISCONTINUED | OUTPATIENT
Start: 2023-03-08 | End: 2023-03-11 | Stop reason: HOSPADM

## 2023-03-08 RX ORDER — NALOXONE HCL 0.4 MG/ML
0.02 VIAL (ML) INJECTION
Status: DISCONTINUED | OUTPATIENT
Start: 2023-03-08 | End: 2023-03-11 | Stop reason: HOSPADM

## 2023-03-08 RX ORDER — NITROGLYCERIN 0.4 MG/1
0.4 TABLET SUBLINGUAL
Status: DISPENSED | OUTPATIENT
Start: 2023-03-08 | End: 2023-03-08

## 2023-03-08 RX ORDER — CLOPIDOGREL BISULFATE 75 MG/1
300 TABLET ORAL ONCE
Status: COMPLETED | OUTPATIENT
Start: 2023-03-08 | End: 2023-03-08

## 2023-03-08 RX ORDER — ASPIRIN 325 MG
325 TABLET ORAL
Status: COMPLETED | OUTPATIENT
Start: 2023-03-08 | End: 2023-03-08

## 2023-03-08 RX ORDER — NITROGLYCERIN 20 MG/100ML
10 INJECTION INTRAVENOUS CONTINUOUS
Status: DISCONTINUED | OUTPATIENT
Start: 2023-03-08 | End: 2023-03-10

## 2023-03-08 RX ORDER — LOSARTAN POTASSIUM 50 MG/1
100 TABLET ORAL DAILY
Status: DISCONTINUED | OUTPATIENT
Start: 2023-03-08 | End: 2023-03-11 | Stop reason: HOSPADM

## 2023-03-08 RX ORDER — HYDROCODONE BITARTRATE AND ACETAMINOPHEN 5; 325 MG/1; MG/1
1 TABLET ORAL EVERY 6 HOURS PRN
Status: DISCONTINUED | OUTPATIENT
Start: 2023-03-08 | End: 2023-03-11 | Stop reason: HOSPADM

## 2023-03-08 RX ORDER — GLUCAGON 1 MG
1 KIT INJECTION
Status: DISCONTINUED | OUTPATIENT
Start: 2023-03-08 | End: 2023-03-11 | Stop reason: HOSPADM

## 2023-03-08 RX ADMIN — CLOPIDOGREL BISULFATE 300 MG: 75 TABLET, FILM COATED ORAL at 01:03

## 2023-03-08 RX ADMIN — HEPARIN SODIUM AND DEXTROSE 12 UNITS/KG/HR: 10000; 5 INJECTION INTRAVENOUS at 01:03

## 2023-03-08 RX ADMIN — FAMOTIDINE 20 MG: 20 TABLET ORAL at 08:03

## 2023-03-08 RX ADMIN — NITROGLYCERIN 10 MCG/MIN: 20 INJECTION INTRAVENOUS at 02:03

## 2023-03-08 RX ADMIN — HEPARIN SODIUM AND DEXTROSE 8 UNITS/KG/HR: 10000; 5 INJECTION INTRAVENOUS at 07:03

## 2023-03-08 RX ADMIN — ACETAMINOPHEN 650 MG: 325 TABLET ORAL at 08:03

## 2023-03-08 RX ADMIN — HEPARIN SODIUM 3350 UNITS: 5000 INJECTION, SOLUTION INTRAVENOUS; SUBCUTANEOUS at 01:03

## 2023-03-08 RX ADMIN — ASPIRIN 325 MG ORAL TABLET 325 MG: 325 PILL ORAL at 09:03

## 2023-03-08 RX ADMIN — ATORVASTATIN CALCIUM 40 MG: 40 TABLET, FILM COATED ORAL at 08:03

## 2023-03-08 RX ADMIN — POLYETHYLENE GLYCOL 3350 17 G: 17 POWDER, FOR SOLUTION ORAL at 02:03

## 2023-03-08 RX ADMIN — POTASSIUM BICARBONATE 50 MEQ: 977.5 TABLET, EFFERVESCENT ORAL at 02:03

## 2023-03-08 NOTE — Clinical Note
An angiography was performed of the right coronary arteries. Multiple views were taken. The angiography was performed via power injection. The injected amount was 6 mL contrast at 3 mL/s. The PSI from the power injection was 300.

## 2023-03-08 NOTE — Clinical Note
Radial flush given by . Radial flush consists of: 2.5 mg verapamil, 3,000 units heparin, 200 mcg nitroglycerin, and 5 cc NS flush.

## 2023-03-08 NOTE — TELEPHONE ENCOUNTER
Labs printed for review by Dr. Baum, but it is now noted that patient currently in ER with Dx. NSTEMI.

## 2023-03-08 NOTE — ED NOTES
Adult Physical Assessment  LOC: Suma Butterfield, 67 y.o. female verified via two identifiers.  The patient is awake, alert, oriented and speaking appropriately at this time.  APPEARANCE: Patient resting comfortably and appears to be in no acute distress at this time. Patient is clean and well groomed, patient's clothing is properly fastened.  SKIN:The skin is warm and dry, color consistent with ethnicity, patient has normal skin turgor and moist mucus membranes, skin intact, no breakdown or brusing noted.  MUSCULOSKELETAL: Patient moving all extremities well, no obvious swelling or deformities noted.  RESPIRATORY: Airway is open and patent, respirations are spontaneous, patient has a normal effort and rate, no accessory muscle use noted.    *CARDIAC: Patient has a normal rate and rhythm, no periphreal edema noted in any extremity, capillary refill < 3 seconds in all extremities. Pt has a little hypertension, states she is having chest discomfort again, she had it yesterday when she was here but it went away so she went home. Pt states it came back today and is worse. Localized and not radiate.    ABDOMEN: Soft and non tender to palpation, no abdominal distention noted. Bowel sounds present in all four quadrants.  NEUROLOGIC: Eyes open spontaneously, behavior appropriate to situation, follows commands, facial expression symmetrical, bilateral hand grasp equal and even, purposeful motor response noted, normal sensation in all extremities when touched with a finger.

## 2023-03-08 NOTE — TELEPHONE ENCOUNTER
Please let patient know her troponin was elevated indicating a heart attack and she needs to go back to the hospital immediately for further treatment

## 2023-03-08 NOTE — SUBJECTIVE & OBJECTIVE
Past Medical History:   Diagnosis Date    Elevated ferritin 10/13/2022    Elevated MCV 10/13/2022    Hepatitis C     Hypertension     Polycythemia 10/13/2022    SVT (supraventricular tachycardia)        No past surgical history on file.    Review of patient's allergies indicates:   Allergen Reactions    Succinylcholine      **Pseudocholinesterase**       No current facility-administered medications on file prior to encounter.     Current Outpatient Medications on File Prior to Encounter   Medication Sig    alendronate (FOSAMAX) 70 MG tablet TK 1 T PO 1 TIME Q WK (Patient taking differently: Take 70 mg by mouth every 7 days. SATURDAYS.)    biotin 5,000 mcg TbDL Take 5,000 mcg by mouth once daily.    calcium-vitamin D3 (OS- + D3) 500 mg-5 mcg (200 unit) per tablet Take 1 tablet by mouth once daily.    finasteride (PROSCAR) 5 mg tablet Take 1 tablet (5 mg total) by mouth once daily.    fluocinonide (LIDEX) 0.05 % external solution APPLY TOPICALLY TO THE AFFECTED AREA DAILY    hydroCHLOROthiazide (HYDRODIURIL) 12.5 MG Tab Take 1 tablet (12.5 mg total) by mouth once daily.    losartan (COZAAR) 100 MG tablet Take 1 tablet (100 mg total) by mouth once daily.    metoprolol succinate (TOPROL-XL) 100 MG 24 hr tablet Take 1 tablet (100 mg total) by mouth once daily.    milk thistle 175 mg tablet Take 175 mg by mouth once daily.    omega-3 fatty acids/fish oil (FISH OIL-OMEGA-3 FATTY ACIDS) 300-1,000 mg capsule Take 1 capsule by mouth once daily.    vitamin E 400 UNIT capsule Take 400 Units by mouth once daily.    zinc gluconate 50 mg tablet Take 50 mg by mouth once daily.    cyanocobalamin (VITAMIN B-12) 1000 MCG tablet Take 100 mcg by mouth once daily.     Family History    None       Tobacco Use    Smoking status: Former     Types: Cigarettes    Smokeless tobacco: Never   Substance and Sexual Activity    Alcohol use: Yes    Drug use: Never    Sexual activity: Yes     Partners: Male     Review of Systems    Constitutional:  Negative for chills and fever.   HENT:  Negative for sore throat.    Eyes:  Negative for redness and itching.   Respiratory:  Negative for chest tightness and shortness of breath.    Cardiovascular:  Positive for chest pain. Negative for palpitations.   Gastrointestinal:  Negative for blood in stool and nausea.   Genitourinary:  Negative for dysuria.   Musculoskeletal:  Negative for gait problem and joint swelling.   Skin:  Positive for wound.   Neurological:  Negative for tremors and weakness.   Psychiatric/Behavioral:  Negative for behavioral problems and sleep disturbance.    All other systems reviewed and are negative.  Objective:     Vital Signs (Most Recent):  Temp: 98.7 °F (37.1 °C) (03/08/23 0825)  Pulse: 60 (03/08/23 1131)  Resp: 16 (03/08/23 1130)  BP: (!) 169/80 (03/08/23 1130)  SpO2: 97 % (03/08/23 1130)   Vital Signs (24h Range):  Temp:  [98 °F (36.7 °C)-98.7 °F (37.1 °C)] 98.7 °F (37.1 °C)  Pulse:  [58-69] 60  Resp:  [16] 16  SpO2:  [97 %-100 %] 97 %  BP: (169-199)/() 169/80     Weight: 55.9 kg (123 lb 3.8 oz)  Body mass index is 23.29 kg/m².    Physical Exam  Vitals and nursing note reviewed.   Constitutional:       Appearance: She is well-developed.   HENT:      Head: Atraumatic.   Neck:      Vascular: No JVD.   Cardiovascular:      Rate and Rhythm: Normal rate and regular rhythm.      Heart sounds: Normal heart sounds. No murmur heard.    No gallop.   Pulmonary:      Effort: No respiratory distress.      Breath sounds: Normal breath sounds. No wheezing.   Abdominal:      General: Bowel sounds are normal. There is no distension.      Palpations: Abdomen is soft.      Tenderness: There is no guarding or rebound.   Musculoskeletal:      Cervical back: Neck supple.   Lymphadenopathy:      Cervical: No cervical adenopathy.   Skin:     General: Skin is warm.      Capillary Refill: Capillary refill takes less than 2 seconds.      Findings: No rash.   Neurological:      General: No  focal deficit present.      Mental Status: She is alert and oriented to person, place, and time.      Cranial Nerves: No cranial nerve deficit.   Psychiatric:         Behavior: Behavior normal.           Significant Labs: All pertinent labs within the past 24 hours have been reviewed.    Significant Imaging: I have reviewed all pertinent imaging results/findings within the past 24 hours.

## 2023-03-08 NOTE — CONSULTS
[facility]  Department of Cardiology  Consult Note      PATIENT NAME: Suma Butterfield  MRN: [id number]  TODAY'S DATE: 03/08/2023  ADMIT DATE: 3/8/2023                          CONSULT REQUESTED BY: Pricila Velazquez MD    SUBJECTIVE     PRINCIPAL PROBLEM: NSTEMI (non-ST elevated myocardial infarction)      REASON FOR CONSULT:  NSTEMI      HPI:    Patient is a 67-year-old female with past medical history of hypertension, hep C, SVT, chronically elevated liver enzymes, polycythemia who presented to the ED with chest pain and weakness since yesterday morning.  She came to the ED yesterday the wait was too long and she decided to leave.  Her pain persisted so she decided to come back.  No acute STT wave changes on EKG.  She denies fever, chills, headache, dizziness, palpitations nausea, vomiting, jaw neck or arm pain edema or bleeding.  Troponin HS 2374, repeat 3316.5.  Patient was hypertensive during examination, /80.  Patient denies after chest pain during examination.  She admits to drinking 1-2 glasses of wine daily and is an occasional smoker.  K 3.5, creatinine 0.6, H&H 15 and 42, .    FROM H&P:  HPI: 67-year-old lady with prior history of hypertension, cured hepatitis-C, SVT, chronically elevated liver enzymes presented to emergency room with chief complaint of chest pain.  Upon further asking patient mentioned that she was in her usual state of health until yesterday morning when she started experiencing midsternal chest discomfort and she presented to ER.  Yesterday in ER her workup revealed mildly elevated troponin however due to busy emergency room she decided to leave and was not evaluated by ER provider.  Her symptoms persisted and she felt weak and decided to come back to ED today.  Otherwise denies any fever, chills, headache, dizziness, palpitations, nausea, vomiting, bladder or bowel symptoms.  She denies any prior history of coronary artery disease, stroke or CHF.  She also noted  to have multiple readings of elevated blood pressure at home as well as in emergency room.  She endorses half pack of cigarette smoking every day and 1-2 glasses of wine every day otherwise denies any recreational drug use.         Review of patient's allergies indicates:   Allergen Reactions    Succinylcholine      **Pseudocholinesterase**       Past Medical History:   Diagnosis Date    Elevated ferritin 10/13/2022    Elevated MCV 10/13/2022    Hepatitis C     Hypertension     Polycythemia 10/13/2022    SVT (supraventricular tachycardia)      No past surgical history on file.  Social History     Tobacco Use    Smoking status: Former     Types: Cigarettes    Smokeless tobacco: Never   Substance Use Topics    Alcohol use: Yes    Drug use: Never        REVIEW OF SYSTEMS  Negative except as mentioned above  OBJECTIVE     VITAL SIGNS (Most Recent)  Temp: 98.7 °F (37.1 °C) (03/08/23 0825)  Pulse: 60 (03/08/23 1131)  Resp: 16 (03/08/23 1130)  BP: (!) 169/80 (03/08/23 1130)  SpO2: 97 % (03/08/23 1130)    VENTILATION STATUS  Resp: 16 (03/08/23 1130)  SpO2: 97 % (03/08/23 1130)       I & O (Last 24H):No intake or output data in the 24 hours ending 03/08/23 1310    WEIGHTS  Wt Readings from Last 3 Encounters:   03/08/23 0825 55.9 kg (123 lb 3.8 oz)   03/07/23 1750 55.8 kg (123 lb)   02/28/23 0919 57.2 kg (126 lb)       PHYSICAL EXAM  GENERAL: well built, well nourished, well-developed in no apparent distress alert and oriented.   HEENT: Normocephalic. Pupils normal and conjunctivae normal.   NECK: No JVD. No bruit..   THYROID: Thyroid not examined  CARDIAC: Regular rate and rhythm. S1 is normal.S2 is normal.No gallops, clicks or murmurs noted at this time.  CHEST ANATOMY: normal.   LUNGS: Clear to auscultation. No wheezing or rhonchi..   ABDOMEN: Soft. Normal bowel sounds. Nontender  URINARY: No cam catheter   EXTREMITIES: No cyanosis, clubbing or edema noted at this time.  PERIPHERAL VASCULAR SYSTEM: Good palpable distal  pulses.   CENTRAL NERVOUS SYSTEM: No focal motor or sensory deficits noted.   SKIN: Skin without lesions, moist, well perfused.   MUSCLE STRENGTH & TONE: No noteable weakness, atrophy or abnormal movement.     HOME MEDICATIONS:  No current facility-administered medications on file prior to encounter.     Current Outpatient Medications on File Prior to Encounter   Medication Sig Dispense Refill    alendronate (FOSAMAX) 70 MG tablet TK 1 T PO 1 TIME Q WK (Patient taking differently: Take 70 mg by mouth every 7 days. SATURDAYS.) 12 tablet 3    biotin 5,000 mcg TbDL Take 5,000 mcg by mouth once daily.      calcium-vitamin D3 (OS- + D3) 500 mg-5 mcg (200 unit) per tablet Take 1 tablet by mouth once daily.      finasteride (PROSCAR) 5 mg tablet Take 1 tablet (5 mg total) by mouth once daily. 30 tablet 11    fluocinonide (LIDEX) 0.05 % external solution APPLY TOPICALLY TO THE AFFECTED AREA DAILY 60 mL 2    hydroCHLOROthiazide (HYDRODIURIL) 12.5 MG Tab Take 1 tablet (12.5 mg total) by mouth once daily. 90 tablet 1    losartan (COZAAR) 100 MG tablet Take 1 tablet (100 mg total) by mouth once daily. 30 tablet 2    metoprolol succinate (TOPROL-XL) 100 MG 24 hr tablet Take 1 tablet (100 mg total) by mouth once daily. 90 tablet 1    milk thistle 175 mg tablet Take 175 mg by mouth once daily.      omega-3 fatty acids/fish oil (FISH OIL-OMEGA-3 FATTY ACIDS) 300-1,000 mg capsule Take 1 capsule by mouth once daily.      vitamin E 400 UNIT capsule Take 400 Units by mouth once daily.      zinc gluconate 50 mg tablet Take 50 mg by mouth once daily.      cyanocobalamin (VITAMIN B-12) 1000 MCG tablet Take 100 mcg by mouth once daily.         SCHEDULED MEDS:   [START ON 3/9/2023] aspirin  81 mg Oral Daily    atorvastatin  40 mg Oral QHS    clopidogreL  300 mg Oral Once    heparin (PORCINE)  60 Units/kg (Adjusted) Intravenous Once    losartan  100 mg Oral Daily    nitroGLYCERIN  0.4 mg Sublingual ED 1 Time    polyethylene glycol  17  g Oral BID       CONTINUOUS INFUSIONS:   heparin (porcine) in D5W 12 Units/kg/hr (03/08/23 1306)    nitroGLYCERIN         PRN MEDS:acetaminophen, acetaminophen, glucagon (human recombinant), glucose, glucose, heparin (PORCINE), heparin (PORCINE), HYDROcodone-acetaminophen, magnesium oxide, magnesium oxide, melatonin, naloxone, ondansetron, potassium bicarbonate, potassium bicarbonate, potassium bicarbonate, potassium, sodium phosphates, potassium, sodium phosphates, potassium, sodium phosphates, simethicone, sodium chloride 0.9%, sodium chloride 0.9%    LABS AND DIAGNOSTICS     CBC LAST 3 DAYS  Recent Labs   Lab 03/07/23  0721 03/07/23  1902 03/08/23  0850   WBC 5.8 6.86 7.28   RBC 3.85 3.89* 4.20   HGB 14.4 14.4 15.7   HCT 42.7 40.7 42.9   .9* 105* 102*   MCH 37.4* 37.0* 37.4*   MCHC 33.7 35.4 36.6*   RDW 11.1 11.6 11.3*    186 189   MPV 10.0 9.7 9.2   GRAN  --  51.6  3.5 60.8  4.4   LYMPH 42.0  2,436 35.6  2.4 26.1  1.9   MONO 8.8  510 9.5  0.7 10.3  0.8   BASO 70 0.06 0.07   NRBC  --  0 0       COAGULATION LAST 3 DAYS  Recent Labs   Lab 03/08/23  0850   INR 1.0   APTT 24.9       CHEMISTRY LAST 3 DAYS  Recent Labs   Lab 03/07/23 0721 03/07/23  1902 03/08/23  0850    132* 133*   K 4.1 3.3* 3.5    98 97   CO2 30 25 25   ANIONGAP  --  9 11   BUN 13 16 10   CREATININE 0.65 0.6 0.6   * 132* 115*   CALCIUM 9.4 9.8 10.3   MG  --  1.9  --    ALBUMIN 4.3 4.7 4.9   PROT 6.8 7.6 8.1   ALKPHOS  --  54* 60   ALT 59* 83* 96*   AST 50* 81* 112*   BILITOT 0.8 0.6 1.0       CARDIAC PROFILE LAST 3 DAYS  Recent Labs   Lab 03/07/23  1902 03/08/23  0850 03/08/23  1202   BNP 61 218*  --    TROPONINIHS 46.9* 2374.8* 3316.5*       ENDOCRINE LAST 3 DAYS  No results for input(s): TSH, PROCAL in the last 168 hours.    LAST ARTERIAL BLOOD GAS  ABG  No results for input(s): PH, PO2, PCO2, HCO3, BE in the last 168 hours.    LAST 7 DAYS MICROBIOLOGY   Microbiology Results (last 7 days)       ** No  results found for the last 168 hours. **            MOST RECENT IMAGING  X-Ray Chest AP Portable  Reason: Chest pain.    FINDINGS:    Portable chest with comparison chest x-ray March 7, 2023 show normal cardiomediastinal silhouette.  Lungs are clear. Pulmonary vasculature is normal. No acute osseous abnormality.    IMPRESSION:    No acute cardiopulmonary abnormality.    Electronically signed by:  Erlin Zuleta DO  3/8/2023 9:30 AM CST Workstation: [id number]      ECHOCARDIOGRAM RESULTS (last 5)  No results found for this or any previous visit.      CURRENT/PREVIOUS VISIT EKG  Results for orders placed or performed during the hospital encounter of 03/08/23   EKG 12-lead    Collection Time: 03/08/23  8:37 AM    Narrative    Test Reason : R07.9,    Vent. Rate : 058 BPM     Atrial Rate : 058 BPM     P-R Int : 154 ms          QRS Dur : 082 ms      QT Int : 438 ms       P-R-T Axes : 032 057 060 degrees     QTc Int : 429 ms    Sinus bradycardia  Otherwise normal ECG  When compared with ECG of 01-JUN-2020 01:33,  Nonspecific T wave abnormality no longer evident in Anterior leads    Referred By:  ED MD           Confirmed By:            ASSESSMENT/PLAN:     Active Hospital Problems    Diagnosis    *NSTEMI (non-ST elevated myocardial infarction)    Hypercholesterolemia    Essential hypertension    SVT (supraventricular tachycardia)    Chronic hepatitis C       ASSESSMENT & PLAN:     Midsternal Chest Pain- stable. No pain during examination  NSTEMI  Elevated LFTs  Hyperlipidemia  Chronic Hep C  Hypertension  SVT      RECOMMENDATIONS:    Patient presents with chest pain and shortness of breath exacerbated by exertion.  Patient is pain free during examination.  No acute EKG changes noted.  Patient has elevated troponins this admission- Troponin HS 2374, repeat 3316.5.  Recommend proceeding with cardiac catheterization next am.    Discussed with patient the risks and benefits of the procedure including, but not limited to, the  following 1:1000 risk of Heart attack, stroke and death with 3-5% Risk of bleeding, vessel damage, and the need for emergent CABG Surgery.  All questions have been answered to patient's satisfaction.  Informed consent obtained.  Will keep her nothing by mouth post midnight and proceed with the coronary angiography possible PCI in the morning.   Patient is hypertensive this admission.  Continue losartan 100mg daily. Recommend starting patient on nitroglycerin gtt now.  Continue aspirin 81mg daily.  Continue heparin gtt.  Serial PTT.  Adjust per protocol.   Patient has chronically elevated LFTs.  Patient admits to ETOH daily use.  Recommend tobacco and alcohol cessation.    Thank you for the consultation. We will continue to follow.       Cheyanne Eng NP  [facility]  Department of Cardiology  Date of Service: 03/08/2023      I have personally interviewed and examined the patient, I have reviewed the Nurse Practitioner's history and physical, assessment, and plan. I have personally evaluated the patient at bedside and agree with the findings and made appropriate changes as necessary in recommendations.  NPO after midnight for cath in AM. Please notify cardiology if pt's pain recurs overnight.     Jill Griffiths MD  Department of Cardiology  [facility]  3/8/23

## 2023-03-08 NOTE — HPI
67-year-old lady with prior history of hypertension, cured hepatitis-C, SVT, chronically elevated liver enzymes presented to emergency room with chief complaint of chest pain.  Upon further asking patient mentioned that she was in her usual state of health until yesterday morning when she started experiencing midsternal chest discomfort and she presented to ER.  Yesterday in ER her workup revealed mildly elevated troponin however due to busy emergency room she decided to leave and was not evaluated by ER provider.  Her symptoms persisted and she felt weak and decided to come back to ED today.  Otherwise denies any fever, chills, headache, dizziness, palpitations, nausea, vomiting, bladder or bowel symptoms.  She denies any prior history of coronary artery disease, stroke or CHF.  She also noted to have multiple readings of elevated blood pressure at home as well as in emergency room.  She endorses half pack of cigarette smoking every day and 1-2 glasses of wine every day otherwise denies any recreational drug use.

## 2023-03-08 NOTE — Clinical Note
The catheter was repositioned into the ostium   right coronary artery. Hemodynamics were performed.  and Pullback was recorded.

## 2023-03-08 NOTE — Clinical Note
The closure device was deployed in the right radial artery. 12 cc's of air were inserted into the closure device.

## 2023-03-08 NOTE — ED PROVIDER NOTES
Encounter Date: 3/8/2023       History     Chief Complaint   Patient presents with    Chest Pain     Chest pressure since yesterday, nausea yesterday     Patient is a 67-year-old female with history of hypertension who presents to the emergency department with chest pain and shortness of breath.  Patient reports she woke up yesterday morning at 7:00 a.m. with chest discomfort.  She reports it persisted throughout the day.  She reports yesterday evening she came to the emergency department to be evaluated for it.  She reports the weight was way too long so she decided to leave.  She reports when she woke up this morning the pain was still present.  She reports she has a constant pain that she describes as discomfort in the left side of her chest that she rates as a 5/10.  She reports it does get as severe as a 10/10.  She denies any lower extremity swelling.  She reports nausea with no vomiting.  She denies any recent illnesses.  She denies recent travel, recent surgery, previous blood clot, or exogenous estrogen.  She denies any previous cardiac events.    The history is provided by the patient.   Review of patient's allergies indicates:   Allergen Reactions    Succinylcholine      **Pseudocholinesterase**     Past Medical History:   Diagnosis Date    Elevated ferritin 10/13/2022    Elevated MCV 10/13/2022    Hepatitis C     Hypertension     Polycythemia 10/13/2022    SVT (supraventricular tachycardia)      No past surgical history on file.  Family History   Problem Relation Age of Onset    Glaucoma Neg Hx     Macular degeneration Neg Hx     Retinal detachment Neg Hx      Social History     Tobacco Use    Smoking status: Former     Types: Cigarettes    Smokeless tobacco: Never   Substance Use Topics    Alcohol use: Yes    Drug use: Never     Review of Systems   Constitutional:  Negative for activity change, appetite change, chills, fatigue and fever.   HENT:  Negative for congestion, ear discharge, ear pain,  postnasal drip, rhinorrhea and sore throat.    Respiratory:  Positive for shortness of breath. Negative for cough.    Cardiovascular:  Positive for chest pain. Negative for leg swelling.   Gastrointestinal:  Negative for abdominal pain.   Genitourinary:  Negative for dysuria, flank pain and hematuria.   Musculoskeletal:  Negative for back pain, neck pain and neck stiffness.   Neurological:  Negative for dizziness, light-headedness and headaches.     Physical Exam     Initial Vitals [03/08/23 0825]   BP Pulse Resp Temp SpO2   (!) 190/108 69 16 98.7 °F (37.1 °C) 99 %      MAP       --         Physical Exam    Nursing note and vitals reviewed.  Constitutional: She appears well-developed and well-nourished. She is not diaphoretic.  Non-toxic appearance. No distress.   HENT:   Head: Normocephalic.   Right Ear: External ear normal.   Left Ear: External ear normal.   Nose: Nose normal.   Mouth/Throat: Oropharynx is clear and moist. No oropharyngeal exudate.   Eyes: Conjunctivae are normal. Pupils are equal, round, and reactive to light.   Neck:   Normal range of motion.  Cardiovascular:  Normal rate and regular rhythm.           No lower extremity edema   Pulmonary/Chest: Breath sounds normal. No respiratory distress. She has no wheezes. She has no rhonchi. She has no rales. She exhibits no tenderness.   Abdominal: Abdomen is soft. Bowel sounds are normal. There is no abdominal tenderness.   Musculoskeletal:      Cervical back: Normal range of motion.     Lymphadenopathy:     She has no cervical adenopathy.   Neurological: She is alert and oriented to person, place, and time. She has normal strength.   Skin: Skin is warm and dry. Capillary refill takes less than 2 seconds.   Psychiatric: She has a normal mood and affect.       ED Course   Procedures  Labs Reviewed   PROTIME-INR   CBC W/ AUTO DIFFERENTIAL   COMPREHENSIVE METABOLIC PANEL   B-TYPE NATRIURETIC PEPTIDE   TROPONIN I HIGH SENSITIVITY     EKG Readings:  "(Independently Interpreted)   Initial Reading: No STEMI. Rhythm: Normal Sinus Rhythm. Heart Rate: 58.   ECG Results              EKG 12-lead (In process)  Result time 03/08/23 08:43:07      In process by Interface, Lab In [facility] (03/08/23 08:43:07)                   Narrative:    Test Reason : R07.9,    Vent. Rate : 058 BPM     Atrial Rate : 058 BPM     P-R Int : 154 ms          QRS Dur : 082 ms      QT Int : 438 ms       P-R-T Axes : 032 057 060 degrees     QTc Int : 429 ms    Sinus bradycardia  Otherwise normal ECG  When compared with ECG of 01-JUN-2020 01:33,  Nonspecific T wave abnormality no longer evident in Anterior leads    Referred By:  ED MD           Confirmed By:                                   Imaging Results              X-Ray Chest AP Portable (In process)                      Medications   aspirin tablet 325 mg (325 mg Oral Given 3/8/23 0914)     Medical Decision Making:   Initial Assessment:   Urgent evaluation of a 67-year-old female with history of hypertension who presents to the emergency department with chest discomfort.  Patient is afebrile, nontoxic-appearing, and hemodynamically stable.  Normal heart sounds.  Normal lung sounds.  No lower extremity edema.  Not concerned for PE.  Patient's chart is reviewed from yesterday's visit.  Patient was never seen or evaluated in a room.  She had a quick provider in triage evaluation.  The labs drawn did show an elevation in troponin.  EKG today shows no ST elevation.  Will trend labs today and consult Cardiology and  for admission.  Clinical Tests:   Lab Tests: Ordered and Reviewed  Radiological Study: Ordered and Reviewed  ED Management:  9:26 AM  Giving home antihypertensives at this time.      945AM  Meds cancelled as she has already taken this morning.    11:17 AM  Lab just called with critical.  Troponin is greater than 2000.  Pt is still having "slight discomfort."  Will give dose of nitro at this time.  Consulting cardiology.    11:33 " AM  Discussed with Dr. Julien - advised to start on heparin.  Dr. Velazquez has accepted patient.                        Clinical Impression:   Final diagnoses:  [R07.9] Chest pain               Alejandra Pelaez-MISHA Pablo  03/08/23 1134

## 2023-03-08 NOTE — TELEPHONE ENCOUNTER
----- Message from Janeth Rivera MA sent at 3/8/2023 11:39 AM CST -----    ----- Message -----  From: Deana Kerns  Sent: 3/8/2023  11:31 AM CST  To: [facility] Staff    Vm- pt went to the er last night with chest pain. She had some abnormal lab results but the hospital did not have rooms so she left as she was in the hallway. She is worried about the test results   [phone]

## 2023-03-08 NOTE — TELEPHONE ENCOUNTER
LMOR for a call back to see how pt is doing today.     Troponin yesterday was elevated. Please review.

## 2023-03-08 NOTE — H&P
[facility] Medicine  History & Physical    Patient Name: uSma Butterfield  MRN: [id number]  Patient Class: IP- Inpatient  Admission Date: 3/8/2023  Attending Physician: Pricila Velazquez MD   Primary Care Provider: Mono Ku MD         Patient information was obtained from patient, past medical records and ER records.     Subjective:     Principal Problem:NSTEMI (non-ST elevated myocardial infarction)    Chief Complaint:   Chief Complaint   Patient presents with    Chest Pain     Chest pressure since yesterday, nausea yesterday        HPI: 67-year-old lady with prior history of hypertension, cured hepatitis-C, SVT, chronically elevated liver enzymes presented to emergency room with chief complaint of chest pain.  Upon further asking patient mentioned that she was in her usual state of health until yesterday morning when she started experiencing midsternal chest discomfort and she presented to ER.  Yesterday in ER her workup revealed mildly elevated troponin however due to busy emergency room she decided to leave and was not evaluated by ER provider.  Her symptoms persisted and she felt weak and decided to come back to ED today.  Otherwise denies any fever, chills, headache, dizziness, palpitations, nausea, vomiting, bladder or bowel symptoms.  She denies any prior history of coronary artery disease, stroke or CHF.  She also noted to have multiple readings of elevated blood pressure at home as well as in emergency room.  She endorses half pack of cigarette smoking every day and 1-2 glasses of wine every day otherwise denies any recreational drug use.       Past Medical History:   Diagnosis Date    Elevated ferritin 10/13/2022    Elevated MCV 10/13/2022    Hepatitis C     Hypertension     Polycythemia 10/13/2022    SVT (supraventricular tachycardia)        No past surgical history on file.    Review of patient's allergies indicates:   Allergen Reactions    Succinylcholine       **Pseudocholinesterase**       No current facility-administered medications on file prior to encounter.     Current Outpatient Medications on File Prior to Encounter   Medication Sig    alendronate (FOSAMAX) 70 MG tablet TK 1 T PO 1 TIME Q WK (Patient taking differently: Take 70 mg by mouth every 7 days. SATURDAYS.)    biotin 5,000 mcg TbDL Take 5,000 mcg by mouth once daily.    calcium-vitamin D3 (OS- + D3) 500 mg-5 mcg (200 unit) per tablet Take 1 tablet by mouth once daily.    finasteride (PROSCAR) 5 mg tablet Take 1 tablet (5 mg total) by mouth once daily.    fluocinonide (LIDEX) 0.05 % external solution APPLY TOPICALLY TO THE AFFECTED AREA DAILY    hydroCHLOROthiazide (HYDRODIURIL) 12.5 MG Tab Take 1 tablet (12.5 mg total) by mouth once daily.    losartan (COZAAR) 100 MG tablet Take 1 tablet (100 mg total) by mouth once daily.    metoprolol succinate (TOPROL-XL) 100 MG 24 hr tablet Take 1 tablet (100 mg total) by mouth once daily.    milk thistle 175 mg tablet Take 175 mg by mouth once daily.    omega-3 fatty acids/fish oil (FISH OIL-OMEGA-3 FATTY ACIDS) 300-1,000 mg capsule Take 1 capsule by mouth once daily.    vitamin E 400 UNIT capsule Take 400 Units by mouth once daily.    zinc gluconate 50 mg tablet Take 50 mg by mouth once daily.    cyanocobalamin (VITAMIN B-12) 1000 MCG tablet Take 100 mcg by mouth once daily.     Family History    None       Tobacco Use    Smoking status: Former     Types: Cigarettes    Smokeless tobacco: Never   Substance and Sexual Activity    Alcohol use: Yes    Drug use: Never    Sexual activity: Yes     Partners: Male     Review of Systems   Constitutional:  Negative for chills and fever.   HENT:  Negative for sore throat.    Eyes:  Negative for redness and itching.   Respiratory:  Negative for chest tightness and shortness of breath.    Cardiovascular:  Positive for chest pain. Negative for palpitations.   Gastrointestinal:  Negative for blood in stool  and nausea.   Genitourinary:  Negative for dysuria.   Musculoskeletal:  Negative for gait problem and joint swelling.   Skin:  Positive for wound.   Neurological:  Negative for tremors and weakness.   Psychiatric/Behavioral:  Negative for behavioral problems and sleep disturbance.    All other systems reviewed and are negative.  Objective:     Vital Signs (Most Recent):  Temp: 98.7 °F (37.1 °C) (03/08/23 0825)  Pulse: 60 (03/08/23 1131)  Resp: 16 (03/08/23 1130)  BP: (!) 169/80 (03/08/23 1130)  SpO2: 97 % (03/08/23 1130)   Vital Signs (24h Range):  Temp:  [98 °F (36.7 °C)-98.7 °F (37.1 °C)] 98.7 °F (37.1 °C)  Pulse:  [58-69] 60  Resp:  [16] 16  SpO2:  [97 %-100 %] 97 %  BP: (169-199)/() 169/80     Weight: 55.9 kg (123 lb 3.8 oz)  Body mass index is 23.29 kg/m².    Physical Exam  Vitals and nursing note reviewed.   Constitutional:       Appearance: She is well-developed.   HENT:      Head: Atraumatic.   Neck:      Vascular: No JVD.   Cardiovascular:      Rate and Rhythm: Normal rate and regular rhythm.      Heart sounds: Normal heart sounds. No murmur heard.    No gallop.   Pulmonary:      Effort: No respiratory distress.      Breath sounds: Normal breath sounds. No wheezing.   Abdominal:      General: Bowel sounds are normal. There is no distension.      Palpations: Abdomen is soft.      Tenderness: There is no guarding or rebound.   Musculoskeletal:      Cervical back: Neck supple.   Lymphadenopathy:      Cervical: No cervical adenopathy.   Skin:     General: Skin is warm.      Capillary Refill: Capillary refill takes less than 2 seconds.      Findings: No rash.   Neurological:      General: No focal deficit present.      Mental Status: She is alert and oriented to person, place, and time.      Cranial Nerves: No cranial nerve deficit.   Psychiatric:         Behavior: Behavior normal.           Significant Labs: All pertinent labs within the past 24 hours have been reviewed.    Significant Imaging: I have  reviewed all pertinent imaging results/findings within the past 24 hours.    Assessment/Plan:     67-year-old lady with prior history of hypertension, SVT came with chest discomfort found to have NSTEMI and accelerated hypertension.     Active Hospital Problems    Diagnosis  POA    *NSTEMI (non-ST elevated myocardial infarction) [I21.4]  Yes    Hypercholesterolemia [E78.00]  Yes     Chronic    Essential hypertension [I10]  Yes    SVT (supraventricular tachycardia) [I47.1]  Yes    Chronic hepatitis C [B18.2]  Yes      Resolved Hospital Problems   No resolved problems to display.       Plan:  Admit to step-down unit-inpatient  Overall presentation is concerning for NSTEMI with accelerated hypertension.  Bradycardia noted, so far EKG did not show any ST elevations  Discussed case with Cardiology in person.   Aspirin, loading dose of Plavix, heparin GTT  Will hold beta-blocker due to bradycardia  Nitroglycerin drip  Even though patient has chronically elevated liver enzymes we will start Lipitor.  If significant worsening of liver enzymes we may consider stopping it however considering her acute coronary syndrome benefit outweighs risk  I spent considerable time explaining to her that if she ends up receiving coronary stents she will require dual antiplatelets and also explained consequences of stopping it without MD's consultation  Resume essential home medications, will hold HCTZ  P.r.n. IV antihypertensives if SBP above 160 or DBP above 90 mmHg  2D echo  Serial EKGs, cardiac enzymes, telemetry monitoring  Cardiac diet  NPO after midnight for left heart catheterization tomorrow a.m.    VTE Risk Mitigation (From admission, onward)         Ordered     heparin 25,000 units in dextrose 5% (100 units/ml) IV bolus from bag - ADDITIONAL PRN BOLUS - 60 units/kg (max bolus 4000 units)  As needed (PRN)        Question:  Heparin Infusion Adjustment (DO NOT MODIFY ANSWER)  Answer:   [email or web address]\epic\Images\Pharmacy\HeparinInfusions\heparin LOW INTENSITY nomogram for [facility] [id number].pdf    03/08/23 1149     heparin 25,000 units in dextrose 5% (100 units/ml) IV bolus from bag - ADDITIONAL PRN BOLUS - 30 units/kg (max bolus 4000 units)  As needed (PRN)        Question:  Heparin Infusion Adjustment (DO NOT MODIFY ANSWER)  Answer:  [email or web address]\epic\Images\Pharmacy\HeparinInfusions\heparin LOW INTENSITY nomogram for [facility] [id number].pdf    03/08/23 1149     heparin 25,000 units in dextrose 5% (100 units/ml) IV bolus from bag INITIAL BOLUS (max bolus 4000 units)  Once        Question:  Heparin Infusion Adjustment (DO NOT MODIFY ANSWER)  Answer:  [email or web address]\epic\Images\Pharmacy\HeparinInfusions\heparin LOW INTENSITY nomogram for [facility] [id number].pdf    03/08/23 1149     heparin 25,000 units in dextrose 5% 250 mL (100 units/mL) infusion LOW INTENSITY nomogram - [facility]  Continuous        Question Answer Comment   Heparin Infusion Adjustment (DO NOT MODIFY ANSWER) \\[email or web address]\epic\Images\Pharmacy\HeparinInfusions\heparin LOW INTENSITY nomogram for [facility] [id number].pdf    Begin at (in units/kg/hr) 12        03/08/23 1149                   Pricila Velazquez MD  Department of Hospital Medicine   [facility]

## 2023-03-09 ENCOUNTER — TELEPHONE (OUTPATIENT)
Dept: FAMILY MEDICINE | Facility: CLINIC | Age: 68
End: 2023-03-09

## 2023-03-09 PROBLEM — H11.32 SUBCONJUNCTIVAL HEMORRHAGE OF LEFT EYE: Status: ACTIVE | Noted: 2023-03-09

## 2023-03-09 LAB
AFP-TM SERPL-MCNC: 5.3 NG/ML
ALBUMIN SERPL BCP-MCNC: 4.3 G/DL (ref 3.5–5.2)
ALBUMIN SERPL-MCNC: 4.3 G/DL (ref 3.6–5.1)
ALBUMIN/GLOB SERPL: 1.7 (CALC) (ref 1–2.5)
ALP SERPL-CCNC: 53 U/L (ref 37–153)
ALP SERPL-CCNC: 56 U/L (ref 55–135)
ALT SERPL W/O P-5'-P-CCNC: 89 U/L (ref 10–44)
ALT SERPL-CCNC: 59 U/L (ref 6–29)
ANION GAP SERPL CALC-SCNC: 12 MMOL/L (ref 8–16)
APTT BLDCRRT: 24.5 SEC (ref 21–32)
APTT BLDCRRT: 29.3 SEC (ref 21–32)
APTT BLDCRRT: 34.5 SEC (ref 21–32)
APTT BLDCRRT: 35.3 SEC (ref 21–32)
AST SERPL-CCNC: 101 U/L (ref 10–40)
AST SERPL-CCNC: 50 U/L (ref 10–35)
AV INDEX (PROSTH): 0.84
AV MEAN GRADIENT: 2 MMHG
AV PEAK GRADIENT: 4 MMHG
AV VALVE AREA: 3.18 CM2
AV VELOCITY RATIO: 0.83
BASOPHILS # BLD AUTO: 0.06 K/UL (ref 0–0.2)
BASOPHILS # BLD AUTO: 70 CELLS/UL (ref 0–200)
BASOPHILS NFR BLD AUTO: 1.2 %
BASOPHILS NFR BLD: 0.6 % (ref 0–1.9)
BILIRUB SERPL-MCNC: 0.8 MG/DL (ref 0.2–1.2)
BILIRUB SERPL-MCNC: 0.9 MG/DL (ref 0.1–1)
BSA FOR ECHO PROCEDURE: 1.55 M2
BUN SERPL-MCNC: 11 MG/DL (ref 8–23)
BUN SERPL-MCNC: 13 MG/DL (ref 7–25)
BUN/CREAT SERPL: ABNORMAL (CALC) (ref 6–22)
CALCIUM SERPL-MCNC: 9.2 MG/DL (ref 8.7–10.5)
CALCIUM SERPL-MCNC: 9.4 MG/DL (ref 8.6–10.4)
CHLORIDE SERPL-SCNC: 102 MMOL/L (ref 98–110)
CHLORIDE SERPL-SCNC: 95 MMOL/L (ref 95–110)
CO2 SERPL-SCNC: 24 MMOL/L (ref 23–29)
CO2 SERPL-SCNC: 30 MMOL/L (ref 20–32)
CREAT SERPL-MCNC: 0.65 MG/DL (ref 0.5–1.05)
CREAT SERPL-MCNC: 0.7 MG/DL (ref 0.5–1.4)
CV ECHO LV RWT: 0.47 CM
DIFFERENTIAL METHOD: ABNORMAL
DOP CALC AO PEAK VEL: 1.01 M/S
DOP CALC AO VTI: 22 CM
DOP CALC LVOT AREA: 3.8 CM2
DOP CALC LVOT DIAMETER: 2.2 CM
DOP CALC LVOT PEAK VEL: 0.84 M/S
DOP CALC LVOT STROKE VOLUME: 69.91 CM3
DOP CALCLVOT PEAK VEL VTI: 18.4 CM
E WAVE DECELERATION TIME: 220 MSEC
E/A RATIO: 1.14
E/E' RATIO: 11.07 M/S
ECHO LV POSTERIOR WALL: 1.02 CM (ref 0.6–1.1)
EGFR: 98 ML/MIN/1.73M2
EJECTION FRACTION: 65 %
EOSINOPHIL # BLD AUTO: 0.1 K/UL (ref 0–0.5)
EOSINOPHIL # BLD AUTO: 168 CELLS/UL (ref 15–500)
EOSINOPHIL NFR BLD AUTO: 2.9 %
EOSINOPHIL NFR BLD: 1.1 % (ref 0–8)
ERYTHROCYTE [DISTWIDTH] IN BLOOD BY AUTOMATED COUNT: 11.1 % (ref 11–15)
ERYTHROCYTE [DISTWIDTH] IN BLOOD BY AUTOMATED COUNT: 11.5 % (ref 11.5–14.5)
ERYTHROCYTE [DISTWIDTH] IN BLOOD BY AUTOMATED COUNT: 11.6 % (ref 11.5–14.5)
EST. GFR  (NO RACE VARIABLE): >60 ML/MIN/1.73 M^2
FERRITIN SERPL-MCNC: 220 NG/ML (ref 16–288)
FRACTIONAL SHORTENING: 38 % (ref 28–44)
GLOBULIN SER CALC-MCNC: 2.5 G/DL (CALC) (ref 1.9–3.7)
GLUCOSE SERPL-MCNC: 101 MG/DL (ref 65–99)
GLUCOSE SERPL-MCNC: 141 MG/DL (ref 70–110)
HCT VFR BLD AUTO: 40.8 % (ref 37–48.5)
HCT VFR BLD AUTO: 41.6 % (ref 37–48.5)
HCT VFR BLD AUTO: 42.7 % (ref 35–45)
HGB BLD-MCNC: 14.4 G/DL (ref 11.7–15.5)
HGB BLD-MCNC: 14.6 G/DL (ref 12–16)
HGB BLD-MCNC: 15.1 G/DL (ref 12–16)
IMM GRANULOCYTES # BLD AUTO: 0.02 K/UL (ref 0–0.04)
IMM GRANULOCYTES NFR BLD AUTO: 0.2 % (ref 0–0.5)
INTERVENTRICULAR SEPTUM: 0.95 CM (ref 0.6–1.1)
IRON SATN MFR SERPL: 36 % (CALC) (ref 16–45)
IRON SERPL-MCNC: 145 MCG/DL (ref 45–160)
IVC DIAMETER: 1.37 CM
LEFT ATRIUM SIZE: 2.1 CM
LEFT ATRIUM VOLUME INDEX MOD: 11.4 ML/M2
LEFT ATRIUM VOLUME MOD: 17.3 CM3
LEFT INTERNAL DIMENSION IN SYSTOLE: 2.7 CM (ref 2.1–4)
LEFT VENTRICLE DIASTOLIC VOLUME INDEX: 56.46 ML/M2
LEFT VENTRICLE DIASTOLIC VOLUME: 85.82 ML
LEFT VENTRICLE MASS INDEX: 94 G/M2
LEFT VENTRICLE SYSTOLIC VOLUME INDEX: 17.8 ML/M2
LEFT VENTRICLE SYSTOLIC VOLUME: 27.02 ML
LEFT VENTRICULAR INTERNAL DIMENSION IN DIASTOLE: 4.36 CM (ref 3.5–6)
LEFT VENTRICULAR MASS: 142.67 G
LV LATERAL E/E' RATIO: 9.22 M/S
LV SEPTAL E/E' RATIO: 13.83 M/S
LVOT MG: 1 MMHG
LVOT MV: 0.54 CM/S
LYMPHOCYTES # BLD AUTO: 2.2 K/UL (ref 1–4.8)
LYMPHOCYTES # BLD AUTO: 2436 CELLS/UL (ref 850–3900)
LYMPHOCYTES NFR BLD AUTO: 42 %
LYMPHOCYTES NFR BLD: 23.3 % (ref 18–48)
MAGNESIUM SERPL-MCNC: 1.8 MG/DL (ref 1.6–2.6)
MCH RBC QN AUTO: 37.2 PG (ref 27–31)
MCH RBC QN AUTO: 37.4 PG (ref 27–33)
MCH RBC QN AUTO: 37.8 PG (ref 27–31)
MCHC RBC AUTO-ENTMCNC: 33.7 G/DL (ref 32–36)
MCHC RBC AUTO-ENTMCNC: 35.1 G/DL (ref 32–36)
MCHC RBC AUTO-ENTMCNC: 37 G/DL (ref 32–36)
MCV RBC AUTO: 102 FL (ref 82–98)
MCV RBC AUTO: 106 FL (ref 82–98)
MCV RBC AUTO: 110.9 FL (ref 80–100)
MONOCYTES # BLD AUTO: 1 K/UL (ref 0.3–1)
MONOCYTES # BLD AUTO: 510 CELLS/UL (ref 200–950)
MONOCYTES NFR BLD AUTO: 8.8 %
MONOCYTES NFR BLD: 10.2 % (ref 4–15)
MV PEAK A VEL: 0.73 M/S
MV PEAK E VEL: 0.83 M/S
NEUTROPHILS # BLD AUTO: 2616 CELLS/UL (ref 1500–7800)
NEUTROPHILS # BLD AUTO: 6.1 K/UL (ref 1.8–7.7)
NEUTROPHILS NFR BLD AUTO: 45.1 %
NEUTROPHILS NFR BLD: 64.6 % (ref 38–73)
NRBC BLD-RTO: 0 /100 WBC
PHOSPHATE SERPL-MCNC: 3.2 MG/DL (ref 2.7–4.5)
PISA TR MAX VEL: 2.18 M/S
PLATELET # BLD AUTO: 145 K/UL (ref 150–450)
PLATELET # BLD AUTO: 176 K/UL (ref 150–450)
PLATELET # BLD AUTO: 198 THOUSAND/UL (ref 140–400)
PMV BLD AUTO: 9.5 FL (ref 9.2–12.9)
PMV BLD AUTO: 9.6 FL (ref 9.2–12.9)
PMV BLD REES-ECKER: 10 FL (ref 7.5–12.5)
POTASSIUM SERPL-SCNC: 3.4 MMOL/L (ref 3.5–5.1)
POTASSIUM SERPL-SCNC: 4.1 MMOL/L (ref 3.5–5.3)
PROT SERPL-MCNC: 6.8 G/DL (ref 6.1–8.1)
PROT SERPL-MCNC: 7.3 G/DL (ref 6–8.4)
PV MV: 0.37 M/S
PV PEAK VELOCITY: 0.56 CM/S
RBC # BLD AUTO: 3.85 MILLION/UL (ref 3.8–5.1)
RBC # BLD AUTO: 3.92 M/UL (ref 4–5.4)
RBC # BLD AUTO: 4 M/UL (ref 4–5.4)
RV TISSUE DOPPLER FREE WALL SYSTOLIC VELOCITY 1 (APICAL 4 CHAMBER VIEW): 0.01 CM/S
SODIUM SERPL-SCNC: 131 MMOL/L (ref 136–145)
SODIUM SERPL-SCNC: 138 MMOL/L (ref 135–146)
TDI LATERAL: 0.09 M/S
TDI SEPTAL: 0.06 M/S
TDI: 0.08 M/S
TIBC SERPL-MCNC: 400 MCG/DL (CALC) (ref 250–450)
TR MAX PG: 19 MMHG
TRICUSPID ANNULAR PLANE SYSTOLIC EXCURSION: 1.78 CM
TROPONIN I SERPL HS-MCNC: 3017.3 PG/ML (ref 0–14.9)
TROPONIN I SERPL HS-MCNC: 4476.6 PG/ML (ref 0–14.9)
WBC # BLD AUTO: 5.8 THOUSAND/UL (ref 3.8–10.8)
WBC # BLD AUTO: 9.37 K/UL (ref 3.9–12.7)
WBC # BLD AUTO: 9.39 K/UL (ref 3.9–12.7)

## 2023-03-09 PROCEDURE — 36415 COLL VENOUS BLD VENIPUNCTURE: CPT | Performed by: HOSPITALIST

## 2023-03-09 PROCEDURE — 93005 ELECTROCARDIOGRAM TRACING: CPT | Performed by: INTERNAL MEDICINE

## 2023-03-09 PROCEDURE — 25000003 PHARM REV CODE 250

## 2023-03-09 PROCEDURE — 84100 ASSAY OF PHOSPHORUS: CPT | Performed by: HOSPITALIST

## 2023-03-09 PROCEDURE — 21000000 HC CCU ICU ROOM CHARGE

## 2023-03-09 PROCEDURE — 83735 ASSAY OF MAGNESIUM: CPT | Performed by: HOSPITALIST

## 2023-03-09 PROCEDURE — 93010 ELECTROCARDIOGRAM REPORT: CPT | Mod: ,,, | Performed by: INTERNAL MEDICINE

## 2023-03-09 PROCEDURE — 84484 ASSAY OF TROPONIN QUANT: CPT | Performed by: HOSPITALIST

## 2023-03-09 PROCEDURE — 99900035 HC TECH TIME PER 15 MIN (STAT)

## 2023-03-09 PROCEDURE — 85025 COMPLETE CBC W/AUTO DIFF WBC: CPT | Performed by: HOSPITALIST

## 2023-03-09 PROCEDURE — 99233 PR SUBSEQUENT HOSPITAL CARE,LEVL III: ICD-10-PCS | Mod: ,,, | Performed by: INTERNAL MEDICINE

## 2023-03-09 PROCEDURE — 25000003 PHARM REV CODE 250: Performed by: HOSPITALIST

## 2023-03-09 PROCEDURE — 84484 ASSAY OF TROPONIN QUANT: CPT | Mod: 91 | Performed by: HOSPITALIST

## 2023-03-09 PROCEDURE — 85027 COMPLETE CBC AUTOMATED: CPT | Performed by: HOSPITALIST

## 2023-03-09 PROCEDURE — 99233 SBSQ HOSP IP/OBS HIGH 50: CPT | Mod: ,,, | Performed by: INTERNAL MEDICINE

## 2023-03-09 PROCEDURE — 94761 N-INVAS EAR/PLS OXIMETRY MLT: CPT

## 2023-03-09 PROCEDURE — 63600175 PHARM REV CODE 636 W HCPCS: Performed by: STUDENT IN AN ORGANIZED HEALTH CARE EDUCATION/TRAINING PROGRAM

## 2023-03-09 PROCEDURE — 80053 COMPREHEN METABOLIC PANEL: CPT | Performed by: HOSPITALIST

## 2023-03-09 PROCEDURE — 85730 THROMBOPLASTIN TIME PARTIAL: CPT | Mod: 91 | Performed by: HOSPITALIST

## 2023-03-09 PROCEDURE — 93010 EKG 12-LEAD: ICD-10-PCS | Mod: ,,, | Performed by: INTERNAL MEDICINE

## 2023-03-09 PROCEDURE — 63600175 PHARM REV CODE 636 W HCPCS: Performed by: HOSPITALIST

## 2023-03-09 PROCEDURE — 25000003 PHARM REV CODE 250: Performed by: INTERNAL MEDICINE

## 2023-03-09 RX ORDER — POTASSIUM CHLORIDE 7.45 MG/ML
10 INJECTION INTRAVENOUS
Status: DISPENSED | OUTPATIENT
Start: 2023-03-09 | End: 2023-03-09

## 2023-03-09 RX ORDER — SODIUM CHLORIDE 9 MG/ML
INJECTION, SOLUTION INTRAVENOUS ONCE
Status: COMPLETED | OUTPATIENT
Start: 2023-03-09 | End: 2023-03-09

## 2023-03-09 RX ORDER — MUPIROCIN 20 MG/G
OINTMENT TOPICAL 2 TIMES DAILY
Status: DISCONTINUED | OUTPATIENT
Start: 2023-03-09 | End: 2023-03-11 | Stop reason: HOSPADM

## 2023-03-09 RX ORDER — CHLORHEXIDINE GLUCONATE ORAL RINSE 1.2 MG/ML
15 SOLUTION DENTAL 2 TIMES DAILY
Status: DISCONTINUED | OUTPATIENT
Start: 2023-03-09 | End: 2023-03-11 | Stop reason: HOSPADM

## 2023-03-09 RX ORDER — METOPROLOL TARTRATE 50 MG/1
50 TABLET ORAL 2 TIMES DAILY
Status: DISCONTINUED | OUTPATIENT
Start: 2023-03-09 | End: 2023-03-11 | Stop reason: HOSPADM

## 2023-03-09 RX ORDER — METOPROLOL TARTRATE 25 MG/1
25 TABLET, FILM COATED ORAL 2 TIMES DAILY
Status: DISCONTINUED | OUTPATIENT
Start: 2023-03-09 | End: 2023-03-09

## 2023-03-09 RX ADMIN — METOPROLOL TARTRATE 25 MG: 25 TABLET, FILM COATED ORAL at 11:03

## 2023-03-09 RX ADMIN — CHLORHEXIDINE GLUCONATE 15 ML: 1.2 RINSE ORAL at 09:03

## 2023-03-09 RX ADMIN — HEPARIN SODIUM AND DEXTROSE 12 UNITS/KG/HR: 10000; 5 INJECTION INTRAVENOUS at 09:03

## 2023-03-09 RX ADMIN — ATORVASTATIN CALCIUM 40 MG: 40 TABLET, FILM COATED ORAL at 09:03

## 2023-03-09 RX ADMIN — FAMOTIDINE 20 MG: 20 TABLET ORAL at 09:03

## 2023-03-09 RX ADMIN — ONDANSETRON HYDROCHLORIDE 4 MG: 2 SOLUTION INTRAMUSCULAR; INTRAVENOUS at 01:03

## 2023-03-09 RX ADMIN — MUPIROCIN 1 G: 20 OINTMENT TOPICAL at 09:03

## 2023-03-09 RX ADMIN — POTASSIUM BICARBONATE 50 MEQ: 977.5 TABLET, EFFERVESCENT ORAL at 08:03

## 2023-03-09 RX ADMIN — POTASSIUM CHLORIDE 10 MEQ: 7.46 INJECTION, SOLUTION INTRAVENOUS at 05:03

## 2023-03-09 RX ADMIN — LOSARTAN POTASSIUM 100 MG: 50 TABLET, FILM COATED ORAL at 08:03

## 2023-03-09 RX ADMIN — FAMOTIDINE 20 MG: 20 TABLET ORAL at 08:03

## 2023-03-09 RX ADMIN — ACETAMINOPHEN 650 MG: 325 TABLET ORAL at 01:03

## 2023-03-09 RX ADMIN — SODIUM CHLORIDE: 0.9 INJECTION, SOLUTION INTRAVENOUS at 04:03

## 2023-03-09 RX ADMIN — METOPROLOL TARTRATE 50 MG: 50 TABLET, FILM COATED ORAL at 09:03

## 2023-03-09 RX ADMIN — POTASSIUM CHLORIDE 10 MEQ: 7.46 INJECTION, SOLUTION INTRAVENOUS at 06:03

## 2023-03-09 RX ADMIN — ASPIRIN 81 MG: 81 TABLET, COATED ORAL at 08:03

## 2023-03-09 RX ADMIN — CHLORHEXIDINE GLUCONATE 15 ML: 1.2 RINSE ORAL at 11:03

## 2023-03-09 RX ADMIN — MUPIROCIN 1 G: 20 OINTMENT TOPICAL at 11:03

## 2023-03-09 NOTE — PROGRESS NOTES
[facility] Medicine  Progress Note    Patient name: Suma Butterfield  MRN: [id number]  Admit Date: 3/8/2023   LOS: 1 day     SUBJECTIVE:     Principal problem: NSTEMI (non-ST elevated myocardial infarction)    Interval History:  Patient was seen and examined bedside, denies any active chest pain, early morning she developed left medial subconjunctival hemorrhage however she denies any pain, vision changes, pain on extraocular movement.  Of note she received loading dose of Plavix yesterday and was on heparin GTT.  Heparin GTT was stopped.  Cardiology and myself evaluated her    Scheduled Meds:   aspirin  81 mg Oral Daily    atorvastatin  40 mg Oral QHS    chlorhexidine  15 mL Mouth/Throat BID    famotidine  20 mg Oral BID    losartan  100 mg Oral Daily    metoprolol tartrate  25 mg Oral BID    mupirocin   Nasal BID    polyethylene glycol  17 g Oral BID     Continuous Infusions:   heparin (porcine) in D5W Stopped (03/09/23 0713)    nitroGLYCERIN 10 mcg/min (03/08/23 1458)     PRN Meds:acetaminophen, acetaminophen, glucagon (human recombinant), glucose, glucose, heparin (PORCINE), heparin (PORCINE), hydrALAZINE, HYDROcodone-acetaminophen, magnesium oxide, magnesium oxide, melatonin, morphine, naloxone, ondansetron, potassium bicarbonate, potassium bicarbonate, potassium bicarbonate, potassium, sodium phosphates, potassium, sodium phosphates, potassium, sodium phosphates, simethicone, sodium chloride 0.9%, sodium chloride 0.9%    Review of patient's allergies indicates:   Allergen Reactions    Succinylcholine      **Pseudocholinesterase**       Review of Systems: As per interval history    OBJECTIVE:     Vital Signs (Most Recent)  Temp: 97.7 °F (36.5 °C) (03/09/23 1500)  Pulse: 80 (03/09/23 1110)  Resp: 20 (03/09/23 1110)  BP: 128/62 (03/09/23 1105)  SpO2: 97 % (03/09/23 1110)    Vital Signs Range (Last 24H):  Temp:  [97.4 °F (36.3 °C)-98.3 °F (36.8 °C)]   Pulse:  []   Resp:  [11-49]   BP:  (121-171)/()   SpO2:  [94 %-100 %]     I & O (Last 24H):  Intake/Output Summary (Last 24 hours) at 3/9/2023 1706  Last data filed at 3/9/2023 1431  Gross per 24 hour   Intake 1175.42 ml   Output --   Net 1175.42 ml       Physical Exam:  General: Patient resting comfortably in no acute distress. Appears as stated age. Calm  Eyes: No conjunctival injection. No scleral icterus.  Left medial subconjunctival hemorrhage noted, pictures in media section  ENT: Hearing grossly intact. No discharge from ears. No nasal discharge.   Neck: Supple, trachea midline. No JVD  CVS: RRR. No LE edema BL  Lungs:  No tachypnea or accessory muscle use.  Clear to auscultation bilaterally  Abdomen:  Soft, nontender and nondistended.  No organomegaly  Neuro: AOx3. Moves all extremities. Follows commands. Responds appropriately   Skin:  No rash or erythema noted  Eye:                Laboratory:  I have reviewed all pertinent lab results within the past 24 hours.    Diagnostic Results:  Reviewed all imaging    ASSESSMENT/PLAN:     67-year-old lady with prior history of hypertension, SVT came with chest discomfort found to have NSTEMI and accelerated hypertension.  Today she developed left subconjunctival hemorrhage, procedure was postponed until tomorrow       Active Hospital Problems    Diagnosis  POA    *NSTEMI (non-ST elevated myocardial infarction) [I21.4]  Yes    Subconjunctival hemorrhage of left eye [H11.32]  No    Hypercholesterolemia [E78.00]  Yes     Chronic    Essential hypertension [I10]  Yes    SVT (supraventricular tachycardia) [I47.1]  Yes    Chronic hepatitis C [B18.2]  Yes      Resolved Hospital Problems   No resolved problems to display.         Plan:   Overall presentation consistent with NSTEMI, she was given loading dose of Plavix 300 mg, aspirin, was started on heparin GTT.  Thinks she developed left subconjunctival hemorrhage, heparin GTT was started, patient has absolutely no ocular symptoms.  Cardiology and  myself evaluated her, we reached out to [facility] ophthalmology who recommended no further interventions and also recommended to continue heparin GTT and cleared her to go for cardiac catheterization  After discussion with Cardiology we decided to postpone procedure until tomorrow, continue heparin GTT  Frequent ocular exams  Aspirin, low-dose beta-blocker(known history of SVT), nitroglycerin GTT, losartan  2D echo  Serial EKGs, telemetry monitoring  Cardiac diet  NPO after midnight  Further management as per clinical course    VTE Risk Mitigation (From admission, onward)           Ordered     heparin 25,000 units in dextrose 5% (100 units/ml) IV bolus from bag - ADDITIONAL PRN BOLUS - 60 units/kg (max bolus 4000 units)  As needed (PRN)        Question:  Heparin Infusion Adjustment (DO NOT MODIFY ANSWER)  Answer:  [email or web address]\epic\Images\Pharmacy\HeparinInfusions\heparin LOW INTENSITY nomogram for [facility] [id number].pdf    03/08/23 1149     heparin 25,000 units in dextrose 5% (100 units/ml) IV bolus from bag - ADDITIONAL PRN BOLUS - 30 units/kg (max bolus 4000 units)  As needed (PRN)        Question:  Heparin Infusion Adjustment (DO NOT MODIFY ANSWER)  Answer:  [email or web address]\epic\Images\Pharmacy\HeparinInfusions\heparin LOW INTENSITY nomogram for [facility] [id number].pdf    03/08/23 1149     heparin 25,000 units in dextrose 5% 250 mL (100 units/mL) infusion LOW INTENSITY nomogram - [facility]  Continuous        Question Answer Comment   Heparin Infusion Adjustment (DO NOT MODIFY ANSWER) [email or web address]\epic\Images\Pharmacy\HeparinInfusions\heparin LOW INTENSITY nomogram for [facility] [id number].pdf    Begin at (in units/kg/hr) 12        03/08/23 1149                        Department Hospital Medicine  [facility]  Pricila Velazquez MD  Date of service: 03/09/2023

## 2023-03-09 NOTE — NURSING
I spoke with Dr. Velazquez at this time and updated him in the change in pt condition pertaining to sclera hemorrhage of left eye. Will continue to monitor.

## 2023-03-09 NOTE — PLAN OF CARE
03/09/23 1110   Patient Assessment/Suction   Rhythm/Pattern, Respiratory no shortness of breath reported   PRE-TX-O2   Device (Oxygen Therapy) room air   SpO2 97 %   Pulse Oximetry Type Continuous   $ Pulse Oximetry - Multiple Charge Pulse Oximetry - Multiple   Pulse 80   Resp 20   Respiratory Evaluation   $ Care Plan Tech Time 15 min

## 2023-03-09 NOTE — PLAN OF CARE
[facility]  Initial Discharge Assessment       Primary Care Provider: Mono Ku MD    Admission Diagnosis: NSTEMI (non-ST elevated myocardial infarction) [I21.4]    Admission Date: 3/8/2023  Expected Discharge Date: 3/10/2023     met with Pt at bedside to complete discharge assessment. Pt AAOx4s. Demographics, PCP, and insurance verified. No home health. No dialysis. Pt reports ability to complete ADLs without assistance. Pt verbalized plan to discharge home via family transport. Pt has no other needs to be addressed at this time.       Payor: HU[employer] MANAGED MEDICARE / Plan: HU[employer] MEDICARE HMO / Product Type: Capitation /     Extended Emergency Contact Information  Primary Emergency Contact: Isaiah Butterfield  Address: [address] [place] [address] [place]  Mobile Phone: [phone]  Relation: Spouse   needed? No    Discharge Plan A: Home with family  Discharge Plan B: Home with family      [id number] - [place], [place] - [address] AT [facility] [address]  [address] [facility] [address]  Phone: [phone] Fax: [phone]      Initial Assessment (most recent)       Adult Discharge Assessment - 03/09/23 1221          Discharge Assessment    Assessment Type Discharge Planning Assessment     Confirmed/corrected address, phone number and insurance Yes     Confirmed Demographics Correct on Facesheet     Source of Information patient;health record     Does patient/caregiver understand observation status Yes     Communicated ODIN with patient/caregiver Yes     Reason For Admission NSTEMI (non-ST elevated myocardial infarction)     People in Home spouse;child(omayra), dependent     Facility Arrived From: Home     Do you expect to return to your current living situation? Yes     Do you have help at home or someone to help you manage your care at home? Yes     Who are your caregiver(s) and their  phone number(s)? Isaiah Butterfield (Spouse)   [phone] (Mobile)     Prior to hospitilization cognitive status: Alert/Oriented     Current cognitive status: Alert/Oriented     Home Accessibility wheelchair accessible     Home Layout Able to live on 1st floor     Equipment Currently Used at Home none     Readmission within 30 days? No     Patient currently being followed by outpatient case management? No     Do you currently have service(s) that help you manage your care at home? No     Do you take prescription medications? Yes     Do you have prescription coverage? Yes     Coverage Payor:  HU[employer] MANAGED MEDICARE - [employer] MEDICARE O     Do you have any problems affording any of your prescribed medications? No     Is the patient taking medications as prescribed? yes     Who is going to help you get home at discharge? Isaiah Butterfield (Spouse)   [phone] (Mobile)     How do you get to doctors appointments? agency     Are you on dialysis? No     Do you take coumadin? No     Discharge Plan A Home with family     Discharge Plan B Home with family     DME Needed Upon Discharge  none     Discharge Plan discussed with: Patient        OTHER    Name(s) of People in Home Vaibhav Butterfieldy (Spouse)   [phone] (Mobile); child (unnamed)

## 2023-03-09 NOTE — NURSING
PRE PROCEDURE VISIT COMPLETE. PATIENT ALERT AND ORIENTED X3. QUESTIONS ANSWERED ABOUT PROCEDURE. PATIENT DENIES FURTHER QUESTIONS OR CONCERNS.

## 2023-03-09 NOTE — PLAN OF CARE
Problem: Adult Inpatient Plan of Care  Goal: Plan of Care Review  3/9/2023 0030 by Kristine Bellamy RN  Outcome: Ongoing, Progressing  3/9/2023 0030 by Kristine Bellamy RN  Outcome: Ongoing, Progressing  Goal: Patient-Specific Goal (Individualized)  3/9/2023 0030 by Kristine Bellamy RN  Outcome: Ongoing, Progressing  3/9/2023 0030 by Kristine Bellamy RN  Outcome: Ongoing, Progressing  Goal: Absence of Hospital-Acquired Illness or Injury  3/9/2023 0030 by Kristine Bellamy RN  Outcome: Ongoing, Progressing  3/9/2023 0030 by Kristine Bellamy RN  Outcome: Ongoing, Progressing  Goal: Optimal Comfort and Wellbeing  3/9/2023 0030 by Kristine Bellamy RN  Outcome: Ongoing, Progressing  3/9/2023 0030 by Kristine Bellamy RN  Outcome: Ongoing, Progressing  Goal: Readiness for Transition of Care  3/9/2023 0030 by Kristine Bellamy RN  Outcome: Ongoing, Progressing  3/9/2023 0030 by Kristine Bellamy RN  Outcome: Ongoing, Progressing     Problem: Dysrhythmia  Goal: Normalized Cardiac Rhythm  Outcome: Ongoing, Progressing     Problem: Chest Pain  Goal: Resolution of Chest Pain Symptoms  Outcome: Ongoing, Progressing

## 2023-03-09 NOTE — NURSING
Heparin drip stopped at this time. The pt appears to have developed a sclera hemorrhage of left eye. The hemorrhage appears to cover most of the inner portion of the left eye. The pt denies any vision changes at this time. I attempted to call Dr. Wiggins but was not able to reach him. A STAT Aptt and CBC ordered and awaiting to be collected at this time.

## 2023-03-09 NOTE — CARE UPDATE
03/08/23 2039   Patient Assessment/Suction   Respiratory Effort Unlabored   Rhythm/Pattern, Respiratory pattern regular   PRE-TX-O2   Device (Oxygen Therapy) room air   SpO2 97 %   Pulse Oximetry Type Continuous   $ Pulse Oximetry - Multiple Charge Pulse Oximetry - Multiple   Pulse 64   Resp 20   Respiratory Evaluation   $ Care Plan Tech Time 15 min   $ Eval/Re-eval Charges Evaluation   Evaluation For New Orders

## 2023-03-09 NOTE — NURSING
Dr. Wiggins returned my call at this time. Updated him pertaining to the apparent sclera hemorrhage of the left eye. He agrees with holding heparin drip at this time.

## 2023-03-09 NOTE — NURSING
"Pt went into SVT hr 170-190, Pt stated that she does this frequently at home but is usually able "to break it" on her own, pt asymptomatic and  episode lasted approximately 15 minutes, Dr Oconnell made aware, while attempting to obtain EKG pt went back into NSR in the 80's  "

## 2023-03-10 LAB
ALBUMIN SERPL BCP-MCNC: 4.1 G/DL (ref 3.5–5.2)
ALP SERPL-CCNC: 51 U/L (ref 55–135)
ALT SERPL W/O P-5'-P-CCNC: 58 U/L (ref 10–44)
ANION GAP SERPL CALC-SCNC: 7 MMOL/L (ref 8–16)
APTT BLDCRRT: 67 SEC (ref 21–32)
AST SERPL-CCNC: 41 U/L (ref 10–40)
BASOPHILS # BLD AUTO: 0.05 K/UL (ref 0–0.2)
BASOPHILS NFR BLD: 0.7 % (ref 0–1.9)
BILIRUB SERPL-MCNC: 0.9 MG/DL (ref 0.1–1)
BUN SERPL-MCNC: 9 MG/DL (ref 8–23)
CALCIUM SERPL-MCNC: 9.2 MG/DL (ref 8.7–10.5)
CHLORIDE SERPL-SCNC: 107 MMOL/L (ref 95–110)
CO2 SERPL-SCNC: 22 MMOL/L (ref 23–29)
CREAT SERPL-MCNC: 0.6 MG/DL (ref 0.5–1.4)
CRP SERPL-MCNC: 1.44 MG/DL
DIFFERENTIAL METHOD: ABNORMAL
EOSINOPHIL # BLD AUTO: 0.1 K/UL (ref 0–0.5)
EOSINOPHIL NFR BLD: 1.8 % (ref 0–8)
ERYTHROCYTE [DISTWIDTH] IN BLOOD BY AUTOMATED COUNT: 11.5 % (ref 11.5–14.5)
ERYTHROCYTE [SEDIMENTATION RATE] IN BLOOD BY WESTERGREN METHOD: 9 MM/HR (ref 0–20)
EST. GFR  (NO RACE VARIABLE): >60 ML/MIN/1.73 M^2
GLUCOSE SERPL-MCNC: 111 MG/DL (ref 70–110)
HCT VFR BLD AUTO: 39.5 % (ref 37–48.5)
HGB BLD-MCNC: 14 G/DL (ref 12–16)
IMM GRANULOCYTES # BLD AUTO: 0.03 K/UL (ref 0–0.04)
IMM GRANULOCYTES NFR BLD AUTO: 0.4 % (ref 0–0.5)
LYMPHOCYTES # BLD AUTO: 2.5 K/UL (ref 1–4.8)
LYMPHOCYTES NFR BLD: 34 % (ref 18–48)
MAGNESIUM SERPL-MCNC: 2.1 MG/DL (ref 1.6–2.6)
MCH RBC QN AUTO: 37.2 PG (ref 27–31)
MCHC RBC AUTO-ENTMCNC: 35.4 G/DL (ref 32–36)
MCV RBC AUTO: 105 FL (ref 82–98)
MONOCYTES # BLD AUTO: 0.9 K/UL (ref 0.3–1)
MONOCYTES NFR BLD: 11.9 % (ref 4–15)
NEUTROPHILS # BLD AUTO: 3.7 K/UL (ref 1.8–7.7)
NEUTROPHILS NFR BLD: 51.2 % (ref 38–73)
NRBC BLD-RTO: 0 /100 WBC
PHOSPHATE SERPL-MCNC: 2.6 MG/DL (ref 2.7–4.5)
PLATELET # BLD AUTO: 164 K/UL (ref 150–450)
PMV BLD AUTO: 9.7 FL (ref 9.2–12.9)
POTASSIUM SERPL-SCNC: 3.8 MMOL/L (ref 3.5–5.1)
PROT SERPL-MCNC: 7 G/DL (ref 6–8.4)
RBC # BLD AUTO: 3.76 M/UL (ref 4–5.4)
SODIUM SERPL-SCNC: 136 MMOL/L (ref 136–145)
WBC # BLD AUTO: 7.21 K/UL (ref 3.9–12.7)

## 2023-03-10 PROCEDURE — 25000003 PHARM REV CODE 250

## 2023-03-10 PROCEDURE — C1894 INTRO/SHEATH, NON-LASER: HCPCS | Performed by: INTERNAL MEDICINE

## 2023-03-10 PROCEDURE — 99152 MOD SED SAME PHYS/QHP 5/>YRS: CPT | Mod: ,,, | Performed by: INTERNAL MEDICINE

## 2023-03-10 PROCEDURE — 63600175 PHARM REV CODE 636 W HCPCS: Performed by: INTERNAL MEDICINE

## 2023-03-10 PROCEDURE — 99233 SBSQ HOSP IP/OBS HIGH 50: CPT | Mod: ,,, | Performed by: INTERNAL MEDICINE

## 2023-03-10 PROCEDURE — 85730 THROMBOPLASTIN TIME PARTIAL: CPT | Performed by: HOSPITALIST

## 2023-03-10 PROCEDURE — 99900035 HC TECH TIME PER 15 MIN (STAT)

## 2023-03-10 PROCEDURE — 85651 RBC SED RATE NONAUTOMATED: CPT | Performed by: HOSPITALIST

## 2023-03-10 PROCEDURE — 99153 MOD SED SAME PHYS/QHP EA: CPT | Performed by: INTERNAL MEDICINE

## 2023-03-10 PROCEDURE — 99152 MOD SED SAME PHYS/QHP 5/>YRS: CPT | Performed by: INTERNAL MEDICINE

## 2023-03-10 PROCEDURE — 93458 L HRT ARTERY/VENTRICLE ANGIO: CPT | Mod: 26,,, | Performed by: INTERNAL MEDICINE

## 2023-03-10 PROCEDURE — 80053 COMPREHEN METABOLIC PANEL: CPT | Performed by: HOSPITALIST

## 2023-03-10 PROCEDURE — 86140 C-REACTIVE PROTEIN: CPT | Performed by: HOSPITALIST

## 2023-03-10 PROCEDURE — 94761 N-INVAS EAR/PLS OXIMETRY MLT: CPT

## 2023-03-10 PROCEDURE — C1887 CATHETER, GUIDING: HCPCS | Performed by: INTERNAL MEDICINE

## 2023-03-10 PROCEDURE — 25000003 PHARM REV CODE 250: Performed by: INTERNAL MEDICINE

## 2023-03-10 PROCEDURE — 84100 ASSAY OF PHOSPHORUS: CPT | Performed by: HOSPITALIST

## 2023-03-10 PROCEDURE — 36415 COLL VENOUS BLD VENIPUNCTURE: CPT | Performed by: HOSPITALIST

## 2023-03-10 PROCEDURE — 93458 PR CATH PLACE/CORON ANGIO, IMG SUPER/INTERP,W LEFT HEART VENTRICULOGRAPHY: ICD-10-PCS | Mod: 26,,, | Performed by: INTERNAL MEDICINE

## 2023-03-10 PROCEDURE — C1769 GUIDE WIRE: HCPCS | Performed by: INTERNAL MEDICINE

## 2023-03-10 PROCEDURE — 27000221 HC OXYGEN, UP TO 24 HOURS

## 2023-03-10 PROCEDURE — 21000000 HC CCU ICU ROOM CHARGE

## 2023-03-10 PROCEDURE — 94799 UNLISTED PULMONARY SVC/PX: CPT

## 2023-03-10 PROCEDURE — 85025 COMPLETE CBC W/AUTO DIFF WBC: CPT | Performed by: HOSPITALIST

## 2023-03-10 PROCEDURE — 99233 PR SUBSEQUENT HOSPITAL CARE,LEVL III: ICD-10-PCS | Mod: ,,, | Performed by: INTERNAL MEDICINE

## 2023-03-10 PROCEDURE — 99152 PR MOD CONSCIOUS SEDATION, SAME PHYS, 5+ YRS, FIRST 15 MIN: ICD-10-PCS | Mod: ,,, | Performed by: INTERNAL MEDICINE

## 2023-03-10 PROCEDURE — 83735 ASSAY OF MAGNESIUM: CPT | Performed by: HOSPITALIST

## 2023-03-10 PROCEDURE — 93458 L HRT ARTERY/VENTRICLE ANGIO: CPT | Performed by: INTERNAL MEDICINE

## 2023-03-10 PROCEDURE — 25000003 PHARM REV CODE 250: Performed by: HOSPITALIST

## 2023-03-10 PROCEDURE — 25500020 PHARM REV CODE 255: Performed by: INTERNAL MEDICINE

## 2023-03-10 RX ORDER — SODIUM CHLORIDE 9 MG/ML
INJECTION, SOLUTION INTRAVENOUS CONTINUOUS
Status: ACTIVE | OUTPATIENT
Start: 2023-03-10 | End: 2023-03-10

## 2023-03-10 RX ORDER — CLOPIDOGREL BISULFATE 75 MG/1
75 TABLET ORAL DAILY
Status: DISCONTINUED | OUTPATIENT
Start: 2023-03-10 | End: 2023-03-11 | Stop reason: HOSPADM

## 2023-03-10 RX ORDER — FENTANYL CITRATE 50 UG/ML
INJECTION, SOLUTION INTRAMUSCULAR; INTRAVENOUS
Status: DISCONTINUED | OUTPATIENT
Start: 2023-03-10 | End: 2023-03-10 | Stop reason: HOSPADM

## 2023-03-10 RX ORDER — IODIXANOL 320 MG/ML
INJECTION, SOLUTION INTRAVASCULAR
Status: DISCONTINUED | OUTPATIENT
Start: 2023-03-10 | End: 2023-03-10 | Stop reason: HOSPADM

## 2023-03-10 RX ORDER — LIDOCAINE HYDROCHLORIDE 10 MG/ML
INJECTION, SOLUTION EPIDURAL; INFILTRATION; INTRACAUDAL; PERINEURAL
Status: DISCONTINUED | OUTPATIENT
Start: 2023-03-10 | End: 2023-03-10 | Stop reason: HOSPADM

## 2023-03-10 RX ORDER — MIDAZOLAM HYDROCHLORIDE 1 MG/ML
INJECTION INTRAMUSCULAR; INTRAVENOUS
Status: DISCONTINUED | OUTPATIENT
Start: 2023-03-10 | End: 2023-03-10 | Stop reason: HOSPADM

## 2023-03-10 RX ADMIN — ASPIRIN 81 MG: 81 TABLET, COATED ORAL at 08:03

## 2023-03-10 RX ADMIN — CLOPIDOGREL BISULFATE 75 MG: 75 TABLET, FILM COATED ORAL at 12:03

## 2023-03-10 RX ADMIN — POLYETHYLENE GLYCOL 3350 17 G: 17 POWDER, FOR SOLUTION ORAL at 08:03

## 2023-03-10 RX ADMIN — MUPIROCIN 1 G: 20 OINTMENT TOPICAL at 08:03

## 2023-03-10 RX ADMIN — FAMOTIDINE 20 MG: 20 TABLET ORAL at 08:03

## 2023-03-10 RX ADMIN — METOPROLOL TARTRATE 50 MG: 50 TABLET, FILM COATED ORAL at 12:03

## 2023-03-10 RX ADMIN — FAMOTIDINE 20 MG: 20 TABLET ORAL at 12:03

## 2023-03-10 RX ADMIN — CHLORHEXIDINE GLUCONATE 15 ML: 1.2 RINSE ORAL at 08:03

## 2023-03-10 RX ADMIN — LOSARTAN POTASSIUM 100 MG: 50 TABLET, FILM COATED ORAL at 12:03

## 2023-03-10 RX ADMIN — METOPROLOL TARTRATE 50 MG: 50 TABLET, FILM COATED ORAL at 08:03

## 2023-03-10 RX ADMIN — ATORVASTATIN CALCIUM 40 MG: 40 TABLET, FILM COATED ORAL at 08:03

## 2023-03-10 NOTE — PLAN OF CARE
Pt has an angiogram that was postponed today due to an apparent sclera hemorrhage of the left eye. Ophthalmology reviewed the case. The plan is to continue with the angiogram tomorrow if no further complications continue. Pt is very pleasant but did become frustrated due to the delay in procedure. Pt to be NPO after midnight except for sips of water with medications. Pt does not appear to be in any apparent distress at this time.

## 2023-03-10 NOTE — PROGRESS NOTES
[facility]  Department of Cardiology  Consult Note      PATIENT NAME: Suma Butterfield  MRN: [id number]  TODAY'S DATE: 03/09/2023  ADMIT DATE: 3/8/2023                          CONSULT REQUESTED BY: Pricila Velazquez MD    SUBJECTIVE     PRINCIPAL PROBLEM: NSTEMI (non-ST elevated myocardial infarction)    03/09/2023:  Patient had left eye medial subconjunctival hemorrhage.  Asymptomatic.  No vision impairment.  Discussed with ophthalmology at [facility] and okay to proceed with cardiac catheterization and anticoagulation.  Patient had an episode of SVT overnight which spontaneously converted.        REASON FOR CONSULT:  NSTEMI      HPI:    Patient is a 67-year-old female with past medical history of hypertension, hep C, SVT, chronically elevated liver enzymes, polycythemia who presented to the ED with chest pain and weakness since yesterday morning.  She came to the ED yesterday the wait was too long and she decided to leave.  Her pain persisted so she decided to come back.  No acute STT wave changes on EKG.  She denies fever, chills, headache, dizziness, palpitations nausea, vomiting, jaw neck or arm pain edema or bleeding.  Troponin HS 2374, repeat 3316.5.  Patient was hypertensive during examination, /80.  Patient denies after chest pain during examination.  She admits to drinking 1-2 glasses of wine daily and is an occasional smoker.  K 3.5, creatinine 0.6, H&H 15 and 42, .    FROM H&P:  HPI: 67-year-old lady with prior history of hypertension, cured hepatitis-C, SVT, chronically elevated liver enzymes presented to emergency room with chief complaint of chest pain.  Upon further asking patient mentioned that she was in her usual state of health until yesterday morning when she started experiencing midsternal chest discomfort and she presented to ER.  Yesterday in ER her workup revealed mildly elevated troponin however due to busy emergency room she decided to leave and was not evaluated by  ER provider.  Her symptoms persisted and she felt weak and decided to come back to ED today.  Otherwise denies any fever, chills, headache, dizziness, palpitations, nausea, vomiting, bladder or bowel symptoms.  She denies any prior history of coronary artery disease, stroke or CHF.  She also noted to have multiple readings of elevated blood pressure at home as well as in emergency room.  She endorses half pack of cigarette smoking every day and 1-2 glasses of wine every day otherwise denies any recreational drug use.         Review of patient's allergies indicates:   Allergen Reactions    Succinylcholine      **Pseudocholinesterase**       Past Medical History:   Diagnosis Date    Elevated ferritin 10/13/2022    Elevated MCV 10/13/2022    Hepatitis C     Hypertension     Polycythemia 10/13/2022    SVT (supraventricular tachycardia)      No past surgical history on file.  Social History     Tobacco Use    Smoking status: Former     Types: Cigarettes    Smokeless tobacco: Never   Substance Use Topics    Alcohol use: Yes    Drug use: Never        REVIEW OF SYSTEMS  Negative except as mentioned above  OBJECTIVE     VITAL SIGNS (Most Recent)  Temp: 97.7 °F (36.5 °C) (03/09/23 1500)  Pulse: 84 (03/09/23 1801)  Resp: (!) 41 (03/09/23 1801)  BP: 105/67 (03/09/23 1801)  SpO2: 97 % (03/09/23 1801)    VENTILATION STATUS  Resp: (!) 41 (03/09/23 1801)  SpO2: 97 % (03/09/23 1801)       I & O (Last 24H):  Intake/Output Summary (Last 24 hours) at 3/9/2023 1855  Last data filed at 3/9/2023 1801  Gross per 24 hour   Intake 1064.98 ml   Output --   Net 1064.98 ml       WEIGHTS  Wt Readings from Last 3 Encounters:   03/08/23 1625 55.8 kg (123 lb)   03/08/23 0825 55.9 kg (123 lb 3.8 oz)   03/08/23 1315 55.8 kg (123 lb)   03/07/23 1750 55.8 kg (123 lb)       PHYSICAL EXAM  GENERAL: well built, well nourished, well-developed in no apparent distress alert and oriented.   HEENT: Normocephalic. Pupils normal.  Left eye medial subconjunctival  hemorrhage.   NECK: No JVD. No bruit..   CARDIAC: Regular rate and rhythm. S1 is normal.S2 is normal.No gallops, clicks or murmurs noted at this time.  CHEST ANATOMY: normal.   LUNGS: Clear to auscultation. No wheezing or rhonchi..   ABDOMEN: Soft. Normal bowel sounds. Nontender  URINARY: No cam catheter   EXTREMITIES: No cyanosis, clubbing or edema noted at this time.  CENTRAL NERVOUS SYSTEM: No focal motor or sensory deficits noted.   SKIN: Skin without lesions, moist, well perfused.   MUSCLE STRENGTH & TONE: No noteable weakness, atrophy or abnormal movement.     HOME MEDICATIONS:  No current facility-administered medications on file prior to encounter.     Current Outpatient Medications on File Prior to Encounter   Medication Sig Dispense Refill    alendronate (FOSAMAX) 70 MG tablet TK 1 T PO 1 TIME Q WK (Patient taking differently: Take 70 mg by mouth every 7 days. SATURDAYS.) 12 tablet 3    biotin 5,000 mcg TbDL Take 5,000 mcg by mouth once daily.      calcium-vitamin D3 (OS- + D3) 500 mg-5 mcg (200 unit) per tablet Take 1 tablet by mouth once daily.      finasteride (PROSCAR) 5 mg tablet Take 1 tablet (5 mg total) by mouth once daily. 30 tablet 11    fluocinonide (LIDEX) 0.05 % external solution APPLY TOPICALLY TO THE AFFECTED AREA DAILY 60 mL 2    hydroCHLOROthiazide (HYDRODIURIL) 12.5 MG Tab Take 1 tablet (12.5 mg total) by mouth once daily. 90 tablet 1    losartan (COZAAR) 100 MG tablet Take 1 tablet (100 mg total) by mouth once daily. 30 tablet 2    metoprolol succinate (TOPROL-XL) 100 MG 24 hr tablet Take 1 tablet (100 mg total) by mouth once daily. 90 tablet 1    milk thistle 175 mg tablet Take 175 mg by mouth once daily.      omega-3 fatty acids/fish oil (FISH OIL-OMEGA-3 FATTY ACIDS) 300-1,000 mg capsule Take 1 capsule by mouth once daily.      vitamin E 400 UNIT capsule Take 400 Units by mouth once daily.      zinc gluconate 50 mg tablet Take 50 mg by mouth once daily.      cyanocobalamin  (VITAMIN B-12) 1000 MCG tablet Take 100 mcg by mouth once daily.         SCHEDULED MEDS:   aspirin  81 mg Oral Daily    atorvastatin  40 mg Oral QHS    chlorhexidine  15 mL Mouth/Throat BID    famotidine  20 mg Oral BID    losartan  100 mg Oral Daily    metoprolol tartrate  25 mg Oral BID    mupirocin   Nasal BID    polyethylene glycol  17 g Oral BID       CONTINUOUS INFUSIONS:   heparin (porcine) in D5W 12 Units/kg/hr (03/09/23 1726)    nitroGLYCERIN 10 mcg/min (03/09/23 1801)       PRN MEDS:acetaminophen, acetaminophen, glucagon (human recombinant), glucose, glucose, heparin (PORCINE), heparin (PORCINE), hydrALAZINE, HYDROcodone-acetaminophen, magnesium oxide, magnesium oxide, melatonin, morphine, naloxone, ondansetron, potassium bicarbonate, potassium bicarbonate, potassium bicarbonate, potassium, sodium phosphates, potassium, sodium phosphates, potassium, sodium phosphates, simethicone, sodium chloride 0.9%, sodium chloride 0.9%    LABS AND DIAGNOSTICS     CBC LAST 3 DAYS  Recent Labs   Lab 03/07/23  1902 03/08/23  0850 03/09/23  0201 03/09/23  0735   WBC 6.86 7.28 9.39 9.37   RBC 3.89* 4.20 4.00 3.92*   HGB 14.4 15.7 15.1 14.6   HCT 40.7 42.9 40.8 41.6   * 102* 102* 106*   MCH 37.0* 37.4* 37.8* 37.2*   MCHC 35.4 36.6* 37.0* 35.1   RDW 11.6 11.3* 11.5 11.6    189 176 145*   MPV 9.7 9.2 9.5 9.6   GRAN 51.6  3.5 60.8  4.4 64.6  6.1  --    LYMPH 35.6  2.4 26.1  1.9 23.3  2.2  --    MONO 9.5  0.7 10.3  0.8 10.2  1.0  --    BASO 0.06 0.07 0.06  --    NRBC 0 0 0  --        COAGULATION LAST 3 DAYS  Recent Labs   Lab 03/08/23  0850 03/08/23  1638 03/09/23  0201 03/09/23  0912 03/09/23  1531   INR 1.0  --   --   --   --    APTT 24.9   < > 29.3 34.5* 24.5    < > = values in this interval not displayed.       CHEMISTRY LAST 3 DAYS  Recent Labs   Lab 03/07/23  1902 03/08/23  0850 03/09/23  0201   * 133* 131*   K 3.3* 3.5 3.4*   CL 98 97 95   CO2 25 25 24   ANIONGAP 9 11 12   BUN 16 10 11    CREATININE 0.6 0.6 0.7   * 115* 141*   CALCIUM 9.8 10.3 9.2   MG 1.9  --  1.8   ALBUMIN 4.7 4.9 4.3   PROT 7.6 8.1 7.3   ALKPHOS 54* 60 56   ALT 83* 96* 89*   AST 81* 112* 101*   BILITOT 0.6 1.0 0.9       CARDIAC PROFILE LAST 3 DAYS  Recent Labs   Lab 03/07/23  1902 03/08/23  0850 03/08/23  1202 03/08/23  1808 03/09/23  0046 03/09/23  0734   BNP 61 218*  --   --   --   --    TROPONINIHS 46.9* 2374.8*   < > 4785.9* 4476.6* 3017.3*    < > = values in this interval not displayed.       ENDOCRINE LAST 3 DAYS  No results for input(s): TSH, PROCAL in the last 168 hours.    LAST ARTERIAL BLOOD GAS  ABG  No results for input(s): PH, PO2, PCO2, HCO3, BE in the last 168 hours.    LAST 7 DAYS MICROBIOLOGY   Microbiology Results (last 7 days)       ** No results found for the last 168 hours. **            MOST RECENT IMAGING  Echo  · The left ventricle is normal in size with concentric remodeling and   normal systolic function.  · The estimated ejection fraction is 65%.  · Normal left ventricular diastolic function.  · Normal right ventricular size with normal right ventricular systolic   function.         ECHOCARDIOGRAM RESULTS (last 5)  No results found for this or any previous visit.      CURRENT/PREVIOUS VISIT EKG  Results for orders placed or performed during the hospital encounter of 03/08/23   EKG 12-lead    Collection Time: 03/09/23  5:50 AM    Narrative    Test Reason : I21.4,I21.4,    Vent. Rate : 064 BPM     Atrial Rate : 064 BPM     P-R Int : 156 ms          QRS Dur : 072 ms      QT Int : 442 ms       P-R-T Axes : 002 023 023 degrees     QTc Int : 455 ms    Normal sinus rhythm  Nonspecific ST abnormality  Abnormal ECG  When compared with ECG of 08-MAR-2023 08:37,  Nonspecific T wave abnormality now evident in Inferior leads    Referred By: [email or web address]   SELF           Confirmed By:            ASSESSMENT/PLAN:     Active Hospital Problems    Diagnosis    *NSTEMI (non-ST elevated myocardial infarction)     Subconjunctival hemorrhage of left eye    Hypercholesterolemia    Essential hypertension    SVT (supraventricular tachycardia)    Chronic hepatitis C       ASSESSMENT & PLAN:     NSTEMI  Elevated LFTs  Hyperlipidemia  H/o Hep C  Hypertension  SVT  Left eye Subconjunctival hemorrhage     RECOMMENDATIONS:  Discussed with ophthalmology at main [place] regarding subconjunctival hemorrhage.  OK to resume anticoagulation and proceed with cardiac catheterization.  Resume IV heparin today, monitor PTT  Serial eye exams  Continue medical management of NSTEMI  Increase metoprolol to 50 mg b.i.d.  Patient is pain-free and troponins are down trending.   NPO after midnight for cardiac catheterization in a.[facility] Montrell Griffiths MD  [facility]  Department of Cardiology  Date of Service: 03/09/2023

## 2023-03-10 NOTE — PLAN OF CARE
Problem: Adult Inpatient Plan of Care  Goal: Plan of Care Review  Outcome: Ongoing, Progressing  Goal: Patient-Specific Goal (Individualized)  Outcome: Ongoing, Progressing  Goal: Absence of Hospital-Acquired Illness or Injury  Outcome: Ongoing, Progressing  Goal: Optimal Comfort and Wellbeing  Outcome: Ongoing, Progressing  Goal: Readiness for Transition of Care  Outcome: Ongoing, Progressing     Problem: Dysrhythmia  Goal: Normalized Cardiac Rhythm  Outcome: Ongoing, Progressing     Problem: Chest Pain  Goal: Resolution of Chest Pain Symptoms  Outcome: Ongoing, Progressing

## 2023-03-11 VITALS
TEMPERATURE: 99 F | OXYGEN SATURATION: 98 % | BODY MASS INDEX: 24.68 KG/M2 | HEART RATE: 70 BPM | WEIGHT: 125.69 LBS | DIASTOLIC BLOOD PRESSURE: 45 MMHG | HEIGHT: 60 IN | RESPIRATION RATE: 18 BRPM | SYSTOLIC BLOOD PRESSURE: 125 MMHG

## 2023-03-11 LAB
ALBUMIN SERPL BCP-MCNC: 3.8 G/DL (ref 3.5–5.2)
ALP SERPL-CCNC: 47 U/L (ref 55–135)
ALT SERPL W/O P-5'-P-CCNC: 50 U/L (ref 10–44)
ANION GAP SERPL CALC-SCNC: 5 MMOL/L (ref 8–16)
AST SERPL-CCNC: 36 U/L (ref 10–40)
BASOPHILS # BLD AUTO: 0.06 K/UL (ref 0–0.2)
BASOPHILS NFR BLD: 1 % (ref 0–1.9)
BILIRUB SERPL-MCNC: 0.6 MG/DL (ref 0.1–1)
BUN SERPL-MCNC: 10 MG/DL (ref 8–23)
CALCIUM SERPL-MCNC: 8.7 MG/DL (ref 8.7–10.5)
CHLORIDE SERPL-SCNC: 103 MMOL/L (ref 95–110)
CO2 SERPL-SCNC: 25 MMOL/L (ref 23–29)
CREAT SERPL-MCNC: 0.6 MG/DL (ref 0.5–1.4)
DIFFERENTIAL METHOD: ABNORMAL
EOSINOPHIL # BLD AUTO: 0.1 K/UL (ref 0–0.5)
EOSINOPHIL NFR BLD: 2 % (ref 0–8)
ERYTHROCYTE [DISTWIDTH] IN BLOOD BY AUTOMATED COUNT: 11.5 % (ref 11.5–14.5)
EST. GFR  (NO RACE VARIABLE): >60 ML/MIN/1.73 M^2
GLUCOSE SERPL-MCNC: 104 MG/DL (ref 70–110)
HCT VFR BLD AUTO: 36.9 % (ref 37–48.5)
HGB BLD-MCNC: 12.9 G/DL (ref 12–16)
IMM GRANULOCYTES # BLD AUTO: 0.02 K/UL (ref 0–0.04)
IMM GRANULOCYTES NFR BLD AUTO: 0.3 % (ref 0–0.5)
LYMPHOCYTES # BLD AUTO: 2.3 K/UL (ref 1–4.8)
LYMPHOCYTES NFR BLD: 39.1 % (ref 18–48)
MAGNESIUM SERPL-MCNC: 2.1 MG/DL (ref 1.6–2.6)
MCH RBC QN AUTO: 37.5 PG (ref 27–31)
MCHC RBC AUTO-ENTMCNC: 35 G/DL (ref 32–36)
MCV RBC AUTO: 107 FL (ref 82–98)
MONOCYTES # BLD AUTO: 0.6 K/UL (ref 0.3–1)
MONOCYTES NFR BLD: 10.8 % (ref 4–15)
NEUTROPHILS # BLD AUTO: 2.8 K/UL (ref 1.8–7.7)
NEUTROPHILS NFR BLD: 46.8 % (ref 38–73)
NRBC BLD-RTO: 0 /100 WBC
PHOSPHATE SERPL-MCNC: 2.6 MG/DL (ref 2.7–4.5)
PLATELET # BLD AUTO: 151 K/UL (ref 150–450)
PMV BLD AUTO: 9.6 FL (ref 9.2–12.9)
POTASSIUM SERPL-SCNC: 4 MMOL/L (ref 3.5–5.1)
PROT SERPL-MCNC: 6.5 G/DL (ref 6–8.4)
RBC # BLD AUTO: 3.44 M/UL (ref 4–5.4)
SODIUM SERPL-SCNC: 133 MMOL/L (ref 136–145)
WBC # BLD AUTO: 5.93 K/UL (ref 3.9–12.7)

## 2023-03-11 PROCEDURE — 93010 EKG 12-LEAD: ICD-10-PCS | Mod: ,,, | Performed by: GENERAL PRACTICE

## 2023-03-11 PROCEDURE — 25000003 PHARM REV CODE 250: Performed by: HOSPITALIST

## 2023-03-11 PROCEDURE — 36415 COLL VENOUS BLD VENIPUNCTURE: CPT | Performed by: HOSPITALIST

## 2023-03-11 PROCEDURE — 84100 ASSAY OF PHOSPHORUS: CPT | Performed by: HOSPITALIST

## 2023-03-11 PROCEDURE — 93010 ELECTROCARDIOGRAM REPORT: CPT | Mod: ,,, | Performed by: GENERAL PRACTICE

## 2023-03-11 PROCEDURE — 85025 COMPLETE CBC W/AUTO DIFF WBC: CPT | Performed by: HOSPITALIST

## 2023-03-11 PROCEDURE — 93005 ELECTROCARDIOGRAM TRACING: CPT | Performed by: GENERAL PRACTICE

## 2023-03-11 PROCEDURE — 25000003 PHARM REV CODE 250

## 2023-03-11 PROCEDURE — 83735 ASSAY OF MAGNESIUM: CPT | Performed by: HOSPITALIST

## 2023-03-11 PROCEDURE — 80053 COMPREHEN METABOLIC PANEL: CPT | Performed by: HOSPITALIST

## 2023-03-11 PROCEDURE — 25000003 PHARM REV CODE 250: Performed by: INTERNAL MEDICINE

## 2023-03-11 RX ORDER — CLOPIDOGREL BISULFATE 75 MG/1
75 TABLET ORAL DAILY
Qty: 30 TABLET | Refills: 11 | Status: SHIPPED | OUTPATIENT
Start: 2023-03-11 | End: 2023-05-02 | Stop reason: SDUPTHER

## 2023-03-11 RX ORDER — ATORVASTATIN CALCIUM 40 MG/1
40 TABLET, FILM COATED ORAL NIGHTLY
Qty: 30 TABLET | Refills: 1 | Status: SHIPPED | OUTPATIENT
Start: 2023-03-11 | End: 2023-05-05 | Stop reason: SDUPTHER

## 2023-03-11 RX ORDER — METOPROLOL TARTRATE 50 MG/1
50 TABLET ORAL 2 TIMES DAILY
Qty: 60 TABLET | Refills: 1 | Status: SHIPPED | OUTPATIENT
Start: 2023-03-11 | End: 2023-05-05 | Stop reason: SDUPTHER

## 2023-03-11 RX ORDER — ASPIRIN 81 MG/1
81 TABLET ORAL DAILY
Qty: 30 TABLET | Refills: 1 | Status: SHIPPED | OUTPATIENT
Start: 2023-03-11 | End: 2023-05-02 | Stop reason: SDUPTHER

## 2023-03-11 RX ADMIN — FAMOTIDINE 20 MG: 20 TABLET ORAL at 08:03

## 2023-03-11 RX ADMIN — CLOPIDOGREL BISULFATE 75 MG: 75 TABLET, FILM COATED ORAL at 08:03

## 2023-03-11 RX ADMIN — MUPIROCIN 1 G: 20 OINTMENT TOPICAL at 08:03

## 2023-03-11 RX ADMIN — ASPIRIN 81 MG: 81 TABLET, COATED ORAL at 08:03

## 2023-03-11 RX ADMIN — LOSARTAN POTASSIUM 100 MG: 50 TABLET, FILM COATED ORAL at 08:03

## 2023-03-11 RX ADMIN — CHLORHEXIDINE GLUCONATE 15 ML: 1.2 RINSE ORAL at 09:03

## 2023-03-11 RX ADMIN — METOPROLOL TARTRATE 50 MG: 50 TABLET, FILM COATED ORAL at 08:03

## 2023-03-11 NOTE — CARE UPDATE
03/10/23 2203   Patient Assessment/Suction   Level of Consciousness (AVPU) alert   Respiratory Effort Unlabored   Expansion/Accessory Muscles/Retractions no use of accessory muscles   All Lung Fields Breath Sounds clear;diminished   Rhythm/Pattern, Respiratory no shortness of breath reported   Cough Frequency no cough   PRE-TX-O2   Device (Oxygen Therapy) nasal cannula   $ Is the patient on Low Flow Oxygen? Yes   Flow (L/min) 4   SpO2 98 %   Pulse Oximetry Type Continuous   $ Pulse Oximetry - Multiple Charge Pulse Oximetry - Multiple   Pulse 65   Resp 20   Positioning   Head of Bed (HOB) Positioning HOB elevated;HOB at 20-30 degrees   Respiratory Evaluation   $ Care Plan Tech Time 15 min   $ Eval/Re-eval Charges Re-evaluation

## 2023-03-11 NOTE — PROGRESS NOTES
[facility]  Department of Cardiology  Consult Note      PATIENT NAME: Suma Butterfield  MRN: [id number]  TODAY'S DATE: 03/10/2023  ADMIT DATE: 3/8/2023                          CONSULT REQUESTED BY: Tico Bush MD    SUBJECTIVE     PRINCIPAL PROBLEM: NSTEMI (non-ST elevated myocardial infarction)    03/10/2023:  Status post cardiac catheterization today.  Patient tolerated the procedure well.     03/09/2023:  Patient had left eye medial subconjunctival hemorrhage.  Asymptomatic.  No vision impairment.  Discussed with ophthalmology at [facility] and [place] to proceed with cardiac catheterization and anticoagulation.  Patient had an episode of SVT overnight which spontaneously converted.        REASON FOR CONSULT:  NSTEMI      HPI:    Patient is a 67-year-old female with past medical history of hypertension, hep C, SVT, chronically elevated liver enzymes, polycythemia who presented to the ED with chest pain and weakness since yesterday morning.  She came to the ED yesterday the wait was too long and she decided to leave.  Her pain persisted so she decided to come back.  No acute STT wave changes on EKG.  She denies fever, chills, headache, dizziness, palpitations nausea, vomiting, jaw neck or arm pain edema or bleeding.  Troponin HS 2374, repeat 3316.5.  Patient was hypertensive during examination, /80.  Patient denies after chest pain during examination.  She admits to drinking 1-2 glasses of wine daily and is an occasional smoker.  K 3.5, creatinine 0.6, H&H 15 and 42, .    FROM H&P:  HPI: 67-year-old lady with prior history of hypertension, cured hepatitis-C, SVT, chronically elevated liver enzymes presented to emergency room with chief complaint of chest pain.  Upon further asking patient mentioned that she was in her usual state of health until yesterday morning when she started experiencing midsternal chest discomfort and she presented to ER.  Yesterday in ER her workup revealed mildly  elevated troponin however due to busy emergency room she decided to leave and was not evaluated by ER provider.  Her symptoms persisted and she felt weak and decided to come back to ED today.  Otherwise denies any fever, chills, headache, dizziness, palpitations, nausea, vomiting, bladder or bowel symptoms.  She denies any prior history of coronary artery disease, stroke or CHF.  She also noted to have multiple readings of elevated blood pressure at home as well as in emergency room.  She endorses half pack of cigarette smoking every day and 1-2 glasses of wine every day otherwise denies any recreational drug use.         Review of patient's allergies indicates:   Allergen Reactions    Succinylcholine      **Pseudocholinesterase**       Past Medical History:   Diagnosis Date    Elevated ferritin 10/13/2022    Elevated MCV 10/13/2022    Hepatitis C     Hypertension     Polycythemia 10/13/2022    SVT (supraventricular tachycardia)      No past surgical history on file.  Social History     Tobacco Use    Smoking status: Former     Types: Cigarettes    Smokeless tobacco: Never   Substance Use Topics    Alcohol use: Yes    Drug use: Never        REVIEW OF SYSTEMS  Negative except as mentioned above  OBJECTIVE     VITAL SIGNS (Most Recent)  Temp: 97.2 °F (36.2 °C) (03/10/23 2312)  Pulse: 67 (03/10/23 1500)  Resp: (!) 27 (03/10/23 1500)  BP: (!) 113/52 (03/10/23 2312)  SpO2: 98 % (03/10/23 1500)    VENTILATION STATUS  Resp: (!) 27 (03/10/23 1500)  SpO2: 98 % (03/10/23 1500)       I & O (Last 24H):No intake or output data in the 24 hours ending 03/10/23 2352      WEIGHTS  Wt Readings from Last 3 Encounters:   03/10/23 0400 53.5 kg (117 lb 15.1 oz)   03/08/23 1625 55.8 kg (123 lb)   03/08/23 0825 55.9 kg (123 lb 3.8 oz)   03/08/23 1315 55.8 kg (123 lb)   03/07/23 1750 55.8 kg (123 lb)       PHYSICAL EXAM  GENERAL: well built, well nourished, well-developed in no apparent distress alert and oriented.   HEENT: Normocephalic.  Pupils normal.  Left eye medial subconjunctival hemorrhage improving.   NECK: No JVD. No bruit..   CARDIAC: Regular rate and rhythm. S1 is normal.S2 is normal.No gallops, clicks or murmurs noted at this time.  CHEST ANATOMY: normal.   LUNGS: Clear to auscultation. No wheezing or rhonchi..   ABDOMEN: Soft. Normal bowel sounds. Nontender  URINARY: No cam catheter   EXTREMITIES: No cyanosis, clubbing or edema noted at this time.  CENTRAL NERVOUS SYSTEM: No focal motor or sensory deficits noted.   SKIN: Skin without lesions, moist, well perfused.   MUSCLE STRENGTH & TONE: No noteable weakness, atrophy or abnormal movement.     HOME MEDICATIONS:  No current facility-administered medications on file prior to encounter.     Current Outpatient Medications on File Prior to Encounter   Medication Sig Dispense Refill    alendronate (FOSAMAX) 70 MG tablet TK 1 T PO 1 TIME Q WK (Patient taking differently: Take 70 mg by mouth every 7 days. SATURDAYS.) 12 tablet 3    biotin 5,000 mcg TbDL Take 5,000 mcg by mouth once daily.      calcium-vitamin D3 (OS- + D3) 500 mg-5 mcg (200 unit) per tablet Take 1 tablet by mouth once daily.      finasteride (PROSCAR) 5 mg tablet Take 1 tablet (5 mg total) by mouth once daily. 30 tablet 11    fluocinonide (LIDEX) 0.05 % external solution APPLY TOPICALLY TO THE AFFECTED AREA DAILY 60 mL 2    hydroCHLOROthiazide (HYDRODIURIL) 12.5 MG Tab Take 1 tablet (12.5 mg total) by mouth once daily. 90 tablet 1    losartan (COZAAR) 100 MG tablet Take 1 tablet (100 mg total) by mouth once daily. 30 tablet 2    metoprolol succinate (TOPROL-XL) 100 MG 24 hr tablet Take 1 tablet (100 mg total) by mouth once daily. 90 tablet 1    milk thistle 175 mg tablet Take 175 mg by mouth once daily.      omega-3 fatty acids/fish oil (FISH OIL-OMEGA-3 FATTY ACIDS) 300-1,000 mg capsule Take 1 capsule by mouth once daily.      vitamin E 400 UNIT capsule Take 400 Units by mouth once daily.      zinc gluconate 50 mg  tablet Take 50 mg by mouth once daily.      cyanocobalamin (VITAMIN B-12) 1000 MCG tablet Take 100 mcg by mouth once daily.         SCHEDULED MEDS:   aspirin  81 mg Oral Daily    atorvastatin  40 mg Oral QHS    chlorhexidine  15 mL Mouth/Throat BID    clopidogreL  75 mg Oral Daily    famotidine  20 mg Oral BID    losartan  100 mg Oral Daily    metoprolol tartrate  50 mg Oral BID    mupirocin   Nasal BID    polyethylene glycol  17 g Oral BID       CONTINUOUS INFUSIONS:        PRN MEDS:acetaminophen, acetaminophen, glucagon (human recombinant), glucose, glucose, hydrALAZINE, HYDROcodone-acetaminophen, magnesium oxide, magnesium oxide, melatonin, morphine, naloxone, ondansetron, potassium bicarbonate, potassium bicarbonate, potassium bicarbonate, potassium, sodium phosphates, potassium, sodium phosphates, potassium, sodium phosphates, simethicone, sodium chloride 0.9%, sodium chloride 0.9%    LABS AND DIAGNOSTICS     CBC LAST 3 DAYS  Recent Labs   Lab 03/08/23  0850 03/09/23  0201 03/09/23  0735 03/10/23  0557   WBC 7.28 9.39 9.37 7.21   RBC 4.20 4.00 3.92* 3.76*   HGB 15.7 15.1 14.6 14.0   HCT 42.9 40.8 41.6 39.5   * 102* 106* 105*   MCH 37.4* 37.8* 37.2* 37.2*   MCHC 36.6* 37.0* 35.1 35.4   RDW 11.3* 11.5 11.6 11.5    176 145* 164   MPV 9.2 9.5 9.6 9.7   GRAN 60.8  4.4 64.6  6.1  --  51.2  3.7   LYMPH 26.1  1.9 23.3  2.2  --  34.0  2.5   MONO 10.3  0.8 10.2  1.0  --  11.9  0.9   BASO 0.07 0.06  --  0.05   NRBC 0 0  --  0       COAGULATION LAST 3 DAYS  Recent Labs   Lab 03/08/23  0850 03/08/23  1638 03/09/23  1531 03/09/23  2322 03/10/23  0557   INR 1.0  --   --   --   --    APTT 24.9   < > 24.5 35.3* 67.0*    < > = values in this interval not displayed.       CHEMISTRY LAST 3 DAYS  Recent Labs   Lab 03/07/23  1902 03/08/23  0850 03/09/23  0201 03/10/23  0557   * 133* 131* 136   K 3.3* 3.5 3.4* 3.8   CL 98 97 95 107   CO2 25 25 24 22*   ANIONGAP 9 11 12 7*   BUN 16 10 11 9   CREATININE 0.6  0.6 0.7 0.6   * 115* 141* 111*   CALCIUM 9.8 10.3 9.2 9.2   MG 1.9  --  1.8 2.1   ALBUMIN 4.7 4.9 4.3 4.1   PROT 7.6 8.1 7.3 7.0   ALKPHOS 54* 60 56 51*   ALT 83* 96* 89* 58*   AST 81* 112* 101* 41*   BILITOT 0.6 1.0 0.9 0.9       CARDIAC PROFILE LAST 3 DAYS  Recent Labs   Lab 03/07/23  1902 03/08/23  0850 03/08/23  1202 03/08/23  1808 03/09/23  0046 03/09/23  0734   BNP 61 218*  --   --   --   --    TROPONINIHS 46.9* 2374.8*   < > 4785.9* 4476.6* 3017.3*    < > = values in this interval not displayed.       ENDOCRINE LAST 3 DAYS  No results for input(s): TSH, PROCAL in the last 168 hours.    LAST ARTERIAL BLOOD GAS  ABG  No results for input(s): PH, PO2, PCO2, HCO3, BE in the last 168 hours.    LAST 7 DAYS MICROBIOLOGY   Microbiology Results (last 7 days)       ** No results found for the last 168 hours. **            MOST RECENT IMAGING  Echo  · The left ventricle is normal in size with concentric remodeling and   normal systolic function.  · The estimated ejection fraction is 65%.  · Normal left ventricular diastolic function.  · Normal right ventricular size with normal right ventricular systolic   function.         ECHOCARDIOGRAM RESULTS (last 5)  No results found for this or any previous visit.      CURRENT/PREVIOUS VISIT EKG  Results for orders placed or performed during the hospital encounter of 03/08/23   EKG 12-lead    Collection Time: 03/09/23  5:50 AM    Narrative    Test Reason : I21.4,I21.4,    Vent. Rate : 064 BPM     Atrial Rate : 064 BPM     P-R Int : 156 ms          QRS Dur : 072 ms      QT Int : 442 ms       P-R-T Axes : 002 023 023 degrees     QTc Int : 455 ms    Normal sinus rhythm  Nonspecific ST abnormality  Abnormal ECG  When compared with ECG of 08-MAR-2023 08:37,  Nonspecific T wave abnormality now evident in Inferior leads    Referred By: [email or web address]   SELF           Confirmed By:            ASSESSMENT/PLAN:     Active Hospital Problems    Diagnosis    *NSTEMI (non-ST elevated  myocardial infarction)    Subconjunctival hemorrhage of left eye    Hypercholesterolemia    Essential hypertension    SVT (supraventricular tachycardia)    Chronic hepatitis C       ASSESSMENT & PLAN:     NSTEMI  Elevated LFTs  Hyperlipidemia  H/o Hep C  Hypertension  SVT  Left eye Subconjunctival hemorrhage     RECOMMENDATIONS:  Status post cardiac catheterization today.  Patient had significant one-vessel coronary artery disease with severe disease in a very small branch of RPL which is not amenable to PCI.  No evidence of occlusive coronary disease in the remaining coronary vessels. Full report to follow.   Check ESR, CRP  Medical management of NSTEMI  Aspirin and Plavix for 1 year  Possible discharge tomorrow.        Jill Griffiths MD  [facility]  Department of Cardiology  Date of Service: 03/10/2023

## 2023-03-11 NOTE — PLAN OF CARE
03/11/23 0826   Final Note   Assessment Type Final Discharge Note   Anticipated Discharge Disposition Home   What phone number can be called within the next 1-3 days to see how you are doing after discharge? [phone]   Post-Acute Status   Discharge Delays None known at this time     Patient cleared for discharge from case management standpoint.    Follow up appointments scheduled and added to AVS.    Chart and discharge orders reviewed.  Patient discharged home with no further case management needs.

## 2023-03-12 NOTE — DISCHARGE SUMMARY
[facility] Medicine  Discharge Summary      Patient Name: Suma Butterfield  MRN: [id number]  SHERON: [id number]  Patient Class: IP- Inpatient  Admission Date: 3/8/2023  Hospital Length of Stay: 3 days  Discharge Date and Time: 3/11/2023  9:47 AM  Attending Physician: No att. providers found   Discharging Provider: Tico Bush MD  Primary Care Provider: Mono Ku MD    Primary Care Team: Networked reference to record PCT     HPI:   67-year-old lady with prior history of hypertension, cured hepatitis-C, SVT, chronically elevated liver enzymes presented to emergency room with chief complaint of chest pain.  Upon further asking patient mentioned that she was in her usual state of health until yesterday morning when she started experiencing midsternal chest discomfort and she presented to ER.  Yesterday in ER her workup revealed mildly elevated troponin however due to busy emergency room she decided to leave and was not evaluated by ER provider.  Her symptoms persisted and she felt weak and decided to come back to ED today.  Otherwise denies any fever, chills, headache, dizziness, palpitations, nausea, vomiting, bladder or bowel symptoms.  She denies any prior history of coronary artery disease, stroke or CHF.  She also noted to have multiple readings of elevated blood pressure at home as well as in emergency room.  She endorses half pack of cigarette smoking every day and 1-2 glasses of wine every day otherwise denies any recreational drug use.       Procedure(s) (LRB):  Left heart cath (Left)      Hospital Course:   67-year-old lady with prior history of hypertension, SVT came with chest discomfort found to have NSTEMI and accelerated hypertension.  Started on heparin gtt. She developed left subconjunctival hemorrhage and thus LHC was was postponed until today.  LHC revealed severe disease in a small branch of RPL.  She will be on ASA and plavix for a year.  Inflammatory markers ordered to  evaluate for myocarditis per Cardiology- relatively unremarkable.  On day of discharge, she feels well with no pain or SOB. Ambulating with out difficulty.  NAD.  Per Cardiology, Toprol xl changed to lopressor.  HCTZ stopped as her blood pressure no longer requires/supports it. ASA, Plavix, Lipitor added. Rx sent to her pharmacy. She will follow up with Cardiology in one week. Return precautions given.       Goals of Care Treatment Preferences:  Code Status: Full Code      Consults:   Consults (From admission, onward)        Status Ordering Provider     Inpatient consult to Cardiology  Once        Provider:  Jill Griffiths MD    Completed STAR ENCARNACION          No new Assessment & Plan notes have been filed under this hospital service since the last note was generated.  Service: Hospital Medicine    Final Active Diagnoses:    Diagnosis Date Noted POA    PRINCIPAL PROBLEM:  NSTEMI (non-ST elevated myocardial infarction) [I21.4] 03/08/2023 Yes    Subconjunctival hemorrhage of left eye [H11.32] 03/09/2023 No    Hypercholesterolemia [E78.00] 07/14/2021 Yes     Chronic    Essential hypertension [I10] 03/03/2021 Yes    SVT (supraventricular tachycardia) [I47.1] 06/01/2020 Yes    Chronic hepatitis C [B18.2] 06/08/2017 Yes      Problems Resolved During this Admission:       Discharged Condition: good    Disposition: Home or Self Care    Follow Up:   Follow-up Information     Jill Griffiths MD. Schedule an appointment as soon as possible for a visit in 10 day(s).    Specialty: Cardiology  Why: post hospital discharge follow up for non ST elevation myocardial infarct  Contact information:  [address] [place] [address]  [address] [place] [address]  [phone]                       Patient Instructions:      Diet Cardiac     Notify your health care provider if you experience any of the following:  severe uncontrolled pain     Notify your health care provider if you experience any of the following:  difficulty  breathing or increased cough     Activity as tolerated       Significant Diagnostic Studies: Labs:   CMP   Recent Labs   Lab 03/10/23  0557 03/11/23  0520    133*   K 3.8 4.0    103   CO2 22* 25   * 104   BUN 9 10   CREATININE 0.6 0.6   CALCIUM 9.2 8.7   PROT 7.0 6.5   ALBUMIN 4.1 3.8   BILITOT 0.9 0.6   ALKPHOS 51* 47*   AST 41* 36   ALT 58* 50*   ANIONGAP 7* 5*    and CBC   Recent Labs   Lab 03/10/23  0557 03/11/23  0521   WBC 7.21 5.93   HGB 14.0 12.9   HCT 39.5 36.9*    151       Pending Diagnostic Studies:     Procedure Component Value Units Date/Time    EKG 12-LEAD on arrival to floor [[id number]]     Order Status: Sent Lab Status: No result     EKG 12-LEAD starting tomorrow [[id number]] Collected: 03/11/23 0735    Order Status: Sent Lab Status: In process Updated: 03/11/23 1008    Narrative:      Test Reason : I21.4,    Vent. Rate : 057 BPM     Atrial Rate : 057 BPM     P-R Int : 154 ms          QRS Dur : 074 ms      QT Int : 428 ms       P-R-T Axes : 035 047 036 degrees     QTc Int : 416 ms    Sinus bradycardia  Otherwise normal ECG  When compared with ECG of 09-MAR-2023 05:50,  No significant change was found    Referred By: AAAREFERR   SELF           Confirmed By:          Medications:  Reconciled Home Medications:      Medication List      START taking these medications    aspirin 81 MG EC tablet  Commonly known as: ECOTRIN  Take 1 tablet (81 mg total) by mouth once daily.     atorvastatin 40 MG tablet  Commonly known as: LIPITOR  Take 1 tablet (40 mg total) by mouth every evening.     clopidogreL 75 mg tablet  Commonly known as: PLAVIX  Take 1 tablet (75 mg total) by mouth once daily.     metoprolol tartrate 50 MG tablet  Commonly known as: LOPRESSOR  Take 1 tablet (50 mg total) by mouth 2 (two) times daily.        CHANGE how you take these medications    alendronate 70 MG tablet  Commonly known as: FOSAMAX  TK 1 T PO 1 TIME Q WK  What changed:   · how much to take  · how to take  this  · when to take this  · additional instructions        CONTINUE taking these medications    biotin 5,000 mcg Tbdl  Take 5,000 mcg by mouth once daily.     calcium-vitamin D3 500 mg-5 mcg (200 unit) per tablet  Commonly known as: OS- + D3  Take 1 tablet by mouth once daily.     cyanocobalamin 1000 MCG tablet  Commonly known as: VITAMIN B-12  Take 100 mcg by mouth once daily.     finasteride 5 mg tablet  Commonly known as: PROSCAR  Take 1 tablet (5 mg total) by mouth once daily.     fish oil-omega-3 fatty acids 300-1,000 mg capsule  Take 1 capsule by mouth once daily.     fluocinonide 0.05 % external solution  Commonly known as: LIDEX  APPLY TOPICALLY TO THE AFFECTED AREA DAILY     losartan 100 MG tablet  Commonly known as: COZAAR  Take 1 tablet (100 mg total) by mouth once daily.     milk thistle 175 mg tablet  Take 175 mg by mouth once daily.     vitamin E 400 UNIT capsule  Take 400 Units by mouth once daily.     zinc gluconate 50 mg tablet  Take 50 mg by mouth once daily.        STOP taking these medications    hydroCHLOROthiazide 12.5 MG Tab  Commonly known as: HYDRODIURIL     metoprolol succinate 100 MG 24 hr tablet  Commonly known as: TOPROL-XL            Indwelling Lines/Drains at time of discharge:   Lines/Drains/Airways     None                 Time spent on the discharge of patient: 34 minutes         Tico Bush MD  Department of Hospital Medicine  [facility]

## 2023-03-12 NOTE — HOSPITAL COURSE
67-year-old lady with prior history of hypertension, SVT came with chest discomfort found to have NSTEMI and accelerated hypertension.  Started on heparin gtt. She developed left subconjunctival hemorrhage and thus LHC was was postponed until today.  LHC revealed severe disease in a small branch of RPL.  She will be on ASA and plavix for a year.  Inflammatory markers ordered to evaluate for myocarditis per Cardiology- relatively unremarkable.  On day of discharge, she feels well with no pain or SOB. Ambulating with out difficulty.  NAD.  Per Cardiology, Toprol xl changed to lopressor.  HCTZ stopped as her blood pressure no longer requires/supports it. ASA, Plavix, Lipitor added. Rx sent to her pharmacy. She will follow up with Cardiology in one week. Return precautions given.

## 2023-03-13 ENCOUNTER — TELEPHONE (OUTPATIENT)
Dept: FAMILY MEDICINE | Facility: CLINIC | Age: 68
End: 2023-03-13

## 2023-03-13 ENCOUNTER — TELEPHONE (OUTPATIENT)
Dept: CARDIOLOGY | Facility: CLINIC | Age: 68
End: 2023-03-13
Payer: MEDICARE

## 2023-03-13 NOTE — TELEPHONE ENCOUNTER
----- Message from Ciara Tanner sent at 3/13/2023 10:28 AM CDT -----  Type:  Sooner Appointment Request    Caller is requesting a sooner appointment.  Caller declined first available appointment listed below.  Caller will not accept being placed on the waitlist and is requesting a message be sent to doctor.    Name of Caller:  pt   When is the first available appointment?  Unk   Symptoms:  ed with 10days f/d   Best Call Back Number:  [phone] (home) [phone] (work)    Additional Information:  pt is requesting a call back regarding appt access please advise thank you

## 2023-03-13 NOTE — TELEPHONE ENCOUNTER
----- Message from Barbara Zhang LPN sent at 3/9/2023  4:29 PM CST -----  Regarding: HFU  Call patient - needs post-hospital phone call within 2 business days and hospital follow up visit scheduled within 7-14 days.    Expected discharge- 3/10/23

## 2023-03-14 ENCOUNTER — TELEPHONE (OUTPATIENT)
Dept: FAMILY MEDICINE | Facility: CLINIC | Age: 68
End: 2023-03-14

## 2023-03-14 ENCOUNTER — PATIENT OUTREACH (OUTPATIENT)
Dept: FAMILY MEDICINE | Facility: CLINIC | Age: 68
End: 2023-03-14

## 2023-03-14 NOTE — TELEPHONE ENCOUNTER
Offered pt multiple appointments. Pt declined. Pt states she does not want to miss work. Appt scheduled with cardiology on 3/21/23. States she needs to see cardiology more than us. Agreed with pt advised to call if she needs anything. Pt voiced understanding.

## 2023-03-14 NOTE — TELEPHONE ENCOUNTER
----- Message from Janeth Rivera MA sent at 3/14/2023  8:02 AM CDT -----    ----- Message -----  From: Deana Kerns  Sent: 3/14/2023   7:11 AM CDT  To: Mono Scruggs    - pt is calling back   [phone]

## 2023-03-14 NOTE — PROGRESS NOTES
Discharge Information     Discharge Date:   3/11/23    Primary Discharge Diagnosis:  NSTMI      Discharge Summary:  Reviewed      Medication & Order Review     Were medication changes made or new medications added?   Yes    If so, has the patient filled the prescriptions?  Yes     Was Home Health ordered? No    If so, has Home Health contacted patient and/or initiated services?  No    Name of Home Health Agency? N/A    Durable Medical Equipment ordered?  No     If so, has the DME provider contacted patient and delivered equipment?  N/A    Follow Up               Any problems since discharge? No    How is the patient feeling since returning home?      Have you set up recommended follow up appointments?  Cardiology appt in 1 week.     Schedule Hospital Follow-up appointment within 7-14 days (preferably 7).      Notes:  Offered pt multiple appointments. Pt declined. Pt states she does not want to miss work. Appt scheduled with cardiology on 3/21/23. States she needs to see cardiology more than us. Agreed with pt advised to call if she needs anything. Pt voiced understanding.       Barbara Zhang

## 2023-03-28 ENCOUNTER — OFFICE VISIT (OUTPATIENT)
Dept: CARDIOLOGY | Facility: CLINIC | Age: 68
End: 2023-03-28
Payer: MEDICARE

## 2023-03-28 VITALS
BODY MASS INDEX: 24.23 KG/M2 | WEIGHT: 123.44 LBS | SYSTOLIC BLOOD PRESSURE: 112 MMHG | HEART RATE: 60 BPM | DIASTOLIC BLOOD PRESSURE: 68 MMHG | HEIGHT: 60 IN

## 2023-03-28 DIAGNOSIS — I21.4 NSTEMI (NON-ST ELEVATED MYOCARDIAL INFARCTION): Primary | ICD-10-CM

## 2023-03-28 PROCEDURE — 99214 PR OFFICE/OUTPT VISIT, EST, LEVL IV, 30-39 MIN: ICD-10-PCS | Mod: S$GLB,,, | Performed by: INTERNAL MEDICINE

## 2023-03-28 PROCEDURE — 3008F PR BODY MASS INDEX (BMI) DOCUMENTED: ICD-10-PCS | Mod: CPTII,S$GLB,, | Performed by: INTERNAL MEDICINE

## 2023-03-28 PROCEDURE — 3078F PR MOST RECENT DIASTOLIC BLOOD PRESSURE < 80 MM HG: ICD-10-PCS | Mod: CPTII,S$GLB,, | Performed by: INTERNAL MEDICINE

## 2023-03-28 PROCEDURE — 1160F RVW MEDS BY RX/DR IN RCRD: CPT | Mod: CPTII,S$GLB,, | Performed by: INTERNAL MEDICINE

## 2023-03-28 PROCEDURE — 1126F AMNT PAIN NOTED NONE PRSNT: CPT | Mod: CPTII,S$GLB,, | Performed by: INTERNAL MEDICINE

## 2023-03-28 PROCEDURE — 1111F DSCHRG MED/CURRENT MED MERGE: CPT | Mod: CPTII,S$GLB,, | Performed by: INTERNAL MEDICINE

## 2023-03-28 PROCEDURE — 99999 PR PBB SHADOW E&M-EST. PATIENT-LVL IV: ICD-10-PCS | Mod: PBBFAC,,, | Performed by: INTERNAL MEDICINE

## 2023-03-28 PROCEDURE — 3288F PR FALLS RISK ASSESSMENT DOCUMENTED: ICD-10-PCS | Mod: CPTII,S$GLB,, | Performed by: INTERNAL MEDICINE

## 2023-03-28 PROCEDURE — 1159F PR MEDICATION LIST DOCUMENTED IN MEDICAL RECORD: ICD-10-PCS | Mod: CPTII,S$GLB,, | Performed by: INTERNAL MEDICINE

## 2023-03-28 PROCEDURE — 3078F DIAST BP <80 MM HG: CPT | Mod: CPTII,S$GLB,, | Performed by: INTERNAL MEDICINE

## 2023-03-28 PROCEDURE — 4010F ACE/ARB THERAPY RXD/TAKEN: CPT | Mod: CPTII,S$GLB,, | Performed by: INTERNAL MEDICINE

## 2023-03-28 PROCEDURE — 1101F PR PT FALLS ASSESS DOC 0-1 FALLS W/OUT INJ PAST YR: ICD-10-PCS | Mod: CPTII,S$GLB,, | Performed by: INTERNAL MEDICINE

## 2023-03-28 PROCEDURE — 3074F SYST BP LT 130 MM HG: CPT | Mod: CPTII,S$GLB,, | Performed by: INTERNAL MEDICINE

## 2023-03-28 PROCEDURE — 1101F PT FALLS ASSESS-DOCD LE1/YR: CPT | Mod: CPTII,S$GLB,, | Performed by: INTERNAL MEDICINE

## 2023-03-28 PROCEDURE — 1111F PR DISCHARGE MEDS RECONCILED W/ CURRENT OUTPATIENT MED LIST: ICD-10-PCS | Mod: CPTII,S$GLB,, | Performed by: INTERNAL MEDICINE

## 2023-03-28 PROCEDURE — 99214 OFFICE O/P EST MOD 30 MIN: CPT | Mod: S$GLB,,, | Performed by: INTERNAL MEDICINE

## 2023-03-28 PROCEDURE — 3008F BODY MASS INDEX DOCD: CPT | Mod: CPTII,S$GLB,, | Performed by: INTERNAL MEDICINE

## 2023-03-28 PROCEDURE — 3074F PR MOST RECENT SYSTOLIC BLOOD PRESSURE < 130 MM HG: ICD-10-PCS | Mod: CPTII,S$GLB,, | Performed by: INTERNAL MEDICINE

## 2023-03-28 PROCEDURE — 1160F PR REVIEW ALL MEDS BY PRESCRIBER/CLIN PHARMACIST DOCUMENTED: ICD-10-PCS | Mod: CPTII,S$GLB,, | Performed by: INTERNAL MEDICINE

## 2023-03-28 PROCEDURE — 4010F PR ACE/ARB THEARPY RXD/TAKEN: ICD-10-PCS | Mod: CPTII,S$GLB,, | Performed by: INTERNAL MEDICINE

## 2023-03-28 PROCEDURE — 3288F FALL RISK ASSESSMENT DOCD: CPT | Mod: CPTII,S$GLB,, | Performed by: INTERNAL MEDICINE

## 2023-03-28 PROCEDURE — 1126F PR PAIN SEVERITY QUANTIFIED, NO PAIN PRESENT: ICD-10-PCS | Mod: CPTII,S$GLB,, | Performed by: INTERNAL MEDICINE

## 2023-03-28 PROCEDURE — 99999 PR PBB SHADOW E&M-EST. PATIENT-LVL IV: CPT | Mod: PBBFAC,,, | Performed by: INTERNAL MEDICINE

## 2023-03-28 PROCEDURE — 1159F MED LIST DOCD IN RCRD: CPT | Mod: CPTII,S$GLB,, | Performed by: INTERNAL MEDICINE

## 2023-04-04 ENCOUNTER — HOSPITAL ENCOUNTER (OUTPATIENT)
Dept: RADIOLOGY | Facility: HOSPITAL | Age: 68
Discharge: HOME | End: 2023-04-04
Attending: INTERNAL MEDICINE
Payer: MEDICARE

## 2023-04-04 DIAGNOSIS — R71.8 ELEVATED MCV: ICD-10-CM

## 2023-04-04 DIAGNOSIS — R79.89 ELEVATED FERRITIN: ICD-10-CM

## 2023-04-04 DIAGNOSIS — B18.2 CHRONIC HEPATITIS C WITHOUT HEPATIC COMA: ICD-10-CM

## 2023-04-04 DIAGNOSIS — D75.1 POLYCYTHEMIA: ICD-10-CM

## 2023-04-04 PROCEDURE — 76700 US EXAM ABDOM COMPLETE: CPT | Mod: TC,PO

## 2023-04-07 ENCOUNTER — PATIENT MESSAGE (OUTPATIENT)
Dept: FAMILY MEDICINE | Facility: CLINIC | Age: 68
End: 2023-04-07

## 2023-04-07 DIAGNOSIS — M79.606 PAIN OF LOWER EXTREMITY, UNSPECIFIED LATERALITY: Primary | ICD-10-CM

## 2023-04-10 RX ORDER — METHYLPREDNISOLONE 4 MG/1
TABLET ORAL
Qty: 21 EACH | Refills: 0 | Status: SHIPPED | OUTPATIENT
Start: 2023-04-10 | End: 2023-05-01

## 2023-04-10 RX ORDER — METHOCARBAMOL 750 MG/1
750 TABLET, FILM COATED ORAL 4 TIMES DAILY
Qty: 40 TABLET | Refills: 0 | Status: SHIPPED | OUTPATIENT
Start: 2023-04-10 | End: 2023-04-20

## 2023-04-18 NOTE — PROGRESS NOTES
Subjective:    Patient ID:  Suma Butterfield is a 67 y.o. female patient here for evaluation Post-op Evaluation and Hypertension      History of Present Illness:     67 yr old female came to the clinic for follow up after the hospital discharge. Patient with PMH of HTN, hepc, SVT presented to [facility] in March 2023 with chest pain found to have NSTEMI status post cardiac catheterization which showed severe disease in a small branch of RPL which was not amenable to percutaneous coronary intervention.  Patient was discharged with medical therapy.  She was asymptomatic during the clinic visit.        Review of patient's allergies indicates:   Allergen Reactions    Succinylcholine      **Pseudocholinesterase**       Past Medical History:   Diagnosis Date    Elevated ferritin 10/13/2022    Elevated MCV 10/13/2022    Hepatitis C     Hypertension     Polycythemia 10/13/2022    SVT (supraventricular tachycardia)      Past Surgical History:   Procedure Laterality Date    CORONARY ANGIOGRAPHY N/A 3/10/2023    Procedure: ANGIOGRAM, CORONARY ARTERY;  Surgeon: Jill Griffiths MD;  Location: [facility] CATH/EP LAB;  Service: Cardiology;  Laterality: N/A;    LEFT HEART CATHETERIZATION Left 3/10/2023    Procedure: Left heart cath;  Surgeon: Jill Griffiths MD;  Location: [facility] CATH/EP LAB;  Service: Cardiology;  Laterality: Left;     Social History     Tobacco Use    Smoking status: Former     Types: Cigarettes    Smokeless tobacco: Never   Substance Use Topics    Alcohol use: Yes    Drug use: Never        Review of Systems   Negative except as mentioned in HPI         Objective        Vitals:    03/28/23 1103   BP: 112/68   Pulse: 60       LIPIDS - LAST 2   Lab Results   Component Value Date    CHOL 226 (H) 12/27/2022    CHOL 227 (H) 03/29/2022    HDL 90 12/27/2022    HDL 94 03/29/2022    LDLCALC 117 (H) 12/27/2022    LDLCALC 114 (H) 03/29/2022    TRIG 91 12/27/2022    TRIG 90 03/29/2022    CHOLHDL 2.5  12/27/2022    CHOLHDL 2.4 03/29/2022       CBC - LAST 2  Lab Results   Component Value Date    WBC 6.6 04/04/2023    WBC 5.93 03/11/2023    RBC 4.06 04/04/2023    RBC 3.44 (L) 03/11/2023    HGB 14.9 04/04/2023    HGB 12.9 03/11/2023    HCT 43.4 04/04/2023    HCT 36.9 (L) 03/11/2023    .9 (H) 04/04/2023     (H) 03/11/2023    MCH 36.7 (H) 04/04/2023    MCH 37.5 (H) 03/11/2023    MCHC 34.3 04/04/2023    MCHC 35.0 03/11/2023    RDW 11.3 04/04/2023    RDW 11.5 03/11/2023     04/04/2023     03/11/2023    MPV 9.7 04/04/2023    MPV 9.6 03/11/2023    GRAN 2.8 03/11/2023    GRAN 46.8 03/11/2023    LYMPH 1,360 04/04/2023    LYMPH 20.6 04/04/2023    MONO 759 04/04/2023    MONO 11.5 04/04/2023    BASO 40 04/04/2023    BASO 0.06 03/11/2023    NRBC 0 03/11/2023    NRBC 0 03/10/2023       CHEMISTRY & LIVER FUNCTION - LAST 2  Lab Results   Component Value Date     04/04/2023     (L) 03/11/2023    K 4.5 04/04/2023    K 4.0 03/11/2023     04/04/2023     03/11/2023    CO2 27 04/04/2023    CO2 25 03/11/2023    ANIONGAP 5 (L) 03/11/2023    ANIONGAP 7 (L) 03/10/2023    BUN 12 04/04/2023    BUN 10 03/11/2023    CREATININE 0.67 04/04/2023    CREATININE 0.6 03/11/2023     (H) 04/04/2023     03/11/2023    CALCIUM 9.9 04/04/2023    CALCIUM 8.7 03/11/2023    MG 2.1 03/11/2023    MG 2.1 03/10/2023    ALBUMIN 4.4 04/04/2023    ALBUMIN 3.8 03/11/2023    PROT 7.0 04/04/2023    PROT 6.5 03/11/2023    ALKPHOS 47 (L) 03/11/2023    ALKPHOS 51 (L) 03/10/2023    ALT 35 (H) 04/04/2023    ALT 50 (H) 03/11/2023    AST 36 (H) 04/04/2023    AST 36 03/11/2023    BILITOT 1.0 04/04/2023    BILITOT 0.6 03/11/2023        CARDIAC PROFILE - LAST 2  Lab Results   Component Value Date     (H) 03/08/2023    BNP 61 03/07/2023     10/14/2022    TROPONINI <0.030 06/01/2020    TROPONINIHS 3017.3 (HH) 03/09/2023    TROPONINIHS 4476.6 (HH) 03/09/2023        COAGULATION - LAST 2  Lab Results    Component Value Date    LABPT 13.5 06/01/2020    INR 1.0 03/08/2023    INR 1.1 06/01/2020    APTT 67.0 (H) 03/10/2023    APTT 35.3 (H) 03/09/2023       ENDOCRINE & PSA - LAST 2  Lab Results   Component Value Date    MICROALBUR 0.2 03/29/2022    MICROALBUR <0.2 05/26/2021        ECHOCARDIOGRAM RESULTS  Results for orders placed during the hospital encounter of 03/08/23    Echo    Interpretation Summary  · The left ventricle is normal in size with concentric remodeling and normal systolic function.  · The estimated ejection fraction is 65%.  · Normal left ventricular diastolic function.  · Normal right ventricular size with normal right ventricular systolic function.      CURRENT/PREVIOUS VISIT EKG  Results for orders placed or performed during the hospital encounter of 03/08/23   EKG 12-LEAD starting tomorrow    Collection Time: 03/11/23  7:35 AM    Narrative    Test Reason : I21.4,    Vent. Rate : 057 BPM     Atrial Rate : 057 BPM     P-R Int : 154 ms          QRS Dur : 074 ms      QT Int : 428 ms       P-R-T Axes : 035 047 036 degrees     QTc Int : 416 ms    Sinus bradycardia  Otherwise normal ECG  When compared with ECG of 09-MAR-2023 05:50,  No significant change was found  Confirmed by Aleshia ADEN, Olman ECHEVARRIA ([id number]) on 3/12/2023 12:49:56 PM    Referred By: [email or web address]   SELF           Confirmed By:Olman Monk MD     No valid procedures specified.   No results found for this or any previous visit.    No valid procedures specified.          PREVIOUS STRESS TEST              PREVIOUS ANGIOGRAM        PHYSICAL EXAM    CONSTITUTIONAL: Well built, well nourished in no apparent distress  HEENT: No pallor  NECK: no JVD  LUNGS: CTA b/l  HEART: regular rate and rhythm, S1, S2 normal, no murmur   ABDOMEN: soft, non-tender; bowel sounds normal  EXTREMITIES: No edema  NEURO: AAO X 3   SKIN:  No rash  Psych:  Normal affect    I HAVE REVIEWED :    The vital signs, nurses notes, and all the pertinent radiology and  labs.        Current Outpatient Medications   Medication Instructions    alendronate (FOSAMAX) 70 MG tablet TK 1 T PO 1 TIME Q WK    aspirin (ECOTRIN) 81 mg, Oral, Daily    atorvastatin (LIPITOR) 40 mg, Oral, Nightly    biotin 5,000 mcg, Oral, Daily    calcium-vitamin D3 (OS- + D3) 500 mg-5 mcg (200 unit) per tablet 1 tablet, Oral, Daily    clopidogreL (PLAVIX) 75 mg, Oral, Daily    cyanocobalamin (VITAMIN B-12) 100 mcg, Oral, Daily    finasteride (PROSCAR) 5 mg, Oral, Daily    fluocinonide (LIDEX) 0.05 % external solution APPLY TOPICALLY TO THE AFFECTED AREA DAILY    losartan (COZAAR) 100 mg, Oral, Daily    methocarbamoL (ROBAXIN) 750 mg, Oral, 4 times daily    methylPREDNISolone (MEDROL DOSEPACK) 4 mg tablet use as directed    metoprolol tartrate (LOPRESSOR) 50 mg, Oral, 2 times daily    milk thistle 175 mg, Oral, Daily    omega-3 fatty acids/fish oil (FISH OIL-OMEGA-3 FATTY ACIDS) 300-1,000 mg capsule 1 capsule, Oral, Daily    vitamin E 400 Units, Oral, Daily    zinc gluconate 50 mg, Oral, Daily        Assessment & Plan     67 yr old female came to the clinic for follow up after the hospital discharge. Patient with PMH of HTN, hepc, SVT presented to [facility] in March 2023 with chest pain found to have NSTEMI status post cardiac catheterization which showed severe disease in a small branch of RPL which was not amenable to percutaneous coronary intervention.  Patient was discharged with medical therapy.  She was asymptomatic during the clinic visit.  Continue dual antiplatelet therapy with aspirin and Plavix for 1 year   Continue Lipitor 40 mg  Normal LV function on echo  Continue losartan 100 mg and Lopressor 50 mg b.i.d.  Repeat blood work with PCP      Follow up in about 6 months (around 9/28/2023).

## 2023-05-02 ENCOUNTER — OFFICE VISIT (OUTPATIENT)
Dept: DERMATOLOGY | Facility: CLINIC | Age: 68
End: 2023-05-02
Payer: MEDICARE

## 2023-05-02 DIAGNOSIS — L66.9 CICATRICIAL ALOPECIA: Primary | ICD-10-CM

## 2023-05-02 PROCEDURE — 99999 PR PBB SHADOW E&M-EST. PATIENT-LVL II: ICD-10-PCS | Mod: PBBFAC,,, | Performed by: STUDENT IN AN ORGANIZED HEALTH CARE EDUCATION/TRAINING PROGRAM

## 2023-05-02 PROCEDURE — 99999 PR PBB SHADOW E&M-EST. PATIENT-LVL II: CPT | Mod: PBBFAC,,, | Performed by: STUDENT IN AN ORGANIZED HEALTH CARE EDUCATION/TRAINING PROGRAM

## 2023-05-02 PROCEDURE — 3288F PR FALLS RISK ASSESSMENT DOCUMENTED: ICD-10-PCS | Mod: CPTII,S$GLB,, | Performed by: STUDENT IN AN ORGANIZED HEALTH CARE EDUCATION/TRAINING PROGRAM

## 2023-05-02 PROCEDURE — 1160F PR REVIEW ALL MEDS BY PRESCRIBER/CLIN PHARMACIST DOCUMENTED: ICD-10-PCS | Mod: CPTII,S$GLB,, | Performed by: STUDENT IN AN ORGANIZED HEALTH CARE EDUCATION/TRAINING PROGRAM

## 2023-05-02 PROCEDURE — 1159F PR MEDICATION LIST DOCUMENTED IN MEDICAL RECORD: ICD-10-PCS | Mod: CPTII,S$GLB,, | Performed by: STUDENT IN AN ORGANIZED HEALTH CARE EDUCATION/TRAINING PROGRAM

## 2023-05-02 PROCEDURE — 1101F PT FALLS ASSESS-DOCD LE1/YR: CPT | Mod: CPTII,S$GLB,, | Performed by: STUDENT IN AN ORGANIZED HEALTH CARE EDUCATION/TRAINING PROGRAM

## 2023-05-02 PROCEDURE — 1126F AMNT PAIN NOTED NONE PRSNT: CPT | Mod: CPTII,S$GLB,, | Performed by: STUDENT IN AN ORGANIZED HEALTH CARE EDUCATION/TRAINING PROGRAM

## 2023-05-02 PROCEDURE — 11901 PR INJECTION, SKIN LESIONS, 8 OR MORE: ICD-10-PCS | Mod: S$GLB,,, | Performed by: STUDENT IN AN ORGANIZED HEALTH CARE EDUCATION/TRAINING PROGRAM

## 2023-05-02 PROCEDURE — 1101F PR PT FALLS ASSESS DOC 0-1 FALLS W/OUT INJ PAST YR: ICD-10-PCS | Mod: CPTII,S$GLB,, | Performed by: STUDENT IN AN ORGANIZED HEALTH CARE EDUCATION/TRAINING PROGRAM

## 2023-05-02 PROCEDURE — 4010F ACE/ARB THERAPY RXD/TAKEN: CPT | Mod: CPTII,S$GLB,, | Performed by: STUDENT IN AN ORGANIZED HEALTH CARE EDUCATION/TRAINING PROGRAM

## 2023-05-02 PROCEDURE — 4010F PR ACE/ARB THEARPY RXD/TAKEN: ICD-10-PCS | Mod: CPTII,S$GLB,, | Performed by: STUDENT IN AN ORGANIZED HEALTH CARE EDUCATION/TRAINING PROGRAM

## 2023-05-02 PROCEDURE — 1159F MED LIST DOCD IN RCRD: CPT | Mod: CPTII,S$GLB,, | Performed by: STUDENT IN AN ORGANIZED HEALTH CARE EDUCATION/TRAINING PROGRAM

## 2023-05-02 PROCEDURE — 11901 INJECT SKIN LESIONS >7: CPT | Mod: S$GLB,,, | Performed by: STUDENT IN AN ORGANIZED HEALTH CARE EDUCATION/TRAINING PROGRAM

## 2023-05-02 PROCEDURE — 1126F PR PAIN SEVERITY QUANTIFIED, NO PAIN PRESENT: ICD-10-PCS | Mod: CPTII,S$GLB,, | Performed by: STUDENT IN AN ORGANIZED HEALTH CARE EDUCATION/TRAINING PROGRAM

## 2023-05-02 PROCEDURE — 99499 UNLISTED E&M SERVICE: CPT | Mod: S$GLB,,, | Performed by: STUDENT IN AN ORGANIZED HEALTH CARE EDUCATION/TRAINING PROGRAM

## 2023-05-02 PROCEDURE — 1160F RVW MEDS BY RX/DR IN RCRD: CPT | Mod: CPTII,S$GLB,, | Performed by: STUDENT IN AN ORGANIZED HEALTH CARE EDUCATION/TRAINING PROGRAM

## 2023-05-02 PROCEDURE — 3288F FALL RISK ASSESSMENT DOCD: CPT | Mod: CPTII,S$GLB,, | Performed by: STUDENT IN AN ORGANIZED HEALTH CARE EDUCATION/TRAINING PROGRAM

## 2023-05-02 PROCEDURE — 99499 NO LOS: ICD-10-PCS | Mod: S$GLB,,, | Performed by: STUDENT IN AN ORGANIZED HEALTH CARE EDUCATION/TRAINING PROGRAM

## 2023-05-02 NOTE — PROGRESS NOTES
Subjective:      Patient ID:  Suma Butterfield is a 67 y.o. female who presents for   Chief Complaint   Patient presents with    Follow-up     Hair loss     LOV 5/12/22    Patient here today for f/u on hair loss.  Patient states she is noticing growth in some areas.   Taking finasteride 5mg daily and using Lidex solution nightly.  Using rogaine 5% daily.   ILK at last visit.     Final Pathologic Diagnosis     Skin, scalp, biopsies:   -Cicatricial Alopecia, see comment   Comment: Given the clinical differential central centrifugal cicatricial   alopecia seems most appropriate in this context there are findings of   minaturized hairs present which represent a coexisting androgenic alopecia.   The findings of fibrosis of follicle tracts are nonspecfic, this could   represent a burnt out inflammatory hair process   Comment: Interp By Laura Armenta M.D., Signed on 05/31/2022 at 16:52     No hx of cancer, post-menopausal     transaminitis- Has hx of treated hep C s/p treatment, drinks alcohol daily- following with outside GI  Also following with Dr. Baum for elevated ferritin, RBC count  Had liver ultrasound which should hepatic steatosis      Has SVT and uncontrolled htn- but working w/ her PCP to get it under better control      Review of Systems   Constitutional:  Negative for fever, chills and fatigue.   Respiratory:  Negative for cough and shortness of breath.    Gastrointestinal:  Negative for nausea and vomiting.     Objective:   Physical Exam   Constitutional: She appears well-developed and well-nourished.   Neurological: She is alert and oriented to person, place, and time.   Psychiatric: She has a normal mood and affect.   Skin:   Areas Examined (abnormalities noted in diagram):   Scalp / Hair Palpated and Inspected     Diagram Legend     Erythematous scaling macule/papule c/w actinic keratosis       Vascular papule c/w angioma      Pigmented verrucoid papule/plaque c/w seborrheic keratosis      Yellow  umbilicated papule c/w sebaceous hyperplasia      Irregularly shaped tan macule c/w lentigo     1-2 mm smooth white papules consistent with Milia      Movable subcutaneous cyst with punctum c/w epidermal inclusion cyst      Subcutaneous movable cyst c/w pilar cyst      Firm pink to brown papule c/w dermatofibroma      Pedunculated fleshy papule(s) c/w skin tag(s)      Evenly pigmented macule c/w junctional nevus     Mildly variegated pigmented, slightly irregular-bordered macule c/w mildly atypical nevus      Flesh colored to evenly pigmented papule c/w intradermal nevus       Pink pearly papule/plaque c/w basal cell carcinoma      Erythematous hyperkeratotic cursted plaque c/w SCC      Surgical scar with no sign of skin cancer recurrence      Open and closed comedones      Inflammatory papules and pustules      Verrucoid papule consistent consistent with wart     Erythematous eczematous patches and plaques     Dystrophic onycholytic nail with subungual debris c/w onychomycosis     Umbilicated papule    Erythematous-base heme-crusted tan verrucoid plaque consistent with inflamed seborrheic keratosis     Erythematous Silvery Scaling Plaque c/w Psoriasis     See annotation            Assessment / Plan:        Cicatricial alopecia  Androgenic alopecia  -     fluocinonide (LIDEX) 0.05 % external solution; APPLY TOPICALLY TO THE AFFECTED AREA DAILY  Dispense: 60 mL; Refill: 2  - continue finasteride 5mg daily   - continue rogaine 5% BID  - discussed low dose minoxidil and spironolactone, however w/ hx of HTN on 3 anti-hypertensives and also w/ hx of SVT will not add either right now  -Finasteride is not approved by the FDA for use in women, however multiple studies have shown benefit when used to treat female pattern hair loss in combination with topical minoxidil. Side effects are infrequent, usually mild, and reversible even with maintenance of treatment. They include decreased libido, breast tenderness, and headache.  In premenopausal women, irregular menstruation and spotting may occur. Pregnancy must be avoided while on this medication (risk of feminization of the male fetus)  Intralesional Kenalog 3.3 mg/cc (6 cc total) injected into 14 lesions on the scalp today after obtaining verbal consent including risk of surrounding hypopigmentation. Patient tolerated procedure well.     Units: 2  NDC for Kenalog 10mg/cc:  [id number]           No follow-ups on file.

## 2023-05-02 NOTE — TELEPHONE ENCOUNTER
----- Message from Deana Kerns sent at 5/2/2023  3:48 PM CDT -----  Vm- pt needs refill on 2 of her medications   [phone]

## 2023-05-02 NOTE — TELEPHONE ENCOUNTER
Spoke to pt. States she was discharged from hospital on ASA and plavix. Pt requesting meds sent to pharmacy. Has upcoming appt. Pt will bring bottles to appt.     Rx set up if ok.

## 2023-05-03 RX ORDER — CLOPIDOGREL BISULFATE 75 MG/1
75 TABLET ORAL DAILY
Qty: 30 TABLET | Refills: 11 | Status: SHIPPED | OUTPATIENT
Start: 2023-05-03 | End: 2023-05-29 | Stop reason: SDUPTHER

## 2023-05-03 RX ORDER — ASPIRIN 81 MG/1
81 TABLET ORAL DAILY
Qty: 30 TABLET | Refills: 0 | Status: SHIPPED | OUTPATIENT
Start: 2023-05-03 | End: 2023-05-29 | Stop reason: SDUPTHER

## 2023-05-05 DIAGNOSIS — M81.0 AGE RELATED OSTEOPOROSIS, UNSPECIFIED PATHOLOGICAL FRACTURE PRESENCE: ICD-10-CM

## 2023-05-05 RX ORDER — METOPROLOL TARTRATE 50 MG/1
50 TABLET ORAL 2 TIMES DAILY
Qty: 60 TABLET | Refills: 1 | Status: SHIPPED | OUTPATIENT
Start: 2023-05-05 | End: 2023-05-29 | Stop reason: SDUPTHER

## 2023-05-05 RX ORDER — ALENDRONATE SODIUM 70 MG/1
TABLET ORAL
Qty: 12 TABLET | Refills: 3 | Status: SHIPPED | OUTPATIENT
Start: 2023-05-05 | End: 2023-10-13 | Stop reason: SDUPTHER

## 2023-05-05 RX ORDER — ATORVASTATIN CALCIUM 40 MG/1
40 TABLET, FILM COATED ORAL NIGHTLY
Qty: 30 TABLET | Refills: 1 | Status: SHIPPED | OUTPATIENT
Start: 2023-05-05 | End: 2023-08-20 | Stop reason: SDUPTHER

## 2023-05-05 NOTE — TELEPHONE ENCOUNTER
----- Message from Deana Kerns sent at 5/5/2023  9:58 AM CDT -----  - pt called earlier in the week about alendronate and metoprolol and it is still not at the pharmacy     [phone]

## 2023-05-05 NOTE — TELEPHONE ENCOUNTER
Spoke with pt states she called the pharmacy earlier this week not our office. States she is out and needs for her upcoming appt.

## 2023-05-09 DIAGNOSIS — I10 ESSENTIAL HYPERTENSION: ICD-10-CM

## 2023-05-09 DIAGNOSIS — L64.9 ANDROGENIC ALOPECIA: ICD-10-CM

## 2023-05-09 RX ORDER — LOSARTAN POTASSIUM 100 MG/1
100 TABLET ORAL DAILY
Qty: 30 TABLET | Refills: 2 | Status: SHIPPED | OUTPATIENT
Start: 2023-05-09 | End: 2023-05-29 | Stop reason: SDUPTHER

## 2023-05-10 RX ORDER — FLUOCINONIDE TOPICAL SOLUTION USP, 0.05% 0.5 MG/ML
SOLUTION TOPICAL
Qty: 60 ML | Refills: 5 | Status: SHIPPED | OUTPATIENT
Start: 2023-05-10 | End: 2023-10-10 | Stop reason: SDUPTHER

## 2023-05-23 ENCOUNTER — HOSPITAL ENCOUNTER (EMERGENCY)
Facility: HOSPITAL | Age: 68
Discharge: HOME | End: 2023-05-23
Attending: EMERGENCY MEDICINE
Payer: MEDICARE

## 2023-05-23 VITALS
HEIGHT: 60 IN | BODY MASS INDEX: 24.15 KG/M2 | WEIGHT: 123 LBS | OXYGEN SATURATION: 97 % | TEMPERATURE: 98 F | RESPIRATION RATE: 16 BRPM | SYSTOLIC BLOOD PRESSURE: 153 MMHG | DIASTOLIC BLOOD PRESSURE: 81 MMHG | HEART RATE: 57 BPM

## 2023-05-23 DIAGNOSIS — M25.531 PAIN IN BOTH WRISTS: Primary | ICD-10-CM

## 2023-05-23 DIAGNOSIS — M25.532 PAIN IN BOTH WRISTS: Primary | ICD-10-CM
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
## 2023-05-23 PROCEDURE — 99284 EMERGENCY DEPT VISIT MOD MDM: CPT

## 2023-05-23 RX ORDER — DICLOFENAC SODIUM 75 MG/1
75 TABLET, DELAYED RELEASE ORAL 2 TIMES DAILY
Qty: 14 TABLET | Refills: 0 | Status: SHIPPED | OUTPATIENT
Start: 2023-05-23

## 2023-05-23 NOTE — ED NOTES
"Fall injuring L knee and bilateral wrists. Pain unrelieved by 4 ADVIL TAKEN PTA. PRIVATE ROOM. EVEN AND NON LABORED RESPIRATIONS.  AIRWAY CLEAR.  PULSES REGULAR.  < 3" CAPILLARY REFILL. SKIN WD. CONTUSION NOTED AT L KNEE AREA.  MAEW.  NON DISTENDED ABDOMEN. ALERT, ORIENTED AND AMBULATORY. 8/10 PAIN REPORTED AT THIS TIME.  CALL LIGHT IN REACH.   "

## 2023-05-23 NOTE — ED PROVIDER NOTES
Encounter Date: 5/23/2023       History     Chief Complaint   Patient presents with    Wrist Pain     Fell yesterday morning - wrist pain increased during the day yesterday and this morning - would like them checked.  Also having pain to left knee     Patient presents emergency department for reported bilateral wrist pain status post fall while walking yesterday on the way to work tripped on a area on the sidewalk falling forward bruising and abrading her left knee and injuring her wrist bilaterally she denies any previous wrist fractures she describes pain that extends from the midportion of her thumb to the upper portion of the radius bilaterally she did not strike her head she did not lose conscious she denies any neck pain      Review of patient's allergies indicates:   Allergen Reactions    Succinylcholine      **Pseudocholinesterase**     Past Medical History:   Diagnosis Date    Elevated ferritin 10/13/2022    Elevated MCV 10/13/2022    Hepatitis C     Hypertension     Polycythemia 10/13/2022    SVT (supraventricular tachycardia)      Past Surgical History:   Procedure Laterality Date    CORONARY ANGIOGRAPHY N/A 3/10/2023    Procedure: ANGIOGRAM, CORONARY ARTERY;  Surgeon: Jill Griffiths MD;  Location: [facility] CATH/EP LAB;  Service: Cardiology;  Laterality: N/A;    LEFT HEART CATHETERIZATION Left 3/10/2023    Procedure: Left heart cath;  Surgeon: Jill Griffiths MD;  Location: [facility] CATH/EP LAB;  Service: Cardiology;  Laterality: Left;     Family History   Problem Relation Age of Onset    Glaucoma Neg Hx     Macular degeneration Neg Hx     Retinal detachment Neg Hx      Social History     Tobacco Use    Smoking status: Former     Types: Cigarettes    Smokeless tobacco: Never   Substance Use Topics    Alcohol use: Yes    Drug use: Never     Review of Systems   Musculoskeletal:  Positive for arthralgias. Negative for joint swelling.   All other systems reviewed and are negative.    Physical Exam      Initial Vitals [05/23/23 0712]   BP Pulse Resp Temp SpO2   (!) 184/106 63 16 97.8 °F (36.6 °C) 99 %      MAP       --         Physical Exam    Constitutional: She appears well-developed and well-nourished. No distress.   HENT:   Head: Normocephalic and atraumatic.   Right Ear: External ear normal.   Left Ear: External ear normal.   Neck: Neck supple.   Normal range of motion.  Cardiovascular:  Normal rate, normal heart sounds and intact distal pulses.           Pulmonary/Chest: No respiratory distress.   Musculoskeletal:         General: Tenderness present. Normal range of motion.      Cervical back: Normal range of motion and neck supple.      Comments: Full range of motion no snuffbox tenderness pain stents the thumb to the wrist of the forearm radial distribution     Neurological: She is alert and oriented to person, place, and time. She has normal strength. GCS score is 15. GCS eye subscore is 4. GCS verbal subscore is 5. GCS motor subscore is 6.   Skin: Skin is warm and dry. Capillary refill takes less than 2 seconds.   Bruising abrasion noted to left knee   Psychiatric: She has a normal mood and affect. Her behavior is normal.       ED Course   Procedures  Labs Reviewed - No data to display       Imaging Results              X-Ray Knee Complete 4 or more Views Left (Final result)  Result time 05/23/23 08:43:12   Procedure changed from X-Ray Knee 3 View Left     Final result by Erlni Zuleta MD (05/23/23 08:43:12)                   Narrative:    Reason: pain    FINDINGS:    4 views of the left knee show no fracture, dislocation, or destructive osseous lesion. There is minimal patellar osteophytosis. No gross soft tissue abnormality.    IMPRESSION:    1.  No acute osseous abnormality.  2.  Minimal patellar osteophytosis.    Electronically signed by:  Erlin Zuleta DO  5/23/2023 8:43 AM CDT Workstation: [id number]                                     X-Ray Hand 3 View Bilateral (Final result)  Result time  05/23/23 08:42:18      Final result by Erlin Zuleta MD (05/23/23 08:42:18)                   Narrative:    Reason: Pain status post fall.    FINDINGS:    Multiple views of the bilateral hands show no fracture, dislocation, or destructive osseous lesion. There are severe osteoarthropathy of the left first CMC joint with large osteophytes at the base of the first metacarpal. There is bodies versus soft tissue mineralizations joint level. There is moderate osteophytosis of the right first CMC joint with osteophytes and soft tissue mineralizations. Interphalangeal joint spaces of the hands are within normal limits. There is unfused left ulnar styloid process adjacent ossicle. No gross soft tissue abnormality.    IMPRESSION:    1.  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  No acute fracture or dislocation.  3.  Unfused left ulnar styloid process with adjacent ossicle.    Electronically signed by:  Erlin Zuleta DO  5/23/2023 8:42 AM CDT Workstation: [id number]                                     Medications - No data to display  Medical Decision Making:   Clinical Tests:   Radiological Study: Ordered and Reviewed  ED Management:  There is no evidence of fracture dislocation or degenerative changes noted on radiographs discussed patient's findings will place her anti-inflammatories pain control right command ice for 24 hours followed by                        Clinical Impression:   Final diagnoses:  [W19.XXXA] Fall  [M25.531, M25.532] Pain in both wrists (Primary)        ED Disposition Condition    Discharge Stable          ED Prescriptions       Medication Sig Dispense Start Date End Date Auth. Provider    diclofenac (VOLTAREN) 75 MG EC tablet Take 1 tablet (75 mg total) by mouth 2 (two) times daily. 14 tablet 5/23/2023 -- Vish Albrecht MD          Follow-up Information       Follow up With Specialties Details Why Contact Info    Mono Ku MD Family [facility], [place]  [facility], [facility] Services Call in 1 day for re-examination of your symptoms [address]  [address]  [place] [address]  [phone]               Vish Albrecht MD  05/23/23 6617

## 2023-05-26 NOTE — PROGRESS NOTES
[facility] Hematology/Oncology  PROGRESS NOTE - Follow-up Visit      Subjective:       Patient ID:   NAME: Suma Butterfield : 1955     67 y.o. female    Referring Doc: Hector Colin PA*  Other Physicians: Pavan Leonard; Jun        Chief Complaint: polycythemia/elevated MCV f/u      History of Present Illness:     Patient returns today for a regularly scheduled follow-up visit.  The patient is here today to go over the results of the recently ordered labs, tests and studies.     She is here with her son    She previously had had colonoscopy with Dr Barahona and had three benign polyps removed.     She is feeling ok; energy levels are fine; no CP, SOB, HA's or N/V; breathing ok    She has been getting phlebotomies every other monthly but is checking every month      Discussed covid19 and she has been vaccinated      ROS:   GEN: normal without any fever, night sweats or weight loss  HEENT: normal with no HA's, sore throat, stiff neck, changes in vision  CV: normal with no CP, SOB, PND, GAY or orthopnea  PULM: normal with no SOB, cough, hemoptysis, sputum or pleuritic pain  GI: normal with no abdominal pain, nausea, vomiting, constipation, diarrhea, melanotic stools, BRBPR, or hematemesis  : normal with no hematuria, dysuria  BREAST: normal with no mass, discharge, pain  SKIN: normal with no rash, erythema, bruising, or swelling    Pain Scale: 0    Allergies:  Review of patient's allergies indicates:   Allergen Reactions    Succinylcholine      **Pseudocholinesterase**       Medications:    Current Outpatient Medications:     alendronate (FOSAMAX) 70 MG tablet, TK 1 T PO 1 TIME Q WK, Disp: 12 tablet, Rfl: 3    aspirin (ECOTRIN) 81 MG EC tablet, Take 1 tablet (81 mg total) by mouth once daily., Disp: 30 tablet, Rfl: 0    atorvastatin (LIPITOR) 40 MG tablet, Take 1 tablet (40 mg total) by mouth every evening., Disp: 30 tablet, Rfl: 1    biotin 5,000 mcg TbDL, Take 5,000 mcg by mouth once daily., Disp: ,  Rfl:     calcium-vitamin D3 (OS- + D3) 500 mg-5 mcg (200 unit) per tablet, Take 1 tablet by mouth once daily., Disp: , Rfl:     clopidogreL (PLAVIX) 75 mg tablet, Take 1 tablet (75 mg total) by mouth once daily., Disp: 30 tablet, Rfl: 11    cyanocobalamin (VITAMIN B-12) 1000 MCG tablet, Take 100 mcg by mouth once daily., Disp: , Rfl:     diclofenac (VOLTAREN) 75 MG EC tablet, Take 1 tablet (75 mg total) by mouth 2 (two) times daily., Disp: 14 tablet, Rfl: 0    finasteride (PROSCAR) 5 mg tablet, Take 1 tablet (5 mg total) by mouth once daily., Disp: 30 tablet, Rfl: 11    fluocinonide (LIDEX) 0.05 % external solution, APPLY TOPICALLY TO THE AFFECTED AREA DAILY, Disp: 60 mL, Rfl: 5    losartan (COZAAR) 100 MG tablet, Take 1 tablet (100 mg total) by mouth once daily., Disp: 30 tablet, Rfl: 2    metoprolol tartrate (LOPRESSOR) 50 MG tablet, Take 1 tablet (50 mg total) by mouth 2 (two) times daily., Disp: 60 tablet, Rfl: 1    milk thistle 175 mg tablet, Take 175 mg by mouth once daily., Disp: , Rfl:     omega-3 fatty acids/fish oil (FISH OIL-OMEGA-3 FATTY ACIDS) 300-1,000 mg capsule, Take 1 capsule by mouth once daily., Disp: , Rfl:     vitamin E 400 UNIT capsule, Take 400 Units by mouth once daily., Disp: , Rfl:     zinc gluconate 50 mg tablet, Take 50 mg by mouth once daily., Disp: , Rfl:     PMHx/PSHx Updates:  See patient's last visit with me on 2/28/2023  See H&P on 10/14/2022        Pathology:   Cancer Staging   No matching staging information was found for the patient.          Objective:     Vitals:  Blood pressure (!) 156/79, pulse 62, temperature 98 °F (36.7 °C), resp. rate 18, height 5' (1.524 m), weight 56.6 kg (124 lb 11.2 oz).    Physical Examination:   GEN: no apparent distress, comfortable; AAOx3  HEAD: atraumatic and normocephalic; alopecia  EYES: no pallor, no icterus, PERRLA  ENT: OMM, no pharyngeal erythema, external ears WNL; no nasal discharge; no thrush  NECK: no masses, thyroid normal,  trachea midline, no LAD/LN's, supple  CV: RRR with no murmur; normal pulse; normal S1 and S2; no pedal edema  CHEST: Normal respiratory effort; CTAB; normal breath sounds; no wheeze or crackles  ABDOM: nontender and nondistended; soft; normal bowel sounds; no rebound/guarding  MUSC/Skeletal: ROM normal; no crepitus; joints normal; no deformities or arthropathy  EXTREM: no clubbing, cyanosis, inflammation or swelling  SKIN: no rashes, lesions, ulcers, petechiae or subcutaneous nodules  : no cam  NEURO: grossly intact; motor/sensory WNL; AAOx3; no tremors  PSYCH: normal mood, affect and behavior  LYMPH: normal cervical, supraclavicular, axillary and groin LN's            Labs:     Lab Results   Component Value Date    WBC 5.7 05/09/2023    HGB 15.5 05/09/2023    HCT 44.3 05/09/2023    .0 (H) 05/09/2023     05/09/2023       CMP  Sodium   Date Value Ref Range Status   05/09/2023 140 135 - 146 mmol/L Final     Potassium   Date Value Ref Range Status   05/09/2023 4.4 3.5 - 5.3 mmol/L Final     Chloride   Date Value Ref Range Status   05/09/2023 104 98 - 110 mmol/L Final     CO2   Date Value Ref Range Status   05/09/2023 28 20 - 32 mmol/L Final     Glucose   Date Value Ref Range Status   05/09/2023 99 65 - 99 mg/dL Final     Comment:                   Fasting reference interval          BUN   Date Value Ref Range Status   05/09/2023 9 7 - 25 mg/dL Final     Creatinine   Date Value Ref Range Status   05/09/2023 0.65 0.50 - 1.05 mg/dL Final     Calcium   Date Value Ref Range Status   05/09/2023 9.5 8.6 - 10.4 mg/dL Final     Total Protein   Date Value Ref Range Status   05/09/2023 6.5 6.1 - 8.1 g/dL Final     Albumin   Date Value Ref Range Status   05/09/2023 4.3 3.6 - 5.1 g/dL Final     Total Bilirubin   Date Value Ref Range Status   05/09/2023 0.8 0.2 - 1.2 mg/dL Final     Alkaline Phosphatase   Date Value Ref Range Status   03/11/2023 47 (L) 55 - 135 U/L Final     AST   Date Value Ref Range Status    05/09/2023 27 10 - 35 U/L Final     ALT   Date Value Ref Range Status   05/09/2023 41 (H) 6 - 29 U/L Final     Anion Gap   Date Value Ref Range Status   03/11/2023 5 (L) 8 - 16 mmol/L Final     eGFR   Date Value Ref Range Status   05/09/2023 98 > OR = 60 mL/min/1.73m2 Final     Comment:     The eGFR is based on the CKD-EPI 2021 equation. To calculate   the new eGFR from a previous Creatinine or Cystatin C  result, go to https://www.kidney.org/professionals/  kdoqi/gfr%5Fcalculator       Lab Results   Component Value Date    IRON 171 (H) 05/09/2023    TRANSFERRIN 262 05/20/2022    TIBC 378 05/09/2023    LABIRON 45 05/09/2023    FESATURATED 36 05/20/2022         Lab Results   Component Value Date    FERRITIN 89 05/09/2023         JAK2 V617F Mutation NOT DETECTED     DNA Mutation Analysis See Below    Comment: RESULT: POSITIVE FOR ONE HFE GENE PATHOGENIC VARIANT: H63D (HETEROZYGOTE)    This patient is negative for the   C282Y pathogenic variant    AFP ng/mL 7.5 High      Erythropoietin 2.6 - 18.5 mIU/mL 10.5       - 250 U/L 182      Retic % 2.1     Folate (packed RBC's) >280 ng/mL       Methylmalonic Acid 87 - 318 nmol/L 145      Folate ng/mL 10.4          Radiology/Diagnostic Studies:    US 4/4/2023:    IMPRESSION:     No significant sonographic abnormality.        CXR    Result Date: 10/28/2022  CHEST PA AND LATERAL CLINICAL DATA:Polycythemia. Comparison to November 2017. FINDINGS: PA and lateral views demonstrate normal heart size. The mediastinum is unremarkable. There are no infiltrates. There is blunting of the lateral costophrenic angle on the left which could reflect a trace amount of pleural fluid or pleural scarring. Osseous structures are unremarkable. IMPRESSION: 1. Slight blunting of the costophrenic angle on the left which could reflect a trace amount of pleural fluid or pleural scarring. 2. No other significant findings.      US Abdomen Complete    Result Date: 10/28/2022  Abdominal  ultrasound complete CLINICAL DATA: Polycythemia FINDINGS: Sonographic assessment of the abdomen is compared to November 2020. The pancreas, aorta, and inferior vena cava are unremarkable. The liver is diffusely echogenic, similar to the prior study, compatible with fatty infiltration. Hepatopedal flow is noted within the portal vein. The gallbladder and biliary tree are within normal limits. The common bile duct measures 3 mm. The spleen and bilateral kidneys are within normal limits. There is no free fluid. IMPRESSION: 1. Hepatic steatosis. 2. No other significant findings.        I have reviewed all available lab results and radiology reports.    Assessment/Plan:   (1) 67 y.o. female with diagnosis of polycythemia and elevated MCV/ferritin who has been referred by Hector Colin PA* for evaluation by medical hematology/oncology.   - polycythemia since 2020 at least - suspect she has a secondary polycythemia process due to the chronic tobacco history  - former smoker who quit about a year ago  - elevated ferritin on labs in  May 2022 - possible acute phase reactant or due to the underlying liver hx  - order hemochromatosis panel    11/22/2022:  - she has H63D heterozygous HFE gene abnormality  - JAK2 was negative  - set up phlebotomy monthly for now    2/28/2023:  - she has been getting phlebotomies monthly - feeling better overall  - due for repeat labs    5/29/2023:  - doing well with no new issues  - checking labs monthly and phlebotomy as directed  - ferritin down to 89     (2) HTN and hypercholesterolemia     (3) SVT - followed by Dr Browne     (4) Chronic hep C at age 52 s/p antiviral therapy - followed by Natalya  - AFP was a little elevated at 7.5  - US with hepatic steatosis     (5) Severe Alopecia - followed by Dr Etsrella Kaba with derm      VISIT DIAGNOSES:      Chronic hepatitis C without hepatic coma    Elevated MCV    Polycythemia    Elevated ferritin          PLAN:  continue phlebotomies  monthly for now, monitor labs monthly  Discussed the genetic implications of the HFE gene abnormality in siblings and children  Repeat AFP every 6 months   4.. F/u with PCP, Derm and GI  RTC in  6 months  Fax note to Hector Colin PA*; Estrella Kaba, Jun    Discussion:     COVID-19 Discussion:    I had long discussion with patient and any applicable family about the COVID-19 coronavirus epidemic and the recommended precautions with regard to cancer and/or hematology patients. I have re-iterated the CDC recommendations for adequate hand washing, use of hand -like products, and coughing into elbow, etc. In addition, especially for our patients who are on chemotherapy and/or our otherwise immunocompromised patients, I have recommended avoidance of crowds, including movie theaters, restaurants, churches, etc. I have recommended avoidance of any sick or symptomatic family members and/or friends. I have also recommended avoidance of any raw and unwashed food products, and general avoidance of food items that have not been prepared by themselves. The patient has been asked to call us immediately with any symptom developments, issues, questions or other general concerns.     I spent over 25 mins of time with the patient. Reviewed results of the recently ordered labs, tests and studies; made directives with regards to the results. Over half of this time was spent couseling and coordinating care.    I have explained all of the above in detail and the patient understands all of the current recommendation(s). I have answered all of their questions to the best of my ability and to their complete satisfaction.   The patient is to continue with the current management plan.            Electronically signed by Adan Baum MD

## 2023-05-29 ENCOUNTER — OFFICE VISIT (OUTPATIENT)
Dept: HEMATOLOGY/ONCOLOGY | Facility: CLINIC | Age: 68
End: 2023-05-29
Payer: MEDICARE

## 2023-05-29 ENCOUNTER — OFFICE VISIT (OUTPATIENT)
Dept: FAMILY MEDICINE | Facility: CLINIC | Age: 68
End: 2023-05-29
Payer: MEDICARE

## 2023-05-29 VITALS
BODY MASS INDEX: 24.48 KG/M2 | DIASTOLIC BLOOD PRESSURE: 79 MMHG | SYSTOLIC BLOOD PRESSURE: 156 MMHG | WEIGHT: 124.69 LBS | TEMPERATURE: 98 F | RESPIRATION RATE: 18 BRPM | HEIGHT: 60 IN | HEART RATE: 62 BPM

## 2023-05-29 VITALS
BODY MASS INDEX: 24.54 KG/M2 | WEIGHT: 125 LBS | HEART RATE: 62 BPM | HEIGHT: 60 IN | SYSTOLIC BLOOD PRESSURE: 154 MMHG | DIASTOLIC BLOOD PRESSURE: 88 MMHG | OXYGEN SATURATION: 98 %

## 2023-05-29 DIAGNOSIS — B18.2 CHRONIC HEPATITIS C WITHOUT HEPATIC COMA: Primary | ICD-10-CM

## 2023-05-29 DIAGNOSIS — I10 ESSENTIAL HYPERTENSION: ICD-10-CM

## 2023-05-29 DIAGNOSIS — R79.89 ELEVATED FERRITIN: ICD-10-CM

## 2023-05-29 DIAGNOSIS — R71.8 ELEVATED MCV: ICD-10-CM

## 2023-05-29 DIAGNOSIS — D75.1 POLYCYTHEMIA: ICD-10-CM

## 2023-05-29 DIAGNOSIS — I25.10 CORONARY ARTERY DISEASE, UNSPECIFIED VESSEL OR LESION TYPE, UNSPECIFIED WHETHER ANGINA PRESENT, UNSPECIFIED WHETHER NATIVE OR TRANSPLANTED HEART: Primary | ICD-10-CM

## 2023-05-29 PROCEDURE — 4010F ACE/ARB THERAPY RXD/TAKEN: CPT | Mod: CPTII,S$GLB,, | Performed by: PHYSICIAN ASSISTANT

## 2023-05-29 PROCEDURE — 1126F PR PAIN SEVERITY QUANTIFIED, NO PAIN PRESENT: ICD-10-PCS | Mod: CPTII,S$GLB,, | Performed by: INTERNAL MEDICINE

## 2023-05-29 PROCEDURE — 1160F RVW MEDS BY RX/DR IN RCRD: CPT | Mod: CPTII,S$GLB,, | Performed by: INTERNAL MEDICINE

## 2023-05-29 PROCEDURE — 3288F PR FALLS RISK ASSESSMENT DOCUMENTED: ICD-10-PCS | Mod: CPTII,S$GLB,, | Performed by: INTERNAL MEDICINE

## 2023-05-29 PROCEDURE — 3288F FALL RISK ASSESSMENT DOCD: CPT | Mod: CPTII,S$GLB,, | Performed by: INTERNAL MEDICINE

## 2023-05-29 PROCEDURE — 3077F SYST BP >= 140 MM HG: CPT | Mod: CPTII,S$GLB,, | Performed by: INTERNAL MEDICINE

## 2023-05-29 PROCEDURE — 3008F PR BODY MASS INDEX (BMI) DOCUMENTED: ICD-10-PCS | Mod: CPTII,S$GLB,, | Performed by: INTERNAL MEDICINE

## 2023-05-29 PROCEDURE — 3079F PR MOST RECENT DIASTOLIC BLOOD PRESSURE 80-89 MM HG: ICD-10-PCS | Mod: CPTII,S$GLB,, | Performed by: PHYSICIAN ASSISTANT

## 2023-05-29 PROCEDURE — 3078F DIAST BP <80 MM HG: CPT | Mod: CPTII,S$GLB,, | Performed by: INTERNAL MEDICINE

## 2023-05-29 PROCEDURE — 1101F PR PT FALLS ASSESS DOC 0-1 FALLS W/OUT INJ PAST YR: ICD-10-PCS | Mod: CPTII,S$GLB,, | Performed by: INTERNAL MEDICINE

## 2023-05-29 PROCEDURE — 3077F SYST BP >= 140 MM HG: CPT | Mod: CPTII,S$GLB,, | Performed by: PHYSICIAN ASSISTANT

## 2023-05-29 PROCEDURE — 1126F AMNT PAIN NOTED NONE PRSNT: CPT | Mod: CPTII,S$GLB,, | Performed by: INTERNAL MEDICINE

## 2023-05-29 PROCEDURE — 1101F PR PT FALLS ASSESS DOC 0-1 FALLS W/OUT INJ PAST YR: ICD-10-PCS | Mod: CPTII,S$GLB,, | Performed by: PHYSICIAN ASSISTANT

## 2023-05-29 PROCEDURE — 1160F PR REVIEW ALL MEDS BY PRESCRIBER/CLIN PHARMACIST DOCUMENTED: ICD-10-PCS | Mod: CPTII,S$GLB,, | Performed by: INTERNAL MEDICINE

## 2023-05-29 PROCEDURE — 99214 PR OFFICE/OUTPT VISIT, EST, LEVL IV, 30-39 MIN: ICD-10-PCS | Mod: S$GLB,,, | Performed by: PHYSICIAN ASSISTANT

## 2023-05-29 PROCEDURE — 3078F PR MOST RECENT DIASTOLIC BLOOD PRESSURE < 80 MM HG: ICD-10-PCS | Mod: CPTII,S$GLB,, | Performed by: INTERNAL MEDICINE

## 2023-05-29 PROCEDURE — 1159F MED LIST DOCD IN RCRD: CPT | Mod: CPTII,S$GLB,, | Performed by: INTERNAL MEDICINE

## 2023-05-29 PROCEDURE — 99214 OFFICE O/P EST MOD 30 MIN: CPT | Mod: S$GLB,,, | Performed by: PHYSICIAN ASSISTANT

## 2023-05-29 PROCEDURE — 3288F FALL RISK ASSESSMENT DOCD: CPT | Mod: CPTII,S$GLB,, | Performed by: PHYSICIAN ASSISTANT

## 2023-05-29 PROCEDURE — 3288F PR FALLS RISK ASSESSMENT DOCUMENTED: ICD-10-PCS | Mod: CPTII,S$GLB,, | Performed by: PHYSICIAN ASSISTANT

## 2023-05-29 PROCEDURE — 3077F PR MOST RECENT SYSTOLIC BLOOD PRESSURE >= 140 MM HG: ICD-10-PCS | Mod: CPTII,S$GLB,, | Performed by: PHYSICIAN ASSISTANT

## 2023-05-29 PROCEDURE — 1101F PT FALLS ASSESS-DOCD LE1/YR: CPT | Mod: CPTII,S$GLB,, | Performed by: INTERNAL MEDICINE

## 2023-05-29 PROCEDURE — 1159F PR MEDICATION LIST DOCUMENTED IN MEDICAL RECORD: ICD-10-PCS | Mod: CPTII,S$GLB,, | Performed by: PHYSICIAN ASSISTANT

## 2023-05-29 PROCEDURE — 4010F ACE/ARB THERAPY RXD/TAKEN: CPT | Mod: CPTII,S$GLB,, | Performed by: INTERNAL MEDICINE

## 2023-05-29 PROCEDURE — 99213 OFFICE O/P EST LOW 20 MIN: CPT | Mod: S$GLB,,, | Performed by: INTERNAL MEDICINE

## 2023-05-29 PROCEDURE — 1101F PT FALLS ASSESS-DOCD LE1/YR: CPT | Mod: CPTII,S$GLB,, | Performed by: PHYSICIAN ASSISTANT

## 2023-05-29 PROCEDURE — 3008F BODY MASS INDEX DOCD: CPT | Mod: CPTII,S$GLB,, | Performed by: INTERNAL MEDICINE

## 2023-05-29 PROCEDURE — 4010F PR ACE/ARB THEARPY RXD/TAKEN: ICD-10-PCS | Mod: CPTII,S$GLB,, | Performed by: INTERNAL MEDICINE

## 2023-05-29 PROCEDURE — 4010F PR ACE/ARB THEARPY RXD/TAKEN: ICD-10-PCS | Mod: CPTII,S$GLB,, | Performed by: PHYSICIAN ASSISTANT

## 2023-05-29 PROCEDURE — 1159F PR MEDICATION LIST DOCUMENTED IN MEDICAL RECORD: ICD-10-PCS | Mod: CPTII,S$GLB,, | Performed by: INTERNAL MEDICINE

## 2023-05-29 PROCEDURE — 99213 PR OFFICE/OUTPT VISIT, EST, LEVL III, 20-29 MIN: ICD-10-PCS | Mod: S$GLB,,, | Performed by: INTERNAL MEDICINE

## 2023-05-29 PROCEDURE — 3008F PR BODY MASS INDEX (BMI) DOCUMENTED: ICD-10-PCS | Mod: CPTII,S$GLB,, | Performed by: PHYSICIAN ASSISTANT

## 2023-05-29 PROCEDURE — 3079F DIAST BP 80-89 MM HG: CPT | Mod: CPTII,S$GLB,, | Performed by: PHYSICIAN ASSISTANT

## 2023-05-29 PROCEDURE — 1159F MED LIST DOCD IN RCRD: CPT | Mod: CPTII,S$GLB,, | Performed by: PHYSICIAN ASSISTANT

## 2023-05-29 PROCEDURE — 3008F BODY MASS INDEX DOCD: CPT | Mod: CPTII,S$GLB,, | Performed by: PHYSICIAN ASSISTANT

## 2023-05-29 PROCEDURE — 3077F PR MOST RECENT SYSTOLIC BLOOD PRESSURE >= 140 MM HG: ICD-10-PCS | Mod: CPTII,S$GLB,, | Performed by: INTERNAL MEDICINE

## 2023-05-29 RX ORDER — METOPROLOL TARTRATE 50 MG/1
50 TABLET ORAL 2 TIMES DAILY
Qty: 180 TABLET | Refills: 1 | Status: SHIPPED | OUTPATIENT
Start: 2023-05-29 | End: 2023-06-20

## 2023-05-29 RX ORDER — LOSARTAN POTASSIUM 100 MG/1
100 TABLET ORAL DAILY
Qty: 90 TABLET | Refills: 1 | Status: SHIPPED | OUTPATIENT
Start: 2023-05-29 | End: 2023-10-13 | Stop reason: SDUPTHER

## 2023-05-29 RX ORDER — ASPIRIN 81 MG/1
81 TABLET ORAL DAILY
Qty: 90 TABLET | Refills: 1 | Status: SHIPPED | OUTPATIENT
Start: 2023-05-29 | End: 2023-09-04 | Stop reason: SDUPTHER

## 2023-05-29 RX ORDER — CLOPIDOGREL BISULFATE 75 MG/1
75 TABLET ORAL DAILY
Qty: 90 TABLET | Refills: 1 | Status: SHIPPED | OUTPATIENT
Start: 2023-05-29 | End: 2023-10-13 | Stop reason: SDUPTHER

## 2023-05-29 NOTE — PROGRESS NOTES
SUBJECTIVE:    Patient ID: Suma Butterfield is a 67 y.o. female.    Chief Complaint: Hypertension (No bottles//Pt is here for a check up and medication refills//decline pna vaccine//KYLAH )    This is a 67-year-old female who presents today for regular four-month follow-up.  Her pressure has improved slightly but still not at goal. She is under the care of sophia Horton. NSTEMI back in March, cath with blockage that is not amenable to stenting. Just medically managing now. Does report having [employer] party yesterday and drinking more. Thinks this could have her pressure up.       Ancillary Orders on 04/21/2023   Component Date Value Ref Range Status    WBC 05/09/2023 5.7  3.8 - 10.8 Thousand/uL Final    RBC 05/09/2023 4.30  3.80 - 5.10 Million/uL Final    Hemoglobin 05/09/2023 15.5  11.7 - 15.5 g/dL Final    Hematocrit 05/09/2023 44.3  35.0 - 45.0 % Final    MCV 05/09/2023 103.0 (H)  80.0 - 100.0 fL Final    MCH 05/09/2023 36.0 (H)  27.0 - 33.0 pg Final    MCHC 05/09/2023 35.0  32.0 - 36.0 g/dL Final    RDW 05/09/2023 11.6  11.0 - 15.0 % Final    Platelets 05/09/2023 173  140 - 400 Thousand/uL Final    MPV 05/09/2023 9.6  7.5 - 12.5 fL Final    Neutrophils, Abs 05/09/2023 3,038  1,500 - 7,800 cells/uL Final    Lymph # 05/09/2023 2,024  850 - 3,900 cells/uL Final    Mono # 05/09/2023 467  200 - 950 cells/uL Final    Eos # 05/09/2023 131  15 - 500 cells/uL Final    Baso # 05/09/2023 40  0 - 200 cells/uL Final    Neutrophils Relative 05/09/2023 53.3  % Final    Lymph % 05/09/2023 35.5  % Final    Mono % 05/09/2023 8.2  % Final    Eosinophil % 05/09/2023 2.3  % Final    Basophil % 05/09/2023 0.7  % Final    Glucose 05/09/2023 99  65 - 99 mg/dL Final    BUN 05/09/2023 9  7 - 25 mg/dL Final    Creatinine 05/09/2023 0.65  0.50 - 1.05 mg/dL Final    eGFR 05/09/2023 98  > OR = 60 mL/min/1.73m2 Final    BUN/Creatinine Ratio 05/09/2023 NOT APPLICABLE  6 - 22 (calc) Final    Sodium 05/09/2023 140  135 - 146 mmol/L Final     Potassium 05/09/2023 4.4  3.5 - 5.3 mmol/L Final    Chloride 05/09/2023 104  98 - 110 mmol/L Final    CO2 05/09/2023 28  20 - 32 mmol/L Final    Calcium 05/09/2023 9.5  8.6 - 10.4 mg/dL Final    Total Protein 05/09/2023 6.5  6.1 - 8.1 g/dL Final    Albumin 05/09/2023 4.3  3.6 - 5.1 g/dL Final    Globulin, Total 05/09/2023 2.2  1.9 - 3.7 g/dL (calc) Final    Albumin/Globulin Ratio 05/09/2023 2.0  1.0 - 2.5 (calc) Final    Total Bilirubin 05/09/2023 0.8  0.2 - 1.2 mg/dL Final    Alkaline Phosphatase 05/09/2023 52  37 - 153 U/L Final    AST 05/09/2023 27  10 - 35 U/L Final    ALT 05/09/2023 41 (H)  6 - 29 U/L Final    Iron 05/09/2023 171 (H)  45 - 160 mcg/dL Final    TIBC 05/09/2023 378  250 - 450 mcg/dL (calc) Final    Iron Saturation 05/09/2023 45  16 - 45 % (calc) Final    Ferritin 05/09/2023 89  16 - 288 ng/mL Final   No results displayed because visit has over 200 results.      Admission on 03/07/2023, Discharged on 03/07/2023   Component Date Value Ref Range Status    Magnesium 03/07/2023 1.9  1.6 - 2.6 mg/dL Final    WBC 03/07/2023 6.86  3.90 - 12.70 K/uL Final    RBC 03/07/2023 3.89 (L)  4.00 - 5.40 M/uL Final    Hemoglobin 03/07/2023 14.4  12.0 - 16.0 g/dL Final    Hematocrit 03/07/2023 40.7  37.0 - 48.5 % Final    MCV 03/07/2023 105 (H)  82 - 98 fL Final    MCH 03/07/2023 37.0 (H)  27.0 - 31.0 pg Final    MCHC 03/07/2023 35.4  32.0 - 36.0 g/dL Final    RDW 03/07/2023 11.6  11.5 - 14.5 % Final    Platelets 03/07/2023 186  150 - 450 K/uL Final    MPV 03/07/2023 9.7  9.2 - 12.9 fL Final    Immature Granulocytes 03/07/2023 0.4  0.0 - 0.5 % Final    Gran # (ANC) 03/07/2023 3.5  1.8 - 7.7 K/uL Final    Immature Grans (Abs) 03/07/2023 0.03  0.00 - 0.04 K/uL Final    Lymph # 03/07/2023 2.4  1.0 - 4.8 K/uL Final    Mono # 03/07/2023 0.7  0.3 - 1.0 K/uL Final    Eos # 03/07/2023 0.1  0.0 - 0.5 K/uL Final    Baso # 03/07/2023 0.06  0.00 - 0.20 K/uL Final    nRBC 03/07/2023 0  0 /100 WBC Final    Gran % 03/07/2023 51.6   38.0 - 73.0 % Final    Lymph % 03/07/2023 35.6  18.0 - 48.0 % Final    Mono % 03/07/2023 9.5  4.0 - 15.0 % Final    Eosinophil % 03/07/2023 2.0  0.0 - 8.0 % Final    Basophil % 03/07/2023 0.9  0.0 - 1.9 % Final    Differential Method 03/07/2023 Automated   Final    Sodium 03/07/2023 132 (L)  136 - 145 mmol/L Final    Potassium 03/07/2023 3.3 (L)  3.5 - 5.1 mmol/L Final    Chloride 03/07/2023 98  95 - 110 mmol/L Final    CO2 03/07/2023 25  23 - 29 mmol/L Final    Glucose 03/07/2023 132 (H)  70 - 110 mg/dL Final    BUN 03/07/2023 16  8 - 23 mg/dL Final    Creatinine 03/07/2023 0.6  0.5 - 1.4 mg/dL Final    Calcium 03/07/2023 9.8  8.7 - 10.5 mg/dL Final    Total Protein 03/07/2023 7.6  6.0 - 8.4 g/dL Final    Albumin 03/07/2023 4.7  3.5 - 5.2 g/dL Final    Total Bilirubin 03/07/2023 0.6  0.1 - 1.0 mg/dL Final    Alkaline Phosphatase 03/07/2023 54 (L)  55 - 135 U/L Final    AST 03/07/2023 81 (H)  10 - 40 U/L Final    ALT 03/07/2023 83 (H)  10 - 44 U/L Final    Anion Gap 03/07/2023 9  8 - 16 mmol/L Final    eGFR 03/07/2023 >60.0  >60 mL/min/1.73 m^2 Final    Troponin I High Sensitivity 03/07/2023 46.9 (H)  0.0 - 14.9 pg/mL Final    BNP 03/07/2023 61  0 - 99 pg/mL Final   Office Visit on 02/28/2023   Component Date Value Ref Range Status    WBC 03/07/2023 5.8  3.8 - 10.8 Thousand/uL Final    RBC 03/07/2023 3.85  3.80 - 5.10 Million/uL Final    Hemoglobin 03/07/2023 14.4  11.7 - 15.5 g/dL Final    Hematocrit 03/07/2023 42.7  35.0 - 45.0 % Final    MCV 03/07/2023 110.9 (H)  80.0 - 100.0 fL Final    MCH 03/07/2023 37.4 (H)  27.0 - 33.0 pg Final    MCHC 03/07/2023 33.7  32.0 - 36.0 g/dL Final    RDW 03/07/2023 11.1  11.0 - 15.0 % Final    Platelets 03/07/2023 198  140 - 400 Thousand/uL Final    MPV 03/07/2023 10.0  7.5 - 12.5 fL Final    Neutrophils, Abs 03/07/2023 2,616  1,500 - 7,800 cells/uL Final    Lymph # 03/07/2023 2,436  850 - 3,900 cells/uL Final    Mono # 03/07/2023 510  200 - 950 cells/uL Final    Eos # 03/07/2023  168  15 - 500 cells/uL Final    Baso # 03/07/2023 70  0 - 200 cells/uL Final    Neutrophils Relative 03/07/2023 45.1  % Final    Lymph % 03/07/2023 42.0  % Final    Mono % 03/07/2023 8.8  % Final    Eosinophil % 03/07/2023 2.9  % Final    Basophil % 03/07/2023 1.2  % Final    Glucose 03/07/2023 101 (H)  65 - 99 mg/dL Final    BUN 03/07/2023 13  7 - 25 mg/dL Final    Creatinine 03/07/2023 0.65  0.50 - 1.05 mg/dL Final    eGFR 03/07/2023 98  > OR = 60 mL/min/1.73m2 Final    BUN/Creatinine Ratio 03/07/2023 NOT APPLICABLE  6 - 22 (calc) Final    Sodium 03/07/2023 138  135 - 146 mmol/L Final    Potassium 03/07/2023 4.1  3.5 - 5.3 mmol/L Final    Chloride 03/07/2023 102  98 - 110 mmol/L Final    CO2 03/07/2023 30  20 - 32 mmol/L Final    Calcium 03/07/2023 9.4  8.6 - 10.4 mg/dL Final    Total Protein 03/07/2023 6.8  6.1 - 8.1 g/dL Final    Albumin 03/07/2023 4.3  3.6 - 5.1 g/dL Final    Globulin, Total 03/07/2023 2.5  1.9 - 3.7 g/dL (calc) Final    Albumin/Globulin Ratio 03/07/2023 1.7  1.0 - 2.5 (calc) Final    Total Bilirubin 03/07/2023 0.8  0.2 - 1.2 mg/dL Final    Alkaline Phosphatase 03/07/2023 53  37 - 153 U/L Final    AST 03/07/2023 50 (H)  10 - 35 U/L Final    ALT 03/07/2023 59 (H)  6 - 29 U/L Final    Iron 03/07/2023 145  45 - 160 mcg/dL Final    TIBC 03/07/2023 400  250 - 450 mcg/dL (calc) Final    Iron Saturation 03/07/2023 36  16 - 45 % (calc) Final    Ferritin 03/07/2023 220  16 - 288 ng/mL Final    AFP 03/07/2023 5.3  ng/mL Final   Telephone on 12/23/2022   Component Date Value Ref Range Status    Cholesterol 12/27/2022 226 (H)  <200 mg/dL Final    HDL 12/27/2022 90  > OR = 50 mg/dL Final    Triglycerides 12/27/2022 91  <150 mg/dL Final    LDL Cholesterol 12/27/2022 117 (H)  mg/dL (calc) Final    HDL/Cholesterol Ratio 12/27/2022 2.5  <5.0 (calc) Final    Non HDL Chol. (LDL+VLDL) 12/27/2022 136 (H)  <130 mg/dL (calc) Final    Color, UA 12/27/2022 YELLOW  YELLOW Final    Appearance, UA 12/27/2022 CLEAR  CLEAR  Final    Specific [place], UA 12/27/2022 1.011  1.001 - 1.035 Final    pH, UA 12/27/2022 8.0  5.0 - 8.0 Final    Glucose, UA 12/27/2022 NEGATIVE  NEGATIVE Final    Bilirubin, UA 12/27/2022 NEGATIVE  NEGATIVE Final    Ketones, UA 12/27/2022 NEGATIVE  NEGATIVE Final    Occult Blood UA 12/27/2022 NEGATIVE  NEGATIVE Final    Protein, UA 12/27/2022 NEGATIVE  NEGATIVE Final    Nitrite, UA 12/27/2022 NEGATIVE  NEGATIVE Final    Leukocytes, UA 12/27/2022 NEGATIVE  NEGATIVE Final    WBC Casts, UA 12/27/2022 NONE SEEN  < OR = 5 /HPF Final    RBC Casts, UA 12/27/2022 NONE SEEN  < OR = 2 /HPF Final    Squam Epithel, UA 12/27/2022 0-5  < OR = 5 /HPF Final    Bacteria, UA 12/27/2022 NONE SEEN  NONE SEEN /HPF Final    Hyaline Casts, UA 12/27/2022 NONE SEEN  NONE SEEN /LPF Final    Service Cmt: 12/27/2022    Final    Reflexive Urine Culture 12/27/2022    Final    TSH w/reflex to FT4 12/27/2022 0.94  0.40 - 4.50 mIU/L Final   Ancillary Orders on 12/09/2022   Component Date Value Ref Range Status    Glucose 04/04/2023 100 (H)  65 - 99 mg/dL Final    BUN 04/04/2023 12  7 - 25 mg/dL Final    Creatinine 04/04/2023 0.67  0.50 - 1.05 mg/dL Final    eGFR 04/04/2023 97  > OR = 60 mL/min/1.73m2 Final    BUN/Creatinine Ratio 04/04/2023 NOT APPLICABLE  6 - 22 (calc) Final    Sodium 04/04/2023 139  135 - 146 mmol/L Final    Potassium 04/04/2023 4.5  3.5 - 5.3 mmol/L Final    Chloride 04/04/2023 102  98 - 110 mmol/L Final    CO2 04/04/2023 27  20 - 32 mmol/L Final    Calcium 04/04/2023 9.9  8.6 - 10.4 mg/dL Final    Total Protein 04/04/2023 7.0  6.1 - 8.1 g/dL Final    Albumin 04/04/2023 4.4  3.6 - 5.1 g/dL Final    Globulin, Total 04/04/2023 2.6  1.9 - 3.7 g/dL (calc) Final    Albumin/Globulin Ratio 04/04/2023 1.7  1.0 - 2.5 (calc) Final    Total Bilirubin 04/04/2023 1.0  0.2 - 1.2 mg/dL Final    Alkaline Phosphatase 04/04/2023 59  37 - 153 U/L Final    AST 04/04/2023 36 (H)  10 - 35 U/L Final    ALT 04/04/2023 35 (H)  6 - 29 U/L Final    WBC  04/04/2023 6.6  3.8 - 10.8 Thousand/uL Final    RBC 04/04/2023 4.06  3.80 - 5.10 Million/uL Final    Hemoglobin 04/04/2023 14.9  11.7 - 15.5 g/dL Final    Hematocrit 04/04/2023 43.4  35.0 - 45.0 % Final    MCV 04/04/2023 106.9 (H)  80.0 - 100.0 fL Final    MCH 04/04/2023 36.7 (H)  27.0 - 33.0 pg Final    MCHC 04/04/2023 34.3  32.0 - 36.0 g/dL Final    RDW 04/04/2023 11.3  11.0 - 15.0 % Final    Platelets 04/04/2023 145  140 - 400 Thousand/uL Final    MPV 04/04/2023 9.7  7.5 - 12.5 fL Final    Neutrophils, Abs 04/04/2023 4,303  1,500 - 7,800 cells/uL Final    Lymph # 04/04/2023 1,360  850 - 3,900 cells/uL Final    Mono # 04/04/2023 759  200 - 950 cells/uL Final    Eos # 04/04/2023 139  15 - 500 cells/uL Final    Baso # 04/04/2023 40  0 - 200 cells/uL Final    Neutrophils Relative 04/04/2023 65.2  % Final    Lymph % 04/04/2023 20.6  % Final    Mono % 04/04/2023 11.5  % Final    Eosinophil % 04/04/2023 2.1  % Final    Basophil % 04/04/2023 0.6  % Final    Ferritin 04/04/2023 102  16 - 288 ng/mL Final    Iron 04/04/2023 104  45 - 160 mcg/dL Final    TIBC 04/04/2023 392  250 - 450 mcg/dL (calc) Final    Iron Saturation 04/04/2023 27  16 - 45 % (calc) Final       Past Medical History:   Diagnosis Date    Elevated ferritin 10/13/2022    Elevated MCV 10/13/2022    Hepatitis C     Hypertension     Polycythemia 10/13/2022    SVT (supraventricular tachycardia)      Past Surgical History:   Procedure Laterality Date    CORONARY ANGIOGRAPHY N/A 3/10/2023    Procedure: ANGIOGRAM, CORONARY ARTERY;  Surgeon: Jill Griffiths MD;  Location: [facility] CATH/EP LAB;  Service: Cardiology;  Laterality: N/A;    LEFT HEART CATHETERIZATION Left 3/10/2023    Procedure: Left heart cath;  Surgeon: Jill Griffiths MD;  Location: [facility] CATH/EP LAB;  Service: Cardiology;  Laterality: Left;     Family History   Problem Relation Age of Onset    Glaucoma Neg Hx     Macular degeneration Neg Hx     Retinal detachment Neg Hx        Marital  Status:   Alcohol History:  reports current alcohol use.  Tobacco History:  reports that she has quit smoking. Her smoking use included cigarettes. She has been exposed to tobacco smoke. She has never used smokeless tobacco.  Drug History:  reports no history of drug use.    Review of patient's allergies indicates:   Allergen Reactions    Succinylcholine      **Pseudocholinesterase**       Current Outpatient Medications:     alendronate (FOSAMAX) 70 MG tablet, TK 1 T PO 1 TIME Q WK, Disp: 12 tablet, Rfl: 3    aspirin (ECOTRIN) 81 MG EC tablet, Take 1 tablet (81 mg total) by mouth once daily., Disp: 90 tablet, Rfl: 1    atorvastatin (LIPITOR) 40 MG tablet, Take 1 tablet (40 mg total) by mouth every evening., Disp: 30 tablet, Rfl: 1    biotin 5,000 mcg TbDL, Take 5,000 mcg by mouth once daily., Disp: , Rfl:     calcium-vitamin D3 (OS- + D3) 500 mg-5 mcg (200 unit) per tablet, Take 1 tablet by mouth once daily., Disp: , Rfl:     clopidogreL (PLAVIX) 75 mg tablet, Take 1 tablet (75 mg total) by mouth once daily., Disp: 90 tablet, Rfl: 1    cyanocobalamin (VITAMIN B-12) 1000 MCG tablet, Take 100 mcg by mouth once daily., Disp: , Rfl:     diclofenac (VOLTAREN) 75 MG EC tablet, Take 1 tablet (75 mg total) by mouth 2 (two) times daily., Disp: 14 tablet, Rfl: 0    finasteride (PROSCAR) 5 mg tablet, Take 1 tablet (5 mg total) by mouth once daily., Disp: 30 tablet, Rfl: 11    fluocinonide (LIDEX) 0.05 % external solution, APPLY TOPICALLY TO THE AFFECTED AREA DAILY, Disp: 60 mL, Rfl: 5    losartan (COZAAR) 100 MG tablet, Take 1 tablet (100 mg total) by mouth once daily., Disp: 90 tablet, Rfl: 1    metoprolol tartrate (LOPRESSOR) 50 MG tablet, Take 1 tablet (50 mg total) by mouth 2 (two) times daily., Disp: 180 tablet, Rfl: 1    milk thistle 175 mg tablet, Take 175 mg by mouth once daily., Disp: , Rfl:     omega-3 fatty acids/fish oil (FISH OIL-OMEGA-3 FATTY ACIDS) 300-1,000 mg capsule, Take 1 capsule by mouth once  daily., Disp: , Rfl:     vitamin E 400 UNIT capsule, Take 400 Units by mouth once daily., Disp: , Rfl:     zinc gluconate 50 mg tablet, Take 50 mg by mouth once daily., Disp: , Rfl:     Review of Systems   Constitutional:  Negative for appetite change, chills, fatigue, fever and unexpected weight change.   HENT:  Negative for congestion.    Respiratory:  Negative for cough, chest tightness and shortness of breath.    Cardiovascular:  Negative for chest pain and palpitations.   Gastrointestinal:  Negative for abdominal distention and abdominal pain.   Endocrine: Negative for cold intolerance and heat intolerance.   Genitourinary:  Negative for difficulty urinating and dysuria.   Musculoskeletal:  Negative for arthralgias and back pain.   Neurological:  Negative for dizziness, weakness and headaches.        Objective:      Vitals:    05/29/23 1036 05/29/23 1045   BP: (!) 156/88 (!) 154/88   Pulse: 62    SpO2: 98%    Weight: 56.7 kg (125 lb)    Height: 5' (1.524 m)      Physical Exam  Constitutional:       General: She is not in acute distress.     Appearance: She is well-developed.   HENT:      Head: Normocephalic and atraumatic.   Eyes:      Conjunctiva/sclera: Conjunctivae normal.      Pupils: Pupils are equal, round, and reactive to light.   Neck:      Thyroid: No thyromegaly.   Cardiovascular:      Rate and Rhythm: Normal rate and regular rhythm.      Heart sounds: Normal heart sounds.   Pulmonary:      Effort: Pulmonary effort is normal.      Breath sounds: Normal breath sounds.   Abdominal:      General: Bowel sounds are normal. There is no distension.      Palpations: Abdomen is soft.      Tenderness: There is no abdominal tenderness.   Musculoskeletal:         General: Normal range of motion.      Cervical back: Normal range of motion and neck supple.   Skin:     General: Skin is warm and dry.      Findings: No erythema.   Neurological:      Mental Status: She is alert and oriented to person, place, and time.       Cranial Nerves: No cranial nerve deficit.         Assessment:       1. Coronary artery disease, unspecified vessel or lesion type, unspecified whether angina present, unspecified whether native or transplanted heart    2. Essential hypertension         Plan:       Coronary artery disease, unspecified vessel or lesion type, unspecified whether angina present, unspecified whether native or transplanted heart  Comments:  medically managed. not amenable to stent. will refill meds as needed. keep f/u with cards in 6 mos.  Orders:  -     clopidogreL (PLAVIX) 75 mg tablet; Take 1 tablet (75 mg total) by mouth once daily.  Dispense: 90 tablet; Refill: 1  -     aspirin (ECOTRIN) 81 MG EC tablet; Take 1 tablet (81 mg total) by mouth once daily.  Dispense: 90 tablet; Refill: 1  -     losartan (COZAAR) 100 MG tablet; Take 1 tablet (100 mg total) by mouth once daily.  Dispense: 90 tablet; Refill: 1  -     metoprolol tartrate (LOPRESSOR) 50 MG tablet; Take 1 tablet (50 mg total) by mouth 2 (two) times daily.  Dispense: 180 tablet; Refill: 1    Essential hypertension  Comments:  Pressure is not controlled. Managed by cards. recent med adjustments. will have her come in for NV in a couple weeks to recheck.  Orders:  -     losartan (COZAAR) 100 MG tablet; Take 1 tablet (100 mg total) by mouth once daily.  Dispense: 90 tablet; Refill: 1      Follow up in about 6 months (around 11/29/2023) for NV in 2-3 weeks for recheck on pressure.        5/29/2023 Hector Colin PA-C

## 2023-06-12 PROBLEM — I21.4 NSTEMI (NON-ST ELEVATED MYOCARDIAL INFARCTION): Status: RESOLVED | Noted: 2023-03-08 | Resolved: 2023-06-12

## 2023-06-19 ENCOUNTER — TELEPHONE (OUTPATIENT)
Dept: CARDIOLOGY | Facility: CLINIC | Age: 68
End: 2023-06-19
Payer: MEDICARE

## 2023-06-19 NOTE — TELEPHONE ENCOUNTER
----- Message from Duke Esposito sent at 6/19/2023  9:37 AM CDT -----  Regarding: med question  Contact: suma at [phone]  Type: Needs Medical Advice    Who Called:  Suma    Symptoms (please be specific):  bruising     How long has patient had these symptoms:  since starting med, clopidogreL (PLAVIX) 75 mg tablet    Pharmacy name and phone #:    [id number] - [place] - [address] AT [facility] [address]  [address] [place] [address]  Phone: [phone] Fax: [phone]    Best Call Back Number: [phone]    Additional Information:

## 2023-06-19 NOTE — TELEPHONE ENCOUNTER
Spoke with pt, offered appointment, pt decline, answered a few questions that she had. Advised pt to let us know if bruising worsens or doesn't get better. Pt verbalized understanding.

## 2023-06-20 ENCOUNTER — OFFICE VISIT (OUTPATIENT)
Dept: DERMATOLOGY | Facility: CLINIC | Age: 68
End: 2023-06-20
Payer: MEDICARE

## 2023-06-20 ENCOUNTER — CLINICAL SUPPORT (OUTPATIENT)
Dept: FAMILY MEDICINE | Facility: CLINIC | Age: 68
End: 2023-06-20
Payer: MEDICARE

## 2023-06-20 VITALS — DIASTOLIC BLOOD PRESSURE: 90 MMHG | SYSTOLIC BLOOD PRESSURE: 158 MMHG

## 2023-06-20 DIAGNOSIS — L81.7 SCHAMBERG'S CAPILLARITIS: Primary | ICD-10-CM

## 2023-06-20 DIAGNOSIS — L66.9 CICATRICIAL ALOPECIA: ICD-10-CM

## 2023-06-20 DIAGNOSIS — I10 ESSENTIAL HYPERTENSION: Primary | ICD-10-CM

## 2023-06-20 DIAGNOSIS — L64.9 ANDROGENIC ALOPECIA: ICD-10-CM

## 2023-06-20 PROCEDURE — 1159F PR MEDICATION LIST DOCUMENTED IN MEDICAL RECORD: ICD-10-PCS | Mod: CPTII,S$GLB,, | Performed by: STUDENT IN AN ORGANIZED HEALTH CARE EDUCATION/TRAINING PROGRAM

## 2023-06-20 PROCEDURE — 11900 INJECT SKIN LESIONS </W 7: CPT | Mod: S$GLB,,, | Performed by: STUDENT IN AN ORGANIZED HEALTH CARE EDUCATION/TRAINING PROGRAM

## 2023-06-20 PROCEDURE — 1101F PT FALLS ASSESS-DOCD LE1/YR: CPT | Mod: CPTII,S$GLB,, | Performed by: STUDENT IN AN ORGANIZED HEALTH CARE EDUCATION/TRAINING PROGRAM

## 2023-06-20 PROCEDURE — 1126F PR PAIN SEVERITY QUANTIFIED, NO PAIN PRESENT: ICD-10-PCS | Mod: CPTII,S$GLB,, | Performed by: STUDENT IN AN ORGANIZED HEALTH CARE EDUCATION/TRAINING PROGRAM

## 2023-06-20 PROCEDURE — 99213 PR OFFICE/OUTPT VISIT, EST, LEVL III, 20-29 MIN: ICD-10-PCS | Mod: 25,S$GLB,, | Performed by: STUDENT IN AN ORGANIZED HEALTH CARE EDUCATION/TRAINING PROGRAM

## 2023-06-20 PROCEDURE — 99213 OFFICE O/P EST LOW 20 MIN: CPT | Mod: 25,S$GLB,, | Performed by: STUDENT IN AN ORGANIZED HEALTH CARE EDUCATION/TRAINING PROGRAM

## 2023-06-20 PROCEDURE — 1160F RVW MEDS BY RX/DR IN RCRD: CPT | Mod: CPTII,S$GLB,, | Performed by: STUDENT IN AN ORGANIZED HEALTH CARE EDUCATION/TRAINING PROGRAM

## 2023-06-20 PROCEDURE — 3288F PR FALLS RISK ASSESSMENT DOCUMENTED: ICD-10-PCS | Mod: CPTII,S$GLB,, | Performed by: STUDENT IN AN ORGANIZED HEALTH CARE EDUCATION/TRAINING PROGRAM

## 2023-06-20 PROCEDURE — 1159F MED LIST DOCD IN RCRD: CPT | Mod: CPTII,S$GLB,, | Performed by: STUDENT IN AN ORGANIZED HEALTH CARE EDUCATION/TRAINING PROGRAM

## 2023-06-20 PROCEDURE — 1101F PR PT FALLS ASSESS DOC 0-1 FALLS W/OUT INJ PAST YR: ICD-10-PCS | Mod: CPTII,S$GLB,, | Performed by: STUDENT IN AN ORGANIZED HEALTH CARE EDUCATION/TRAINING PROGRAM

## 2023-06-20 PROCEDURE — 3288F FALL RISK ASSESSMENT DOCD: CPT | Mod: CPTII,S$GLB,, | Performed by: STUDENT IN AN ORGANIZED HEALTH CARE EDUCATION/TRAINING PROGRAM

## 2023-06-20 PROCEDURE — 1126F AMNT PAIN NOTED NONE PRSNT: CPT | Mod: CPTII,S$GLB,, | Performed by: STUDENT IN AN ORGANIZED HEALTH CARE EDUCATION/TRAINING PROGRAM

## 2023-06-20 PROCEDURE — 4010F PR ACE/ARB THEARPY RXD/TAKEN: ICD-10-PCS | Mod: CPTII,S$GLB,, | Performed by: STUDENT IN AN ORGANIZED HEALTH CARE EDUCATION/TRAINING PROGRAM

## 2023-06-20 PROCEDURE — 11900 PR INJECTION INTO SKIN LESIONS, UP TO 7: ICD-10-PCS | Mod: S$GLB,,, | Performed by: STUDENT IN AN ORGANIZED HEALTH CARE EDUCATION/TRAINING PROGRAM

## 2023-06-20 PROCEDURE — 4010F ACE/ARB THERAPY RXD/TAKEN: CPT | Mod: CPTII,S$GLB,, | Performed by: STUDENT IN AN ORGANIZED HEALTH CARE EDUCATION/TRAINING PROGRAM

## 2023-06-20 PROCEDURE — 1160F PR REVIEW ALL MEDS BY PRESCRIBER/CLIN PHARMACIST DOCUMENTED: ICD-10-PCS | Mod: CPTII,S$GLB,, | Performed by: STUDENT IN AN ORGANIZED HEALTH CARE EDUCATION/TRAINING PROGRAM

## 2023-06-20 RX ORDER — METOPROLOL TARTRATE 100 MG/1
100 TABLET ORAL 2 TIMES DAILY
COMMUNITY
End: 2023-06-20 | Stop reason: SDUPTHER

## 2023-06-20 RX ORDER — METOPROLOL TARTRATE 100 MG/1
100 TABLET ORAL 2 TIMES DAILY
Qty: 60 TABLET | Refills: 3 | Status: SHIPPED | OUTPATIENT
Start: 2023-06-20 | End: 2023-10-13 | Stop reason: SDUPTHER

## 2023-06-20 NOTE — PROGRESS NOTES
Subjective:      Patient ID:  Suma Butterfield is a 67 y.o. female who presents for   Chief Complaint   Patient presents with    Follow-up     Spot on both legs     KIV 05/02/2023      Patient here today for f/u on hair loss; states that hair is improving.   Patient also states that she notice spots on both legs 1 week ago. States that she treats spots on legs with alcohol that seems to be helping some. Patient states that spots on her legs does not itch or bothersome. Occurred while she was on a cruise. No recent illnesses.       Final Pathologic Diagnosis     Skin, scalp, biopsies:   -Cicatricial Alopecia, see comment   Comment: Given the clinical differential central centrifugal cicatricial   alopecia seems most appropriate in this context there are findings of   minaturized hairs present which represent a coexisting androgenic alopecia.   The findings of fibrosis of follicle tracts are nonspecfic, this could   represent a burnt out inflammatory hair process   Comment: Interp By Laura Armenta M.D., Signed on 05/31/2022 at 16:52     No hx of cancer, post-menopausal     transaminitis- Has hx of treated hep C s/p treatment, drinks alcohol daily- following with outside GI  Also following with Dr. Baum for elevated ferritin, RBC count  Had liver ultrasound which should hepatic steatosis      Has SVT and uncontrolled htn- but working w/ her PCP to get it under better control      Review of Systems   Constitutional:  Negative for fever and chills.   Respiratory:  Negative for cough and shortness of breath.    Gastrointestinal:  Negative for nausea and vomiting.   Skin:  Positive for itching, daily sunscreen use and activity-related sunscreen use. Negative for wears hat.     Objective:   Physical Exam   Constitutional: She appears well-developed and well-nourished.   Neurological: She is alert and oriented to person, place, and time.   Psychiatric: She has a normal mood and affect.   Skin:   Areas Examined  (abnormalities noted in diagram):   Scalp / Hair Palpated and Inspected  RLE Inspected  LLE Inspection Performed               Diagram Legend     Erythematous scaling macule/papule c/w actinic keratosis       Vascular papule c/w angioma      Pigmented verrucoid papule/plaque c/w seborrheic keratosis      Yellow umbilicated papule c/w sebaceous hyperplasia      Irregularly shaped tan macule c/w lentigo     1-2 mm smooth white papules consistent with Milia      Movable subcutaneous cyst with punctum c/w epidermal inclusion cyst      Subcutaneous movable cyst c/w pilar cyst      Firm pink to brown papule c/w dermatofibroma      Pedunculated fleshy papule(s) c/w skin tag(s)      Evenly pigmented macule c/w junctional nevus     Mildly variegated pigmented, slightly irregular-bordered macule c/w mildly atypical nevus      Flesh colored to evenly pigmented papule c/w intradermal nevus       Pink pearly papule/plaque c/w basal cell carcinoma      Erythematous hyperkeratotic cursted plaque c/w SCC      Surgical scar with no sign of skin cancer recurrence      Open and closed comedones      Inflammatory papules and pustules      Verrucoid papule consistent consistent with wart     Erythematous eczematous patches and plaques     Dystrophic onycholytic nail with subungual debris c/w onychomycosis     Umbilicated papule    Erythematous-base heme-crusted tan verrucoid plaque consistent with inflamed seborrheic keratosis     Erythematous Silvery Scaling Plaque c/w Psoriasis     See annotation                Assessment / Plan:        Schamberg's capillaritis  - feels well, no evidence today of LCV, no recent illness, was on her feet a little more than usual while on vacation  Keep legs elevated, consider compression stockings  Asymptomatic for now but discussed tac if becoming symptomatic.   She will contact me if worsening    Cicatricial alopecia  Intralesional Kenalog 3.3mg/cc (6 cc total) injected into 1 lesions on the scalp  today after obtaining verbal consent including risk of surrounding hypopigmentation. Patient tolerated procedure well.    Units: 2  NDC for Kenalog 10mg/cc:  2227-8281-08    Androgenic alopecia  -     fluocinonide (LIDEX) 0.05 % external solution; APPLY TOPICALLY TO THE AFFECTED AREA DAILY  Dispense: 60 mL; Refill: 2  - continue finasteride 5mg daily   - continue rogaine 5% BID  - discussed low dose minoxidil and spironolactone, however w/ hx of HTN on 3 anti-hypertensives and also w/ hx of SVT will not add either right now  -Finasteride is not approved by the FDA for use in women, however multiple studies have shown benefit when used to treat female pattern hair loss in combination with topical minoxidil. Side effects are infrequent, usually mild, and reversible even with maintenance of treatment. They include decreased libido, breast tenderness, and headache. In premenopausal women, irregular menstruation and spotting may occur. Pregnancy must be avoided while on this medication (risk of feminization of the male fetus)           No follow-ups on file.

## 2023-06-20 NOTE — PROGRESS NOTES
Pt is here for a nurse BP check, Pt is taking Losartan 100mg daily and Metoprolol Tartrate 50mg twice daily, Pt stated at home her BP has been running about 140/150. Per Andrea Increase Metoprolol Tartrate to 100mg twice daily and continue losartan 100mg once daily. Med list updated, Pt verbalized understanding. 2 week BP check scheduled. Pt is needing a refill on Metoprolol tartrate 100mg [facility] [address]

## 2023-06-29 DIAGNOSIS — L64.9 ANDROGENIC ALOPECIA: ICD-10-CM

## 2023-06-29 RX ORDER — FINASTERIDE 5 MG/1
5 TABLET, FILM COATED ORAL DAILY
Qty: 30 TABLET | Refills: 11 | Status: SHIPPED | OUTPATIENT
Start: 2023-06-29 | End: 2023-10-13 | Stop reason: SDUPTHER

## 2023-07-11 ENCOUNTER — TELEPHONE (OUTPATIENT)
Dept: FAMILY MEDICINE | Facility: CLINIC | Age: 68
End: 2023-07-11

## 2023-07-18 ENCOUNTER — CLINICAL SUPPORT (OUTPATIENT)
Dept: FAMILY MEDICINE | Facility: CLINIC | Age: 68
End: 2023-07-18
Payer: MEDICARE

## 2023-07-18 VITALS — DIASTOLIC BLOOD PRESSURE: 86 MMHG | SYSTOLIC BLOOD PRESSURE: 132 MMHG

## 2023-07-18 NOTE — PROGRESS NOTES
Pt here for BP check, pt takes losartan 100 mg daily, metoprolol 100 mg bid. Pt states it has been running in the 140's over 80's, sometimes the 130 's

## 2023-08-08 ENCOUNTER — OFFICE VISIT (OUTPATIENT)
Dept: DERMATOLOGY | Facility: CLINIC | Age: 68
End: 2023-08-08
Payer: MEDICARE

## 2023-08-08 DIAGNOSIS — L66.9 CICATRICIAL ALOPECIA: Primary | ICD-10-CM

## 2023-08-08 PROCEDURE — 11901 INJECT SKIN LESIONS >7: CPT | Mod: S$GLB,,, | Performed by: STUDENT IN AN ORGANIZED HEALTH CARE EDUCATION/TRAINING PROGRAM

## 2023-08-08 PROCEDURE — 99499 UNLISTED E&M SERVICE: CPT | Mod: S$GLB,,, | Performed by: STUDENT IN AN ORGANIZED HEALTH CARE EDUCATION/TRAINING PROGRAM

## 2023-08-08 PROCEDURE — 99499 NO LOS: ICD-10-PCS | Mod: S$GLB,,, | Performed by: STUDENT IN AN ORGANIZED HEALTH CARE EDUCATION/TRAINING PROGRAM

## 2023-08-08 PROCEDURE — 11901 PR INJECTION, SKIN LESIONS, 8 OR MORE: ICD-10-PCS | Mod: S$GLB,,, | Performed by: STUDENT IN AN ORGANIZED HEALTH CARE EDUCATION/TRAINING PROGRAM

## 2023-08-08 NOTE — PROGRESS NOTES
Subjective:      Patient ID:  Suma Butterfield is a 68 y.o. female who presents for   Chief Complaint   Patient presents with    Hair Loss     LOV 6/20/23    Patient here today for f/u on hair loss. States that hair growth is improving in certain areas. Taking Finasteride daily. Using Rogaine daily. ILK at last visit.        Final Pathologic Diagnosis     Skin, scalp, biopsies:   -Cicatricial Alopecia, see comment   Comment: Given the clinical differential central centrifugal cicatricial   alopecia seems most appropriate in this context there are findings of   minaturized hairs present which represent a coexisting androgenic alopecia.   The findings of fibrosis of follicle tracts are nonspecfic, this could   represent a burnt out inflammatory hair process   Comment: Interp By Laura Armenta M.D., Signed on 05/31/2022 at 16:52     No hx of cancer, post-menopausal     transaminitis- Has hx of treated hep C s/p treatment, drinks alcohol daily- following with outside GI  Also following with Dr. Baum for elevated ferritin, RBC count  Had liver ultrasound which should hepatic steatosis      Has SVT and uncontrolled htn- but working w/ her PCP to get it under better control           Review of Systems   Constitutional:  Negative for fever and chills.   Respiratory:  Negative for cough and shortness of breath.    Gastrointestinal:  Negative for nausea and vomiting.   Skin:  Positive for daily sunscreen use and activity-related sunscreen use. Negative for itching and wears hat.       Objective:   Physical Exam   Constitutional: She appears well-developed and well-nourished.   Neurological: She is alert and oriented to person, place, and time.   Psychiatric: She has a normal mood and affect.   Skin:   Areas Examined (abnormalities noted in diagram):   Scalp / Hair Palpated and Inspected            Diagram Legend     Erythematous scaling macule/papule c/w actinic keratosis       Vascular papule c/w angioma      Pigmented  verrucoid papule/plaque c/w seborrheic keratosis      Yellow umbilicated papule c/w sebaceous hyperplasia      Irregularly shaped tan macule c/w lentigo     1-2 mm smooth white papules consistent with Milia      Movable subcutaneous cyst with punctum c/w epidermal inclusion cyst      Subcutaneous movable cyst c/w pilar cyst      Firm pink to brown papule c/w dermatofibroma      Pedunculated fleshy papule(s) c/w skin tag(s)      Evenly pigmented macule c/w junctional nevus     Mildly variegated pigmented, slightly irregular-bordered macule c/w mildly atypical nevus      Flesh colored to evenly pigmented papule c/w intradermal nevus       Pink pearly papule/plaque c/w basal cell carcinoma      Erythematous hyperkeratotic cursted plaque c/w SCC      Surgical scar with no sign of skin cancer recurrence      Open and closed comedones      Inflammatory papules and pustules      Verrucoid papule consistent consistent with wart     Erythematous eczematous patches and plaques     Dystrophic onycholytic nail with subungual debris c/w onychomycosis     Umbilicated papule    Erythematous-base heme-crusted tan verrucoid plaque consistent with inflamed seborrheic keratosis     Erythematous Silvery Scaling Plaque c/w Psoriasis     See annotation      Assessment / Plan:        Cicatricial alopecia  Continue lidex solution 1-2 x a day  Continue rogaine 5% solution  Continue finasteride 5mg daily  Intralesional Kenalog 3.3mg/cc (6 cc total) injected into 20 areas of the scalp today after obtaining verbal consent including risk of surrounding hypopigmentation. Patient tolerated procedure well.    Units: 2  NDC for Kenalog 10mg/cc:  9750-1293-00             No follow-ups on file.

## 2023-08-21 RX ORDER — ATORVASTATIN CALCIUM 40 MG/1
40 TABLET, FILM COATED ORAL NIGHTLY
Qty: 30 TABLET | Refills: 1 | Status: SHIPPED | OUTPATIENT
Start: 2023-08-21 | End: 2023-10-13 | Stop reason: SDUPTHER

## 2023-08-22 DIAGNOSIS — Z12.31 ENCOUNTER FOR SCREENING MAMMOGRAM FOR MALIGNANT NEOPLASM OF BREAST: Primary | ICD-10-CM

## 2023-08-29 ENCOUNTER — HOSPITAL ENCOUNTER (OUTPATIENT)
Dept: RADIOLOGY | Facility: HOSPITAL | Age: 68
Discharge: HOME | End: 2023-08-29
Attending: FAMILY MEDICINE
Payer: MEDICARE

## 2023-08-29 DIAGNOSIS — Z12.31 ENCOUNTER FOR SCREENING MAMMOGRAM FOR MALIGNANT NEOPLASM OF BREAST: ICD-10-CM

## 2023-08-29 PROCEDURE — 77067 SCR MAMMO BI INCL CAD: CPT | Mod: TC,PO

## 2023-09-04 DIAGNOSIS — I25.10 CORONARY ARTERY DISEASE, UNSPECIFIED VESSEL OR LESION TYPE, UNSPECIFIED WHETHER ANGINA PRESENT, UNSPECIFIED WHETHER NATIVE OR TRANSPLANTED HEART: ICD-10-CM

## 2023-09-05 ENCOUNTER — TELEPHONE (OUTPATIENT)
Dept: FAMILY MEDICINE | Facility: CLINIC | Age: 68
End: 2023-09-05

## 2023-09-05 RX ORDER — ASPIRIN 81 MG/1
81 TABLET ORAL DAILY
Qty: 90 TABLET | Refills: 1 | Status: SHIPPED | OUTPATIENT
Start: 2023-09-05 | End: 2023-10-13 | Stop reason: SDUPTHER

## 2023-09-05 NOTE — TELEPHONE ENCOUNTER
----- Message from Mono Ku MD sent at 9/4/2023  2:00 PM CDT -----  Call patient.  Mammogram was normal.  Repeat mammogram in 1 year.

## 2023-09-08 ENCOUNTER — TELEPHONE (OUTPATIENT)
Dept: FAMILY MEDICINE | Facility: CLINIC | Age: 68
End: 2023-09-08

## 2023-09-08 NOTE — TELEPHONE ENCOUNTER
----- Message from Karin Levin sent at 9/8/2023  8:38 AM CDT -----  -Pt called at 8:05am and said Macy called her on yesterday and left a message for her to call her back.  [phone]

## 2023-10-10 ENCOUNTER — OFFICE VISIT (OUTPATIENT)
Dept: DERMATOLOGY | Facility: CLINIC | Age: 68
End: 2023-10-10
Payer: MEDICARE

## 2023-10-10 DIAGNOSIS — L64.9 ANDROGENIC ALOPECIA: Primary | ICD-10-CM

## 2023-10-10 DIAGNOSIS — L66.9 CICATRICIAL ALOPECIA: ICD-10-CM

## 2023-10-10 PROCEDURE — 11900 INJECT SKIN LESIONS </W 7: CPT | Mod: S$GLB,,, | Performed by: STUDENT IN AN ORGANIZED HEALTH CARE EDUCATION/TRAINING PROGRAM

## 2023-10-10 PROCEDURE — 99499 NO LOS: ICD-10-PCS | Mod: S$GLB,,, | Performed by: STUDENT IN AN ORGANIZED HEALTH CARE EDUCATION/TRAINING PROGRAM

## 2023-10-10 PROCEDURE — 11900 PR INJECTION INTO SKIN LESIONS, UP TO 7: ICD-10-PCS | Mod: S$GLB,,, | Performed by: STUDENT IN AN ORGANIZED HEALTH CARE EDUCATION/TRAINING PROGRAM

## 2023-10-10 PROCEDURE — 99499 UNLISTED E&M SERVICE: CPT | Mod: S$GLB,,, | Performed by: STUDENT IN AN ORGANIZED HEALTH CARE EDUCATION/TRAINING PROGRAM

## 2023-10-10 RX ORDER — FINASTERIDE 5 MG/1
5 TABLET, FILM COATED ORAL DAILY
Qty: 30 TABLET | Refills: 11 | Status: SHIPPED | OUTPATIENT
Start: 2023-10-10 | End: 2023-10-13 | Stop reason: SDUPTHER

## 2023-10-10 RX ORDER — FLUOCINONIDE TOPICAL SOLUTION USP, 0.05% 0.5 MG/ML
SOLUTION TOPICAL
Qty: 60 ML | Refills: 5 | Status: SHIPPED | OUTPATIENT
Start: 2023-10-10 | End: 2023-10-11 | Stop reason: SDUPTHER

## 2023-10-10 NOTE — PROGRESS NOTES
Subjective:      Patient ID:  Suma Butterfield is a 68 y.o. female who presents for   Chief Complaint   Patient presents with    Hair Loss     Follow up     LOV 08/08/2023    Patient coming in today for a follow up on hair loss. States that it is a slow process but that she does feel like it is helping.   Taking Finasteride daily. Using Rogaine daily. Using Lidex daily.         Final Pathologic Diagnosis     Skin, scalp, biopsies:   -Cicatricial Alopecia, see comment   Comment: Given the clinical differential central centrifugal cicatricial   alopecia seems most appropriate in this context there are findings of   minaturized hairs present which represent a coexisting androgenic alopecia.   The findings of fibrosis of follicle tracts are nonspecfic, this could   represent a burnt out inflammatory hair process   Comment: Interp By Laura Armenta M.D., Signed on 05/31/2022 at 16:52     No hx of cancer, post-menopausal     transaminitis- Has hx of treated hep C s/p treatment, drinks alcohol daily- following with outside GI  Also following with Dr. Baum for elevated ferritin, RBC count  Had liver ultrasound which should hepatic steatosis      Has SVT and uncontrolled htn- but working w/ her PCP to get it under better control                Review of Systems   Constitutional:  Negative for fever and chills.   Respiratory:  Negative for cough and shortness of breath.    Gastrointestinal:  Negative for nausea and vomiting.   Skin:  Positive for daily sunscreen use, activity-related sunscreen use and wears hat. Negative for itching.       Objective:   Physical Exam   Constitutional: She appears well-developed and well-nourished.   Neurological: She is alert and oriented to person, place, and time.   Psychiatric: She has a normal mood and affect.   Skin:   Areas Examined (abnormalities noted in diagram):   Scalp / Hair Palpated and Inspected            Diagram Legend     Erythematous scaling macule/papule c/w actinic  keratosis       Vascular papule c/w angioma      Pigmented verrucoid papule/plaque c/w seborrheic keratosis      Yellow umbilicated papule c/w sebaceous hyperplasia      Irregularly shaped tan macule c/w lentigo     1-2 mm smooth white papules consistent with Milia      Movable subcutaneous cyst with punctum c/w epidermal inclusion cyst      Subcutaneous movable cyst c/w pilar cyst      Firm pink to brown papule c/w dermatofibroma      Pedunculated fleshy papule(s) c/w skin tag(s)      Evenly pigmented macule c/w junctional nevus     Mildly variegated pigmented, slightly irregular-bordered macule c/w mildly atypical nevus      Flesh colored to evenly pigmented papule c/w intradermal nevus       Pink pearly papule/plaque c/w basal cell carcinoma      Erythematous hyperkeratotic cursted plaque c/w SCC      Surgical scar with no sign of skin cancer recurrence      Open and closed comedones      Inflammatory papules and pustules      Verrucoid papule consistent consistent with wart     Erythematous eczematous patches and plaques     Dystrophic onycholytic nail with subungual debris c/w onychomycosis     Umbilicated papule    Erythematous-base heme-crusted tan verrucoid plaque consistent with inflamed seborrheic keratosis     Erythematous Silvery Scaling Plaque c/w Psoriasis     See annotation          Today        Assessment / Plan:        cicatricial alopecia  -     fluocinonide (LIDEX) 0.05 % external solution; APPLY TOPICALLY TO THE AFFECTED AREA DAILY  Dispense: 60 mL; Refill: 5  -     finasteride (PROSCAR) 5 mg tablet; Take 1 tablet (5 mg total) by mouth once daily.  Dispense: 30 tablet; Refill: 11  Intralesional Kenalog 3.3mg/cc (4.5 cc total) injected into 7 lesions on the scalp today after obtaining verbal consent including risk of surrounding hypopigmentation. Patient tolerated procedure well.    Units: 2  NDC for Kenalog 10mg/cc:  7821-8360-78             No follow-ups on file.

## 2023-10-11 RX ORDER — FINASTERIDE 5 MG/1
TABLET, FILM COATED ORAL
Refills: 0 | OUTPATIENT
Start: 2023-10-11

## 2023-10-11 RX ORDER — FLUOCINONIDE TOPICAL SOLUTION USP, 0.05% 0.5 MG/ML
SOLUTION TOPICAL
Qty: 60 ML | Refills: 5 | Status: SHIPPED | OUTPATIENT
Start: 2023-10-11

## 2023-10-11 RX ORDER — FLUOCINONIDE TOPICAL SOLUTION USP, 0.05% 0.5 MG/ML
SOLUTION TOPICAL
Refills: 0 | OUTPATIENT
Start: 2023-10-11

## 2023-10-11 NOTE — TELEPHONE ENCOUNTER
Called patient put message in for presciption    ----- Message from Pamela Matias sent at 10/11/2023 11:31 AM CDT -----  Type:  Needs Medical Advice    Who Called: pt      Would the patient rather a call back or a response via [facility]? Call back    Best Call Back Number: [phone]      Additional Information: pt has questions about medication.      Please call Back to advise. Thanks!

## 2023-10-13 DIAGNOSIS — M81.0 AGE RELATED OSTEOPOROSIS, UNSPECIFIED PATHOLOGICAL FRACTURE PRESENCE: ICD-10-CM

## 2023-10-13 DIAGNOSIS — I25.10 CORONARY ARTERY DISEASE, UNSPECIFIED VESSEL OR LESION TYPE, UNSPECIFIED WHETHER ANGINA PRESENT, UNSPECIFIED WHETHER NATIVE OR TRANSPLANTED HEART: ICD-10-CM

## 2023-10-13 DIAGNOSIS — I10 ESSENTIAL HYPERTENSION: ICD-10-CM

## 2023-10-13 DIAGNOSIS — L66.9 CICATRICIAL ALOPECIA: ICD-10-CM

## 2023-10-13 DIAGNOSIS — L64.9 ANDROGENIC ALOPECIA: ICD-10-CM

## 2023-10-13 RX ORDER — METOPROLOL TARTRATE 100 MG/1
100 TABLET ORAL 2 TIMES DAILY
Qty: 60 TABLET | Refills: 3 | Status: SHIPPED | OUTPATIENT
Start: 2023-10-13 | End: 2023-10-17 | Stop reason: SDUPTHER

## 2023-10-13 RX ORDER — LOSARTAN POTASSIUM 100 MG/1
100 TABLET ORAL DAILY
Qty: 90 TABLET | Refills: 1 | Status: SHIPPED | OUTPATIENT
Start: 2023-10-13 | End: 2023-10-17 | Stop reason: SDUPTHER

## 2023-10-13 RX ORDER — CLOPIDOGREL BISULFATE 75 MG/1
75 TABLET ORAL DAILY
Qty: 90 TABLET | Refills: 1 | Status: SHIPPED | OUTPATIENT
Start: 2023-10-13 | End: 2023-10-17 | Stop reason: SDUPTHER

## 2023-10-13 RX ORDER — HYDROCHLOROTHIAZIDE 12.5 MG/1
12.5 TABLET ORAL DAILY
Qty: 90 TABLET | Refills: 1 | Status: SHIPPED | OUTPATIENT
Start: 2023-10-13 | End: 2023-10-17 | Stop reason: SDUPTHER

## 2023-10-13 RX ORDER — ATORVASTATIN CALCIUM 40 MG/1
40 TABLET, FILM COATED ORAL NIGHTLY
Qty: 30 TABLET | Refills: 1 | Status: SHIPPED | OUTPATIENT
Start: 2023-10-13 | End: 2023-10-17 | Stop reason: SDUPTHER

## 2023-10-13 RX ORDER — ASPIRIN 81 MG/1
81 TABLET ORAL DAILY
Qty: 90 TABLET | Refills: 1 | Status: SHIPPED | OUTPATIENT
Start: 2023-10-13 | End: 2023-10-17 | Stop reason: SDUPTHER

## 2023-10-13 RX ORDER — FINASTERIDE 5 MG/1
5 TABLET, FILM COATED ORAL DAILY
Qty: 30 TABLET | Refills: 11 | Status: SHIPPED | OUTPATIENT
Start: 2023-10-13 | End: 2023-10-17 | Stop reason: SDUPTHER

## 2023-10-13 RX ORDER — ALENDRONATE SODIUM 70 MG/1
TABLET ORAL
Qty: 12 TABLET | Refills: 3 | Status: SHIPPED | OUTPATIENT
Start: 2023-10-13 | End: 2023-10-17 | Stop reason: SDUPTHER

## 2023-10-13 NOTE — TELEPHONE ENCOUNTER
Patient stated [facility] will not cover her medications and is requesting it be sent to [facility]

## 2023-10-16 RX ORDER — FINASTERIDE 5 MG/1
5 TABLET, FILM COATED ORAL DAILY
Qty: 30 TABLET | Refills: 11 | Status: SHIPPED | OUTPATIENT
Start: 2023-10-16 | End: 2024-01-22 | Stop reason: SDUPTHER

## 2023-10-17 DIAGNOSIS — I25.10 CORONARY ARTERY DISEASE, UNSPECIFIED VESSEL OR LESION TYPE, UNSPECIFIED WHETHER ANGINA PRESENT, UNSPECIFIED WHETHER NATIVE OR TRANSPLANTED HEART: ICD-10-CM

## 2023-10-17 DIAGNOSIS — M81.0 AGE RELATED OSTEOPOROSIS, UNSPECIFIED PATHOLOGICAL FRACTURE PRESENCE: ICD-10-CM

## 2023-10-17 DIAGNOSIS — I10 ESSENTIAL HYPERTENSION: ICD-10-CM

## 2023-10-17 DIAGNOSIS — L66.9 CICATRICIAL ALOPECIA: ICD-10-CM

## 2023-10-17 RX ORDER — LOSARTAN POTASSIUM 100 MG/1
100 TABLET ORAL DAILY
Qty: 90 TABLET | Refills: 1 | Status: SHIPPED | OUTPATIENT
Start: 2023-10-17 | End: 2024-01-20 | Stop reason: SDUPTHER

## 2023-10-17 RX ORDER — ATORVASTATIN CALCIUM 40 MG/1
40 TABLET, FILM COATED ORAL NIGHTLY
Qty: 30 TABLET | Refills: 1 | Status: SHIPPED | OUTPATIENT
Start: 2023-10-17 | End: 2023-12-20

## 2023-10-17 RX ORDER — METOPROLOL TARTRATE 100 MG/1
100 TABLET ORAL 2 TIMES DAILY
Qty: 60 TABLET | Refills: 3 | Status: SHIPPED | OUTPATIENT
Start: 2023-10-17 | End: 2024-01-20 | Stop reason: SDUPTHER

## 2023-10-17 RX ORDER — HYDROCHLOROTHIAZIDE 12.5 MG/1
12.5 TABLET ORAL DAILY
Qty: 90 TABLET | Refills: 1 | Status: SHIPPED | OUTPATIENT
Start: 2023-10-17 | End: 2024-01-30

## 2023-10-17 RX ORDER — ALENDRONATE SODIUM 70 MG/1
TABLET ORAL
Qty: 12 TABLET | Refills: 3 | Status: SHIPPED | OUTPATIENT
Start: 2023-10-17 | End: 2024-02-10 | Stop reason: SDUPTHER

## 2023-10-17 RX ORDER — FINASTERIDE 5 MG/1
5 TABLET, FILM COATED ORAL DAILY
Qty: 30 TABLET | Refills: 11 | Status: SHIPPED | OUTPATIENT
Start: 2023-10-17 | End: 2023-12-01 | Stop reason: SDUPTHER

## 2023-10-17 RX ORDER — CLOPIDOGREL BISULFATE 75 MG/1
75 TABLET ORAL DAILY
Qty: 90 TABLET | Refills: 1 | Status: SHIPPED | OUTPATIENT
Start: 2023-10-17 | End: 2024-10-16

## 2023-10-17 RX ORDER — ASPIRIN 81 MG/1
81 TABLET ORAL DAILY
Qty: 90 TABLET | Refills: 1 | Status: SHIPPED | OUTPATIENT
Start: 2023-10-17 | End: 2024-01-20 | Stop reason: SDUPTHER

## 2023-10-17 NOTE — TELEPHONE ENCOUNTER
Pt states she has been using [facility], but since she has [employer] they canceled her rx.   Needs rx sent to [facility]

## 2023-10-17 NOTE — TELEPHONE ENCOUNTER
----- Message from Deana Kerns sent at 10/17/2023 11:23 AM CDT -----  Vm- 10:52-pt is calling to talk to nurse about her rx's   [phone]

## 2023-11-15 ENCOUNTER — TELEPHONE (OUTPATIENT)
Dept: FAMILY MEDICINE | Facility: CLINIC | Age: 68
End: 2023-11-15

## 2023-11-15 DIAGNOSIS — I10 ESSENTIAL HYPERTENSION: Primary | ICD-10-CM

## 2023-11-15 DIAGNOSIS — E78.00 HYPERCHOLESTEROLEMIA: ICD-10-CM

## 2023-11-15 DIAGNOSIS — Z79.899 ENCOUNTER FOR LONG-TERM (CURRENT) USE OF OTHER MEDICATIONS: ICD-10-CM

## 2023-11-27 NOTE — PROGRESS NOTES
[facility] Hematology/Oncology  PROGRESS NOTE - Follow-up Visit      Subjective:       Patient ID:   NAME: Suma Butterfield : 1955     68 y.o. female    Referring Doc: Hector Colin PA*  Other Physicians: Pavan Leonard; Jun        Chief Complaint: polycythemia/elevated MCV f/u      History of Present Illness:     Patient returns today for a regularly scheduled follow-up visit.  The patient is here today to go over the results of the recently ordered labs, tests and studies.     She is here by herself. She has been seeing Dr Kaba with dermatology for her alopecia. She sees her PCP this coming Friday. Some chronic hip issues.     She previously had had colonoscopy with Dr Barahona and had three benign polyps removed.     She is feeling ok; energy levels are fine; no CP, SOB, HA's or N/V; breathing ok    She has been getting phlebotomies every other month      Discussed covid19 and she has been vaccinated      ROS:   GEN: normal without any fever, night sweats or weight loss  HEENT: normal with no HA's, sore throat, stiff neck, changes in vision  CV: normal with no CP, SOB, PND, GAY or orthopnea  PULM: normal with no SOB, cough, hemoptysis, sputum or pleuritic pain  GI: normal with no abdominal pain, nausea, vomiting, constipation, diarrhea, melanotic stools, BRBPR, or hematemesis  : normal with no hematuria, dysuria  BREAST: normal with no mass, discharge, pain  SKIN: normal with no rash, erythema, bruising, or swelling    Pain Scale: 0    Allergies:  Review of patient's allergies indicates:   Allergen Reactions    Succinylcholine      **Pseudocholinesterase**       Medications:    Current Outpatient Medications:     alendronate (FOSAMAX) 70 MG tablet, TK 1 T PO 1 TIME Q WK, Disp: 12 tablet, Rfl: 3    aspirin (ECOTRIN) 81 MG EC tablet, Take 1 tablet (81 mg total) by mouth once daily., Disp: 90 tablet, Rfl: 1    atorvastatin (LIPITOR) 40 MG tablet, Take 1 tablet (40 mg total) by mouth every evening.,  Disp: 30 tablet, Rfl: 1    biotin 5,000 mcg TbDL, Take 5,000 mcg by mouth once daily., Disp: , Rfl:     calcium-vitamin D3 (OS- + D3) 500 mg-5 mcg (200 unit) per tablet, Take 1 tablet by mouth once daily., Disp: , Rfl:     clopidogreL (PLAVIX) 75 mg tablet, Take 1 tablet (75 mg total) by mouth once daily., Disp: 90 tablet, Rfl: 1    cyanocobalamin (VITAMIN B-12) 1000 MCG tablet, Take 100 mcg by mouth once daily., Disp: , Rfl:     diclofenac (VOLTAREN) 75 MG EC tablet, Take 1 tablet (75 mg total) by mouth 2 (two) times daily., Disp: 14 tablet, Rfl: 0    finasteride (PROSCAR) 5 mg tablet, Take 1 tablet (5 mg total) by mouth once daily., Disp: 30 tablet, Rfl: 11    finasteride (PROSCAR) 5 mg tablet, Take 1 tablet (5 mg total) by mouth once daily., Disp: 30 tablet, Rfl: 11    fluocinonide (LIDEX) 0.05 % external solution, APPLY TOPICALLY TO THE AFFECTED AREA DAILY, Disp: 60 mL, Rfl: 5    hydroCHLOROthiazide (HYDRODIURIL) 12.5 MG Tab, Take 1 tablet (12.5 mg total) by mouth once daily., Disp: 90 tablet, Rfl: 1    losartan (COZAAR) 100 MG tablet, Take 1 tablet (100 mg total) by mouth once daily., Disp: 90 tablet, Rfl: 1    metoprolol tartrate (LOPRESSOR) 100 MG tablet, Take 1 tablet (100 mg total) by mouth 2 (two) times daily., Disp: 60 tablet, Rfl: 3    milk thistle 175 mg tablet, Take 175 mg by mouth once daily., Disp: , Rfl:     omega-3 fatty acids/fish oil (FISH OIL-OMEGA-3 FATTY ACIDS) 300-1,000 mg capsule, Take 1 capsule by mouth once daily., Disp: , Rfl:     vitamin E 400 UNIT capsule, Take 400 Units by mouth once daily., Disp: , Rfl:     zinc gluconate 50 mg tablet, Take 50 mg by mouth once daily., Disp: , Rfl:     PMHx/PSHx Updates:  See patient's last visit with me on 5/29/2023  See H&P on 10/14/2022        Pathology:   Cancer Staging   No matching staging information was found for the patient.          Objective:     Vitals:  Blood pressure (!) 165/92, pulse (!) 58, temperature 97 °F (36.1 °C), resp. rate  16, weight 57.1 kg (125 lb 14.4 oz).    Physical Examination:   GEN: no apparent distress, comfortable; AAOx3  HEAD: atraumatic and normocephalic; alopecia  EYES: no pallor, no icterus, PERRLA  ENT: OMM, no pharyngeal erythema, external ears WNL; no nasal discharge; no thrush  NECK: no masses, thyroid normal, trachea midline, no LAD/LN's, supple  CV: RRR with no murmur; normal pulse; normal S1 and S2; no pedal edema  CHEST: Normal respiratory effort; CTAB; normal breath sounds; no wheeze or crackles  ABDOM: nontender and nondistended; soft; normal bowel sounds; no rebound/guarding  MUSC/Skeletal: ROM normal; no crepitus; joints normal; no deformities or arthropathy  EXTREM: no clubbing, cyanosis, inflammation or swelling  SKIN: no rashes, lesions, ulcers, petechiae or subcutaneous nodules  : no cam  NEURO: grossly intact; motor/sensory WNL; AAOx3; no tremors  PSYCH: normal mood, affect and behavior  LYMPH: normal cervical, supraclavicular, axillary and groin LN's            Labs:     Lab Results   Component Value Date    WBC 6.7 10/21/2023    HGB 15.9 (H) 10/21/2023    HCT 46.8 (H) 10/21/2023    .6 (H) 10/21/2023     10/21/2023       CMP  Sodium   Date Value Ref Range Status   07/18/2023 140 135 - 146 mmol/L Final     Potassium   Date Value Ref Range Status   07/18/2023 4.3 3.5 - 5.3 mmol/L Final     Chloride   Date Value Ref Range Status   07/18/2023 105 98 - 110 mmol/L Final     CO2   Date Value Ref Range Status   07/18/2023 29 20 - 32 mmol/L Final     Glucose   Date Value Ref Range Status   07/18/2023 107 (H) 65 - 99 mg/dL Final     Comment:                   Fasting reference interval     For someone without known diabetes, a glucose value  between 100 and 125 mg/dL is consistent with  prediabetes and should be confirmed with a  follow-up test.          BUN   Date Value Ref Range Status   07/18/2023 9 7 - 25 mg/dL Final     Creatinine   Date Value Ref Range Status   07/18/2023 0.68 0.50 - 1.05  mg/dL Final     Calcium   Date Value Ref Range Status   07/18/2023 9.4 8.6 - 10.4 mg/dL Final     Total Protein   Date Value Ref Range Status   07/18/2023 6.4 6.1 - 8.1 g/dL Final     Albumin   Date Value Ref Range Status   07/18/2023 4.2 3.6 - 5.1 g/dL Final     Total Bilirubin   Date Value Ref Range Status   07/18/2023 0.8 0.2 - 1.2 mg/dL Final     Alkaline Phosphatase   Date Value Ref Range Status   03/11/2023 47 (L) 55 - 135 U/L Final     AST   Date Value Ref Range Status   07/18/2023 26 10 - 35 U/L Final     ALT   Date Value Ref Range Status   07/18/2023 28 6 - 29 U/L Final     Anion Gap   Date Value Ref Range Status   03/11/2023 5 (L) 8 - 16 mmol/L Final     eGFR   Date Value Ref Range Status   07/18/2023 97 > OR = 60 mL/min/1.73m2 Final     Comment:     The eGFR is based on the CKD-EPI 2021 equation. To calculate   the new eGFR from a previous Creatinine or Cystatin C  result, go to https://www.kidney.org/professionals/  kdoqi/gfr%5Fcalculator       Lab Results   Component Value Date    IRON 97 10/21/2023    TRANSFERRIN 262 05/20/2022    TIBC 448 10/21/2023    LABIRON 22 10/21/2023    FESATURATED 36 05/20/2022         Lab Results   Component Value Date    FERRITIN 20 10/21/2023         JAK2 V617F Mutation NOT DETECTED     DNA Mutation Analysis See Below    Comment: RESULT: POSITIVE FOR ONE HFE GENE PATHOGENIC VARIANT: H63D (HETEROZYGOTE)    This patient is negative for the   C282Y pathogenic variant    AFP ng/mL 3.0     Erythropoietin 2.6 - 18.5 mIU/mL 10.5       - 250 U/L 182      Retic % 2.1     Folate (packed RBC's) >280 ng/mL       Methylmalonic Acid 87 - 318 nmol/L 145      Folate ng/mL 10.4          Radiology/Diagnostic Studies:    US 4/4/2023:    IMPRESSION:     No significant sonographic abnormality.        CXR    Result Date: 10/28/2022  CHEST PA AND LATERAL CLINICAL DATA:Polycythemia. Comparison to November 2017. FINDINGS: PA and lateral views demonstrate normal heart size. The  mediastinum is unremarkable. There are no infiltrates. There is blunting of the lateral costophrenic angle on the left which could reflect a trace amount of pleural fluid or pleural scarring. Osseous structures are unremarkable. IMPRESSION: 1. Slight blunting of the costophrenic angle on the left which could reflect a trace amount of pleural fluid or pleural scarring. 2. No other significant findings.      US Abdomen Complete    Result Date: 10/28/2022  Abdominal ultrasound complete CLINICAL DATA: Polycythemia FINDINGS: Sonographic assessment of the abdomen is compared to November 2020. The pancreas, aorta, and inferior vena cava are unremarkable. The liver is diffusely echogenic, similar to the prior study, compatible with fatty infiltration. Hepatopedal flow is noted within the portal vein. The gallbladder and biliary tree are within normal limits. The common bile duct measures 3 mm. The spleen and bilateral kidneys are within normal limits. There is no free fluid. IMPRESSION: 1. Hepatic steatosis. 2. No other significant findings.        I have reviewed all available lab results and radiology reports.    Assessment/Plan:   (1) 68 y.o. female with diagnosis of polycythemia and elevated MCV/ferritin who has been referred by Hector Colin PA* for evaluation by medical hematology/oncology.   - polycythemia since 2020 at least - suspect she has a secondary polycythemia process due to the chronic tobacco history  - former smoker who quit about a year ago  - elevated ferritin on labs in  May 2022 - possible acute phase reactant or due to the underlying liver hx  - order hemochromatosis panel    11/22/2022:  - she has H63D heterozygous HFE gene abnormality  - JAK2 was negative  - set up phlebotomy monthly for now    2/28/2023:  - she has been getting phlebotomies monthly - feeling better overall  - due for repeat labs    5/29/2023:  - doing well with no new issues  - checking labs monthly and phlebotomy as  directed  - ferritin down to 89    11/28/2023:  - continued on phlebotomes every other month  - latest hgb at 15.9  - ferritin down to 20; iron at 97     (2) HTN and hypercholesterolemia     (3) SVT - followed by Dr Browne     (4) Chronic hep C at age 52 s/p antiviral therapy - followed by Natalya  - AFP was a little elevated at 7.5  - US with hepatic steatosis    11/28/2023:  - latest AFP at 3.0 and adequate     (5) Severe Alopecia - followed by Dr Estrella Kaba with derm      VISIT DIAGNOSES:      Polycythemia    Elevated MCV    Elevated ferritin    Chronic hepatitis C without hepatic coma          PLAN:  continue phlebotomies monthly for now, monitor labs monthly  Discussed the genetic implications of the HFE gene abnormality in siblings and children  Repeat AFP every 6 months   4.. F/u with PCP, Derm and GI  RTC in  6 months  Fax note to Hector Colin PA*; Estrella Kaba, Jun    Discussion:     COVID-19 Discussion:    I had long discussion with patient and any applicable family about the COVID-19 coronavirus epidemic and the recommended precautions with regard to cancer and/or hematology patients. I have re-iterated the CDC recommendations for adequate hand washing, use of hand -like products, and coughing into elbow, etc. In addition, especially for our patients who are on chemotherapy and/or our otherwise immunocompromised patients, I have recommended avoidance of crowds, including movie theaters, restaurants, churches, etc. I have recommended avoidance of any sick or symptomatic family members and/or friends. I have also recommended avoidance of any raw and unwashed food products, and general avoidance of food items that have not been prepared by themselves. The patient has been asked to call us immediately with any symptom developments, issues, questions or other general concerns.     I spent over 25 mins of time with the patient. Reviewed results of the recently ordered labs, tests and  studies; made directives with regards to the results. Over half of this time was spent couseling and coordinating care.    I have explained all of the above in detail and the patient understands all of the current recommendation(s). I have answered all of their questions to the best of my ability and to their complete satisfaction.   The patient is to continue with the current management plan.            Electronically signed by Adan Baum MD

## 2023-11-28 ENCOUNTER — OFFICE VISIT (OUTPATIENT)
Dept: HEMATOLOGY/ONCOLOGY | Facility: CLINIC | Age: 68
End: 2023-11-28
Payer: MEDICARE

## 2023-11-28 VITALS
RESPIRATION RATE: 16 BRPM | TEMPERATURE: 97 F | BODY MASS INDEX: 24.59 KG/M2 | DIASTOLIC BLOOD PRESSURE: 92 MMHG | SYSTOLIC BLOOD PRESSURE: 165 MMHG | WEIGHT: 125.88 LBS | HEART RATE: 58 BPM

## 2023-11-28 DIAGNOSIS — D75.1 POLYCYTHEMIA: Primary | ICD-10-CM

## 2023-11-28 DIAGNOSIS — R71.8 ELEVATED MCV: ICD-10-CM

## 2023-11-28 DIAGNOSIS — B18.2 CHRONIC HEPATITIS C WITHOUT HEPATIC COMA: ICD-10-CM

## 2023-11-28 DIAGNOSIS — R79.89 ELEVATED FERRITIN: ICD-10-CM

## 2023-11-28 PROCEDURE — 1159F MED LIST DOCD IN RCRD: CPT | Mod: CPTII,S$GLB,, | Performed by: INTERNAL MEDICINE

## 2023-11-28 PROCEDURE — 1101F PT FALLS ASSESS-DOCD LE1/YR: CPT | Mod: CPTII,S$GLB,, | Performed by: INTERNAL MEDICINE

## 2023-11-28 PROCEDURE — 3008F PR BODY MASS INDEX (BMI) DOCUMENTED: ICD-10-PCS | Mod: CPTII,S$GLB,, | Performed by: INTERNAL MEDICINE

## 2023-11-28 PROCEDURE — 4010F PR ACE/ARB THEARPY RXD/TAKEN: ICD-10-PCS | Mod: CPTII,S$GLB,, | Performed by: INTERNAL MEDICINE

## 2023-11-28 PROCEDURE — 1160F RVW MEDS BY RX/DR IN RCRD: CPT | Mod: CPTII,S$GLB,, | Performed by: INTERNAL MEDICINE

## 2023-11-28 PROCEDURE — 1159F PR MEDICATION LIST DOCUMENTED IN MEDICAL RECORD: ICD-10-PCS | Mod: CPTII,S$GLB,, | Performed by: INTERNAL MEDICINE

## 2023-11-28 PROCEDURE — 3080F PR MOST RECENT DIASTOLIC BLOOD PRESSURE >= 90 MM HG: ICD-10-PCS | Mod: CPTII,S$GLB,, | Performed by: INTERNAL MEDICINE

## 2023-11-28 PROCEDURE — 3077F PR MOST RECENT SYSTOLIC BLOOD PRESSURE >= 140 MM HG: ICD-10-PCS | Mod: CPTII,S$GLB,, | Performed by: INTERNAL MEDICINE

## 2023-11-28 PROCEDURE — 1101F PR PT FALLS ASSESS DOC 0-1 FALLS W/OUT INJ PAST YR: ICD-10-PCS | Mod: CPTII,S$GLB,, | Performed by: INTERNAL MEDICINE

## 2023-11-28 PROCEDURE — 99213 PR OFFICE/OUTPT VISIT, EST, LEVL III, 20-29 MIN: ICD-10-PCS | Mod: S$GLB,,, | Performed by: INTERNAL MEDICINE

## 2023-11-28 PROCEDURE — 3077F SYST BP >= 140 MM HG: CPT | Mod: CPTII,S$GLB,, | Performed by: INTERNAL MEDICINE

## 2023-11-28 PROCEDURE — 99213 OFFICE O/P EST LOW 20 MIN: CPT | Mod: S$GLB,,, | Performed by: INTERNAL MEDICINE

## 2023-11-28 PROCEDURE — 3008F BODY MASS INDEX DOCD: CPT | Mod: CPTII,S$GLB,, | Performed by: INTERNAL MEDICINE

## 2023-11-28 PROCEDURE — 1126F AMNT PAIN NOTED NONE PRSNT: CPT | Mod: CPTII,S$GLB,, | Performed by: INTERNAL MEDICINE

## 2023-11-28 PROCEDURE — 3288F FALL RISK ASSESSMENT DOCD: CPT | Mod: CPTII,S$GLB,, | Performed by: INTERNAL MEDICINE

## 2023-11-28 PROCEDURE — 3080F DIAST BP >= 90 MM HG: CPT | Mod: CPTII,S$GLB,, | Performed by: INTERNAL MEDICINE

## 2023-11-28 PROCEDURE — 4010F ACE/ARB THERAPY RXD/TAKEN: CPT | Mod: CPTII,S$GLB,, | Performed by: INTERNAL MEDICINE

## 2023-11-28 PROCEDURE — 1160F PR REVIEW ALL MEDS BY PRESCRIBER/CLIN PHARMACIST DOCUMENTED: ICD-10-PCS | Mod: CPTII,S$GLB,, | Performed by: INTERNAL MEDICINE

## 2023-11-28 PROCEDURE — 3288F PR FALLS RISK ASSESSMENT DOCUMENTED: ICD-10-PCS | Mod: CPTII,S$GLB,, | Performed by: INTERNAL MEDICINE

## 2023-11-28 PROCEDURE — 1126F PR PAIN SEVERITY QUANTIFIED, NO PAIN PRESENT: ICD-10-PCS | Mod: CPTII,S$GLB,, | Performed by: INTERNAL MEDICINE

## 2023-12-01 ENCOUNTER — OFFICE VISIT (OUTPATIENT)
Dept: FAMILY MEDICINE | Facility: CLINIC | Age: 68
End: 2023-12-01
Payer: MEDICARE

## 2023-12-01 VITALS
OXYGEN SATURATION: 99 % | BODY MASS INDEX: 23.79 KG/M2 | DIASTOLIC BLOOD PRESSURE: 78 MMHG | WEIGHT: 126 LBS | HEIGHT: 61 IN | HEART RATE: 62 BPM | SYSTOLIC BLOOD PRESSURE: 136 MMHG

## 2023-12-01 DIAGNOSIS — L65.9 ALOPECIA: ICD-10-CM

## 2023-12-01 DIAGNOSIS — I25.10 CORONARY ARTERY DISEASE, UNSPECIFIED VESSEL OR LESION TYPE, UNSPECIFIED WHETHER ANGINA PRESENT, UNSPECIFIED WHETHER NATIVE OR TRANSPLANTED HEART: ICD-10-CM

## 2023-12-01 DIAGNOSIS — M16.12 ARTHRITIS OF LEFT HIP: ICD-10-CM

## 2023-12-01 DIAGNOSIS — I10 ESSENTIAL HYPERTENSION: Primary | ICD-10-CM

## 2023-12-01 PROCEDURE — 3008F BODY MASS INDEX DOCD: CPT | Mod: CPTII,S$GLB,, | Performed by: PHYSICIAN ASSISTANT

## 2023-12-01 PROCEDURE — 3075F SYST BP GE 130 - 139MM HG: CPT | Mod: CPTII,S$GLB,, | Performed by: PHYSICIAN ASSISTANT

## 2023-12-01 PROCEDURE — 3288F FALL RISK ASSESSMENT DOCD: CPT | Mod: CPTII,S$GLB,, | Performed by: PHYSICIAN ASSISTANT

## 2023-12-01 PROCEDURE — 3075F PR MOST RECENT SYSTOLIC BLOOD PRESS GE 130-139MM HG: ICD-10-PCS | Mod: CPTII,S$GLB,, | Performed by: PHYSICIAN ASSISTANT

## 2023-12-01 PROCEDURE — 1159F MED LIST DOCD IN RCRD: CPT | Mod: CPTII,S$GLB,, | Performed by: PHYSICIAN ASSISTANT

## 2023-12-01 PROCEDURE — 3078F PR MOST RECENT DIASTOLIC BLOOD PRESSURE < 80 MM HG: ICD-10-PCS | Mod: CPTII,S$GLB,, | Performed by: PHYSICIAN ASSISTANT

## 2023-12-01 PROCEDURE — 3008F PR BODY MASS INDEX (BMI) DOCUMENTED: ICD-10-PCS | Mod: CPTII,S$GLB,, | Performed by: PHYSICIAN ASSISTANT

## 2023-12-01 PROCEDURE — 3078F DIAST BP <80 MM HG: CPT | Mod: CPTII,S$GLB,, | Performed by: PHYSICIAN ASSISTANT

## 2023-12-01 PROCEDURE — 1159F PR MEDICATION LIST DOCUMENTED IN MEDICAL RECORD: ICD-10-PCS | Mod: CPTII,S$GLB,, | Performed by: PHYSICIAN ASSISTANT

## 2023-12-01 PROCEDURE — 1101F PR PT FALLS ASSESS DOC 0-1 FALLS W/OUT INJ PAST YR: ICD-10-PCS | Mod: CPTII,S$GLB,, | Performed by: PHYSICIAN ASSISTANT

## 2023-12-01 PROCEDURE — 4010F ACE/ARB THERAPY RXD/TAKEN: CPT | Mod: CPTII,S$GLB,, | Performed by: PHYSICIAN ASSISTANT

## 2023-12-01 PROCEDURE — 99214 PR OFFICE/OUTPT VISIT, EST, LEVL IV, 30-39 MIN: ICD-10-PCS | Mod: S$GLB,,, | Performed by: PHYSICIAN ASSISTANT

## 2023-12-01 PROCEDURE — 3288F PR FALLS RISK ASSESSMENT DOCUMENTED: ICD-10-PCS | Mod: CPTII,S$GLB,, | Performed by: PHYSICIAN ASSISTANT

## 2023-12-01 PROCEDURE — 4010F PR ACE/ARB THEARPY RXD/TAKEN: ICD-10-PCS | Mod: CPTII,S$GLB,, | Performed by: PHYSICIAN ASSISTANT

## 2023-12-01 PROCEDURE — 1101F PT FALLS ASSESS-DOCD LE1/YR: CPT | Mod: CPTII,S$GLB,, | Performed by: PHYSICIAN ASSISTANT

## 2023-12-01 PROCEDURE — 99214 OFFICE O/P EST MOD 30 MIN: CPT | Mod: S$GLB,,, | Performed by: PHYSICIAN ASSISTANT

## 2023-12-01 NOTE — PROGRESS NOTES
"  SUBJECTIVE:    Patient ID: Suma Butterfield is a 68 y.o. female.    Chief Complaint: Follow-up (No bottles//Pt here for 6 mo follow//declines flu vacc//JL)    Suma Butterfield is a 68 y.o. female presenting for a 6 month visit. She states overall she is doing ok. Still works for [employer]. Following with Dr. Kaba, dermatology for Alopecia. Feels like treatments are helping. Sees Dr. Baum in hematology for polycythemia. Receives phlebotomy every other month.   Today she complains of left hip pain that has been going on for awhile. Characterizes it as "achy stiffness" that worse after sitting for awhile, beginning to walk, or pivoting. She denies numbness or tingling. Pain sometimes radiates to the thigh. Typically ice or heat helps alleviate pain. She has not been able to exercise as much due to pain.  Blood pressure is elevated today. She reports she averages 130s/80s at home. Has been compliant with her medications.       Follow-up  Associated symptoms include arthralgias. Pertinent negatives include no abdominal pain, chest pain, chills, congestion, coughing, fever, headaches or sore throat.       Ancillary Orders on 11/24/2023   Component Date Value Ref Range Status    AFP 11/25/2023 3.0  ng/mL Final   Ancillary Orders on 10/13/2023   Component Date Value Ref Range Status    WBC 10/21/2023 6.7  3.8 - 10.8 Thousand/uL Final    RBC 10/21/2023 4.56  3.80 - 5.10 Million/uL Final    Hemoglobin 10/21/2023 15.9 (H)  11.7 - 15.5 g/dL Final    Hematocrit 10/21/2023 46.8 (H)  35.0 - 45.0 % Final    MCV 10/21/2023 102.6 (H)  80.0 - 100.0 fL Final    MCH 10/21/2023 34.9 (H)  27.0 - 33.0 pg Final    MCHC 10/21/2023 34.0  32.0 - 36.0 g/dL Final    RDW 10/21/2023 11.6  11.0 - 15.0 % Final    Platelets 10/21/2023 166  140 - 400 Thousand/uL Final    MPV 10/21/2023 9.5  7.5 - 12.5 fL Final    Neutrophils, Abs 10/21/2023 3,531  1,500 - 7,800 cells/uL Final    Lymph # 10/21/2023 2,412  850 - 3,900 cells/uL Final    Mono # 10/21/2023 549 "  200 - 950 cells/uL Final    Eos # 10/21/2023 141  15 - 500 cells/uL Final    Baso # 10/21/2023 67  0 - 200 cells/uL Final    Neutrophils Relative 10/21/2023 52.7  % Final    Lymph % 10/21/2023 36.0  % Final    Mono % 10/21/2023 8.2  % Final    Eosinophil % 10/21/2023 2.1  % Final    Basophil % 10/21/2023 1.0  % Final    Iron 10/21/2023 97  45 - 160 mcg/dL Final    TIBC 10/21/2023 448  250 - 450 mcg/dL (calc) Final    Iron Saturation 10/21/2023 22  16 - 45 % (calc) Final    Ferritin 10/21/2023 20  16 - 288 ng/mL Final   Ancillary Orders on 08/25/2023   Component Date Value Ref Range Status    AFP 09/02/2023 3.0  ng/mL Final   Ancillary Orders on 07/14/2023   Component Date Value Ref Range Status    WBC 07/18/2023 5.7  3.8 - 10.8 Thousand/uL Final    RBC 07/18/2023 3.53 (L)  3.80 - 5.10 Million/uL Final    Hemoglobin 07/18/2023 13.0  11.7 - 15.5 g/dL Final    Hematocrit 07/18/2023 37.1  35.0 - 45.0 % Final    MCV 07/18/2023 105.1 (H)  80.0 - 100.0 fL Final    MCH 07/18/2023 36.8 (H)  27.0 - 33.0 pg Final    MCHC 07/18/2023 35.0  32.0 - 36.0 g/dL Final    RDW 07/18/2023 12.0  11.0 - 15.0 % Final    Platelets 07/18/2023 152  140 - 400 Thousand/uL Final    MPV 07/18/2023 9.6  7.5 - 12.5 fL Final    Neutrophils, Abs 07/18/2023 2,850  1,500 - 7,800 cells/uL Final    Lymph # 07/18/2023 2,029  850 - 3,900 cells/uL Final    Mono # 07/18/2023 564  200 - 950 cells/uL Final    Eos # 07/18/2023 200  15 - 500 cells/uL Final    Baso # 07/18/2023 57  0 - 200 cells/uL Final    Neutrophils Relative 07/18/2023 50  % Final    Lymph % 07/18/2023 35.6  % Final    Mono % 07/18/2023 9.9  % Final    Eosinophil % 07/18/2023 3.5  % Final    Basophil % 07/18/2023 1.0  % Final    Glucose 07/18/2023 107 (H)  65 - 99 mg/dL Final    BUN 07/18/2023 9  7 - 25 mg/dL Final    Creatinine 07/18/2023 0.68  0.50 - 1.05 mg/dL Final    eGFR 07/18/2023 97  > OR = 60 mL/min/1.73m2 Final    BUN/Creatinine Ratio 07/18/2023 NOT APPLICABLE  6 - 22 (calc) Final     Sodium 07/18/2023 140  135 - 146 mmol/L Final    Potassium 07/18/2023 4.3  3.5 - 5.3 mmol/L Final    Chloride 07/18/2023 105  98 - 110 mmol/L Final    CO2 07/18/2023 29  20 - 32 mmol/L Final    Calcium 07/18/2023 9.4  8.6 - 10.4 mg/dL Final    Total Protein 07/18/2023 6.4  6.1 - 8.1 g/dL Final    Albumin 07/18/2023 4.2  3.6 - 5.1 g/dL Final    Globulin, Total 07/18/2023 2.2  1.9 - 3.7 g/dL (calc) Final    Albumin/Globulin Ratio 07/18/2023 1.9  1.0 - 2.5 (calc) Final    Total Bilirubin 07/18/2023 0.8  0.2 - 1.2 mg/dL Final    Alkaline Phosphatase 07/18/2023 52  37 - 153 U/L Final    AST 07/18/2023 26  10 - 35 U/L Final    ALT 07/18/2023 28  6 - 29 U/L Final    Iron 07/18/2023 146  45 - 160 mcg/dL Final    TIBC 07/18/2023 359  250 - 450 mcg/dL (calc) Final    Iron Saturation 07/18/2023 41  16 - 45 % (calc) Final    Ferritin 07/18/2023 37  16 - 288 ng/mL Final   Ancillary Orders on 06/16/2023   Component Date Value Ref Range Status    WBC 06/20/2023 5.0  3.8 - 10.8 Thousand/uL Final    RBC 06/20/2023 4.17  3.80 - 5.10 Million/uL Final    Hemoglobin 06/20/2023 15.1  11.7 - 15.5 g/dL Final    Hematocrit 06/20/2023 43.6  35.0 - 45.0 % Final    MCV 06/20/2023 104.6 (H)  80.0 - 100.0 fL Final    MCH 06/20/2023 36.2 (H)  27.0 - 33.0 pg Final    MCHC 06/20/2023 34.6  32.0 - 36.0 g/dL Final    RDW 06/20/2023 11.8  11.0 - 15.0 % Final    Platelets 06/20/2023 168  140 - 400 Thousand/uL Final    MPV 06/20/2023 9.8  7.5 - 12.5 fL Final    Neutrophils, Abs 06/20/2023 2,325  1,500 - 7,800 cells/uL Final    Lymph # 06/20/2023 1,790  850 - 3,900 cells/uL Final    Mono # 06/20/2023 625  200 - 950 cells/uL Final    Eos # 06/20/2023 220  15 - 500 cells/uL Final    Baso # 06/20/2023 40  0 - 200 cells/uL Final    Neutrophils Relative 06/20/2023 46.5  % Final    Lymph % 06/20/2023 35.8  % Final    Mono % 06/20/2023 12.5  % Final    Eosinophil % 06/20/2023 4.4  % Final    Basophil % 06/20/2023 0.8  % Final    Glucose 06/20/2023 108 (H)  65 -  99 mg/dL Final    BUN 06/20/2023 10  7 - 25 mg/dL Final    Creatinine 06/20/2023 0.68  0.50 - 1.05 mg/dL Final    eGFR 06/20/2023 97  > OR = 60 mL/min/1.73m2 Final    BUN/Creatinine Ratio 06/20/2023 NOT APPLICABLE  6 - 22 (calc) Final    Sodium 06/20/2023 140  135 - 146 mmol/L Final    Potassium 06/20/2023 4.6  3.5 - 5.3 mmol/L Final    Chloride 06/20/2023 105  98 - 110 mmol/L Final    CO2 06/20/2023 29  20 - 32 mmol/L Final    Calcium 06/20/2023 9.6  8.6 - 10.4 mg/dL Final    Total Protein 06/20/2023 6.8  6.1 - 8.1 g/dL Final    Albumin 06/20/2023 4.5  3.6 - 5.1 g/dL Final    Globulin, Total 06/20/2023 2.3  1.9 - 3.7 g/dL (calc) Final    Albumin/Globulin Ratio 06/20/2023 2.0  1.0 - 2.5 (calc) Final    Total Bilirubin 06/20/2023 0.8  0.2 - 1.2 mg/dL Final    Alkaline Phosphatase 06/20/2023 60  37 - 153 U/L Final    AST 06/20/2023 29  10 - 35 U/L Final    ALT 06/20/2023 28  6 - 29 U/L Final    Iron 06/20/2023 133  45 - 160 mcg/dL Final    TIBC 06/20/2023 398  250 - 450 mcg/dL (calc) Final    Iron Saturation 06/20/2023 33  16 - 45 % (calc) Final    Ferritin 06/20/2023 53  16 - 288 ng/mL Final       Past Medical History:   Diagnosis Date    Elevated ferritin 10/13/2022    Elevated MCV 10/13/2022    Hepatitis C     Hypertension     Polycythemia 10/13/2022    SVT (supraventricular tachycardia)      Past Surgical History:   Procedure Laterality Date    CORONARY ANGIOGRAPHY N/A 3/10/2023    Procedure: ANGIOGRAM, CORONARY ARTERY;  Surgeon: Jill Griffiths MD;  Location: [facility] CATH/EP LAB;  Service: Cardiology;  Laterality: N/A;    LEFT HEART CATHETERIZATION Left 3/10/2023    Procedure: Left heart cath;  Surgeon: Jill Griffiths MD;  Location: [facility] CATH/EP LAB;  Service: Cardiology;  Laterality: Left;     Family History   Problem Relation Age of Onset    Glaucoma Neg Hx     Macular degeneration Neg Hx     Retinal detachment Neg Hx        Marital Status:   Alcohol History:  reports current alcohol  use.  Tobacco History:  reports that she has quit smoking. Her smoking use included cigarettes. She has been exposed to tobacco smoke. She has never used smokeless tobacco.  Drug History:  reports no history of drug use.    Review of patient's allergies indicates:   Allergen Reactions    Succinylcholine      **Pseudocholinesterase**       Current Outpatient Medications:     alendronate (FOSAMAX) 70 MG tablet, TK 1 T PO 1 TIME Q WK, Disp: 12 tablet, Rfl: 3    aspirin (ECOTRIN) 81 MG EC tablet, Take 1 tablet (81 mg total) by mouth once daily., Disp: 90 tablet, Rfl: 1    atorvastatin (LIPITOR) 40 MG tablet, Take 1 tablet (40 mg total) by mouth every evening., Disp: 30 tablet, Rfl: 1    biotin 5,000 mcg TbDL, Take 5,000 mcg by mouth once daily., Disp: , Rfl:     calcium-vitamin D3 (OS- + D3) 500 mg-5 mcg (200 unit) per tablet, Take 1 tablet by mouth once daily., Disp: , Rfl:     clopidogreL (PLAVIX) 75 mg tablet, Take 1 tablet (75 mg total) by mouth once daily., Disp: 90 tablet, Rfl: 1    diclofenac (VOLTAREN) 75 MG EC tablet, Take 1 tablet (75 mg total) by mouth 2 (two) times daily., Disp: 14 tablet, Rfl: 0    finasteride (PROSCAR) 5 mg tablet, Take 1 tablet (5 mg total) by mouth once daily., Disp: 30 tablet, Rfl: 11    fluocinonide (LIDEX) 0.05 % external solution, APPLY TOPICALLY TO THE AFFECTED AREA DAILY, Disp: 60 mL, Rfl: 5    hydroCHLOROthiazide (HYDRODIURIL) 12.5 MG Tab, Take 1 tablet (12.5 mg total) by mouth once daily., Disp: 90 tablet, Rfl: 1    losartan (COZAAR) 100 MG tablet, Take 1 tablet (100 mg total) by mouth once daily., Disp: 90 tablet, Rfl: 1    metoprolol tartrate (LOPRESSOR) 100 MG tablet, Take 1 tablet (100 mg total) by mouth 2 (two) times daily., Disp: 60 tablet, Rfl: 3    milk thistle 175 mg tablet, Take 175 mg by mouth once daily., Disp: , Rfl:     omega-3 fatty acids/fish oil (FISH OIL-OMEGA-3 FATTY ACIDS) 300-1,000 mg capsule, Take 1 capsule by mouth once daily., Disp: , Rfl:      "vitamin E 400 UNIT capsule, Take 400 Units by mouth once daily., Disp: , Rfl:     zinc gluconate 50 mg tablet, Take 50 mg by mouth once daily., Disp: , Rfl:     cyanocobalamin (VITAMIN B-12) 1000 MCG tablet, Take 100 mcg by mouth once daily., Disp: , Rfl:     Review of Systems   Constitutional:  Negative for chills and fever.   HENT:  Negative for congestion and sore throat.    Eyes:  Negative for discharge and redness.   Respiratory:  Negative for cough and shortness of breath.    Cardiovascular:  Negative for chest pain, palpitations and leg swelling.   Gastrointestinal:  Negative for abdominal pain, constipation and diarrhea.   Genitourinary:  Negative for dysuria and hematuria.   Musculoskeletal:  Positive for arthralgias.   Skin:  Negative for pallor.   Neurological:  Negative for dizziness, light-headedness and headaches.   Psychiatric/Behavioral:  Negative for confusion.           Objective:      Vitals:    12/01/23 0929 12/01/23 1030   BP: (!) 160/80 136/78   Pulse: 61 62   SpO2: 99%    Weight: 57.2 kg (126 lb)    Height: 5' 1" (1.549 m)      Physical Exam  Constitutional:       General: She is not in acute distress.     Appearance: She is not toxic-appearing.   HENT:      Head: Normocephalic and atraumatic.      Nose: Nose normal.      Mouth/Throat:      Mouth: Mucous membranes are moist.      Pharynx: Oropharynx is clear.   Eyes:      Extraocular Movements: Extraocular movements intact.      Conjunctiva/sclera: Conjunctivae normal.      Pupils: Pupils are equal, round, and reactive to light.   Cardiovascular:      Rate and Rhythm: Normal rate and regular rhythm.      Pulses: Normal pulses.      Heart sounds: Normal heart sounds.   Pulmonary:      Effort: Pulmonary effort is normal. No respiratory distress.      Breath sounds: Normal breath sounds. No stridor. No wheezing, rhonchi or rales.   Abdominal:      General: There is no distension.   Musculoskeletal:         General: Normal range of motion.      " Cervical back: Normal range of motion and neck supple.      Right hip: Normal.      Left hip: Normal. No tenderness or bony tenderness. Normal range of motion. Normal strength.      Right lower leg: No edema.      Left lower leg: No edema.      Comments: 5/5 strength bilaterally in LE   Skin:     General: Skin is warm and dry.      Capillary Refill: Capillary refill takes less than 2 seconds.      Coloration: Skin is not pale.   Neurological:      General: No focal deficit present.      Mental Status: She is alert and oriented to person, place, and time.           Assessment:       1. Essential hypertension    2. Coronary artery disease, unspecified vessel or lesion type, unspecified whether angina present, unspecified whether native or transplanted heart    3. Alopecia    4. Arthritis of left hip         Plan:       Essential hypertension  Comments:  recheck pressure was at goal. continue as is.    Coronary artery disease, unspecified vessel or lesion type, unspecified whether angina present, unspecified whether native or transplanted heart  Comments:  maximal medical management. maintain plavix as is. high dose statin. will schedule followup with cards. NSTEMI back in March    Alopecia  Comments:  steadily seeing improvement. slow but some improvement. maintain ILK injections per derm.    Arthritis of left hip  Comments:  check xrays now. would consider PT, ortho depending on results.  Orders:  -     X-Ray Hip 2 or 3 views Left (with Pelvis when performed); Future; Expected date: 12/01/2023      Follow up in about 6 months (around 6/1/2024).        12/1/2023 Hector Colin PA-C

## 2023-12-05 ENCOUNTER — OFFICE VISIT (OUTPATIENT)
Dept: DERMATOLOGY | Facility: CLINIC | Age: 68
End: 2023-12-05
Payer: MEDICARE

## 2023-12-05 ENCOUNTER — TELEPHONE (OUTPATIENT)
Dept: HEMATOLOGY/ONCOLOGY | Facility: CLINIC | Age: 68
End: 2023-12-05

## 2023-12-05 DIAGNOSIS — B18.2 CHRONIC HEPATITIS C WITHOUT HEPATIC COMA: ICD-10-CM

## 2023-12-05 DIAGNOSIS — L66.9 CICATRICIAL ALOPECIA: Primary | ICD-10-CM

## 2023-12-05 DIAGNOSIS — R71.8 ELEVATED MCV: Primary | ICD-10-CM

## 2023-12-05 DIAGNOSIS — D75.1 POLYCYTHEMIA: ICD-10-CM

## 2023-12-05 DIAGNOSIS — R79.89 ELEVATED FERRITIN: ICD-10-CM

## 2023-12-05 DIAGNOSIS — Z14.8 HEMOCHROMATOSIS CARRIER: ICD-10-CM

## 2023-12-05 PROCEDURE — 11900 INJECT SKIN LESIONS </W 7: CPT | Mod: S$GLB,,, | Performed by: STUDENT IN AN ORGANIZED HEALTH CARE EDUCATION/TRAINING PROGRAM

## 2023-12-05 PROCEDURE — 99499 NO LOS: ICD-10-PCS | Mod: S$GLB,,, | Performed by: STUDENT IN AN ORGANIZED HEALTH CARE EDUCATION/TRAINING PROGRAM

## 2023-12-05 PROCEDURE — 11900 PR INJECTION INTO SKIN LESIONS, UP TO 7: ICD-10-PCS | Mod: S$GLB,,, | Performed by: STUDENT IN AN ORGANIZED HEALTH CARE EDUCATION/TRAINING PROGRAM

## 2023-12-05 PROCEDURE — 99499 UNLISTED E&M SERVICE: CPT | Mod: S$GLB,,, | Performed by: STUDENT IN AN ORGANIZED HEALTH CARE EDUCATION/TRAINING PROGRAM

## 2023-12-05 NOTE — PROGRESS NOTES
Subjective:      Patient ID:  Suma Butterfield is a 68 y.o. female who presents for   Chief Complaint   Patient presents with    Hair Loss     LOV 10/10/23    Patient coming in today for a follow up on hair loss. Patient states she thinks she sees some growth, very little and slow.   Taking Finasteride daily. Using Rogaine daily. Using Lidex daily. ILK at LOV.     Final Pathologic Diagnosis     Skin, scalp, biopsies:   -Cicatricial Alopecia, see comment   Comment: Given the clinical differential central centrifugal cicatricial   alopecia seems most appropriate in this context there are findings of   minaturized hairs present which represent a coexisting androgenic alopecia.   The findings of fibrosis of follicle tracts are nonspecfic, this could   represent a burnt out inflammatory hair process   Comment: Interp By Laura Armenta M.D., Signed on 05/31/2022 at 16:52     No hx of cancer, post-menopausal     transaminitis- Has hx of treated hep C s/p treatment, drinks alcohol daily- following with outside GI  Also following with Dr. Baum for elevated ferritin, RBC count  Had liver ultrasound which should hepatic steatosis      Has SVT and uncontrolled htn- but working w/ her PCP to get it under better control             Review of Systems   Constitutional:  Negative for fever and chills.   Respiratory:  Negative for cough and shortness of breath.    Gastrointestinal:  Negative for nausea and vomiting.   Skin:  Positive for daily sunscreen use, activity-related sunscreen use and wears hat. Negative for itching.       Objective:   Physical Exam   Constitutional: She appears well-developed and well-nourished.   Neurological: She is alert and oriented to person, place, and time.   Psychiatric: She has a normal mood and affect.        Diagram Legend     Erythematous scaling macule/papule c/w actinic keratosis       Vascular papule c/w angioma      Pigmented verrucoid papule/plaque c/w seborrheic keratosis      Yellow  umbilicated papule c/w sebaceous hyperplasia      Irregularly shaped tan macule c/w lentigo     1-2 mm smooth white papules consistent with Milia      Movable subcutaneous cyst with punctum c/w epidermal inclusion cyst      Subcutaneous movable cyst c/w pilar cyst      Firm pink to brown papule c/w dermatofibroma      Pedunculated fleshy papule(s) c/w skin tag(s)      Evenly pigmented macule c/w junctional nevus     Mildly variegated pigmented, slightly irregular-bordered macule c/w mildly atypical nevus      Flesh colored to evenly pigmented papule c/w intradermal nevus       Pink pearly papule/plaque c/w basal cell carcinoma      Erythematous hyperkeratotic cursted plaque c/w SCC      Surgical scar with no sign of skin cancer recurrence      Open and closed comedones      Inflammatory papules and pustules      Verrucoid papule consistent consistent with wart     Erythematous eczematous patches and plaques     Dystrophic onycholytic nail with subungual debris c/w onychomycosis     Umbilicated papule    Erythematous-base heme-crusted tan verrucoid plaque consistent with inflamed seborrheic keratosis     Erythematous Silvery Scaling Plaque c/w Psoriasis     See annotation                  Assessment / Plan:        Cicatricial alopecia  Intralesional Kenalog 3.3mg/cc (5 cc total) injected into 7 lesions on the scalp today after obtaining verbal consent including risk of surrounding hypopigmentation. Patient tolerated procedure well.    Units: 2  NDC for Kenalog 10mg/cc:  4922-7258-89  She will message if she needs prescription for clobetasol or betamethasone lotion           No follow-ups on file.

## 2023-12-05 NOTE — TELEPHONE ENCOUNTER
Phlebotomy orders reviewed by Dr. Baum and per his verbal orders I renewed script and faxed to blood center.

## 2023-12-05 NOTE — TELEPHONE ENCOUNTER
----- Message from Lacey Luciano sent at 12/4/2023  9:01 AM CST -----  Pt is needing phelebotomy orders sent to [facility] [phone]

## 2023-12-18 ENCOUNTER — TELEPHONE (OUTPATIENT)
Dept: DERMATOLOGY | Facility: CLINIC | Age: 68
End: 2023-12-18
Payer: MEDICARE

## 2023-12-18 DIAGNOSIS — L65.9 ALOPECIA: Primary | ICD-10-CM

## 2023-12-18 DIAGNOSIS — L65.9 ALOPECIA: ICD-10-CM

## 2023-12-18 RX ORDER — CLOBETASOL PROPIONATE 0.46 MG/ML
SOLUTION TOPICAL 2 TIMES DAILY
Qty: 50 ML | Refills: 1 | Status: SHIPPED | OUTPATIENT
Start: 2023-12-18 | End: 2023-12-18 | Stop reason: SDUPTHER

## 2023-12-18 RX ORDER — CLOBETASOL PROPIONATE 0.46 MG/ML
SOLUTION TOPICAL 2 TIMES DAILY
Qty: 50 ML | Refills: 1 | Status: SHIPPED | OUTPATIENT
Start: 2023-12-18 | End: 2024-02-10 | Stop reason: SDUPTHER

## 2023-12-18 NOTE — TELEPHONE ENCOUNTER
Spoke with patient who stated the lidex is very costly, requesting something different be sent to her pharmacy.     ----- Message from Roberth Gan sent at 12/18/2023 10:38 AM CST -----  Contact: Self  Type: Needs Medical Advice  Who Called:  PT  Symptoms (please be specific):    How long has patient had these symptoms:    Pharmacy name and phone #:          [id number] [place]  [address]  [address] [address]  Phone: [phone] Fax: [phone]          Best Call Back Number: [phone]    Additional Information: PT is calling to change  fluocinonide (LIDEX) 0.05 % external solution to something else as she discussed with

## 2023-12-19 DIAGNOSIS — E78.00 HYPERCHOLESTEROLEMIA: Primary | ICD-10-CM

## 2023-12-19 NOTE — TELEPHONE ENCOUNTER
LOV 12/1/2023   Size Of Lesion In Cm: 0 Bill For Surgical Tray: no Information: Selecting Yes will display possible errors in your note based on the variables you have selected. This validation is only offered as a suggestion for you. PLEASE NOTE THAT THE VALIDATION TEXT WILL BE REMOVED WHEN YOU FINALIZE YOUR NOTE. IF YOU WANT TO FAX A PRELIMINARY NOTE YOU WILL NEED TO TOGGLE THIS TO 'NO' IF YOU DO NOT WANT IT IN YOUR FAXED NOTE. Electrodesiccation Text: The wound bed was treated with electrodesiccation after the biopsy was performed. Type Of Destruction Used: Curettage Depth Of Biopsy: dermis Anesthesia Volume In Cc: 0.5 Billing Type: Third-Party Bill Cryotherapy Text: The wound bed was treated with cryotherapy after the biopsy was performed. Render Post-Care Instructions In Note?: yes Curettage Text: The wound bed was treated with curettage after the biopsy was performed. Wound Care: Bacitracin Hemostasis: Electrocautery Detail Level: Detailed Notification Instructions: Patient will be notified of biopsy results. However, patient instructed to call the office if not contacted within 2 weeks. Consent: Written consent was obtained and risks were reviewed including but not limited to scarring, infection, bleeding, scabbing, incomplete removal, nerve damage and allergy to anesthesia. Post-Care Instructions: I reviewed with the patient in detail post-care instructions. Patient is to keep the biopsy site dry overnight, and then apply bacitracin twice daily until healed. Patient may apply hydrogen peroxide soaks to remove any crusting. Dressing: bandage Anesthesia Type: 1% lidocaine with epinephrine Silver Nitrate Text: The wound bed was treated with silver nitrate after the biopsy was performed. Biopsy Method: Dermablade Biopsy Type: H and E Electrodesiccation And Curettage Text: The wound bed was treated with electrodesiccation and curettage after the biopsy was performed.

## 2023-12-20 RX ORDER — ATORVASTATIN CALCIUM 40 MG/1
40 TABLET, FILM COATED ORAL NIGHTLY
Qty: 90 TABLET | Refills: 1 | Status: SHIPPED | OUTPATIENT
Start: 2023-12-20

## 2023-12-21 ENCOUNTER — TELEPHONE (OUTPATIENT)
Dept: FAMILY MEDICINE | Facility: CLINIC | Age: 68
End: 2023-12-21
Payer: MEDICARE

## 2023-12-21 ENCOUNTER — HOSPITAL ENCOUNTER (OUTPATIENT)
Dept: RADIOLOGY | Facility: HOSPITAL | Age: 68
Discharge: HOME | End: 2023-12-21
Attending: PHYSICIAN ASSISTANT
Payer: MEDICARE

## 2023-12-21 DIAGNOSIS — M16.12 ARTHRITIS OF LEFT HIP: ICD-10-CM

## 2023-12-21 DIAGNOSIS — E78.00 HYPERCHOLESTEROLEMIA: ICD-10-CM

## 2023-12-21 PROCEDURE — 73502 X-RAY EXAM HIP UNI 2-3 VIEWS: CPT | Mod: TC,PO,LT

## 2023-12-21 NOTE — TELEPHONE ENCOUNTER
----- Message from Jill Monsivais sent at 12/21/2023  9:14 AM CST -----  The patient needs all [facility] [address] Refill Atorvastatin. Please take [facility] on  out of her chart. Pt's # [phone]

## 2023-12-22 NOTE — PROGRESS NOTES
SUBJECTIVE:    Patient ID: Suma Butterfield is a 64 y.o. female.    Chief Complaint: Follow-up (6 month follow up.....mlr)    Presents for regular 6 month checkup.  Currently being treated for hypertension and osteoporosis.  She reports compliance with medications as prescribed.  Her blood pressure is significantly elevated today. She reports a bit of a rowdy night with the [employer] game. Dr. Lorenzo gave her permission to increase her dose if needed for palpitations.      No visits with results within 6 Month(s) from this visit.   Latest known visit with results is:   No results found for any previous visit.       Past Medical History:   Diagnosis Date    Hypertension      History reviewed. No pertinent surgical history.  Family History   Problem Relation Age of Onset    Glaucoma Neg Hx     Macular degeneration Neg Hx     Retinal detachment Neg Hx        Marital Status:   Alcohol History:  reports that she drinks alcohol.  Tobacco History:  reports that she has been smoking cigarettes. She has never used smokeless tobacco.  Drug History:  reports that she does not use drugs.    Review of patient's allergies indicates:  No Known Allergies    Current Outpatient Medications:     alendronate (FOSAMAX) 70 MG tablet, TK 1 T PO 1 TIME Q WK, Disp: , Rfl: 5    metoprolol succinate (TOPROL-XL) 50 MG 24 hr tablet, Take 1 tablet (50 mg total) by mouth once daily., Disp: 30 tablet, Rfl: 2    Review of Systems   Constitutional: Negative for appetite change, chills, fatigue, fever and unexpected weight change.   HENT: Negative for congestion.    Respiratory: Negative for cough, chest tightness and shortness of breath.    Cardiovascular: Negative for chest pain and palpitations.   Gastrointestinal: Negative for abdominal distention and abdominal pain.   Endocrine: Negative for cold intolerance and heat intolerance.   Genitourinary: Negative for difficulty urinating and dysuria.   Musculoskeletal: Negative for  "arthralgias and back pain.   Neurological: Negative for dizziness, weakness and headaches.          Objective:      Vitals:    12/23/19 0800 12/23/19 0803   BP: (!) 136/102 (!) 142/106   Pulse: 68    Weight: 58.2 kg (128 lb 3.2 oz)    Height: 5' 2" (1.575 m)      Physical Exam   Constitutional: She is oriented to person, place, and time. She appears well-developed and well-nourished. No distress.   HENT:   Head: Normocephalic and atraumatic.   Eyes: Pupils are equal, round, and reactive to light. Conjunctivae and EOM are normal.   Neck: Normal range of motion. Neck supple. No thyromegaly present.   Cardiovascular: Normal rate, regular rhythm, normal heart sounds and intact distal pulses.   Pulmonary/Chest: Effort normal and breath sounds normal.   Abdominal: Soft. Bowel sounds are normal. She exhibits no distension. There is no tenderness.   Musculoskeletal: Normal range of motion.   Neurological: She is alert and oriented to person, place, and time. No cranial nerve deficit.   Skin: Skin is warm and dry. No erythema.   Psychiatric: She has a normal mood and affect.         Assessment:       1. Essential hypertension         Plan:       Essential hypertension  Comments:  Uncontrolled, Going to bump her dose up to 50mg and check labs in 2 weeks. f/u in 4 weeks with me.  Orders:  -     metoprolol succinate (TOPROL-XL) 50 MG 24 hr tablet; Take 1 tablet (50 mg total) by mouth once daily.  Dispense: 30 tablet; Refill: 2  -     CBC auto differential; Future; Expected date: 12/23/2019  -     Comprehensive metabolic panel; Future; Expected date: 12/23/2019  -     Lipid panel; Future; Expected date: 12/23/2019  -     TSH w/reflex to FT4; Future; Expected date: 12/23/2019  -     Urinalysis, Reflex to Urine Culture Urine, Clean Catch; Future; Expected date: 12/23/2019  -     Microalbumin/creatinine urine ratio; Future; Expected date: 12/23/2019      Follow up in about 4 weeks (around 1/20/2020) for BP Check-Up.    "     12/23/2019 Hector Colin PA-C     d/c home with /DC instructions

## 2023-12-26 ENCOUNTER — TELEPHONE (OUTPATIENT)
Dept: FAMILY MEDICINE | Facility: CLINIC | Age: 68
End: 2023-12-26
Payer: MEDICARE

## 2023-12-26 DIAGNOSIS — M16.12 ARTHRITIS OF LEFT HIP: Primary | ICD-10-CM

## 2024-01-09 ENCOUNTER — OFFICE VISIT (OUTPATIENT)
Dept: ORTHOPEDICS | Facility: CLINIC | Age: 69
End: 2024-01-09
Payer: MEDICARE

## 2024-01-09 VITALS — HEIGHT: 61 IN | BODY MASS INDEX: 23.81 KG/M2 | WEIGHT: 126.13 LBS

## 2024-01-09 DIAGNOSIS — M16.12 UNILATERAL PRIMARY OSTEOARTHRITIS, LEFT HIP: Primary | ICD-10-CM

## 2024-01-09 DIAGNOSIS — M16.12 ARTHRITIS OF LEFT HIP: ICD-10-CM

## 2024-01-09 PROCEDURE — 99204 OFFICE O/P NEW MOD 45 MIN: CPT | Mod: 25,S$GLB,, | Performed by: ORTHOPAEDIC SURGERY

## 2024-01-09 PROCEDURE — 3288F FALL RISK ASSESSMENT DOCD: CPT | Mod: CPTII,S$GLB,, | Performed by: ORTHOPAEDIC SURGERY

## 2024-01-09 PROCEDURE — 3008F BODY MASS INDEX DOCD: CPT | Mod: CPTII,S$GLB,, | Performed by: ORTHOPAEDIC SURGERY

## 2024-01-09 PROCEDURE — 1159F MED LIST DOCD IN RCRD: CPT | Mod: CPTII,S$GLB,, | Performed by: ORTHOPAEDIC SURGERY

## 2024-01-09 PROCEDURE — 1101F PT FALLS ASSESS-DOCD LE1/YR: CPT | Mod: CPTII,S$GLB,, | Performed by: ORTHOPAEDIC SURGERY

## 2024-01-09 PROCEDURE — 20611 DRAIN/INJ JOINT/BURSA W/US: CPT | Mod: LT,S$GLB,, | Performed by: ORTHOPAEDIC SURGERY

## 2024-01-09 PROCEDURE — 1125F AMNT PAIN NOTED PAIN PRSNT: CPT | Mod: CPTII,S$GLB,, | Performed by: ORTHOPAEDIC SURGERY

## 2024-01-09 PROCEDURE — 1160F RVW MEDS BY RX/DR IN RCRD: CPT | Mod: CPTII,S$GLB,, | Performed by: ORTHOPAEDIC SURGERY

## 2024-01-09 PROCEDURE — 99999 PR PBB SHADOW E&M-EST. PATIENT-LVL IV: CPT | Mod: PBBFAC,,, | Performed by: ORTHOPAEDIC SURGERY

## 2024-01-09 RX ORDER — METHYLPREDNISOLONE ACETATE 80 MG/ML
80 INJECTION, SUSPENSION INTRA-ARTICULAR; INTRALESIONAL; INTRAMUSCULAR; SOFT TISSUE
Status: DISCONTINUED | OUTPATIENT
Start: 2024-01-09 | End: 2024-01-09 | Stop reason: HOSPADM

## 2024-01-09 RX ADMIN — METHYLPREDNISOLONE ACETATE 80 MG: 80 INJECTION, SUSPENSION INTRA-ARTICULAR; INTRALESIONAL; INTRAMUSCULAR; SOFT TISSUE at 01:01

## 2024-01-09 NOTE — PROCEDURES
Large Joint Aspiration/Injection: L hip joint    Date/Time: 1/9/2024 1:00 PM    Performed by: Jimmie Ramos II, MD  Authorized by: Jimmie Ramos II, MD    Consent Done?:  Yes (Verbal)  Indications:  Arthritis and pain    Details:  Needle Size:  22 G  Ultrasonic Guidance for needle placement?: Yes    Images are saved and documented.  Approach:  Anterolateral  Location:  Hip  Site:  L hip joint  Medications:  80 mg methylPREDNISolone acetate 80 mg/mL  Patient tolerance:  Patient tolerated the procedure well with no immediate complications

## 2024-01-09 NOTE — PROGRESS NOTES
CC:  68-year-old female presents for evaluation of osteoarthritis of the left hip.  The patient reports for the last 2 years she has had pain that comes and goes.  Today she rates her pain as a 1/10 but she states she has having a good day.  Some days she has pain to the point that she limps since she rates those days as a 5/10.  Her symptoms sound to be very intermittent having good days bad days.  Unfortunately she can not take NSAIDs due to a cardiac history.    Past Medical History:   Diagnosis Date    Elevated ferritin 10/13/2022    Elevated MCV 10/13/2022    Hepatitis C     Hypertension     Polycythemia 10/13/2022    SVT (supraventricular tachycardia)        Past Surgical History:   Procedure Laterality Date    CORONARY ANGIOGRAPHY N/A 3/10/2023    Procedure: ANGIOGRAM, CORONARY ARTERY;  Surgeon: Jill Griffiths MD;  Location: [facility] CATH/EP LAB;  Service: Cardiology;  Laterality: N/A;    LEFT HEART CATHETERIZATION Left 3/10/2023    Procedure: Left heart cath;  Surgeon: Jill Griffiths MD;  Location: [facility] CATH/EP LAB;  Service: Cardiology;  Laterality: Left;       Current Outpatient Medications on File Prior to Visit   Medication Sig Dispense Refill    alendronate (FOSAMAX) 70 MG tablet TK 1 T PO 1 TIME Q WK 12 tablet 3    aspirin (ECOTRIN) 81 MG EC tablet Take 1 tablet (81 mg total) by mouth once daily. 90 tablet 1    atorvastatin (LIPITOR) 40 MG tablet TAKE 1 TABLET(40 MG) BY MOUTH EVERY EVENING 90 tablet 1    biotin 5,000 mcg TbDL Take 5,000 mcg by mouth once daily.      calcium-vitamin D3 (OS- + D3) 500 mg-5 mcg (200 unit) per tablet Take 1 tablet by mouth once daily.      clobetasoL (TEMOVATE) 0.05 % external solution Apply topically 2 (two) times daily. 50 mL 1    clopidogreL (PLAVIX) 75 mg tablet Take 1 tablet (75 mg total) by mouth once daily. 90 tablet 1    cyanocobalamin (VITAMIN B-12) 1000 MCG tablet Take 100 mcg by mouth once daily.      diclofenac (VOLTAREN) 75 MG EC tablet  Take 1 tablet (75 mg total) by mouth 2 (two) times daily. 14 tablet 0    finasteride (PROSCAR) 5 mg tablet Take 1 tablet (5 mg total) by mouth once daily. 30 tablet 11    fluocinonide (LIDEX) 0.05 % external solution APPLY TOPICALLY TO THE AFFECTED AREA DAILY 60 mL 5    hydroCHLOROthiazide (HYDRODIURIL) 12.5 MG Tab Take 1 tablet (12.5 mg total) by mouth once daily. 90 tablet 1    losartan (COZAAR) 100 MG tablet Take 1 tablet (100 mg total) by mouth once daily. 90 tablet 1    metoprolol tartrate (LOPRESSOR) 100 MG tablet Take 1 tablet (100 mg total) by mouth 2 (two) times daily. 60 tablet 3    milk thistle 175 mg tablet Take 175 mg by mouth once daily.      omega-3 fatty acids/fish oil (FISH OIL-OMEGA-3 FATTY ACIDS) 300-1,000 mg capsule Take 1 capsule by mouth once daily.      vitamin E 400 UNIT capsule Take 400 Units by mouth once daily.      zinc gluconate 50 mg tablet Take 50 mg by mouth once daily.       No current facility-administered medications on file prior to visit.     ROS:    Constitution: Denies chills, fever, and sweats.  HENT: Denies headaches or blurry vision.  Cardiovascular: Denies chest pain or irregular heart beat.  Respiratory: Denies cough or shortness of breath.  Gastrointestinal: Denies abdominal pain, nausea, or vomiting.  Genitourinary:  Denies urinary incontinence, bladder and kidney issues  Musculoskeletal:  Denies muscle cramps.  Positive for intermittent left hip pain  Neurological: Denies dizziness or focal weakness.  Psychiatric/Behavioral: Normal mental status.  Hematologic/Lymphatic: Denies bleeding problem or easy bruising/bleeding.  Skin: Denies rash or suspicious lesions.    Physical examination     Gen - No acute distress, well nourished, well groomed   Eyes - Extraoccular motions intact, pupils equally round and reactive to light and accommodation   ENT - normocephalic, atruamtic, oropharynx clear   Neck - Supple, no abnormal masses   Cardiovascular - regular rate and rhythm    Pulmonary - clear to auscultation bilaterally, no wheezes, ronchi, or rales   Abdomen - soft, non-tender, non-distended, positive bowel sounds   Psych - The patient is alert and oriented x3 with normal mood and affect    Examination of the Left Lower Extremity    Skin is intact throughout  Motor in intact EHL,FHL,TA,khushboo  +2 DP/PT  Sensation LT intact D/P/1st    Examination of the Left Hip    C-Sign positive  Logroll negative  Stenchfield negative    Pain with ROM negative    ROM:    Flexion   120  Extension   30  Abduction   45  Adduction   20  External Rotation 45  Internal Rotation 35    Flexion contracture negative    FADIR negative  FADER negative    Tenderness to palpation over lateral and posterolateral greater tochanter negative    X-ray images were examined and personally interpreted by me.  Three views left hip dated 12/21/2023 show severe osteoarthritis of the left hip with complete loss of joint space, periarticular osteophytes, and subchondral sclerosis.    Dx:  Osteoarthritis of the left hip with intermittent symptoms.    Plan:  I discussed treatment options with the patient.  Recommendation is for an ultrasound guided steroid injection into the Left hip. Ultrasound guidance is needed due to the fact that the hip joint is deep in the body and not palpable. Risk and benefits of the procedure were explained to the patient. They verbalized understanding and did wish to proceed. Informed consent was obtained.  The Left hip was visualized under ultrasound. The femoral head, femoral neck, head-neck junction, and acetabulum were all visualized. The femoral neurovascular bundle was identified and avoided. Under direct ultrasound visualization, an 18 gauge spinal needle was advanced to an appropriate spot at the femoral head neck junction and the Left hip was injected with a mixture of 2/2/1 lidocaine, marcaine, and depomedrol. The hip capsule could be seen to distend as the fluid was injected. The patient  tolerated the procedure well.  Static ultrasound images were saved to the patients chart.     Follow up in 4 weeks for re-evaluation

## 2024-01-20 DIAGNOSIS — I10 ESSENTIAL HYPERTENSION: ICD-10-CM

## 2024-01-20 DIAGNOSIS — I25.10 CORONARY ARTERY DISEASE, UNSPECIFIED VESSEL OR LESION TYPE, UNSPECIFIED WHETHER ANGINA PRESENT, UNSPECIFIED WHETHER NATIVE OR TRANSPLANTED HEART: ICD-10-CM

## 2024-01-22 DIAGNOSIS — L64.9 ANDROGENIC ALOPECIA: ICD-10-CM

## 2024-01-22 RX ORDER — LOSARTAN POTASSIUM 100 MG/1
100 TABLET ORAL DAILY
Qty: 90 TABLET | Refills: 1 | Status: SHIPPED | OUTPATIENT
Start: 2024-01-22 | End: 2024-07-20

## 2024-01-22 RX ORDER — METOPROLOL TARTRATE 100 MG/1
100 TABLET ORAL 2 TIMES DAILY
Qty: 60 TABLET | Refills: 3 | Status: SHIPPED | OUTPATIENT
Start: 2024-01-22 | End: 2024-05-14 | Stop reason: SDUPTHER

## 2024-01-22 RX ORDER — ASPIRIN 81 MG/1
81 TABLET ORAL DAILY
Qty: 90 TABLET | Refills: 1 | Status: SHIPPED | OUTPATIENT
Start: 2024-01-22 | End: 2025-01-21

## 2024-01-23 RX ORDER — FINASTERIDE 5 MG/1
5 TABLET, FILM COATED ORAL DAILY
Qty: 30 TABLET | Refills: 11 | Status: SHIPPED | OUTPATIENT
Start: 2024-01-23 | End: 2025-01-22

## 2024-01-30 ENCOUNTER — OFFICE VISIT (OUTPATIENT)
Dept: CARDIOLOGY | Facility: CLINIC | Age: 69
End: 2024-01-30
Payer: MEDICARE

## 2024-01-30 ENCOUNTER — OFFICE VISIT (OUTPATIENT)
Dept: DERMATOLOGY | Facility: CLINIC | Age: 69
End: 2024-01-30
Payer: MEDICARE

## 2024-01-30 VITALS
HEART RATE: 56 BPM | DIASTOLIC BLOOD PRESSURE: 88 MMHG | SYSTOLIC BLOOD PRESSURE: 134 MMHG | WEIGHT: 125.69 LBS | BODY MASS INDEX: 23.73 KG/M2 | HEIGHT: 61 IN

## 2024-01-30 DIAGNOSIS — L66.9 CICATRICIAL ALOPECIA: Primary | ICD-10-CM

## 2024-01-30 DIAGNOSIS — R00.2 PALPITATIONS: Primary | ICD-10-CM

## 2024-01-30 DIAGNOSIS — I25.10 CORONARY ARTERY DISEASE, UNSPECIFIED VESSEL OR LESION TYPE, UNSPECIFIED WHETHER ANGINA PRESENT, UNSPECIFIED WHETHER NATIVE OR TRANSPLANTED HEART: ICD-10-CM

## 2024-01-30 DIAGNOSIS — E78.00 HYPERCHOLESTEROLEMIA: Chronic | ICD-10-CM

## 2024-01-30 PROCEDURE — 99499 UNLISTED E&M SERVICE: CPT | Mod: S$GLB,,, | Performed by: STUDENT IN AN ORGANIZED HEALTH CARE EDUCATION/TRAINING PROGRAM

## 2024-01-30 PROCEDURE — 3079F DIAST BP 80-89 MM HG: CPT | Mod: HCNC,CPTII,S$GLB, | Performed by: INTERNAL MEDICINE

## 2024-01-30 PROCEDURE — 99214 OFFICE O/P EST MOD 30 MIN: CPT | Mod: HCNC,S$GLB,, | Performed by: INTERNAL MEDICINE

## 2024-01-30 PROCEDURE — 3008F BODY MASS INDEX DOCD: CPT | Mod: HCNC,CPTII,S$GLB, | Performed by: INTERNAL MEDICINE

## 2024-01-30 PROCEDURE — 3288F FALL RISK ASSESSMENT DOCD: CPT | Mod: HCNC,CPTII,S$GLB, | Performed by: INTERNAL MEDICINE

## 2024-01-30 PROCEDURE — 99999 PR PBB SHADOW E&M-EST. PATIENT-LVL IV: CPT | Mod: PBBFAC,,, | Performed by: INTERNAL MEDICINE

## 2024-01-30 PROCEDURE — 1159F MED LIST DOCD IN RCRD: CPT | Mod: HCNC,CPTII,S$GLB, | Performed by: INTERNAL MEDICINE

## 2024-01-30 PROCEDURE — 11900 INJECT SKIN LESIONS </W 7: CPT | Mod: S$GLB,,, | Performed by: STUDENT IN AN ORGANIZED HEALTH CARE EDUCATION/TRAINING PROGRAM

## 2024-01-30 PROCEDURE — 4010F ACE/ARB THERAPY RXD/TAKEN: CPT | Mod: HCNC,CPTII,S$GLB, | Performed by: INTERNAL MEDICINE

## 2024-01-30 PROCEDURE — 3075F SYST BP GE 130 - 139MM HG: CPT | Mod: HCNC,CPTII,S$GLB, | Performed by: INTERNAL MEDICINE

## 2024-01-30 PROCEDURE — 1160F RVW MEDS BY RX/DR IN RCRD: CPT | Mod: HCNC,CPTII,S$GLB, | Performed by: INTERNAL MEDICINE

## 2024-01-30 PROCEDURE — 1126F AMNT PAIN NOTED NONE PRSNT: CPT | Mod: HCNC,CPTII,S$GLB, | Performed by: INTERNAL MEDICINE

## 2024-01-30 PROCEDURE — 1101F PT FALLS ASSESS-DOCD LE1/YR: CPT | Mod: HCNC,CPTII,S$GLB, | Performed by: INTERNAL MEDICINE

## 2024-01-30 RX ORDER — AMLODIPINE BESYLATE 2.5 MG/1
2.5 TABLET ORAL DAILY
Qty: 30 TABLET | Refills: 5 | Status: SHIPPED | OUTPATIENT
Start: 2024-01-30 | End: 2024-07-28

## 2024-01-30 NOTE — PROGRESS NOTES
Subjective:      Patient ID:  Suma Butterfield is a 68 y.o. female who presents for   Chief Complaint   Patient presents with    Hair Loss     Follow up hair loss     LOV 12/05/2023    Patient coming in today for a follow up on hair loss. Patient states she thinks she sees some growth, very little and slow.   Taking Finasteride daily. Using Rogaine daily. Using Lidex daily. ILK  3.3mg/cc at LOV.     Final Pathologic Diagnosis     Skin, scalp, biopsies:   -Cicatricial Alopecia, see comment   Comment: Given the clinical differential central centrifugal cicatricial   alopecia seems most appropriate in this context there are findings of   minaturized hairs present which represent a coexisting androgenic alopecia.   The findings of fibrosis of follicle tracts are nonspecfic, this could   represent a burnt out inflammatory hair process   Comment: Interp By Laura Armenta M.D., Signed on 05/31/2022 at 16:52     No hx of cancer, post-menopausal     transaminitis- Has hx of treated hep C s/p treatment, drinks alcohol daily- following with outside GI  Also following with Dr. Baum for elevated ferritin, RBC count  Had liver ultrasound which should hepatic steatosis      Has SVT and uncontrolled htn- but working w/ her PCP to get it under better control      Cicatricial alopecia  Intralesional Kenalog 3.3mg/cc (5 cc total) injected into 7 lesions on the scalp today after obtaining verbal consent including risk of surrounding hypopigmentation. Patient tolerated procedure well.     Units: 2  NDC for Kenalog 10mg/cc:  7851-0871-21  She will message if she needs prescription for clobetasol or betamethasone lotion          Review of Systems   Constitutional:  Negative for fever and chills.   Respiratory:  Negative for cough and shortness of breath.    Gastrointestinal:  Negative for nausea and vomiting.   Skin:  Positive for daily sunscreen use, activity-related sunscreen use and wears hat. Negative for itching.        Objective:   Physical Exam   Constitutional: She appears well-developed and well-nourished.   Neurological: She is alert and oriented to person, place, and time.   Psychiatric: She has a normal mood and affect.   Skin:   Areas Examined (abnormalities noted in diagram):   Scalp / Hair Palpated and Inspected            Diagram Legend     Erythematous scaling macule/papule c/w actinic keratosis       Vascular papule c/w angioma      Pigmented verrucoid papule/plaque c/w seborrheic keratosis      Yellow umbilicated papule c/w sebaceous hyperplasia      Irregularly shaped tan macule c/w lentigo     1-2 mm smooth white papules consistent with Milia      Movable subcutaneous cyst with punctum c/w epidermal inclusion cyst      Subcutaneous movable cyst c/w pilar cyst      Firm pink to brown papule c/w dermatofibroma      Pedunculated fleshy papule(s) c/w skin tag(s)      Evenly pigmented macule c/w junctional nevus     Mildly variegated pigmented, slightly irregular-bordered macule c/w mildly atypical nevus      Flesh colored to evenly pigmented papule c/w intradermal nevus       Pink pearly papule/plaque c/w basal cell carcinoma      Erythematous hyperkeratotic cursted plaque c/w SCC      Surgical scar with no sign of skin cancer recurrence      Open and closed comedones      Inflammatory papules and pustules      Verrucoid papule consistent consistent with wart     Erythematous eczematous patches and plaques     Dystrophic onycholytic nail with subungual debris c/w onychomycosis     Umbilicated papule    Erythematous-base heme-crusted tan verrucoid plaque consistent with inflamed seborrheic keratosis     Erythematous Silvery Scaling Plaque c/w Psoriasis     See annotation      Assessment / Plan:        Cicatricial alopecia  Intralesional Kenalog 3.3mg/cc (6 cc total) injected into 1 lesions on the scalp today after obtaining verbal consent including risk of surrounding hypopigmentation. Patient tolerated procedure  well.    Units: 2  NDC for Kenalog 10mg/cc:  [id number]             No follow-ups on file.

## 2024-01-30 NOTE — PROGRESS NOTES
Subjective:    Patient ID:  Suma Butterfield is a 68 y.o. female patient here for evaluation Hypertension    Follow up visit 01/30/2024:  Came for routine follow up.  Has bruising with Plavix.  So taking Plavix every other day.  Not taking hydrochlorothiazide.  Blood pressure running in 130s  systolic.  Complains of palpitations 1-2 times per week as fluttering sensation which sometimes can last up to 1 minute.  Denies any chest pain    History of Present Illness during initial clinic visit:     67 yr old female came to the clinic for follow up after the hospital discharge. Patient with PMH of HTN, hepc, SVT [facility] with chest pain found to have NSTEMI status post cardiac catheterization which showed severe disease in a small branch of RPL which was not amenable to percutaneous coronary intervention.  Patient was discharged with medical therapy.  She was asymptomatic during the clinic visit.        Review of patient's allergies indicates:   Allergen Reactions    Succinylcholine      **Pseudocholinesterase**       Past Medical History:   Diagnosis Date    Elevated ferritin 10/13/2022    Elevated MCV 10/13/2022    Hepatitis C     Hypertension     Polycythemia 10/13/2022    SVT (supraventricular tachycardia)      Past Surgical History:   Procedure Laterality Date    CORONARY ANGIOGRAPHY N/A 3/10/2023    Procedure: ANGIOGRAM, CORONARY ARTERY;  Surgeon: Jill Griffiths MD;  Location: [facility] CATH/EP LAB;  Service: Cardiology;  Laterality: N/A;    LEFT HEART CATHETERIZATION Left 3/10/2023    Procedure: Left heart cath;  Surgeon: Jill Griffiths MD;  Location: [facility] CATH/EP LAB;  Service: Cardiology;  Laterality: Left;     Social History     Tobacco Use    Smoking status: Former     Types: Cigarettes     Passive exposure: Past    Smokeless tobacco: Never   Substance Use Topics    Alcohol use: Yes    Drug use: Never        Review of Systems   Negative except as  mentioned in HPI         Objective        Vitals:    01/30/24 0938   BP: 134/88   Pulse: (!) 56       LIPIDS - LAST 2   Lab Results   Component Value Date    CHOL 226 (H) 12/27/2022    CHOL 227 (H) 03/29/2022    HDL 90 12/27/2022    HDL 94 03/29/2022    LDLCALC 117 (H) 12/27/2022    LDLCALC 114 (H) 03/29/2022    TRIG 91 12/27/2022    TRIG 90 03/29/2022    CHOLHDL 2.5 12/27/2022    CHOLHDL 2.4 03/29/2022       CBC - LAST 2  Lab Results   Component Value Date    WBC 8.1 01/20/2024    WBC 6.7 10/21/2023    RBC 4.18 01/20/2024    RBC 4.56 10/21/2023    HGB 14.5 01/20/2024    HGB 15.9 (H) 10/21/2023    HCT 42.6 01/20/2024    HCT 46.8 (H) 10/21/2023    .9 (H) 01/20/2024    .6 (H) 10/21/2023    MCH 34.7 (H) 01/20/2024    MCH 34.9 (H) 10/21/2023    MCHC 34.0 01/20/2024    MCHC 34.0 10/21/2023    RDW 12.5 01/20/2024    RDW 11.6 10/21/2023     01/20/2024     10/21/2023    MPV 9.5 01/20/2024    MPV 9.5 10/21/2023    GRAN 2.8 03/11/2023    GRAN 46.8 03/11/2023    LYMPH 2,892 01/20/2024    LYMPH 35.7 01/20/2024    MONO 705 01/20/2024    MONO 8.7 01/20/2024    BASO 49 01/20/2024    BASO 67 10/21/2023    NRBC 0 03/11/2023    NRBC 0 03/10/2023       CHEMISTRY & LIVER FUNCTION - LAST 2  Lab Results   Component Value Date     01/20/2024     07/18/2023    K 4.2 01/20/2024    K 4.3 07/18/2023     01/20/2024     07/18/2023    CO2 28 01/20/2024    CO2 29 07/18/2023    ANIONGAP 5 (L) 03/11/2023    ANIONGAP 7 (L) 03/10/2023    BUN 13 01/20/2024    BUN 9 07/18/2023    CREATININE 0.63 01/20/2024    CREATININE 0.68 07/18/2023    GLU 93 01/20/2024     (H) 07/18/2023    CALCIUM 9.0 01/20/2024    CALCIUM 9.4 07/18/2023    MG 2.1 03/11/2023    MG 2.1 03/10/2023    ALBUMIN 4.5 01/20/2024    ALBUMIN 4.2 07/18/2023    PROT 6.8 01/20/2024    PROT 6.4 07/18/2023    ALKPHOS 47 (L) 03/11/2023    ALKPHOS 51 (L) 03/10/2023    ALT 21 01/20/2024    ALT 28 07/18/2023    AST 19 01/20/2024    AST 26  07/18/2023    BILITOT 0.7 01/20/2024    BILITOT 0.8 07/18/2023        CARDIAC PROFILE - LAST 2  Lab Results   Component Value Date     (H) 03/08/2023    BNP 61 03/07/2023     10/14/2022    TROPONINI <0.030 06/01/2020    TROPONINIHS 3017.3 (HH) 03/09/2023    TROPONINIHS 4476.6 (HH) 03/09/2023        COAGULATION - LAST 2  Lab Results   Component Value Date    LABPT 13.5 06/01/2020    INR 1.0 03/08/2023    INR 1.1 06/01/2020    APTT 67.0 (H) 03/10/2023    APTT 35.3 (H) 03/09/2023       ENDOCRINE & PSA - LAST 2  Lab Results   Component Value Date    MICROALBUR 0.2 03/29/2022    MICROALBUR <0.2 05/26/2021        ECHOCARDIOGRAM RESULTS  Results for orders placed during the hospital encounter of 03/08/23    Echo    Interpretation Summary  · The left ventricle is normal in size with concentric remodeling and normal systolic function.  · The estimated ejection fraction is 65%.  · Normal left ventricular diastolic function.  · Normal right ventricular size with normal right ventricular systolic function.      CURRENT/PREVIOUS VISIT EKG  Results for orders placed or performed during the hospital encounter of 03/08/23   EKG 12-LEAD starting tomorrow    Collection Time: 03/11/23  7:35 AM    Narrative    Test Reason : I21.4,    Vent. Rate : 057 BPM     Atrial Rate : 057 BPM     P-R Int : 154 ms          QRS Dur : 074 ms      QT Int : 428 ms       P-R-T Axes : 035 047 036 degrees     QTc Int : 416 ms    Sinus bradycardia  Otherwise normal ECG  When compared with ECG of 09-MAR-2023 05:50,  No significant change was found  Confirmed by Aleshia ADEN, Olman ECHEVARRIA ([id number]) on 3/12/2023 12:49:56 PM    Referred By: [email or web address]   SELF           Confirmed By:Olman Monk MD     No valid procedures specified.   No results found for this or any previous visit.    No valid procedures specified.          PREVIOUS STRESS TEST              PREVIOUS ANGIOGRAM        PHYSICAL EXAM    CONSTITUTIONAL: Well built, well nourished in no  apparent distress  HEENT: No pallor  NECK: no JVD  LUNGS: CTA b/l  HEART: regular rate and rhythm, S1, S2 normal, no murmur   ABDOMEN: soft  EXTREMITIES: No edema  NEURO: AAO X 3   SKIN:  No rash  Psych:  Normal affect    I HAVE REVIEWED :    The vital signs, nurses notes, and all the pertinent radiology and labs.        Current Outpatient Medications   Medication Instructions    alendronate (FOSAMAX) 70 MG tablet TK 1 T PO 1 TIME Q WK    amLODIPine (NORVASC) 2.5 mg, Oral, Daily    aspirin (ECOTRIN) 81 mg, Oral, Daily    atorvastatin (LIPITOR) 40 mg, Oral, Nightly    biotin 5,000 mcg, Oral, Daily    calcium-vitamin D3 (OS- + D3) 500 mg-5 mcg (200 unit) per tablet 1 tablet, Oral, Daily    clobetasoL (TEMOVATE) 0.05 % external solution Topical (Top), 2 times daily    clopidogreL (PLAVIX) 75 mg, Oral, Daily    cyanocobalamin (VITAMIN B-12) 100 mcg, Oral, Daily    diclofenac (VOLTAREN) 75 mg, Oral, 2 times daily    finasteride (PROSCAR) 5 mg, Oral, Daily    fluocinonide (LIDEX) 0.05 % external solution APPLY TOPICALLY TO THE AFFECTED AREA DAILY    losartan (COZAAR) 100 mg, Oral, Daily    metoprolol tartrate (LOPRESSOR) 100 mg, Oral, 2 times daily    milk thistle 175 mg, Oral, Daily    omega-3 fatty acids/fish oil (FISH OIL-OMEGA-3 FATTY ACIDS) 300-1,000 mg capsule 1 capsule, Oral, Daily    vitamin E 400 Units, Oral, Daily    zinc gluconate 50 mg, Oral, Daily        Assessment & Plan     67 yr old female came to the clinic for follow up. Patient with PMH of HTN, hepc status post treatment, SVT presented to [facility] in March 2023 with chest pain found to have NSTEMI status post cardiac catheterization which showed severe disease in a small branch of RPL which was not amenable to percutaneous coronary intervention.  Patient was discharged with medical therapy.      Palpitations- history of SVT on max dose metoprolol-still having palpitations  CAD- denies chest pain  Hypertension-BP still mildly  elevated  Dyslipidemia  Smoking  Polycythemia      Plan:  Continue aspirin.  Can stop Plavix in March after 12 months of DAPT  Continue metoprolol, losartan  Start amlodipine 2.5 mg daily  Home BP monitoring  Repeat lipid profile and TSH  Cardiac event monitor  Counseled regarding smoking cessation    Follow up in about 3 months (around 4/30/2024).

## 2024-02-03 LAB
CHOLEST SERPL-MCNC: 146 MG/DL
CHOLEST/HDLC SERPL: 1.7 (CALC)
HDLC SERPL-MCNC: 88 MG/DL
LDLC SERPL CALC-MCNC: 44 MG/DL (CALC)
NONHDLC SERPL-MCNC: 58 MG/DL (CALC)
TRIGL SERPL-MCNC: 55 MG/DL
TSH SERPL-ACNC: 0.84 MIU/L (ref 0.4–4.5)

## 2024-02-08 ENCOUNTER — HOSPITAL ENCOUNTER (OUTPATIENT)
Dept: CARDIOLOGY | Facility: CLINIC | Age: 69
Discharge: HOME | End: 2024-02-08
Attending: INTERNAL MEDICINE
Payer: MEDICARE

## 2024-02-08 DIAGNOSIS — R00.2 PALPITATIONS: ICD-10-CM

## 2024-02-08 PROCEDURE — 93248 EXT ECG>7D<15D REV&INTERPJ: CPT | Mod: HCNC,,, | Performed by: INTERNAL MEDICINE

## 2024-02-08 PROCEDURE — 93246 EXT ECG>7D<15D RECORDING: CPT | Mod: HCNC,,, | Performed by: INTERNAL MEDICINE

## 2024-02-10 DIAGNOSIS — L65.9 ALOPECIA: ICD-10-CM

## 2024-02-10 DIAGNOSIS — M81.0 AGE RELATED OSTEOPOROSIS, UNSPECIFIED PATHOLOGICAL FRACTURE PRESENCE: ICD-10-CM

## 2024-02-12 RX ORDER — ALENDRONATE SODIUM 70 MG/1
TABLET ORAL
Qty: 12 TABLET | Refills: 3 | Status: SHIPPED | OUTPATIENT
Start: 2024-02-12

## 2024-02-12 RX ORDER — CLOBETASOL PROPIONATE 0.46 MG/ML
SOLUTION TOPICAL 2 TIMES DAILY
Qty: 50 ML | Refills: 1 | Status: SHIPPED | OUTPATIENT
Start: 2024-02-12 | End: 2024-05-28 | Stop reason: SDUPTHER

## 2024-02-20 ENCOUNTER — OFFICE VISIT (OUTPATIENT)
Dept: ORTHOPEDICS | Facility: CLINIC | Age: 69
End: 2024-02-20
Payer: MEDICARE

## 2024-02-20 VITALS — HEIGHT: 61 IN | BODY MASS INDEX: 23.73 KG/M2 | WEIGHT: 125.69 LBS

## 2024-02-20 DIAGNOSIS — M16.12 UNILATERAL PRIMARY OSTEOARTHRITIS, LEFT HIP: Primary | ICD-10-CM

## 2024-02-20 PROCEDURE — 1125F AMNT PAIN NOTED PAIN PRSNT: CPT | Mod: HCNC,CPTII,S$GLB, | Performed by: ORTHOPAEDIC SURGERY

## 2024-02-20 PROCEDURE — 3288F FALL RISK ASSESSMENT DOCD: CPT | Mod: HCNC,CPTII,S$GLB, | Performed by: ORTHOPAEDIC SURGERY

## 2024-02-20 PROCEDURE — 1160F RVW MEDS BY RX/DR IN RCRD: CPT | Mod: HCNC,CPTII,S$GLB, | Performed by: ORTHOPAEDIC SURGERY

## 2024-02-20 PROCEDURE — 99999 PR PBB SHADOW E&M-EST. PATIENT-LVL III: CPT | Mod: PBBFAC,HCNC,, | Performed by: ORTHOPAEDIC SURGERY

## 2024-02-20 PROCEDURE — 1159F MED LIST DOCD IN RCRD: CPT | Mod: HCNC,CPTII,S$GLB, | Performed by: ORTHOPAEDIC SURGERY

## 2024-02-20 PROCEDURE — 1101F PT FALLS ASSESS-DOCD LE1/YR: CPT | Mod: HCNC,CPTII,S$GLB, | Performed by: ORTHOPAEDIC SURGERY

## 2024-02-20 PROCEDURE — 4010F ACE/ARB THERAPY RXD/TAKEN: CPT | Mod: HCNC,CPTII,S$GLB, | Performed by: ORTHOPAEDIC SURGERY

## 2024-02-20 PROCEDURE — 99213 OFFICE O/P EST LOW 20 MIN: CPT | Mod: HCNC,S$GLB,, | Performed by: ORTHOPAEDIC SURGERY

## 2024-02-20 PROCEDURE — 3008F BODY MASS INDEX DOCD: CPT | Mod: HCNC,CPTII,S$GLB, | Performed by: ORTHOPAEDIC SURGERY

## 2024-02-20 NOTE — PROGRESS NOTES
CC:  68-year-old female follows up with osteoarthritis of her left hip.  We last saw her on 01/09/2024 she received an ultrasound-guided injection in the left hip.  The patient states that her pain returned just a few days ago.  So she really only got a few weeks of relief with the injection.  She currently rates her pain as a 4/10.  She is taken over-the-counter Advil and that has not really completely relieving her pain.    Examination of the Left Lower Extremity    Skin is intact throughout  Motor in intact EHL,FHL,TA,khushboo  +2 DP/PT  Sensation LT intact D/P/1st    Examination of the Left Hip    C-Sign positive  Logroll negative  Stenchfield negative    Pain with ROM negative    ROM:    Flexion   120  Extension   30  Abduction   45  Adduction   20  External Rotation 45  Internal Rotation 35    Flexion contracture negative    FADIR negative  FADER negative    Tenderness to palpation over lateral and posterolateral greater tochanter negative    X-ray images were examined and personally interpreted by me.  Three views left hip dated 12/21/2023 were once again reviewed.  It shows severe osteoarthritis of the left hip with complete loss of joint space, periarticular osteophytes, and subchondral sclerosis.    Dx:  Severe osteoarthritis left hip.  The patient did not get more than 4 weeks of relief with the ultrasound-guided intra-articular steroid injection.    Plan:  I had a long discussion with the patient about treatment options.  It is way too early for her to receive another intra-articular steroid injection.  She will need to wait 2 months for that.  The other option would be to discuss hip replacement surgery.  The patient states that for social reasons she can do that at this time.  I will have her follow up in 2 months and if she wants we can repeat the steroid injection at that time.  In the interim if she changes her mind and wants to proceed with hip replacement then we can discuss hip replacement  surgery.

## 2024-03-08 DIAGNOSIS — I47.10 SVT (SUPRAVENTRICULAR TACHYCARDIA): Primary | ICD-10-CM

## 2024-03-08 LAB
OHS CV EVENT MONITOR DAY: 11
OHS CV HOLTER HOOKUP DATE: NORMAL
OHS CV HOLTER HOOKUP TIME: NORMAL
OHS CV HOLTER LENGTH DECIMAL HOURS: 280
OHS CV HOLTER LENGTH HOURS: 16
OHS CV HOLTER LENGTH MINUTES: 0
OHS CV HOLTER SCAN DATE: NORMAL
OHS CV HOLTER SINUS AVERAGE HR: 70 BPM
OHS CV HOLTER SINUS MAX HR: 167 BPM
OHS CV HOLTER SINUS MIN HR: 54 BPM
OHS CV HOLTER STUDY END DATE: NORMAL
OHS CV HOLTER STUDY END TIME: NORMAL

## 2024-03-11 ENCOUNTER — TELEPHONE (OUTPATIENT)
Dept: DERMATOLOGY | Facility: CLINIC | Age: 69
End: 2024-03-11
Payer: MEDICARE

## 2024-03-11 NOTE — TELEPHONE ENCOUNTER
Called patient and got her rescheduled.     ----- Message from Ly Treviño sent at 3/11/2024  4:18 PM CDT -----  Regarding: Call back  Type:  Sooner Apoointment Request    Caller is requesting a sooner appointment.  Caller declined first available appointment listed below.  Caller will not accept being placed on the waitlist and is requesting a message be sent to doctor.  Name of Caller:Pt    When is the first available appointment?none    Symptoms:Hair loss    Would the patient rather a call back or a response via [facility]? Call back    Best Call Back Number:[phone]    Additional Information: Pt have appt in 3/2024 but needs to reschedule appt . Thank you

## 2024-03-26 DIAGNOSIS — Z00.00 ENCOUNTER FOR MEDICARE ANNUAL WELLNESS EXAM: ICD-10-CM

## 2024-04-09 ENCOUNTER — OFFICE VISIT (OUTPATIENT)
Dept: DERMATOLOGY | Facility: CLINIC | Age: 69
End: 2024-04-09
Payer: MEDICARE

## 2024-04-09 DIAGNOSIS — L66.9 CICATRICIAL ALOPECIA: Primary | ICD-10-CM

## 2024-04-09 PROCEDURE — 11901 INJECT SKIN LESIONS >7: CPT | Mod: S$GLB,,, | Performed by: STUDENT IN AN ORGANIZED HEALTH CARE EDUCATION/TRAINING PROGRAM

## 2024-04-09 PROCEDURE — 99499 UNLISTED E&M SERVICE: CPT | Mod: S$GLB,,, | Performed by: STUDENT IN AN ORGANIZED HEALTH CARE EDUCATION/TRAINING PROGRAM

## 2024-04-09 NOTE — PROGRESS NOTES
Subjective:      Patient ID:  Suma Butterfield is a 68 y.o. female who presents for   Chief Complaint   Patient presents with    Hair Loss     LOV 1/30/24    Patient coming in today for a follow up on hair loss. Patient states she is still noticing growth.   Taking Finasteride daily. Using Rogaine daily. Using Lidex daily. ILK  3.3mg/cc at LOV.     Final Pathologic Diagnosis     Skin, scalp, biopsies:   -Cicatricial Alopecia, see comment   Comment: Given the clinical differential central centrifugal cicatricial   alopecia seems most appropriate in this context there are findings of   minaturized hairs present which represent a coexisting androgenic alopecia.   The findings of fibrosis of follicle tracts are nonspecfic, this could   represent a burnt out inflammatory hair process   Comment: Interp By Laura Armenta M.D., Signed on 05/31/2022 at 16:52     No hx of cancer, post-menopausal     transaminitis- Has hx of treated hep C s/p treatment, drinks alcohol daily- following with outside GI  Also following with Dr. Baum for elevated ferritin, RBC count  Had liver ultrasound which should hepatic steatosis      Has SVT and uncontrolled htn- but working w/ her PCP to get it under better control          Review of Systems   Constitutional:  Negative for fever and chills.   Respiratory:  Negative for cough and shortness of breath.    Gastrointestinal:  Negative for nausea and vomiting.   Skin:  Positive for daily sunscreen use, activity-related sunscreen use and wears hat. Negative for itching.       Objective:   Physical Exam   Constitutional: She appears well-developed and well-nourished.   Neurological: She is alert and oriented to person, place, and time.   Psychiatric: She has a normal mood and affect.   Skin:   Areas Examined (abnormalities noted in diagram):   Scalp / Hair Palpated and Inspected            Diagram Legend     Erythematous scaling macule/papule c/w actinic keratosis       Vascular papule c/w  angioma      Pigmented verrucoid papule/plaque c/w seborrheic keratosis      Yellow umbilicated papule c/w sebaceous hyperplasia      Irregularly shaped tan macule c/w lentigo     1-2 mm smooth white papules consistent with Milia      Movable subcutaneous cyst with punctum c/w epidermal inclusion cyst      Subcutaneous movable cyst c/w pilar cyst      Firm pink to brown papule c/w dermatofibroma      Pedunculated fleshy papule(s) c/w skin tag(s)      Evenly pigmented macule c/w junctional nevus     Mildly variegated pigmented, slightly irregular-bordered macule c/w mildly atypical nevus      Flesh colored to evenly pigmented papule c/w intradermal nevus       Pink pearly papule/plaque c/w basal cell carcinoma      Erythematous hyperkeratotic cursted plaque c/w SCC      Surgical scar with no sign of skin cancer recurrence      Open and closed comedones      Inflammatory papules and pustules      Verrucoid papule consistent consistent with wart     Erythematous eczematous patches and plaques     Dystrophic onycholytic nail with subungual debris c/w onychomycosis     Umbilicated papule    Erythematous-base heme-crusted tan verrucoid plaque consistent with inflamed seborrheic keratosis     Erythematous Silvery Scaling Plaque c/w Psoriasis     See annotation                Assessment / Plan:        Cicatricial alopecia  Intralesional Kenalog 3.3mg/cc (6 cc total) injected into 16 lesions on the scalp today after obtaining verbal consent including risk of surrounding hypopigmentation. Patient tolerated procedure well.    Units: 2  NDC for Kenalog 10mg/cc:  2334-5731-75             No follow-ups on file.

## 2024-04-19 ENCOUNTER — OFFICE VISIT (OUTPATIENT)
Dept: OPTOMETRY | Facility: CLINIC | Age: 69
End: 2024-04-19
Payer: COMMERCIAL

## 2024-04-19 DIAGNOSIS — Z01.00 EXAMINATION OF EYES AND VISION: Primary | ICD-10-CM

## 2024-04-19 DIAGNOSIS — H52.7 REFRACTIVE ERROR: ICD-10-CM

## 2024-04-19 DIAGNOSIS — H25.13 NUCLEAR SCLEROSIS, BILATERAL: ICD-10-CM

## 2024-04-19 DIAGNOSIS — H04.123 DRY EYE SYNDROME, BILATERAL: ICD-10-CM

## 2024-04-19 PROCEDURE — 99999 PR PBB SHADOW E&M-EST. PATIENT-LVL III: CPT | Mod: PBBFAC,,, | Performed by: OPTOMETRIST

## 2024-04-19 PROCEDURE — 92015 DETERMINE REFRACTIVE STATE: CPT | Mod: S$GLB,,, | Performed by: OPTOMETRIST

## 2024-04-19 PROCEDURE — 92014 COMPRE OPH EXAM EST PT 1/>: CPT | Mod: S$GLB,,, | Performed by: OPTOMETRIST

## 2024-04-19 NOTE — PROGRESS NOTES
HPI     Annual Exam     Additional comments: DLE 1-2022           Comments    Pt here today for ocular health exam.  States slight decreased vision at   both near & distance.  Pt would like updated specs rx today.    Denies any headaches or eye pain.    (-) allergies / dry eyes  (-) gtts  (-) floaters or light flashes           Last edited by Delia Hernandez on 4/19/2024  7:45 AM.            Assessment /Plan     For exam results, see Encounter Report.    Examination of eyes and vision    Nuclear sclerosis, bilateral    Refractive error    Dry eye syndrome, bilateral      1. Examination of eyes and vision    2. Nuclear sclerosis, bilateral  Mild OU, not VS  Discussed possible ocular affects of cataracts. Acceptable BCVA OU.   Discussed treatment options. Surgery not recommended at this time.   Monitor yearly.     3. Refractive error  Dispensed updated spectacle Rx. Discussed various spectacle lens options. Discussed adaptation period to new specs.   Demonstrated new spec Rx vs current specs in phoropter with patient satisfaction    4. Dry eye syndrome, bilateral  Discussed ocular affects of dry eyes. Recommend OTC artificial tears 2-4 times a day in both eyes.   Discussed chronicity of DON. RTC if symptoms not alleviated by continued use of artificial tears.

## 2024-04-23 ENCOUNTER — OFFICE VISIT (OUTPATIENT)
Dept: ORTHOPEDICS | Facility: CLINIC | Age: 69
End: 2024-04-23
Payer: MEDICARE

## 2024-04-23 VITALS — WEIGHT: 125.69 LBS | HEIGHT: 61 IN | BODY MASS INDEX: 23.73 KG/M2

## 2024-04-23 DIAGNOSIS — M16.12 UNILATERAL PRIMARY OSTEOARTHRITIS, LEFT HIP: Primary | ICD-10-CM

## 2024-04-23 PROCEDURE — 1101F PT FALLS ASSESS-DOCD LE1/YR: CPT | Mod: HCNC,CPTII,S$GLB, | Performed by: ORTHOPAEDIC SURGERY

## 2024-04-23 PROCEDURE — 99214 OFFICE O/P EST MOD 30 MIN: CPT | Mod: HCNC,25,S$GLB, | Performed by: ORTHOPAEDIC SURGERY

## 2024-04-23 PROCEDURE — 1159F MED LIST DOCD IN RCRD: CPT | Mod: HCNC,CPTII,S$GLB, | Performed by: ORTHOPAEDIC SURGERY

## 2024-04-23 PROCEDURE — 1125F AMNT PAIN NOTED PAIN PRSNT: CPT | Mod: HCNC,CPTII,S$GLB, | Performed by: ORTHOPAEDIC SURGERY

## 2024-04-23 PROCEDURE — 99999 PR PBB SHADOW E&M-EST. PATIENT-LVL III: CPT | Mod: PBBFAC,HCNC,, | Performed by: ORTHOPAEDIC SURGERY

## 2024-04-23 PROCEDURE — 3008F BODY MASS INDEX DOCD: CPT | Mod: HCNC,CPTII,S$GLB, | Performed by: ORTHOPAEDIC SURGERY

## 2024-04-23 PROCEDURE — 3288F FALL RISK ASSESSMENT DOCD: CPT | Mod: HCNC,CPTII,S$GLB, | Performed by: ORTHOPAEDIC SURGERY

## 2024-04-23 PROCEDURE — 4010F ACE/ARB THERAPY RXD/TAKEN: CPT | Mod: HCNC,CPTII,S$GLB, | Performed by: ORTHOPAEDIC SURGERY

## 2024-04-23 PROCEDURE — 1160F RVW MEDS BY RX/DR IN RCRD: CPT | Mod: HCNC,CPTII,S$GLB, | Performed by: ORTHOPAEDIC SURGERY

## 2024-04-23 PROCEDURE — 20611 DRAIN/INJ JOINT/BURSA W/US: CPT | Mod: HCNC,LT,S$GLB, | Performed by: ORTHOPAEDIC SURGERY

## 2024-04-23 RX ORDER — METHYLPREDNISOLONE ACETATE 80 MG/ML
80 INJECTION, SUSPENSION INTRA-ARTICULAR; INTRALESIONAL; INTRAMUSCULAR; SOFT TISSUE
Status: DISCONTINUED | OUTPATIENT
Start: 2024-04-23 | End: 2024-04-23 | Stop reason: HOSPADM

## 2024-04-23 RX ADMIN — METHYLPREDNISOLONE ACETATE 80 MG: 80 INJECTION, SUSPENSION INTRA-ARTICULAR; INTRALESIONAL; INTRAMUSCULAR; SOFT TISSUE at 02:04

## 2024-04-23 NOTE — PROCEDURES
Large Joint Aspiration/Injection: L hip joint    Date/Time: 4/23/2024 2:30 PM    Performed by: Jimmie Ramos II, MD  Authorized by: Jimmie Ramos II, MD    Consent Done?:  Yes (Verbal)  Indications:  Pain  Local anesthesia used?: No      Details:  Needle Size:  22 G  Ultrasonic Guidance for needle placement?: Yes    Images are saved and documented.  Approach:  Anterolateral  Location:  Hip  Site:  L hip joint  Medications:  80 mg methylPREDNISolone acetate 80 mg/mL  Patient tolerance:  Patient tolerated the procedure well with no immediate complications

## 2024-04-23 NOTE — PROGRESS NOTES
CC:  68-year-old female follows up with osteoarthritis of the left hip.  We gave her an injection on 01/09/2024 that is lasted until recently when she has had an insidious return of pain in the left hip.  She currently rates pain as a 2/10 but with activity can get up to a 7 or 8/10.    Past Medical History:   Diagnosis Date    Elevated ferritin 10/13/2022    Elevated MCV 10/13/2022    Hepatitis C     Hypertension     Polycythemia 10/13/2022    SVT (supraventricular tachycardia)        Past Surgical History:   Procedure Laterality Date    CORONARY ANGIOGRAPHY N/A 3/10/2023    Procedure: ANGIOGRAM, CORONARY ARTERY;  Surgeon: Jill Griffiths MD;  Location: [facility] CATH/EP LAB;  Service: Cardiology;  Laterality: N/A;    LEFT HEART CATHETERIZATION Left 3/10/2023    Procedure: Left heart cath;  Surgeon: Jill Griffiths MD;  Location: [facility] CATH/EP LAB;  Service: Cardiology;  Laterality: Left;       Current Outpatient Medications on File Prior to Visit   Medication Sig Dispense Refill    alendronate (FOSAMAX) 70 MG tablet TK 1 T PO 1 TIME Q WK 12 tablet 3    amLODIPine (NORVASC) 2.5 MG tablet Take 1 tablet (2.5 mg total) by mouth once daily. 30 tablet 5    aspirin (ECOTRIN) 81 MG EC tablet Take 1 tablet (81 mg total) by mouth once daily. 90 tablet 1    atorvastatin (LIPITOR) 40 MG tablet TAKE 1 TABLET(40 MG) BY MOUTH EVERY EVENING 90 tablet 1    biotin 5,000 mcg TbDL Take 5,000 mcg by mouth once daily.      calcium-vitamin D3 (OS- + D3) 500 mg-5 mcg (200 unit) per tablet Take 1 tablet by mouth once daily.      clobetasoL (TEMOVATE) 0.05 % external solution Apply topically 2 (two) times daily. 50 mL 1    clopidogreL (PLAVIX) 75 mg tablet Take 1 tablet (75 mg total) by mouth once daily. (Patient taking differently: Take 75 mg by mouth once daily. Pt taking every other day) 90 tablet 1    cyanocobalamin (VITAMIN B-12) 1000 MCG tablet Take 100 mcg by mouth once daily.      diclofenac (VOLTAREN) 75 MG EC  tablet Take 1 tablet (75 mg total) by mouth 2 (two) times daily. 14 tablet 0    finasteride (PROSCAR) 5 mg tablet Take 1 tablet (5 mg total) by mouth once daily. 30 tablet 11    fluocinonide (LIDEX) 0.05 % external solution APPLY TOPICALLY TO THE AFFECTED AREA DAILY 60 mL 5    losartan (COZAAR) 100 MG tablet Take 1 tablet (100 mg total) by mouth once daily. 90 tablet 1    metoprolol tartrate (LOPRESSOR) 100 MG tablet Take 1 tablet (100 mg total) by mouth 2 (two) times daily. 60 tablet 3    milk thistle 175 mg tablet Take 175 mg by mouth once daily.      omega-3 fatty acids/fish oil (FISH OIL-OMEGA-3 FATTY ACIDS) 300-1,000 mg capsule Take 1 capsule by mouth once daily.      vitamin E 400 UNIT capsule Take 400 Units by mouth once daily.      zinc gluconate 50 mg tablet Take 50 mg by mouth once daily.       No current facility-administered medications on file prior to visit.       ROS:    Constitution: Denies chills, fever, and sweats.  HENT: Denies headaches or blurry vision.  Cardiovascular: Denies chest pain or irregular heart beat.  Respiratory: Denies cough or shortness of breath.  Gastrointestinal: Denies abdominal pain, nausea, or vomiting.  Genitourinary:  Denies urinary incontinence, bladder and kidney issues  Musculoskeletal:  Denies muscle cramps.  Neurological: Denies dizziness or focal weakness.  Psychiatric/Behavioral: Normal mental status.  Hematologic/Lymphatic: Denies bleeding problem or easy bruising/bleeding.  Skin: Denies rash or suspicious lesions.    Physical examination     Gen - No acute distress, well nourished, well groomed   Eyes - Extraoccular motions intact, pupils equally round and reactive to light and accommodation   ENT - normocephalic, atruamtic, oropharynx clear   Neck - Supple, no abnormal masses   Cardiovascular - regular rate and rhythm   Pulmonary - clear to auscultation bilaterally, no wheezes, ronchi, or rales   Abdomen - soft, non-tender, non-distended, positive bowel sounds    Psych - The patient is alert and oriented x3 with normal mood and affect    Examination of the left Lower Extremity    Skin is intact throughout  Motor in intact EHL,FHL,TA,khushboo  +2 DP/PT  Sensation LT intact D/P/1st    Examination of the left Hip    C-Sign positive  Logroll negative  Stenchfield positive    Pain with ROM positive    ROM:    Flexion   120  Extension   30  Abduction   45  Adduction   20  External Rotation 45  Internal Rotation 35    Flexion contracture negative    FADIR positive  FADER negative    Tenderness to palpation over lateral and posterolateral greater tochanter negative    X-ray images were examined and personally interpreted by me.      Dx:    Plan:  Recommendation is for an ultrasound guided steroid injection into the Left hip. Ultrasound guidance is needed due to the fact that the hip joint is deep in the body and not palpable. Risk and benefits of the procedure were explained to the patient. They verbalized understanding and did wish to proceed. Informed consent was obtained.  The Left hip was visualized under ultrasound. The femoral head, femoral neck, head-neck junction, and acetabulum were all visualized. The femoral neurovascular bundle was identified and avoided. Under direct ultrasound visualization, an 18 gauge spinal needle was advanced to an appropriate spot at the femoral head neck junction and the Left hip was injected with a mixture of 2/2/1 lidocaine, marcaine, and depomedrol. The hip capsule could be seen to distend as the fluid was injected. The patient tolerated the procedure well.  Static ultrasound images were saved to the patients chart.

## 2024-04-30 ENCOUNTER — OFFICE VISIT (OUTPATIENT)
Dept: URGENT CARE | Facility: CLINIC | Age: 69
End: 2024-04-30
Payer: MEDICARE

## 2024-04-30 VITALS
RESPIRATION RATE: 18 BRPM | HEART RATE: 58 BPM | HEIGHT: 61 IN | WEIGHT: 123 LBS | SYSTOLIC BLOOD PRESSURE: 138 MMHG | BODY MASS INDEX: 23.22 KG/M2 | OXYGEN SATURATION: 97 % | DIASTOLIC BLOOD PRESSURE: 86 MMHG | TEMPERATURE: 98 F

## 2024-04-30 DIAGNOSIS — R05.1 ACUTE COUGH: ICD-10-CM

## 2024-04-30 DIAGNOSIS — Z86.79 HISTORY OF HYPERTENSION: ICD-10-CM

## 2024-04-30 DIAGNOSIS — R09.81 NASAL CONGESTION: ICD-10-CM

## 2024-04-30 DIAGNOSIS — R42 DIZZINESS: ICD-10-CM

## 2024-04-30 DIAGNOSIS — J40 BRONCHITIS: Primary | ICD-10-CM

## 2024-04-30 PROCEDURE — 99214 OFFICE O/P EST MOD 30 MIN: CPT | Mod: S$GLB,,,

## 2024-04-30 RX ORDER — CETIRIZINE HYDROCHLORIDE 10 MG/1
10 TABLET ORAL DAILY
Qty: 30 TABLET | Refills: 0 | Status: SHIPPED | OUTPATIENT
Start: 2024-04-30 | End: 2024-06-04

## 2024-04-30 RX ORDER — AMOXICILLIN AND CLAVULANATE POTASSIUM 875; 125 MG/1; MG/1
1 TABLET, FILM COATED ORAL EVERY 12 HOURS
Qty: 14 TABLET | Refills: 0 | Status: SHIPPED | OUTPATIENT
Start: 2024-04-30 | End: 2024-05-07

## 2024-04-30 RX ORDER — FLUTICASONE PROPIONATE 50 MCG
1 SPRAY, SUSPENSION (ML) NASAL DAILY
Qty: 15.8 ML | Refills: 0 | Status: SHIPPED | OUTPATIENT
Start: 2024-04-30 | End: 2024-06-04

## 2024-04-30 NOTE — PATIENT INSTRUCTIONS
Symptomatic treatment to include:     Rest, increase fluid intake to include electrolyte replacement  Ibuprofen/Tylenol as directed for fever, sore throat, body aches  Zrytec and flonase for sinus symptoms  Antibiotics as prescribed  Warm, salt water gargles, over the counter throat lozenges or sprays as desires.   ER for difficulty breathing not relieved by rest, excessive lethargy and/or change in mental status

## 2024-05-14 DIAGNOSIS — I10 ESSENTIAL HYPERTENSION: ICD-10-CM

## 2024-05-14 DIAGNOSIS — E78.00 HYPERCHOLESTEROLEMIA: ICD-10-CM

## 2024-05-14 RX ORDER — METOPROLOL TARTRATE 100 MG/1
100 TABLET ORAL 2 TIMES DAILY
Qty: 60 TABLET | Refills: 3 | Status: SHIPPED | OUTPATIENT
Start: 2024-05-14 | End: 2024-06-04 | Stop reason: SDUPTHER

## 2024-05-14 RX ORDER — ATORVASTATIN CALCIUM 40 MG/1
40 TABLET, FILM COATED ORAL NIGHTLY
Qty: 90 TABLET | Refills: 1 | Status: SHIPPED | OUTPATIENT
Start: 2024-05-14

## 2024-05-20 ENCOUNTER — TELEPHONE (OUTPATIENT)
Facility: CLINIC | Age: 69
End: 2024-05-20
Payer: MEDICARE

## 2024-05-24 NOTE — PROGRESS NOTES
[facility] Hematology/Oncology  PROGRESS NOTE - Follow-up Visit      Subjective:       Patient ID:   NAME: Suma Butterfield : 1955     68 y.o. female    Referring Doc: Hector Colin PA*  Other Physicians: Pavan Leonard; Jun        Chief Complaint: polycythemia/elevated MCV f/u      History of Present Illness:     Patient returns today for a regularly scheduled follow-up visit.  The patient is here today to go over the results of the recently ordered labs, tests and studies.     She is here by herself.     She has been seeing Dr Kaba with dermatology for her alopecia. She saw Dr Ramos for her chronic hip issues and had steroid injection    She is feeling ok; energy levels are fine; no CP, SOB, HA's or N/V; breathing ok    She had been getting phlebotomies - her count was not high enough to get one last time    She saw Dr guillermo in Dec 2023      Discussed covid19 and she has been vaccinated      ROS:   GEN: normal without any fever, night sweats or weight loss  HEENT: normal with no HA's, sore throat, stiff neck, changes in vision  CV: normal with no CP, SOB, PND, GAY or orthopnea  PULM: normal with no SOB, cough, hemoptysis, sputum or pleuritic pain  GI: normal with no abdominal pain, nausea, vomiting, constipation, diarrhea, melanotic stools, BRBPR, or hematemesis  : normal with no hematuria, dysuria  BREAST: normal with no mass, discharge, pain  SKIN: normal with no rash, erythema, bruising, or swelling    Pain Scale: 0    Allergies:  Review of patient's allergies indicates:   Allergen Reactions    Succinylcholine      **Pseudocholinesterase**       Medications:    Current Outpatient Medications:     alendronate (FOSAMAX) 70 MG tablet, TK 1 T PO 1 TIME Q WK, Disp: 12 tablet, Rfl: 3    amLODIPine (NORVASC) 2.5 MG tablet, Take 1 tablet (2.5 mg total) by mouth once daily., Disp: 30 tablet, Rfl: 5    aspirin (ECOTRIN) 81 MG EC tablet, Take 1 tablet (81 mg total) by mouth once daily., Disp: 90 tablet,  Rfl: 1    atorvastatin (LIPITOR) 40 MG tablet, Take 1 tablet (40 mg total) by mouth every evening., Disp: 90 tablet, Rfl: 1    biotin 5,000 mcg TbDL, Take 5,000 mcg by mouth once daily., Disp: , Rfl:     calcium-vitamin D3 (OS- + D3) 500 mg-5 mcg (200 unit) per tablet, Take 1 tablet by mouth once daily., Disp: , Rfl:     clobetasoL (TEMOVATE) 0.05 % external solution, Apply topically 2 (two) times daily., Disp: 50 mL, Rfl: 1    clopidogreL (PLAVIX) 75 mg tablet, Take 1 tablet (75 mg total) by mouth once daily. (Patient taking differently: Take 75 mg by mouth once daily. Pt taking every other day), Disp: 90 tablet, Rfl: 1    cyanocobalamin (VITAMIN B-12) 1000 MCG tablet, Take 100 mcg by mouth once daily., Disp: , Rfl:     finasteride (PROSCAR) 5 mg tablet, Take 1 tablet (5 mg total) by mouth once daily., Disp: 30 tablet, Rfl: 11    fluocinonide (LIDEX) 0.05 % external solution, APPLY TOPICALLY TO THE AFFECTED AREA DAILY, Disp: 60 mL, Rfl: 5    fluticasone propionate (FLONASE) 50 mcg/actuation nasal spray, 1 spray (50 mcg total) by Each Nostril route once daily., Disp: 15.8 mL, Rfl: 0    losartan (COZAAR) 100 MG tablet, Take 1 tablet (100 mg total) by mouth once daily., Disp: 90 tablet, Rfl: 1    metoprolol tartrate (LOPRESSOR) 100 MG tablet, Take 1 tablet (100 mg total) by mouth 2 (two) times daily., Disp: 60 tablet, Rfl: 3    milk thistle 175 mg tablet, Take 175 mg by mouth once daily., Disp: , Rfl:     omega-3 fatty acids/fish oil (FISH OIL-OMEGA-3 FATTY ACIDS) 300-1,000 mg capsule, Take 1 capsule by mouth once daily., Disp: , Rfl:     vitamin E 400 UNIT capsule, Take 400 Units by mouth once daily., Disp: , Rfl:     zinc gluconate 50 mg tablet, Take 50 mg by mouth once daily., Disp: , Rfl:     cetirizine (ZYRTEC) 10 MG tablet, Take 1 tablet (10 mg total) by mouth once daily., Disp: 30 tablet, Rfl: 0    diclofenac (VOLTAREN) 75 MG EC tablet, Take 1 tablet (75 mg total) by mouth 2 (two) times daily., Disp: 14  "tablet, Rfl: 0    PMHx/PSHx Updates:  See patient's last visit with me on 11/28/2023  See H&P on 10/14/2022        Pathology:   Cancer Staging   No matching staging information was found for the patient.            Objective:     Vitals:  Blood pressure (!) 152/86, pulse 68, temperature 97.1 °F (36.2 °C), resp. rate 17, height 5' 1" (1.549 m), weight 56 kg (123 lb 8 oz).    Physical Examination:   GEN: no apparent distress, comfortable; AAOx3  HEAD: atraumatic and normocephalic; alopecia  EYES: no pallor, no icterus, PERRLA  ENT: OMM, no pharyngeal erythema, external ears WNL; no nasal discharge; no thrush  NECK: no masses, thyroid normal, trachea midline, no LAD/LN's, supple  CV: RRR with no murmur; normal pulse; normal S1 and S2; no pedal edema  CHEST: Normal respiratory effort; CTAB; normal breath sounds; no wheeze or crackles  ABDOM: nontender and nondistended; soft; normal bowel sounds; no rebound/guarding  MUSC/Skeletal: ROM normal; no crepitus; joints normal; no deformities or arthropathy  EXTREM: no clubbing, cyanosis, inflammation or swelling  SKIN: no rashes, lesions, ulcers, petechiae or subcutaneous nodules  : no cam  NEURO: grossly intact; motor/sensory WNL; AAOx3; no tremors  PSYCH: normal mood, affect and behavior  LYMPH: normal cervical, supraclavicular, axillary and groin LN's            Labs:     Lab Results   Component Value Date    WBC 6.5 05/21/2024    HGB 15.1 05/21/2024    HCT 46.7 (H) 05/21/2024    .7 (H) 05/21/2024     05/21/2024       CMP  Sodium   Date Value Ref Range Status   05/21/2024 139 135 - 146 mmol/L Final     Potassium   Date Value Ref Range Status   05/21/2024 3.8 3.5 - 5.3 mmol/L Final     Chloride   Date Value Ref Range Status   05/21/2024 102 98 - 110 mmol/L Final     CO2   Date Value Ref Range Status   05/21/2024 29 20 - 32 mmol/L Final     Glucose   Date Value Ref Range Status   05/21/2024 96 65 - 99 mg/dL Final     Comment:                   Fasting " reference interval          BUN   Date Value Ref Range Status   05/21/2024 11 7 - 25 mg/dL Final     Creatinine   Date Value Ref Range Status   05/21/2024 0.65 0.50 - 1.05 mg/dL Final     Calcium   Date Value Ref Range Status   05/21/2024 9.5 8.6 - 10.4 mg/dL Final     Total Protein   Date Value Ref Range Status   05/21/2024 7.1 6.1 - 8.1 g/dL Final     Albumin   Date Value Ref Range Status   05/21/2024 4.6 3.6 - 5.1 g/dL Final     Total Bilirubin   Date Value Ref Range Status   05/21/2024 0.8 0.2 - 1.2 mg/dL Final     Alkaline Phosphatase   Date Value Ref Range Status   03/11/2023 47 (L) 55 - 135 U/L Final     AST   Date Value Ref Range Status   05/21/2024 21 10 - 35 U/L Final     ALT   Date Value Ref Range Status   05/21/2024 20 6 - 29 U/L Final     Anion Gap   Date Value Ref Range Status   03/11/2023 5 (L) 8 - 16 mmol/L Final     eGFR   Date Value Ref Range Status   05/21/2024 97 > OR = 60 mL/min/1.73m2 Final     Lab Results   Component Value Date    IRON 182 (H) 05/21/2024    TRANSFERRIN 262 05/20/2022    TIBC 416 05/21/2024    LABIRON 44 05/21/2024    FESATURATED 36 05/20/2022         Lab Results   Component Value Date    FERRITIN 23 05/21/2024         JAK2 V617F Mutation NOT DETECTED     DNA Mutation Analysis See Below    Comment: RESULT: POSITIVE FOR ONE HFE GENE PATHOGENIC VARIANT: H63D (HETEROZYGOTE)    This patient is negative for the   C282Y pathogenic variant    AFP ng/mL 3.0     Erythropoietin 2.6 - 18.5 mIU/mL 10.5       - 250 U/L 182      Retic % 2.1     Folate (packed RBC's) >280 ng/mL       Methylmalonic Acid 87 - 318 nmol/L 145      Folate ng/mL 10.4          Radiology/Diagnostic Studies:    US 4/4/2023:    IMPRESSION:     No significant sonographic abnormality.        CXR    Result Date: 10/28/2022  CHEST PA AND LATERAL CLINICAL DATA:Polycythemia. Comparison to November 2017. FINDINGS: PA and lateral views demonstrate normal heart size. The mediastinum is unremarkable. There are no  infiltrates. There is blunting of the lateral costophrenic angle on the left which could reflect a trace amount of pleural fluid or pleural scarring. Osseous structures are unremarkable. IMPRESSION: 1. Slight blunting of the costophrenic angle on the left which could reflect a trace amount of pleural fluid or pleural scarring. 2. No other significant findings.      US Abdomen Complete    Result Date: 10/28/2022  Abdominal ultrasound complete CLINICAL DATA: Polycythemia FINDINGS: Sonographic assessment of the abdomen is compared to November 2020. The pancreas, aorta, and inferior vena cava are unremarkable. The liver is diffusely echogenic, similar to the prior study, compatible with fatty infiltration. Hepatopedal flow is noted within the portal vein. The gallbladder and biliary tree are within normal limits. The common bile duct measures 3 mm. The spleen and bilateral kidneys are within normal limits. There is no free fluid. IMPRESSION: 1. Hepatic steatosis. 2. No other significant findings.        I have reviewed all available lab results and radiology reports.    Assessment/Plan:   (1) 68 y.o. female with diagnosis of polycythemia and elevated MCV/ferritin who has been referred by Hector Colin PA* for evaluation by medical hematology/oncology.   - polycythemia since 2020 at least - suspect she has a secondary polycythemia process due to the chronic tobacco history  - former smoker who quit about a year ago  - elevated ferritin on labs in  May 2022 - possible acute phase reactant or due to the underlying liver hx  - order hemochromatosis panel    11/22/2022:  - she has H63D heterozygous HFE gene abnormality  - JAK2 was negative  - set up phlebotomy monthly for now    2/28/2023:  - she has been getting phlebotomies monthly - feeling better overall  - due for repeat labs    5/29/2023:  - doing well with no new issues  - checking labs monthly and phlebotomy as directed  - ferritin down to 89    11/28/2023:  -  continued on phlebotomes every other month  - latest hgb at 15.9  - ferritin down to 20; iron at 97    5/27/2025:  - continue with current monitoring of labs and phlebotomies as directed - due for one now     (2) HTN and hypercholesterolemia     (3) SVT - followed by Dr Browne     (4) Chronic hep C at age 52 s/p antiviral therapy - followed by Natalya  - AFP was a little elevated at 7.5  - US with hepatic steatosis    11/28/2023:  - latest AFP at 3.0 and adequate    5/27/2024:  - latest AFP 2.9 in Feb 2024     (5) Severe Alopecia - followed by Dr Estrella Kaba with derm      VISIT DIAGNOSES:      Polycythemia    Elevated MCV    Elevated ferritin    Chronic hepatitis C without hepatic coma          PLAN:  continue phlebotomies as directed, monitor labs monthly  Discussed the genetic implications of the HFE gene abnormality in siblings and children  Repeat AFP every 6 months   4.. F/u with PCP, Derm and GI  RTC in  6 months  Fax note to Hector Colin PA*; Estrella Kaba, Jun    Discussion:     COVID-19 Discussion:    I had long discussion with patient and any applicable family about the COVID-19 coronavirus epidemic and the recommended precautions with regard to cancer and/or hematology patients. I have re-iterated the CDC recommendations for adequate hand washing, use of hand -like products, and coughing into elbow, etc. In addition, especially for our patients who are on chemotherapy and/or our otherwise immunocompromised patients, I have recommended avoidance of crowds, including movie theaters, restaurants, churches, etc. I have recommended avoidance of any sick or symptomatic family members and/or friends. I have also recommended avoidance of any raw and unwashed food products, and general avoidance of food items that have not been prepared by themselves. The patient has been asked to call us immediately with any symptom developments, issues, questions or other general concerns.     I spent  over 25 mins of time with the patient. Reviewed results of the recently ordered labs, tests and studies; made directives with regards to the results. Over half of this time was spent couseling and coordinating care.    I have explained all of the above in detail and the patient understands all of the current recommendation(s). I have answered all of their questions to the best of my ability and to their complete satisfaction.   The patient is to continue with the current management plan.            Electronically signed by Adan Baum MD

## 2024-05-25 DIAGNOSIS — R09.81 NASAL CONGESTION: ICD-10-CM

## 2024-05-27 ENCOUNTER — OFFICE VISIT (OUTPATIENT)
Facility: CLINIC | Age: 69
End: 2024-05-27
Payer: MEDICARE

## 2024-05-27 VITALS
TEMPERATURE: 97 F | BODY MASS INDEX: 23.32 KG/M2 | HEART RATE: 68 BPM | SYSTOLIC BLOOD PRESSURE: 152 MMHG | RESPIRATION RATE: 17 BRPM | HEIGHT: 61 IN | WEIGHT: 123.5 LBS | DIASTOLIC BLOOD PRESSURE: 86 MMHG

## 2024-05-27 DIAGNOSIS — D75.1 POLYCYTHEMIA: Primary | ICD-10-CM

## 2024-05-27 DIAGNOSIS — R79.89 ELEVATED FERRITIN: ICD-10-CM

## 2024-05-27 DIAGNOSIS — R71.8 ELEVATED MCV: ICD-10-CM

## 2024-05-27 DIAGNOSIS — B18.2 CHRONIC HEPATITIS C WITHOUT HEPATIC COMA: ICD-10-CM

## 2024-05-27 PROCEDURE — 3079F DIAST BP 80-89 MM HG: CPT | Mod: HCNC,CPTII,S$GLB, | Performed by: INTERNAL MEDICINE

## 2024-05-27 PROCEDURE — 1160F RVW MEDS BY RX/DR IN RCRD: CPT | Mod: HCNC,CPTII,S$GLB, | Performed by: INTERNAL MEDICINE

## 2024-05-27 PROCEDURE — 3008F BODY MASS INDEX DOCD: CPT | Mod: HCNC,CPTII,S$GLB, | Performed by: INTERNAL MEDICINE

## 2024-05-27 PROCEDURE — 1125F AMNT PAIN NOTED PAIN PRSNT: CPT | Mod: HCNC,CPTII,S$GLB, | Performed by: INTERNAL MEDICINE

## 2024-05-27 PROCEDURE — 99999 PR PBB SHADOW E&M-EST. PATIENT-LVL IV: CPT | Mod: PBBFAC,HCNC,, | Performed by: INTERNAL MEDICINE

## 2024-05-27 PROCEDURE — 4010F ACE/ARB THERAPY RXD/TAKEN: CPT | Mod: HCNC,CPTII,S$GLB, | Performed by: INTERNAL MEDICINE

## 2024-05-27 PROCEDURE — 1101F PT FALLS ASSESS-DOCD LE1/YR: CPT | Mod: HCNC,CPTII,S$GLB, | Performed by: INTERNAL MEDICINE

## 2024-05-27 PROCEDURE — G2211 COMPLEX E/M VISIT ADD ON: HCPCS | Mod: HCNC,S$GLB,, | Performed by: INTERNAL MEDICINE

## 2024-05-27 PROCEDURE — 99214 OFFICE O/P EST MOD 30 MIN: CPT | Mod: HCNC,S$GLB,, | Performed by: INTERNAL MEDICINE

## 2024-05-27 PROCEDURE — 3077F SYST BP >= 140 MM HG: CPT | Mod: HCNC,CPTII,S$GLB, | Performed by: INTERNAL MEDICINE

## 2024-05-27 PROCEDURE — 1159F MED LIST DOCD IN RCRD: CPT | Mod: HCNC,CPTII,S$GLB, | Performed by: INTERNAL MEDICINE

## 2024-05-27 PROCEDURE — 3288F FALL RISK ASSESSMENT DOCD: CPT | Mod: HCNC,CPTII,S$GLB, | Performed by: INTERNAL MEDICINE

## 2024-05-28 DIAGNOSIS — L65.9 ALOPECIA: ICD-10-CM

## 2024-05-28 RX ORDER — CLOBETASOL PROPIONATE 0.46 MG/ML
SOLUTION TOPICAL 2 TIMES DAILY
Qty: 50 ML | Refills: 1 | Status: SHIPPED | OUTPATIENT
Start: 2024-05-28

## 2024-05-29 DIAGNOSIS — R30.0 DYSURIA: Primary | ICD-10-CM

## 2024-05-29 RX ORDER — NITROFURANTOIN 25; 75 MG/1; MG/1
100 CAPSULE ORAL 2 TIMES DAILY
Qty: 14 CAPSULE | Refills: 0 | Status: SHIPPED | OUTPATIENT
Start: 2024-05-29 | End: 2024-06-04

## 2024-05-29 NOTE — TELEPHONE ENCOUNTER
Tried to see about collecting a UA but Pt stuck working in [place] until 6pm today and tomorrow.  Having bladder pressure, burning when urinates, urinary frequency with not much coming out

## 2024-05-29 NOTE — TELEPHONE ENCOUNTER
----- Message from Jill Monsivais sent at 5/29/2024 12:09 PM CDT -----  The patient has an appointment next week with Hector. She is getting a bladder infection. Can you call in something? [facility] on [address]. Pt's # [phone]

## 2024-06-04 ENCOUNTER — OFFICE VISIT (OUTPATIENT)
Dept: FAMILY MEDICINE | Facility: CLINIC | Age: 69
End: 2024-06-04
Payer: MEDICARE

## 2024-06-04 ENCOUNTER — OFFICE VISIT (OUTPATIENT)
Dept: DERMATOLOGY | Facility: CLINIC | Age: 69
End: 2024-06-04
Payer: MEDICARE

## 2024-06-04 VITALS
HEART RATE: 65 BPM | SYSTOLIC BLOOD PRESSURE: 137 MMHG | DIASTOLIC BLOOD PRESSURE: 81 MMHG | BODY MASS INDEX: 23.22 KG/M2 | WEIGHT: 123 LBS | OXYGEN SATURATION: 99 % | HEIGHT: 61 IN | RESPIRATION RATE: 18 BRPM

## 2024-06-04 DIAGNOSIS — D75.1 POLYCYTHEMIA: ICD-10-CM

## 2024-06-04 DIAGNOSIS — L65.9 ALOPECIA: ICD-10-CM

## 2024-06-04 DIAGNOSIS — E78.00 HYPERCHOLESTEROLEMIA: ICD-10-CM

## 2024-06-04 DIAGNOSIS — J40 BRONCHITIS: ICD-10-CM

## 2024-06-04 DIAGNOSIS — L66.9 CICATRICIAL ALOPECIA: Primary | ICD-10-CM

## 2024-06-04 DIAGNOSIS — I10 ESSENTIAL HYPERTENSION: Primary | ICD-10-CM

## 2024-06-04 PROCEDURE — 4010F ACE/ARB THERAPY RXD/TAKEN: CPT | Mod: CPTII,S$GLB,, | Performed by: PHYSICIAN ASSISTANT

## 2024-06-04 PROCEDURE — 11900 INJECT SKIN LESIONS </W 7: CPT | Mod: S$GLB,,, | Performed by: STUDENT IN AN ORGANIZED HEALTH CARE EDUCATION/TRAINING PROGRAM

## 2024-06-04 PROCEDURE — 3075F SYST BP GE 130 - 139MM HG: CPT | Mod: CPTII,S$GLB,, | Performed by: PHYSICIAN ASSISTANT

## 2024-06-04 PROCEDURE — 3288F FALL RISK ASSESSMENT DOCD: CPT | Mod: CPTII,S$GLB,, | Performed by: PHYSICIAN ASSISTANT

## 2024-06-04 PROCEDURE — 1101F PT FALLS ASSESS-DOCD LE1/YR: CPT | Mod: CPTII,S$GLB,, | Performed by: PHYSICIAN ASSISTANT

## 2024-06-04 PROCEDURE — 1159F MED LIST DOCD IN RCRD: CPT | Mod: CPTII,S$GLB,, | Performed by: PHYSICIAN ASSISTANT

## 2024-06-04 PROCEDURE — 1126F AMNT PAIN NOTED NONE PRSNT: CPT | Mod: CPTII,S$GLB,, | Performed by: PHYSICIAN ASSISTANT

## 2024-06-04 PROCEDURE — 3079F DIAST BP 80-89 MM HG: CPT | Mod: CPTII,S$GLB,, | Performed by: PHYSICIAN ASSISTANT

## 2024-06-04 PROCEDURE — 99214 OFFICE O/P EST MOD 30 MIN: CPT | Mod: S$GLB,,, | Performed by: PHYSICIAN ASSISTANT

## 2024-06-04 PROCEDURE — 99499 UNLISTED E&M SERVICE: CPT | Mod: S$GLB,,, | Performed by: STUDENT IN AN ORGANIZED HEALTH CARE EDUCATION/TRAINING PROGRAM

## 2024-06-04 PROCEDURE — 3008F BODY MASS INDEX DOCD: CPT | Mod: CPTII,S$GLB,, | Performed by: PHYSICIAN ASSISTANT

## 2024-06-04 RX ORDER — METHYLPREDNISOLONE 4 MG/1
TABLET ORAL
Qty: 21 EACH | Refills: 0 | Status: SHIPPED | OUTPATIENT
Start: 2024-06-04 | End: 2024-06-25

## 2024-06-04 RX ORDER — METOPROLOL TARTRATE 100 MG/1
100 TABLET ORAL 2 TIMES DAILY
Qty: 60 TABLET | Refills: 5 | Status: SHIPPED | OUTPATIENT
Start: 2024-06-04 | End: 2024-12-01

## 2024-06-04 NOTE — PROGRESS NOTES
SUBJECTIVE:    Patient ID: Suma Butterfield is a 68 y.o. female.    Chief Complaint: Follow-up (No bottles// refills needed// pt states she have no complaints today)    68 year old female presents for a follow up. She is doing well. Her blood pressure at home runs around 130s/80s. She is still with Dr. Baum for heme/onc. Her last phlebotomy was June 1st. She is still seeing Dr. Kaba for treatment of her alopecia. She sees Dr. Ramos for left hip arthritis and received two joint injections which has helped with the pain.   She reports a cough and congestion that started in the middle of April that has slowly gotten better, but the cough has lingered. She was on vacation with family when she was exposed to a sick family member. She has taken an antibiotic, zyrtec, and nasal spray given to her at urgent care. Now she denies fever, chills, or congestion. She occasionally uses the nasal spray when she feels the need.    Follow-up  Associated symptoms include coughing. Pertinent negatives include no abdominal pain, arthralgias, chest pain, chills, congestion, fatigue, headaches or joint swelling.       Ancillary Orders on 04/12/2024   Component Date Value Ref Range Status    WBC 05/21/2024 6.5  3.8 - 10.8 Thousand/uL Final    RBC 05/21/2024 4.59  3.80 - 5.10 Million/uL Final    Hemoglobin 05/21/2024 15.1  11.7 - 15.5 g/dL Final    Hematocrit 05/21/2024 46.7 (H)  35.0 - 45.0 % Final    MCV 05/21/2024 101.7 (H)  80.0 - 100.0 fL Final    MCH 05/21/2024 32.9  27.0 - 33.0 pg Final    MCHC 05/21/2024 32.3  32.0 - 36.0 g/dL Final    RDW 05/21/2024 13.5  11.0 - 15.0 % Final    Platelets 05/21/2024 202  140 - 400 Thousand/uL Final    MPV 05/21/2024 9.2  7.5 - 12.5 fL Final    Neutrophils, Abs 05/21/2024 3,523  1,500 - 7,800 cells/uL Final    Lymph # 05/21/2024 1,989  850 - 3,900 cells/uL Final    Mono # 05/21/2024 767  200 - 950 cells/uL Final    Eos # 05/21/2024 169  15 - 500 cells/uL Final    Baso # 05/21/2024 52  0 - 200  cells/uL Final    Neutrophils Relative 05/21/2024 54.2  % Final    Lymph % 05/21/2024 30.6  % Final    Mono % 05/21/2024 11.8  % Final    Eosinophil % 05/21/2024 2.6  % Final    Basophil % 05/21/2024 0.8  % Final    Glucose 05/21/2024 96  65 - 99 mg/dL Final    BUN 05/21/2024 11  7 - 25 mg/dL Final    Creatinine 05/21/2024 0.65  0.50 - 1.05 mg/dL Final    eGFR 05/21/2024 97  > OR = 60 mL/min/1.73m2 Final    BUN/Creatinine Ratio 05/21/2024 SEE NOTE:  6 - 22 (calc) Final    Sodium 05/21/2024 139  135 - 146 mmol/L Final    Potassium 05/21/2024 3.8  3.5 - 5.3 mmol/L Final    Chloride 05/21/2024 102  98 - 110 mmol/L Final    CO2 05/21/2024 29  20 - 32 mmol/L Final    Calcium 05/21/2024 9.5  8.6 - 10.4 mg/dL Final    Total Protein 05/21/2024 7.1  6.1 - 8.1 g/dL Final    Albumin 05/21/2024 4.6  3.6 - 5.1 g/dL Final    Globulin, Total 05/21/2024 2.5  1.9 - 3.7 g/dL (calc) Final    Albumin/Globulin Ratio 05/21/2024 1.8  1.0 - 2.5 (calc) Final    Total Bilirubin 05/21/2024 0.8  0.2 - 1.2 mg/dL Final    Alkaline Phosphatase 05/21/2024 77  37 - 153 U/L Final    AST 05/21/2024 21  10 - 35 U/L Final    ALT 05/21/2024 20  6 - 29 U/L Final    Iron 05/21/2024 182 (H)  45 - 160 mcg/dL Final    TIBC 05/21/2024 416  250 - 450 mcg/dL (calc) Final    Iron Saturation 05/21/2024 44  16 - 45 % (calc) Final    Ferritin 05/21/2024 23  16 - 288 ng/mL Final   Ancillary Orders on 03/15/2024   Component Date Value Ref Range Status    WBC 04/05/2024 7.3  3.8 - 10.8 Thousand/uL Final    RBC 04/05/2024 4.36  3.80 - 5.10 Million/uL Final    Hemoglobin 04/05/2024 14.7  11.7 - 15.5 g/dL Final    Hematocrit 04/05/2024 44.6  35.0 - 45.0 % Final    MCV 04/05/2024 102.3 (H)  80.0 - 100.0 fL Final    MCH 04/05/2024 33.7 (H)  27.0 - 33.0 pg Final    MCHC 04/05/2024 33.0  32.0 - 36.0 g/dL Final    RDW 04/05/2024 12.0  11.0 - 15.0 % Final    Platelets 04/05/2024 203  140 - 400 Thousand/uL Final    MPV 04/05/2024 9.5  7.5 - 12.5 fL Final    Neutrophils, Abs  04/05/2024 3,811  1,500 - 7,800 cells/uL Final    Lymph # 04/05/2024 2,460  850 - 3,900 cells/uL Final    Mono # 04/05/2024 796  200 - 950 cells/uL Final    Eos # 04/05/2024 161  15 - 500 cells/uL Final    Baso # 04/05/2024 73  0 - 200 cells/uL Final    Neutrophils Relative 04/05/2024 52.2  % Final    Lymph % 04/05/2024 33.7  % Final    Mono % 04/05/2024 10.9  % Final    Eosinophil % 04/05/2024 2.2  % Final    Basophil % 04/05/2024 1.0  % Final    Glucose 04/05/2024 104 (H)  65 - 99 mg/dL Final    BUN 04/05/2024 17  7 - 25 mg/dL Final    Creatinine 04/05/2024 0.72  0.50 - 1.05 mg/dL Final    eGFR 04/05/2024 92  > OR = 60 mL/min/1.73m2 Final    BUN/Creatinine Ratio 04/05/2024 SEE NOTE:  6 - 22 (calc) Final    Sodium 04/05/2024 138  135 - 146 mmol/L Final    Potassium 04/05/2024 4.5  3.5 - 5.3 mmol/L Final    Chloride 04/05/2024 102  98 - 110 mmol/L Final    CO2 04/05/2024 28  20 - 32 mmol/L Final    Calcium 04/05/2024 9.9  8.6 - 10.4 mg/dL Final    Total Protein 04/05/2024 7.0  6.1 - 8.1 g/dL Final    Albumin 04/05/2024 4.6  3.6 - 5.1 g/dL Final    Globulin, Total 04/05/2024 2.4  1.9 - 3.7 g/dL (calc) Final    Albumin/Globulin Ratio 04/05/2024 1.9  1.0 - 2.5 (calc) Final    Total Bilirubin 04/05/2024 0.7  0.2 - 1.2 mg/dL Final    Alkaline Phosphatase 04/05/2024 62  37 - 153 U/L Final    AST 04/05/2024 17  10 - 35 U/L Final    ALT 04/05/2024 15  6 - 29 U/L Final    Iron 04/05/2024 83  45 - 160 mcg/dL Final    TIBC 04/05/2024 494 (H)  250 - 450 mcg/dL (calc) Final    Iron Saturation 04/05/2024 17  16 - 45 % (calc) Final    Ferritin 04/05/2024 12 (L)  16 - 288 ng/mL Final   Ancillary Orders on 02/16/2024   Component Date Value Ref Range Status    WBC 02/24/2024 5.5  3.8 - 10.8 Thousand/uL Final    RBC 02/24/2024 4.08  3.80 - 5.10 Million/uL Final    Hemoglobin 02/24/2024 13.9  11.7 - 15.5 g/dL Final    Hematocrit 02/24/2024 42.6  35.0 - 45.0 % Final    MCV 02/24/2024 104.4 (H)  80.0 - 100.0 fL Final    MCH 02/24/2024  34.1 (H)  27.0 - 33.0 pg Final    MCHC 02/24/2024 32.6  32.0 - 36.0 g/dL Final    RDW 02/24/2024 12.6  11.0 - 15.0 % Final    Platelets 02/24/2024 206  140 - 400 Thousand/uL Final    MPV 02/24/2024 9.2  7.5 - 12.5 fL Final    Neutrophils, Abs 02/24/2024 2,547  1,500 - 7,800 cells/uL Final    Lymph # 02/24/2024 2,272  850 - 3,900 cells/uL Final    Mono # 02/24/2024 512  200 - 950 cells/uL Final    Eos # 02/24/2024 110  15 - 500 cells/uL Final    Baso # 02/24/2024 61  0 - 200 cells/uL Final    Neutrophils Relative 02/24/2024 46.3  % Final    Lymph % 02/24/2024 41.3  % Final    Mono % 02/24/2024 9.3  % Final    Eosinophil % 02/24/2024 2.0  % Final    Basophil % 02/24/2024 1.1  % Final    Glucose 02/24/2024 108 (H)  65 - 99 mg/dL Final    BUN 02/24/2024 13  7 - 25 mg/dL Final    Creatinine 02/24/2024 0.71  0.50 - 1.05 mg/dL Final    eGFR 02/24/2024 94  > OR = 60 mL/min/1.73m2 Final    BUN/Creatinine Ratio 02/24/2024 SEE NOTE:  6 - 22 (calc) Final    Sodium 02/24/2024 140  135 - 146 mmol/L Final    Potassium 02/24/2024 4.7  3.5 - 5.3 mmol/L Final    Chloride 02/24/2024 103  98 - 110 mmol/L Final    CO2 02/24/2024 28  20 - 32 mmol/L Final    Calcium 02/24/2024 9.7  8.6 - 10.4 mg/dL Final    Total Protein 02/24/2024 6.7  6.1 - 8.1 g/dL Final    Albumin 02/24/2024 4.5  3.6 - 5.1 g/dL Final    Globulin, Total 02/24/2024 2.2  1.9 - 3.7 g/dL (calc) Final    Albumin/Globulin Ratio 02/24/2024 2.0  1.0 - 2.5 (calc) Final    Total Bilirubin 02/24/2024 0.5  0.2 - 1.2 mg/dL Final    Alkaline Phosphatase 02/24/2024 55  37 - 153 U/L Final    AST 02/24/2024 19  10 - 35 U/L Final    ALT 02/24/2024 15  6 - 29 U/L Final    Iron 02/24/2024 48  45 - 160 mcg/dL Final    TIBC 02/24/2024 454 (H)  250 - 450 mcg/dL (calc) Final    Iron Saturation 02/24/2024 11 (L)  16 - 45 % (calc) Final    Ferritin 02/24/2024 12 (L)  16 - 288 ng/mL Final    AFP 02/24/2024 2.9  ng/mL Final   Hospital Outpatient Visit on 02/08/2024   Component Date Value Ref  Range Status    Holter Hookup Date 02/08/2024 [id number]   Final    Holter Hookup Time 02/08/2024 [id number]   Final    Holter Study End Date 02/08/2024 [id number]   Final    Holter Study End Time 02/08/2024 [id number]   Final    Holter Scan Date 02/08/2024 [id number]   Final    Sinus min HR 02/08/2024 54  bpm Final    Sinus max hr 02/08/2024 167  bpm Final    Sinus avg hr 02/08/2024 70  bpm Final    Event Monitor Day 02/08/2024 11   Final    Holter length hours 02/08/2024 16   Final    holter length minutes 02/08/2024 0   Final    holter length dec hours 02/08/2024 280.00   Final   Orders Only on 02/02/2024   Component Date Value Ref Range Status    Cholesterol 02/02/2024 146  <200 mg/dL Final    HDL 02/02/2024 88  > OR = 50 mg/dL Final    Triglycerides 02/02/2024 55  <150 mg/dL Final    LDL Cholesterol 02/02/2024 44  mg/dL (calc) Final    HDL/Cholesterol Ratio 02/02/2024 1.7  <5.0 (calc) Final    Non HDL Chol. (LDL+VLDL) 02/02/2024 58  <130 mg/dL (calc) Final    TSH 02/02/2024 0.84  0.40 - 4.50 mIU/L Final   Ancillary Orders on 01/19/2024   Component Date Value Ref Range Status    WBC 01/20/2024 8.1  3.8 - 10.8 Thousand/uL Final    RBC 01/20/2024 4.18  3.80 - 5.10 Million/uL Final    Hemoglobin 01/20/2024 14.5  11.7 - 15.5 g/dL Final    Hematocrit 01/20/2024 42.6  35.0 - 45.0 % Final    MCV 01/20/2024 101.9 (H)  80.0 - 100.0 fL Final    MCH 01/20/2024 34.7 (H)  27.0 - 33.0 pg Final    MCHC 01/20/2024 34.0  32.0 - 36.0 g/dL Final    RDW 01/20/2024 12.5  11.0 - 15.0 % Final    Platelets 01/20/2024 209  140 - 400 Thousand/uL Final    MPV 01/20/2024 9.5  7.5 - 12.5 fL Final    Neutrophils, Abs 01/20/2024 4,342  1,500 - 7,800 cells/uL Final    Lymph # 01/20/2024 2,892  850 - 3,900 cells/uL Final    Mono # 01/20/2024 705  200 - 950 cells/uL Final    Eos # 01/20/2024 113  15 - 500 cells/uL Final    Baso # 01/20/2024 49  0 - 200 cells/uL Final    Neutrophils Relative 01/20/2024 53.6  % Final    Lymph % 01/20/2024 35.7  % Final    Mono  % 01/20/2024 8.7  % Final    Eosinophil % 01/20/2024 1.4  % Final    Basophil % 01/20/2024 0.6  % Final    Glucose 01/20/2024 93  65 - 99 mg/dL Final    BUN 01/20/2024 13  7 - 25 mg/dL Final    Creatinine 01/20/2024 0.63  0.50 - 1.05 mg/dL Final    eGFR 01/20/2024 98  > OR = 60 mL/min/1.73m2 Final    BUN/Creatinine Ratio 01/20/2024 SEE NOTE:  6 - 22 (calc) Final    Sodium 01/20/2024 138  135 - 146 mmol/L Final    Potassium 01/20/2024 4.2  3.5 - 5.3 mmol/L Final    Chloride 01/20/2024 103  98 - 110 mmol/L Final    CO2 01/20/2024 28  20 - 32 mmol/L Final    Calcium 01/20/2024 9.0  8.6 - 10.4 mg/dL Final    Total Protein 01/20/2024 6.8  6.1 - 8.1 g/dL Final    Albumin 01/20/2024 4.5  3.6 - 5.1 g/dL Final    Globulin, Total 01/20/2024 2.3  1.9 - 3.7 g/dL (calc) Final    Albumin/Globulin Ratio 01/20/2024 2.0  1.0 - 2.5 (calc) Final    Total Bilirubin 01/20/2024 0.7  0.2 - 1.2 mg/dL Final    Alkaline Phosphatase 01/20/2024 58  37 - 153 U/L Final    AST 01/20/2024 19  10 - 35 U/L Final    ALT 01/20/2024 21  6 - 29 U/L Final    Iron 01/20/2024 85  45 - 160 mcg/dL Final    TIBC 01/20/2024 440  250 - 450 mcg/dL (calc) Final    Iron Saturation 01/20/2024 19  16 - 45 % (calc) Final    Ferritin 01/20/2024 10 (L)  16 - 288 ng/mL Final       Past Medical History:   Diagnosis Date    Elevated ferritin 10/13/2022    Elevated MCV 10/13/2022    Hepatitis C     Hypertension     Polycythemia 10/13/2022    SVT (supraventricular tachycardia)      Past Surgical History:   Procedure Laterality Date    CORONARY ANGIOGRAPHY N/A 3/10/2023    Procedure: ANGIOGRAM, CORONARY ARTERY;  Surgeon: Jill Griffiths MD;  Location: [facility] CATH/EP LAB;  Service: Cardiology;  Laterality: N/A;    LEFT HEART CATHETERIZATION Left 3/10/2023    Procedure: Left heart cath;  Surgeon: Jill Griffiths MD;  Location: [facility] CATH/EP LAB;  Service: Cardiology;  Laterality: Left;     Family History   Problem Relation Name Age of Onset    Glaucoma Neg Hx       Macular degeneration Neg Hx      Retinal detachment Neg Hx         Marital Status:   Alcohol History:  reports current alcohol use.  Tobacco History:  reports that she has quit smoking. Her smoking use included cigarettes. She has been exposed to tobacco smoke. She has never used smokeless tobacco.  Drug History:  reports no history of drug use.    Review of patient's allergies indicates:   Allergen Reactions    Succinylcholine      **Pseudocholinesterase**       Current Outpatient Medications:     alendronate (FOSAMAX) 70 MG tablet, TK 1 T PO 1 TIME Q WK, Disp: 12 tablet, Rfl: 3    amLODIPine (NORVASC) 2.5 MG tablet, Take 1 tablet (2.5 mg total) by mouth once daily., Disp: 30 tablet, Rfl: 5    aspirin (ECOTRIN) 81 MG EC tablet, Take 1 tablet (81 mg total) by mouth once daily., Disp: 90 tablet, Rfl: 1    atorvastatin (LIPITOR) 40 MG tablet, Take 1 tablet (40 mg total) by mouth every evening., Disp: 90 tablet, Rfl: 1    biotin 5,000 mcg TbDL, Take 5,000 mcg by mouth once daily., Disp: , Rfl:     calcium-vitamin D3 (OS- + D3) 500 mg-5 mcg (200 unit) per tablet, Take 1 tablet by mouth once daily., Disp: , Rfl:     clobetasoL (TEMOVATE) 0.05 % external solution, Apply topically 2 (two) times daily., Disp: 50 mL, Rfl: 1    finasteride (PROSCAR) 5 mg tablet, Take 1 tablet (5 mg total) by mouth once daily., Disp: 30 tablet, Rfl: 11    losartan (COZAAR) 100 MG tablet, Take 1 tablet (100 mg total) by mouth once daily., Disp: 90 tablet, Rfl: 1    milk thistle 175 mg tablet, Take 175 mg by mouth once daily., Disp: , Rfl:     omega-3 fatty acids/fish oil (FISH OIL-OMEGA-3 FATTY ACIDS) 300-1,000 mg capsule, Take 1 capsule by mouth once daily., Disp: , Rfl:     vitamin E 400 UNIT capsule, Take 400 Units by mouth once daily., Disp: , Rfl:     zinc gluconate 50 mg tablet, Take 50 mg by mouth once daily., Disp: , Rfl:     cetirizine (ZYRTEC) 10 MG tablet, Take 1 tablet (10 mg total) by mouth once daily., Disp: 30  "tablet, Rfl: 0    clopidogreL (PLAVIX) 75 mg tablet, Take 1 tablet (75 mg total) by mouth once daily. (Patient not taking: Reported on 6/4/2024), Disp: 90 tablet, Rfl: 1    cyanocobalamin (VITAMIN B-12) 1000 MCG tablet, Take 100 mcg by mouth once daily. (Patient not taking: Reported on 6/4/2024), Disp: , Rfl:     diclofenac (VOLTAREN) 75 MG EC tablet, Take 1 tablet (75 mg total) by mouth 2 (two) times daily., Disp: 14 tablet, Rfl: 0    fluocinonide (LIDEX) 0.05 % external solution, APPLY TOPICALLY TO THE AFFECTED AREA DAILY (Patient not taking: Reported on 6/4/2024), Disp: 60 mL, Rfl: 5    fluticasone propionate (FLONASE) 50 mcg/actuation nasal spray, 1 spray (50 mcg total) by Each Nostril route once daily. (Patient not taking: Reported on 6/4/2024), Disp: 15.8 mL, Rfl: 0    methylPREDNISolone (MEDROL DOSEPACK) 4 mg tablet, use as directed, Disp: 21 each, Rfl: 0    metoprolol tartrate (LOPRESSOR) 100 MG tablet, Take 1 tablet (100 mg total) by mouth 2 (two) times daily., Disp: 60 tablet, Rfl: 5    nitrofurantoin, macrocrystal-monohydrate, (MACROBID) 100 MG capsule, Take 1 capsule (100 mg total) by mouth 2 (two) times daily. (Patient not taking: Reported on 6/4/2024), Disp: 14 capsule, Rfl: 0    Review of Systems   Constitutional:  Negative for chills and fatigue.   HENT:  Negative for congestion, postnasal drip and rhinorrhea.    Eyes:  Negative for discharge and itching.   Respiratory:  Positive for cough. Negative for shortness of breath.    Cardiovascular:  Negative for chest pain.   Gastrointestinal:  Negative for abdominal pain.   Genitourinary:  Negative for difficulty urinating.   Musculoskeletal:  Negative for arthralgias and joint swelling.   Neurological:  Negative for dizziness and headaches.          Objective:      Vitals:    06/04/24 0745   BP: 137/81   Pulse: 65   Resp: 18   SpO2: 99%   Weight: 55.8 kg (123 lb)   Height: 5' 1" (1.549 m)     Physical Exam  Constitutional:       Appearance: Normal " appearance. She is normal weight.   HENT:      Head: Normocephalic and atraumatic.   Cardiovascular:      Rate and Rhythm: Normal rate and regular rhythm.      Heart sounds: Normal heart sounds.   Pulmonary:      Effort: Pulmonary effort is normal.   Musculoskeletal:         General: Normal range of motion.   Skin:     General: Skin is warm and dry.   Neurological:      Mental Status: She is alert and oriented to person, place, and time.           Assessment:       1. Essential hypertension    2. Alopecia    3. Hypercholesterolemia    4. Bronchitis    5. Polycythemia           Plan:       Essential hypertension  Comments:  BP remains at goal. continue as is.  Orders:  -     metoprolol tartrate (LOPRESSOR) 100 MG tablet; Take 1 tablet (100 mg total) by mouth 2 (two) times daily.  Dispense: 60 tablet; Refill: 5    Alopecia  Comments:  ILK with Dr. Kaba. continues to help overall. hair is coming in a bit thicker on the top.    Hypercholesterolemia    Bronchitis  Comments:  persisting cough, already treated for acute sxs at the urgent care. check CXR now. medrol taper sent in.  Orders:  -     X-Ray Chest PA And Lateral; Future; Expected date: 06/04/2024  -     methylPREDNISolone (MEDROL DOSEPACK) 4 mg tablet; use as directed  Dispense: 21 each; Refill: 0    Polycythemia  Comments:  Labs reviewed from DR. Baum. maintain as is.      Problem List Items Addressed This Visit          Cardiac/Vascular    Hypercholesterolemia (Chronic)    Essential hypertension - Primary    Relevant Medications    metoprolol tartrate (LOPRESSOR) 100 MG tablet       Oncology    Polycythemia     Other Visit Diagnoses       Alopecia        ILK with Dr. Kaba. continues to help overall. hair is coming in a bit thicker on the top.    Bronchitis        persisting cough, already treated for acute sxs at the urgent care. check CXR now. medrol taper sent in.    Relevant Medications    methylPREDNISolone (MEDROL DOSEPACK) 4 mg tablet    Other Relevant  Orders    X-Ray Chest PA And Lateral          Follow up in about 6 months (around 12/4/2024).        6/4/2024 Hector Colin PA-C

## 2024-06-04 NOTE — PROGRESS NOTES
Subjective:      Patient ID:  Suma Butterfield is a 68 y.o. female who presents for   Chief Complaint   Patient presents with    Alopecia     Follow up     LOV 04/09/2024    Patient coming in today for a follow up on hair loss. Patient states she is still noticing growth.   Taking Finasteride daily. Using Rogaine daily. Using Lidex daily. ILK  3.3mg/cc at LOV.     Final Pathologic Diagnosis     Skin, scalp, biopsies:   -Cicatricial Alopecia, see comment   Comment: Given the clinical differential central centrifugal cicatricial   alopecia seems most appropriate in this context there are findings of   minaturized hairs present which represent a coexisting androgenic alopecia.   The findings of fibrosis of follicle tracts are nonspecfic, this could   represent a burnt out inflammatory hair process   Comment: Interp By Laura Armenta M.D., Signed on 05/31/2022 at 16:52     No hx of cancer, post-menopausal     transaminitis- Has hx of treated hep C s/p treatment, drinks alcohol daily- following with outside GI  Also following with Dr. Baum for elevated ferritin, RBC count  Had liver ultrasound which should hepatic steatosis      Has SVT and uncontrolled htn- but working w/ her PCP to get it under better control        Cicatricial alopecia  Intralesional Kenalog 3.3mg/cc (6 cc total) injected into 16 lesions on the scalp today after obtaining verbal consent including risk of surrounding hypopigmentation. Patient tolerated procedure well.          Review of Systems   Constitutional:  Negative for fever and chills.   Respiratory:  Negative for cough and shortness of breath.    Gastrointestinal:  Negative for nausea and vomiting.   Skin:  Positive for daily sunscreen use, activity-related sunscreen use and wears hat. Negative for itching.       Objective:   Physical Exam   Constitutional: She appears well-developed and well-nourished.   Neurological: She is alert and oriented to person, place, and time.   Psychiatric:  She has a normal mood and affect.   Skin:   Areas Examined (abnormalities noted in diagram):   Scalp / Hair Palpated and Inspected            Diagram Legend     Erythematous scaling macule/papule c/w actinic keratosis       Vascular papule c/w angioma      Pigmented verrucoid papule/plaque c/w seborrheic keratosis      Yellow umbilicated papule c/w sebaceous hyperplasia      Irregularly shaped tan macule c/w lentigo     1-2 mm smooth white papules consistent with Milia      Movable subcutaneous cyst with punctum c/w epidermal inclusion cyst      Subcutaneous movable cyst c/w pilar cyst      Firm pink to brown papule c/w dermatofibroma      Pedunculated fleshy papule(s) c/w skin tag(s)      Evenly pigmented macule c/w junctional nevus     Mildly variegated pigmented, slightly irregular-bordered macule c/w mildly atypical nevus      Flesh colored to evenly pigmented papule c/w intradermal nevus       Pink pearly papule/plaque c/w basal cell carcinoma      Erythematous hyperkeratotic cursted plaque c/w SCC      Surgical scar with no sign of skin cancer recurrence      Open and closed comedones      Inflammatory papules and pustules      Verrucoid papule consistent consistent with wart     Erythematous eczematous patches and plaques     Dystrophic onycholytic nail with subungual debris c/w onychomycosis     Umbilicated papule    Erythematous-base heme-crusted tan verrucoid plaque consistent with inflamed seborrheic keratosis     Erythematous Silvery Scaling Plaque c/w Psoriasis     See annotation            Assessment / Plan:        Cicatricial alopecia  Intralesional Kenalog 3.3mg/cc (6 cc total) injected into 7 lesions on the scalp today after obtaining verbal consent including risk of surrounding hypopigmentation. Patient tolerated procedure well.    Units: 2  NDC for Kenalog 10mg/cc:  [id number]             No follow-ups on file.

## 2024-06-07 RX ORDER — CETIRIZINE HYDROCHLORIDE 10 MG/1
10 TABLET ORAL
Qty: 30 TABLET | Refills: 0 | OUTPATIENT
Start: 2024-06-07

## 2024-07-12 ENCOUNTER — OFFICE VISIT (OUTPATIENT)
Dept: CARDIOLOGY | Facility: CLINIC | Age: 69
End: 2024-07-12
Payer: MEDICARE

## 2024-07-12 VITALS
BODY MASS INDEX: 23.58 KG/M2 | RESPIRATION RATE: 16 BRPM | SYSTOLIC BLOOD PRESSURE: 140 MMHG | WEIGHT: 124.88 LBS | DIASTOLIC BLOOD PRESSURE: 80 MMHG | OXYGEN SATURATION: 97 % | HEART RATE: 65 BPM | HEIGHT: 61 IN

## 2024-07-12 DIAGNOSIS — I47.10 SVT (SUPRAVENTRICULAR TACHYCARDIA): Primary | ICD-10-CM

## 2024-07-12 DIAGNOSIS — D75.1 POLYCYTHEMIA: ICD-10-CM

## 2024-07-12 DIAGNOSIS — I25.10 CORONARY ARTERY DISEASE INVOLVING NATIVE CORONARY ARTERY OF NATIVE HEART WITHOUT ANGINA PECTORIS: ICD-10-CM

## 2024-07-12 DIAGNOSIS — R00.2 PALPITATIONS: ICD-10-CM

## 2024-07-12 DIAGNOSIS — R71.8 ELEVATED MCV: ICD-10-CM

## 2024-07-12 DIAGNOSIS — E78.2 MIXED HYPERLIPIDEMIA: ICD-10-CM

## 2024-07-12 DIAGNOSIS — R79.89 ELEVATED FERRITIN: ICD-10-CM

## 2024-07-12 DIAGNOSIS — B18.2 CHRONIC HEPATITIS C WITHOUT HEPATIC COMA: ICD-10-CM

## 2024-07-12 DIAGNOSIS — I10 PRIMARY HYPERTENSION: ICD-10-CM

## 2024-07-12 PROCEDURE — 4010F ACE/ARB THERAPY RXD/TAKEN: CPT | Mod: HCNC,CPTII,S$GLB, | Performed by: STUDENT IN AN ORGANIZED HEALTH CARE EDUCATION/TRAINING PROGRAM

## 2024-07-12 PROCEDURE — 3079F DIAST BP 80-89 MM HG: CPT | Mod: HCNC,CPTII,S$GLB, | Performed by: STUDENT IN AN ORGANIZED HEALTH CARE EDUCATION/TRAINING PROGRAM

## 2024-07-12 PROCEDURE — 1101F PT FALLS ASSESS-DOCD LE1/YR: CPT | Mod: HCNC,CPTII,S$GLB, | Performed by: STUDENT IN AN ORGANIZED HEALTH CARE EDUCATION/TRAINING PROGRAM

## 2024-07-12 PROCEDURE — 3077F SYST BP >= 140 MM HG: CPT | Mod: HCNC,CPTII,S$GLB, | Performed by: STUDENT IN AN ORGANIZED HEALTH CARE EDUCATION/TRAINING PROGRAM

## 2024-07-12 PROCEDURE — 3288F FALL RISK ASSESSMENT DOCD: CPT | Mod: HCNC,CPTII,S$GLB, | Performed by: STUDENT IN AN ORGANIZED HEALTH CARE EDUCATION/TRAINING PROGRAM

## 2024-07-12 PROCEDURE — 99999 PR PBB SHADOW E&M-EST. PATIENT-LVL IV: CPT | Mod: PBBFAC,HCNC,, | Performed by: STUDENT IN AN ORGANIZED HEALTH CARE EDUCATION/TRAINING PROGRAM

## 2024-07-12 PROCEDURE — 99205 OFFICE O/P NEW HI 60 MIN: CPT | Mod: HCNC,S$GLB,, | Performed by: STUDENT IN AN ORGANIZED HEALTH CARE EDUCATION/TRAINING PROGRAM

## 2024-07-12 PROCEDURE — 1126F AMNT PAIN NOTED NONE PRSNT: CPT | Mod: HCNC,CPTII,S$GLB, | Performed by: STUDENT IN AN ORGANIZED HEALTH CARE EDUCATION/TRAINING PROGRAM

## 2024-07-12 PROCEDURE — 1159F MED LIST DOCD IN RCRD: CPT | Mod: HCNC,CPTII,S$GLB, | Performed by: STUDENT IN AN ORGANIZED HEALTH CARE EDUCATION/TRAINING PROGRAM

## 2024-07-12 PROCEDURE — 3008F BODY MASS INDEX DOCD: CPT | Mod: HCNC,CPTII,S$GLB, | Performed by: STUDENT IN AN ORGANIZED HEALTH CARE EDUCATION/TRAINING PROGRAM

## 2024-07-12 NOTE — PROGRESS NOTES
Electrophysiology Clinic Note    Reason for new patient visit: Evaluation and recommendations regarding symptoms of palpitations with a history of SVT and frequent events of nonsustained and sustained short-RP tachycardia captured on a recent ambulatory event monitor.      PRESENTING HISTORY:     History of Present Illness:  Ms. Suma Butterfield is a trey 68-year-old woman who presents today for evaluation and recommendations regarding symptoms of palpitations with a history of SVT and frequent events of nonsustained and sustained short-RP tachycardia captured on a recent ambulatory event monitor. She has a past medical history significant for symptoms of palpitations with a history of SVT and frequent events of nonsustained and sustained short-RP tachycardia captured on a recent ambulatory event monitor, a prior NSTEMI in March 2023 with single-vessel coronary artery disease with severe disease in a small branch of the RPL which was not amenable to intervention, hypertension, hyperlipidemia, hepatitis C s/p treatment, polycythemia with monthly phlebotomy, elevated ferritin and MCV, and tobacco use.      She is followed in general cardiology with Dr. Griffiths and was previously seen in clinic on 1/30/2024. At that encounter, they discussed her history of SVT with symptoms of occasional intermittent palpitations. She reports that she has occasional palpitations, lasting for a matter of seconds to several minutes. These events would occur about 2-3 times per week prior to her metoprolol initiation, but now occur about once event other week. She denies any associated chest pain, shortness of breath, dizziness or lightheadedness, or limiting symptoms with her palpitation events. She is able to use the Valsalva maneuver to abort these events reliably. She remains on metoprolol tartrate 100mg po BID for improved symptomatic relief, and she reports that since starting metoprolol she is having less palpitations. She remains on  aspirin 81mg po daily, but has discontinued her clopidogrel therapy secondary to frequent bruising. 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      In discussion with Ms. Butterfield today, she tells me that she is feeling overall quite well. She denies any episodes of dizziness, lightheadedness, syncope/presyncope, chest pain or chest discomfort, nausea or vomiting, orthopnea, or PND. She reports brief, intermittent palpitations that do not limit her functioning as above. She reports baseline shortness of breath and dyspnea with exertion that has remained stable. She can climb 1 flight of stairs prior to needing to take a break and continues to attempt to increase her level of physical activity.     Review of Systems:  Review of Systems   Constitutional:  Negative for activity change.   HENT:  Negative for nosebleeds, postnasal drip, rhinorrhea, sinus pressure/congestion, sneezing, sore throat and tinnitus.    Eyes:  Negative for visual disturbance.   Respiratory:  Positive for shortness of breath. Negative for apnea, cough, chest tightness and wheezing.    Cardiovascular:  Positive for palpitations. Negative for chest pain, leg swelling and claudication.   Gastrointestinal:  Negative for abdominal distention, abdominal pain, anal bleeding, change in bowel habit, constipation, diarrhea, nausea and vomiting.   Genitourinary:  Negative for dysuria and hematuria.   Musculoskeletal:  Positive for arthralgias and back pain. Negative for gait problem.   Neurological:  Negative for dizziness, seizures, syncope, weakness, light-headedness, headaches and coordination difficulties.        PAST HISTORY:     Past Medical History:   Diagnosis Date    Elevated ferritin 10/13/2022    Elevated MCV 10/13/2022    Hepatitis C     Hypertension     Polycythemia 10/13/2022    SVT (supraventricular tachycardia)        Past Surgical  History:   Procedure Laterality Date    CORONARY ANGIOGRAPHY N/A 3/10/2023    Procedure: ANGIOGRAM, CORONARY ARTERY;  Surgeon: Jill Griffiths MD;  Location: [facility] CATH/EP LAB;  Service: Cardiology;  Laterality: N/A;    LEFT HEART CATHETERIZATION Left 3/10/2023    Procedure: Left heart cath;  Surgeon: Jill Griffiths MD;  Location: [facility] CATH/EP LAB;  Service: Cardiology;  Laterality: Left;       Family History:  Family History   Problem Relation Name Age of Onset    Glaucoma Neg Hx      Macular degeneration Neg Hx      Retinal detachment Neg Hx         Social History:  She  reports that she has quit smoking. Her smoking use included cigarettes. She has been exposed to tobacco smoke. She has never used smokeless tobacco. She reports current alcohol use. She reports that she does not use drugs.      MEDICATIONS & ALLERGIES:     Review of patient's allergies indicates:   Allergen Reactions    Succinylcholine      **Pseudocholinesterase**       Current Outpatient Medications on File Prior to Visit   Medication Sig Dispense Refill    alendronate (FOSAMAX) 70 MG tablet TK 1 T PO 1 TIME Q WK 12 tablet 3    amLODIPine (NORVASC) 2.5 MG tablet Take 1 tablet (2.5 mg total) by mouth once daily. 30 tablet 5    aspirin (ECOTRIN) 81 MG EC tablet Take 1 tablet (81 mg total) by mouth once daily. 90 tablet 1    atorvastatin (LIPITOR) 40 MG tablet Take 1 tablet (40 mg total) by mouth every evening. 90 tablet 1    biotin 5,000 mcg TbDL Take 5,000 mcg by mouth once daily.      calcium-vitamin D3 (OS- + D3) 500 mg-5 mcg (200 unit) per tablet Take 1 tablet by mouth once daily.      clobetasoL (TEMOVATE) 0.05 % external solution Apply topically 2 (two) times daily. 50 mL 1    finasteride (PROSCAR) 5 mg tablet Take 1 tablet (5 mg total) by mouth once daily. 30 tablet 11    losartan (COZAAR) 100 MG tablet Take 1 tablet (100 mg total) by mouth once daily. 90 tablet 1    metoprolol tartrate (LOPRESSOR) 100 MG tablet  "Take 1 tablet (100 mg total) by mouth 2 (two) times daily. 60 tablet 5    milk thistle 175 mg tablet Take 175 mg by mouth once daily.      omega-3 fatty acids/fish oil (FISH OIL-OMEGA-3 FATTY ACIDS) 300-1,000 mg capsule Take 1 capsule by mouth once daily.      vitamin E 400 UNIT capsule Take 400 Units by mouth once daily.      zinc gluconate 50 mg tablet Take 50 mg by mouth once daily.       No current facility-administered medications on file prior to visit.        OBJECTIVE:     Vital Signs:  BP (!) 140/80 (BP Location: Left arm, Patient Position: Sitting, BP Method: Medium (Manual))   Pulse 65   Resp 16   Ht 5' 1" (1.549 m)   Wt 56.7 kg (124 lb 14.3 oz)   SpO2 97%   BMI 23.60 kg/m²     Physical Exam:  Physical Exam  Constitutional:       General: She is not in acute distress.     Appearance: Normal appearance. She is not ill-appearing or diaphoretic.      Comments: Well-appearing woman in NAD.   HENT:      Head: Normocephalic and atraumatic.      Nose: Nose normal.      Mouth/Throat:      Mouth: Mucous membranes are moist.      Pharynx: Oropharynx is clear.   Eyes:      Pupils: Pupils are equal, round, and reactive to light.   Cardiovascular:      Rate and Rhythm: Normal rate and regular rhythm.      Pulses: Normal pulses.      Heart sounds: Normal heart sounds. No murmur heard.     No friction rub. No gallop.   Pulmonary:      Effort: Pulmonary effort is normal. No respiratory distress.      Breath sounds: Normal breath sounds. No wheezing, rhonchi or rales.   Chest:      Chest wall: No tenderness.   Abdominal:      General: There is no distension.      Palpations: Abdomen is soft.      Tenderness: There is no abdominal tenderness.   Musculoskeletal:         General: No swelling or tenderness.      Cervical back: Normal range of motion.      Right lower leg: No edema.      Left lower leg: No edema.   Skin:     General: Skin is warm and dry.      Findings: No erythema, lesion or rash.   Neurological:      " General: No focal deficit present.      Mental Status: She is alert and oriented to person, place, and time. Mental status is at baseline.      Motor: No weakness.      Gait: Gait normal.   Psychiatric:         Mood and Affect: Mood normal.         Behavior: Behavior normal.        Laboratory Data:  Lab Results   Component Value Date    WBC 4.9 07/02/2024    HGB 14.5 07/02/2024    HCT 43.7 07/02/2024    .3 (H) 07/02/2024     07/02/2024     Lab Results   Component Value Date     (H) 07/02/2024     07/02/2024    K 4.1 07/02/2024     07/02/2024    CO2 29 07/02/2024    BUN 11 07/02/2024    CREATININE 0.65 07/02/2024    CALCIUM 9.4 07/02/2024    MG 2.1 03/11/2023     Lab Results   Component Value Date    INR 1.0 03/08/2023    INR 1.1 06/01/2020       Pertinent Cardiac Data:  ECG: Normal sinus rhythm with rate of 66 bpm,  ms, QRS 78 ms, QT/QTc 410/429 ms.     Resting 2D Transthoracic Echocardiogram - 3/8/2023:  The left ventricle is normal in size with concentric remodeling and normal systolic function.  The estimated ejection fraction is 65%.  Normal left ventricular diastolic function.  Normal right ventricular size with normal right ventricular systolic function.    Coronary Angiogram - 3/10/2023:   Significant 1 vessel coronary artery disease (severe disease in a small branch of RPL which is not amenable to intervention).     14-Day Ambulatory Event Monitor - 2/8/2024:    The predominant rhythm is sinus.    Patient had a min HR of 54 bpm, max HR of 167 bpm, and avg HR of 70 bpm    17 Supraventricular Tachycardia runs occurred.. The run with the fastest interval lasting 40 mins 53 secs with a max rate of 167 bpm (avg 143 bpm). The run with the fastest interval was also the longest    True duration of Supraventricular Tachycardia difficult to ascertain due to artifact.    Supraventricular Tachycardia was detected within +/- 45 seconds of symptomatic patient event(s).    Isolated  SVEs were rare (<1.0%, 590), SVE Couplets were rare (<1.0%, 28), and SVE Triplets were rare (<1.0%, 6).    Isolated VEs were rare (<1.0%, 427), and no VE Couplets or VE Triplets were present.    Patient had 9 auto-triggered events and 7 diaries.    Patient reported symptoms of shortness of breath, heart racing and lightheadedness/dizziness.      ASSESSMENT & PLAN:   Ms. Suma Butterfield is a trey 68-year-old woman who presents today for evaluation and recommendations regarding symptoms of palpitations with a history of SVT and frequent events of nonsustained and sustained short-RP tachycardia captured on a recent ambulatory event monitor. She has a past medical history significant for symptoms of palpitations with a history of SVT and frequent events of nonsustained and sustained short-RP tachycardia captured on a recent ambulatory event monitor, a prior NSTEMI in March 2023 with single-vessel coronary artery disease with severe disease in a small branch of the RPL which was not amenable to intervention, hypertension, hyperlipidemia, hepatitis C s/p treatment, polycythemia with monthly phlebotomy, elevated ferritin and MCV, and tobacco use.      - We discussed the pathophysiology of supraventricular tachycardia, and the fact that SVT is an umbrella term that encompasses several different types of atrial arrhythmias. We reviewed the results of her recent 14-day ambulatory event monitor that captured 9 patient-triggered symptomatic events with reported palpitations, often during periods of nonsustained and sustained SVT with a short-RP tachycardia. These SVT episodes may represent AVNRT, AT with first degree AV block, or AVRT with conduction delay. A rudimentary diagram was drawn for the patient to assist in education.   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   - She  remains under excellent control on metoprolol tartrate 100mg po BID. We discussed consolidation to metoprolol succinate for ease of use; however, she would prefer to remain on her current regimen, as she feels that her events have significantly improved following metoprolol initiation.    - We discussed the possibility of undergoing definitive identification of her SVT mechanism with an EP study and possible radiofrequency catheter ablation with slow pathway modification. The procedure itself, risks, benefits, and alternative options were discussed. As Ms. Butterfield remains well-controlled on pharmacologic therapy and with use of abortive maneuvers, she does not wish to undergo this procedure at this time.   - We reviewed abortive measures when she has an episode of palpitations, such as forced coughing, Valsalva maneuver, modified Valsalva maneuver with forced exhalation through a high resistance structure such a drinking straw, unilateral carotid sinus massage, and ice/cold water application to the face and eyes. She will attempt physical abortive measures first, and was instructed that should her palpitations persist, she was encouraged to present to the ED for possible adenosine administration.   - Her BP remains elevated; she will be seen by her general cardiologist for titration of her antihypertensive regimen. She has previously been prescribed losartan 100mg po daily that appears to be .   - She remains on her statin therapy for her hyperlipidemia.       This patient will return to clinic with me in six months with continued general cardiology and PCP visits in the interim. All questions and concerns were addressed at this encounter.     Signing Physician:       PROMISE Cha MD  Electrophysiology Attending

## 2024-07-18 RX ORDER — AMLODIPINE BESYLATE 2.5 MG/1
2.5 TABLET ORAL DAILY
Qty: 30 TABLET | Refills: 11 | Status: SHIPPED | OUTPATIENT
Start: 2024-07-18 | End: 2025-07-13

## 2024-07-22 PROBLEM — I25.10 CORONARY ARTERY DISEASE INVOLVING NATIVE CORONARY ARTERY OF NATIVE HEART WITHOUT ANGINA PECTORIS: Status: ACTIVE | Noted: 2024-07-22

## 2024-07-22 PROBLEM — E78.2 MIXED HYPERLIPIDEMIA: Status: ACTIVE | Noted: 2024-07-22

## 2024-07-26 ENCOUNTER — TELEPHONE (OUTPATIENT)
Dept: DERMATOLOGY | Facility: CLINIC | Age: 69
End: 2024-07-26
Payer: MEDICARE

## 2024-07-26 NOTE — TELEPHONE ENCOUNTER
Patient rescheduled.     ----- Message from Rubin Jones sent at 7/26/2024 10:29 AM CDT -----  Regarding: appt  Contact: SUMA JAIN [[id number]]  Type:  Sooner Appointment Request    Caller is requesting a sooner appointment.      Name of Caller:  Suma    When is the first available appointment?  1/28/25    Symptoms:  8 wk f/u    Would the patient rather a call back or a response via [facility]? Call    Best Call Back Number:  [phone]     Additional Information:  Pt states she is normally squeezed in for a quick check. Please call to advise.

## 2024-07-31 DIAGNOSIS — I10 ESSENTIAL HYPERTENSION: ICD-10-CM

## 2024-07-31 DIAGNOSIS — I25.10 CORONARY ARTERY DISEASE, UNSPECIFIED VESSEL OR LESION TYPE, UNSPECIFIED WHETHER ANGINA PRESENT, UNSPECIFIED WHETHER NATIVE OR TRANSPLANTED HEART: ICD-10-CM

## 2024-07-31 RX ORDER — LOSARTAN POTASSIUM 100 MG/1
100 TABLET ORAL DAILY
Qty: 90 TABLET | Refills: 1 | Status: SHIPPED | OUTPATIENT
Start: 2024-07-31 | End: 2025-01-27

## 2024-08-20 ENCOUNTER — OFFICE VISIT (OUTPATIENT)
Dept: DERMATOLOGY | Facility: CLINIC | Age: 69
End: 2024-08-20
Payer: MEDICARE

## 2024-08-20 DIAGNOSIS — Z12.31 ENCOUNTER FOR SCREENING MAMMOGRAM FOR MALIGNANT NEOPLASM OF BREAST: Primary | ICD-10-CM

## 2024-08-20 DIAGNOSIS — L66.9 CICATRICIAL ALOPECIA: Primary | ICD-10-CM

## 2024-08-20 PROCEDURE — 11901 INJECT SKIN LESIONS >7: CPT | Mod: S$GLB,,, | Performed by: STUDENT IN AN ORGANIZED HEALTH CARE EDUCATION/TRAINING PROGRAM

## 2024-08-20 PROCEDURE — 99499 UNLISTED E&M SERVICE: CPT | Mod: S$GLB,,, | Performed by: STUDENT IN AN ORGANIZED HEALTH CARE EDUCATION/TRAINING PROGRAM

## 2024-08-20 RX ORDER — FLUOCINONIDE TOPICAL SOLUTION USP, 0.05% 0.5 MG/ML
SOLUTION TOPICAL 2 TIMES DAILY
Qty: 60 ML | Refills: 2 | Status: SHIPPED | OUTPATIENT
Start: 2024-08-20

## 2024-08-20 NOTE — PROGRESS NOTES
Subjective:      Patient ID:  Suma Butterfield is a 69 y.o. female who presents for   Chief Complaint   Patient presents with    Hair Loss     LOV 6/4/24    Patient coming in today for f/u on hair loss. Patient states she is still seeing some growth.   Taking Finasteride daily. Using Rogaine daily. Using Lidex daily - needs refill.   ILK  3.3mg/cc at LOV.     Final Pathologic Diagnosis     Skin, scalp, biopsies:   -Cicatricial Alopecia, see comment   Comment: Given the clinical differential central centrifugal cicatricial   alopecia seems most appropriate in this context there are findings of   minaturized hairs present which represent a coexisting androgenic alopecia.   The findings of fibrosis of follicle tracts are nonspecfic, this could   represent a burnt out inflammatory hair process   Comment: Interp By Laura Armenta M.D., Signed on 05/31/2022 at 16:52     No hx of cancer, post-menopausal     transaminitis- Has hx of treated hep C s/p treatment, drinks alcohol daily- following with outside GI  Also following with Dr. Baum for elevated ferritin, RBC count  Had liver ultrasound which should hepatic steatosis      Has SVT and uncontrolled htn- but working w/ her PCP to get it under better control          Review of Systems   Constitutional:  Negative for fever, chills and fatigue.   Respiratory:  Negative for cough and shortness of breath.    Gastrointestinal:  Negative for nausea and vomiting.   Skin:  Positive for daily sunscreen use, activity-related sunscreen use and wears hat. Negative for itching.       Objective:   Physical Exam   Constitutional: She appears well-developed and well-nourished.   Neurological: She is alert and oriented to person, place, and time.   Psychiatric: She has a normal mood and affect.   Skin:   Areas Examined (abnormalities noted in diagram):   Scalp / Hair Palpated and Inspected            Diagram Legend     Erythematous scaling macule/papule c/w actinic keratosis        Vascular papule c/w angioma      Pigmented verrucoid papule/plaque c/w seborrheic keratosis      Yellow umbilicated papule c/w sebaceous hyperplasia      Irregularly shaped tan macule c/w lentigo     1-2 mm smooth white papules consistent with Milia      Movable subcutaneous cyst with punctum c/w epidermal inclusion cyst      Subcutaneous movable cyst c/w pilar cyst      Firm pink to brown papule c/w dermatofibroma      Pedunculated fleshy papule(s) c/w skin tag(s)      Evenly pigmented macule c/w junctional nevus     Mildly variegated pigmented, slightly irregular-bordered macule c/w mildly atypical nevus      Flesh colored to evenly pigmented papule c/w intradermal nevus       Pink pearly papule/plaque c/w basal cell carcinoma      Erythematous hyperkeratotic cursted plaque c/w SCC      Surgical scar with no sign of skin cancer recurrence      Open and closed comedones      Inflammatory papules and pustules      Verrucoid papule consistent consistent with wart     Erythematous eczematous patches and plaques     Dystrophic onycholytic nail with subungual debris c/w onychomycosis     Umbilicated papule    Erythematous-base heme-crusted tan verrucoid plaque consistent with inflamed seborrheic keratosis     Erythematous Silvery Scaling Plaque c/w Psoriasis     See annotation      Assessment / Plan:        Cicatricial alopecia  -     fluocinonide (LIDEX) 0.05 % external solution; Apply topically 2 (two) times daily.  Dispense: 60 mL; Refill: 2  Intralesional Kenalog 5mg/cc (4 cc total) injected into 7 lesions on the scalp today after obtaining verbal consent including risk of surrounding hypopigmentation. Patient tolerated procedure well.    Units: 2  NDC for Kenalog 10mg/cc:  0505-2598-06         8 weeks    No follow-ups on file.

## 2024-08-26 ENCOUNTER — TELEPHONE (OUTPATIENT)
Facility: CLINIC | Age: 69
End: 2024-08-26
Payer: MEDICARE

## 2024-08-26 NOTE — TELEPHONE ENCOUNTER
Spoke to patient who confirmed that she does get monthly phlebotomies, she went last month and levels were fine so did not need phlebotomy at that time, has plans to get this Saturday for phlebotomy. Informed that I will let Dr Baum know and will call her if he has any further recs concerning this. Verbalized understanding. Dr. Baum made aware.

## 2024-08-26 NOTE — TELEPHONE ENCOUNTER
----- Message from Adan Baum MD sent at 8/26/2024  8:51 AM CDT -----  Does she get phlebotomies ?

## 2024-08-30 ENCOUNTER — HOSPITAL ENCOUNTER (OUTPATIENT)
Dept: RADIOLOGY | Facility: HOSPITAL | Age: 69
Discharge: HOME | End: 2024-08-30
Attending: FAMILY MEDICINE
Payer: MEDICARE

## 2024-08-30 DIAGNOSIS — Z12.31 ENCOUNTER FOR SCREENING MAMMOGRAM FOR MALIGNANT NEOPLASM OF BREAST: ICD-10-CM

## 2024-08-30 PROCEDURE — 77067 SCR MAMMO BI INCL CAD: CPT | Mod: TC,PO

## 2024-08-30 PROCEDURE — 77063 BREAST TOMOSYNTHESIS BI: CPT | Mod: 26,,, | Performed by: RADIOLOGY

## 2024-08-30 PROCEDURE — 77067 SCR MAMMO BI INCL CAD: CPT | Mod: 26,,, | Performed by: RADIOLOGY

## 2024-09-03 ENCOUNTER — TELEPHONE (OUTPATIENT)
Dept: FAMILY MEDICINE | Facility: CLINIC | Age: 69
End: 2024-09-03
Payer: MEDICARE

## 2024-09-03 NOTE — TELEPHONE ENCOUNTER
----- Message from Mono Ku MD sent at 9/1/2024  4:31 PM CDT -----  Call patient.  Mammogram was normal.  Repeat mammogram in 1 year.

## 2024-09-10 ENCOUNTER — HOSPITAL ENCOUNTER (OUTPATIENT)
Dept: RADIOLOGY | Facility: HOSPITAL | Age: 69
Discharge: HOME | End: 2024-09-10
Attending: PHYSICIAN ASSISTANT
Payer: MEDICARE

## 2024-09-10 DIAGNOSIS — J40 BRONCHITIS: ICD-10-CM

## 2024-09-10 PROCEDURE — 71046 X-RAY EXAM CHEST 2 VIEWS: CPT | Mod: TC

## 2024-09-10 PROCEDURE — 71046 X-RAY EXAM CHEST 2 VIEWS: CPT | Mod: 26,,, | Performed by: RADIOLOGY

## 2024-09-16 ENCOUNTER — TELEPHONE (OUTPATIENT)
Facility: CLINIC | Age: 69
End: 2024-09-16
Payer: MEDICARE

## 2024-09-16 DIAGNOSIS — R71.8 ELEVATED FERRITIN, HEMOGLOBIN, AND RED BLOOD CELL COUNT: ICD-10-CM

## 2024-09-16 DIAGNOSIS — D58.2 ELEVATED FERRITIN, HEMOGLOBIN, AND RED BLOOD CELL COUNT: ICD-10-CM

## 2024-09-16 DIAGNOSIS — R79.89 ELEVATED FERRITIN, HEMOGLOBIN, AND RED BLOOD CELL COUNT: ICD-10-CM

## 2024-09-16 DIAGNOSIS — R97.8 OTHER ABNORMAL TUMOR MARKERS: ICD-10-CM

## 2024-09-16 DIAGNOSIS — R79.89 ELEVATED FERRITIN: Primary | ICD-10-CM

## 2024-09-16 NOTE — TELEPHONE ENCOUNTER
----- Message from Liat Cooper sent at 9/16/2024  2:12 PM CDT -----  Pt claims that [facility] informed her that she would require updated orders for her monthly labs. She is due on 9/24.    CB: [phone]

## 2024-09-19 ENCOUNTER — PATIENT MESSAGE (OUTPATIENT)
Dept: FAMILY MEDICINE | Facility: CLINIC | Age: 69
End: 2024-09-19
Payer: MEDICARE

## 2024-09-19 DIAGNOSIS — R05.3 CHRONIC COUGH: Primary | ICD-10-CM

## 2024-09-19 NOTE — TELEPHONE ENCOUNTER
If agreeable we can refer to pulmonology for persistent cough. Possibly cough predominant asthma? If agreeable let me know and UI can send referral to Liat Conley

## 2024-10-22 ENCOUNTER — OFFICE VISIT (OUTPATIENT)
Dept: DERMATOLOGY | Facility: CLINIC | Age: 69
End: 2024-10-22
Payer: MEDICARE

## 2024-10-22 DIAGNOSIS — L66.9 CICATRICIAL ALOPECIA: Primary | ICD-10-CM

## 2024-10-22 DIAGNOSIS — D69.2 SOLAR PURPURA: ICD-10-CM

## 2024-10-22 PROCEDURE — 4010F ACE/ARB THERAPY RXD/TAKEN: CPT | Mod: CPTII,S$GLB,, | Performed by: STUDENT IN AN ORGANIZED HEALTH CARE EDUCATION/TRAINING PROGRAM

## 2024-10-22 PROCEDURE — 11900 INJECT SKIN LESIONS </W 7: CPT | Mod: S$GLB,,, | Performed by: STUDENT IN AN ORGANIZED HEALTH CARE EDUCATION/TRAINING PROGRAM

## 2024-10-22 PROCEDURE — 3288F FALL RISK ASSESSMENT DOCD: CPT | Mod: CPTII,S$GLB,, | Performed by: STUDENT IN AN ORGANIZED HEALTH CARE EDUCATION/TRAINING PROGRAM

## 2024-10-22 PROCEDURE — 1126F AMNT PAIN NOTED NONE PRSNT: CPT | Mod: CPTII,S$GLB,, | Performed by: STUDENT IN AN ORGANIZED HEALTH CARE EDUCATION/TRAINING PROGRAM

## 2024-10-22 PROCEDURE — 1101F PT FALLS ASSESS-DOCD LE1/YR: CPT | Mod: CPTII,S$GLB,, | Performed by: STUDENT IN AN ORGANIZED HEALTH CARE EDUCATION/TRAINING PROGRAM

## 2024-10-22 PROCEDURE — 99212 OFFICE O/P EST SF 10 MIN: CPT | Mod: 25,S$GLB,, | Performed by: STUDENT IN AN ORGANIZED HEALTH CARE EDUCATION/TRAINING PROGRAM

## 2024-10-22 PROCEDURE — 1159F MED LIST DOCD IN RCRD: CPT | Mod: CPTII,S$GLB,, | Performed by: STUDENT IN AN ORGANIZED HEALTH CARE EDUCATION/TRAINING PROGRAM

## 2024-10-22 PROCEDURE — 1160F RVW MEDS BY RX/DR IN RCRD: CPT | Mod: CPTII,S$GLB,, | Performed by: STUDENT IN AN ORGANIZED HEALTH CARE EDUCATION/TRAINING PROGRAM

## 2024-10-22 NOTE — PROGRESS NOTES
Subjective:      Patient ID:  Suma Butterfield is a 69 y.o. female who presents for   Chief Complaint   Patient presents with    Hair Loss     Follow up     LOV 08/20/2024    Patient coming in today for f/u on hair loss. Patient states that the new growth that she had is getting longer.   Taking Finasteride daily. Using Rogaine daily. Using Lidex daily .   ILK 5mg/cc at LOV.    Easy bruising on arms. Stopped asa about 4 months ago.      Final Pathologic Diagnosis     Skin, scalp, biopsies:   -Cicatricial Alopecia, see comment   Comment: Given the clinical differential central centrifugal cicatricial   alopecia seems most appropriate in this context there are findings of   minaturized hairs present which represent a coexisting androgenic alopecia.   The findings of fibrosis of follicle tracts are nonspecfic, this could   represent a burnt out inflammatory hair process   Comment: Interp By Laura Armenta M.D., Signed on 05/31/2022 at 16:52     No hx of cancer, post-menopausal     transaminitis- Has hx of treated hep C s/p treatment, drinks alcohol daily- following with outside GI  Also following with Dr. Baum for elevated ferritin, RBC count  Had liver ultrasound which should hepatic steatosis      Has SVT and uncontrolled htn- but working w/ her PCP to get it under better control                   Review of Systems   Constitutional:  Negative for fever, chills and fatigue.   Respiratory:  Negative for cough and shortness of breath.    Gastrointestinal:  Negative for nausea and vomiting.   Skin:  Positive for daily sunscreen use, activity-related sunscreen use and wears hat. Negative for itching.       Objective:   Physical Exam   Constitutional: She appears well-developed and well-nourished.   Neurological: She is alert and oriented to person, place, and time.   Psychiatric: She has a normal mood and affect.   Skin:   Areas Examined (abnormalities noted in diagram):   Scalp / Hair Palpated and Inspected  VIMAL  Inspected  LUE Inspection Performed                 Diagram Legend     Erythematous scaling macule/papule c/w actinic keratosis       Vascular papule c/w angioma      Pigmented verrucoid papule/plaque c/w seborrheic keratosis      Yellow umbilicated papule c/w sebaceous hyperplasia      Irregularly shaped tan macule c/w lentigo     1-2 mm smooth white papules consistent with Milia      Movable subcutaneous cyst with punctum c/w epidermal inclusion cyst      Subcutaneous movable cyst c/w pilar cyst      Firm pink to brown papule c/w dermatofibroma      Pedunculated fleshy papule(s) c/w skin tag(s)      Evenly pigmented macule c/w junctional nevus     Mildly variegated pigmented, slightly irregular-bordered macule c/w mildly atypical nevus      Flesh colored to evenly pigmented papule c/w intradermal nevus       Pink pearly papule/plaque c/w basal cell carcinoma      Erythematous hyperkeratotic cursted plaque c/w SCC      Surgical scar with no sign of skin cancer recurrence      Open and closed comedones      Inflammatory papules and pustules      Verrucoid papule consistent consistent with wart     Erythematous eczematous patches and plaques     Dystrophic onycholytic nail with subungual debris c/w onychomycosis     Umbilicated papule    Erythematous-base heme-crusted tan verrucoid plaque consistent with inflamed seborrheic keratosis     Erythematous Silvery Scaling Plaque c/w Psoriasis     See annotation      Assessment / Plan:        Cicatricial alopecia  Intralesional Kenalog 3.3mg/cc (4.5 cc total) injected into 1 lesions on the scalp today after obtaining verbal consent including risk of surrounding hypopigmentation. Patient tolerated procedure well.    Units: 2  NDC for Kenalog 10mg/cc:  3321-3695-11    Solar purpura  Bruising only on arms  Stopped asa 4 months ago, still taking vitamin E and fish oil         2 months  No follow-ups on file.

## 2024-11-26 NOTE — PROGRESS NOTES
[facility] Hematology/Oncology  PROGRESS NOTE - Follow-up Visit      Subjective:       Patient ID:   NAME: Suma Butterfield : 1955     69 y.o. female    Referring Doc: Hector Colin PA*  Other Physicians: Pavan Leonard; Jun        Chief Complaint: polycythemia/elevated MCV f/u      History of Present Illness:     Patient returns today for a regularly scheduled follow-up visit.  The patient is here today to go over the results of the recently ordered labs, tests and studies. She is here by herself.     She has been seeing Dr Kaba with dermatology for her alopecia. She has been seeing Dr Ramos for her chronic hip issues and had steroid injections in past    She is feeling ok; energy levels are fine; no CP, SOB, HA's or N/V; breathing ok    She had been getting phlebotomies pretty much every other month; she had latest one in early 2024    She saw Dr Ku in 2024    She forgot to take her BP meds this am      Discussed covid19 and she has been vaccinated      ROS:   GEN: normal without any fever, night sweats or weight loss  HEENT: normal with no HA's, sore throat, stiff neck, changes in vision  CV: normal with no CP, SOB, PND, GAY or orthopnea  PULM: normal with no SOB, cough, hemoptysis, sputum or pleuritic pain  GI: normal with no abdominal pain, nausea, vomiting, constipation, diarrhea, melanotic stools, BRBPR, or hematemesis  : normal with no hematuria, dysuria  BREAST: normal with no mass, discharge, pain  SKIN: normal with no rash, erythema, bruising, or swelling    Pain Scale: 0    Allergies:  Review of patient's allergies indicates:   Allergen Reactions    Succinylcholine      **Pseudocholinesterase**       Medications:    Current Outpatient Medications:     alendronate (FOSAMAX) 70 MG tablet, TK 1 T PO 1 TIME Q WK, Disp: 12 tablet, Rfl: 3    amLODIPine (NORVASC) 2.5 MG tablet, Take 1 tablet (2.5 mg total) by mouth once daily., Disp: 30 tablet, Rfl: 11    aspirin (ECOTRIN) 81 MG  "EC tablet, Take 1 tablet (81 mg total) by mouth once daily., Disp: 90 tablet, Rfl: 1    atorvastatin (LIPITOR) 40 MG tablet, Take 1 tablet (40 mg total) by mouth every evening., Disp: 90 tablet, Rfl: 1    biotin 5,000 mcg TbDL, Take 5,000 mcg by mouth once daily., Disp: , Rfl:     calcium-vitamin D3 (OS- + D3) 500 mg-5 mcg (200 unit) per tablet, Take 1 tablet by mouth once daily., Disp: , Rfl:     clobetasoL (TEMOVATE) 0.05 % external solution, Apply topically 2 (two) times daily., Disp: 50 mL, Rfl: 1    finasteride (PROSCAR) 5 mg tablet, Take 1 tablet (5 mg total) by mouth once daily., Disp: 30 tablet, Rfl: 11    fluocinonide (LIDEX) 0.05 % external solution, Apply topically 2 (two) times daily., Disp: 60 mL, Rfl: 2    losartan (COZAAR) 100 MG tablet, Take 1 tablet (100 mg total) by mouth once daily., Disp: 90 tablet, Rfl: 1    metoprolol tartrate (LOPRESSOR) 100 MG tablet, Take 1 tablet (100 mg total) by mouth 2 (two) times daily., Disp: 60 tablet, Rfl: 5    milk thistle 175 mg tablet, Take 175 mg by mouth once daily., Disp: , Rfl:     omega-3 fatty acids/fish oil (FISH OIL-OMEGA-3 FATTY ACIDS) 300-1,000 mg capsule, Take 1 capsule by mouth once daily., Disp: , Rfl:     vitamin E 400 UNIT capsule, Take 400 Units by mouth once daily., Disp: , Rfl:     zinc gluconate 50 mg tablet, Take 50 mg by mouth once daily., Disp: , Rfl:     PMHx/PSHx Updates:  See patient's last visit with me on 5/27/2024  See H&P on 10/14/2022        Pathology:   Cancer Staging   No matching staging information was found for the patient.            Objective:     Vitals:  Blood pressure (!) 156/92, pulse 69, temperature 97.3 °F (36.3 °C), temperature source Temporal, resp. rate 16, height 5' 1" (1.549 m), weight 56.7 kg (125 lb).    Physical Examination:   GEN: no apparent distress, comfortable; AAOx3  HEAD: atraumatic and normocephalic; alopecia  EYES: no pallor, no icterus, PERRLA  ENT: OMM, no pharyngeal erythema, external ears WNL; " no nasal discharge; no thrush  NECK: no masses, thyroid normal, trachea midline, no LAD/LN's, supple  CV: RRR with no murmur; normal pulse; normal S1 and S2; no pedal edema  CHEST: Normal respiratory effort; CTAB; normal breath sounds; no wheeze or crackles  ABDOM: nontender and nondistended; soft; normal bowel sounds; no rebound/guarding  MUSC/Skeletal: ROM normal; no crepitus; joints normal; no deformities or arthropathy  EXTREM: no clubbing, cyanosis, inflammation or swelling  SKIN: no rashes, lesions, ulcers, petechiae or subcutaneous nodules  : no cam  NEURO: grossly intact; motor/sensory WNL; AAOx3; no tremors  PSYCH: normal mood, affect and behavior  LYMPH: normal cervical, supraclavicular, axillary and groin LN's            Labs:     Lab Results   Component Value Date    WBC 5.9 11/09/2024    HGB 13.8 11/09/2024    HCT 41.8 11/09/2024    .5 (H) 11/09/2024     11/09/2024       CMP  Sodium   Date Value Ref Range Status   11/09/2024 138 135 - 146 mmol/L Final     Potassium   Date Value Ref Range Status   11/09/2024 4.3 3.5 - 5.3 mmol/L Final     Chloride   Date Value Ref Range Status   11/09/2024 106 98 - 110 mmol/L Final     CO2   Date Value Ref Range Status   11/09/2024 26 20 - 32 mmol/L Final     Glucose   Date Value Ref Range Status   11/09/2024 108 (H) 65 - 99 mg/dL Final     Comment:                   Fasting reference interval     For someone without known diabetes, a glucose value  between 100 and 125 mg/dL is consistent with  prediabetes and should be confirmed with a  follow-up test.          BUN   Date Value Ref Range Status   11/09/2024 11 7 - 25 mg/dL Final     Creatinine   Date Value Ref Range Status   11/09/2024 0.72 0.50 - 1.05 mg/dL Final     Calcium   Date Value Ref Range Status   11/09/2024 9.1 8.6 - 10.4 mg/dL Final     Total Protein   Date Value Ref Range Status   11/09/2024 6.8 6.1 - 8.1 g/dL Final     Albumin   Date Value Ref Range Status   11/09/2024 4.5 3.6 - 5.1 g/dL  Final     Total Bilirubin   Date Value Ref Range Status   11/09/2024 0.8 0.2 - 1.2 mg/dL Final     Alkaline Phosphatase   Date Value Ref Range Status   03/11/2023 47 (L) 55 - 135 U/L Final     AST   Date Value Ref Range Status   11/09/2024 15 10 - 35 U/L Final     ALT   Date Value Ref Range Status   11/09/2024 11 6 - 29 U/L Final     Anion Gap   Date Value Ref Range Status   03/11/2023 5 (L) 8 - 16 mmol/L Final     eGFR   Date Value Ref Range Status   11/09/2024 92 > OR = 60 mL/min/1.73m2 Final     Lab Results   Component Value Date    IRON 75 11/09/2024    TRANSFERRIN 262 05/20/2022    TIBC 428 11/09/2024    LABIRON 18 11/09/2024    FESATURATED 36 05/20/2022         Lab Results   Component Value Date    FERRITIN 11 (L) 11/09/2024         JAK2 V617F Mutation NOT DETECTED     DNA Mutation Analysis See Below    Comment: RESULT: POSITIVE FOR ONE HFE GENE PATHOGENIC VARIANT: H63D (HETEROZYGOTE)    This patient is negative for the   C282Y pathogenic variant    AFP ng/mL 3.0     Erythropoietin 2.6 - 18.5 mIU/mL 10.5       - 250 U/L 182      Retic % 2.1     Folate (packed RBC's) >280 ng/mL       Methylmalonic Acid 87 - 318 nmol/L 145      Folate ng/mL 10.4          Radiology/Diagnostic Studies:    US 4/4/2023:    IMPRESSION:     No significant sonographic abnormality.        CXR    Result Date: 10/28/2022  CHEST PA AND LATERAL CLINICAL DATA:Polycythemia. Comparison to November 2017. FINDINGS: PA and lateral views demonstrate normal heart size. The mediastinum is unremarkable. There are no infiltrates. There is blunting of the lateral costophrenic angle on the left which could reflect a trace amount of pleural fluid or pleural scarring. Osseous structures are unremarkable. IMPRESSION: 1. Slight blunting of the costophrenic angle on the left which could reflect a trace amount of pleural fluid or pleural scarring. 2. No other significant findings.      US Abdomen Complete    Result Date: 10/28/2022  Abdominal  ultrasound complete CLINICAL DATA: Polycythemia FINDINGS: Sonographic assessment of the abdomen is compared to November 2020. The pancreas, aorta, and inferior vena cava are unremarkable. The liver is diffusely echogenic, similar to the prior study, compatible with fatty infiltration. Hepatopedal flow is noted within the portal vein. The gallbladder and biliary tree are within normal limits. The common bile duct measures 3 mm. The spleen and bilateral kidneys are within normal limits. There is no free fluid. IMPRESSION: 1. Hepatic steatosis. 2. No other significant findings.        I have reviewed all available lab results and radiology reports.    Assessment/Plan:   (1) 69 y.o. female with diagnosis of polycythemia and elevated MCV/ferritin who has been referred by Hector Colin PA* for evaluation by medical hematology/oncology.   - polycythemia since 2020 at least - suspect she has a secondary polycythemia process due to the chronic tobacco history  - former smoker who quit about a year ago  - elevated ferritin on labs in  May 2022 - possible acute phase reactant or due to the underlying liver hx  - order hemochromatosis panel    11/22/2022:  - she has H63D heterozygous HFE gene abnormality  - JAK2 was negative  - set up phlebotomy monthly for now    2/28/2023:  - she has been getting phlebotomies monthly - feeling better overall  - due for repeat labs    5/29/2023:  - doing well with no new issues  - checking labs monthly and phlebotomy as directed  - ferritin down to 89    11/28/2023:  - continued on phlebotomes every other month  - latest hgb at 15.9  - ferritin down to 20; iron at 97    5/27/2025:  - continue with current monitoring of labs and phlebotomies as directed - due for one now    11/27/2024:  - continued on phlebotomies pretty much every other month  - latest hgb at 13.8  - ferritin now down to 11     (2) HTN and hypercholesterolemia     (3) SVT - followed by Dr Browne     (4) Chronic hep C  at age 52 s/p antiviral therapy - followed by Natalya  - AFP was a little elevated at 7.5  - US with hepatic steatosis    11/28/2023:  - latest AFP at 3.0 and adequate    5/27/2024:  - latest AFP 2.9 in Feb 2024 11/27/2024:  - AFP at 2.8 in Oct 2024     (5) Severe Alopecia - followed by Dr Estrella Kaba with derm      VISIT DIAGNOSES:      Chronic hepatitis C without hepatic coma    Elevated MCV    Elevated ferritin    Polycythemia          PLAN:  continue phlebotomies as directed, monitor labs monthly  Discussed the genetic implications of the HFE gene abnormality in siblings and children  Repeat AFP every 6 months   4.. F/u with PCP, Derm and GI  RTC in  6 months  Fax note to Hector Colin PA*; Estrella Kaba, Jun    Discussion:     COVID-19 Discussion:    I had long discussion with patient and any applicable family about the COVID-19 coronavirus epidemic and the recommended precautions with regard to cancer and/or hematology patients. I have re-iterated the CDC recommendations for adequate hand washing, use of hand -like products, and coughing into elbow, etc. In addition, especially for our patients who are on chemotherapy and/or our otherwise immunocompromised patients, I have recommended avoidance of crowds, including movie theaters, restaurants, churches, etc. I have recommended avoidance of any sick or symptomatic family members and/or friends. I have also recommended avoidance of any raw and unwashed food products, and general avoidance of food items that have not been prepared by themselves. The patient has been asked to call us immediately with any symptom developments, issues, questions or other general concerns.     I spent over 25 mins of time with the patient. Reviewed results of the recently ordered labs, tests and studies; made directives with regards to the results. Over half of this time was spent couseling and coordinating care.    I have explained all of the above in detail  and the patient understands all of the current recommendation(s). I have answered all of their questions to the best of my ability and to their complete satisfaction.   The patient is to continue with the current management plan.            Electronically signed by Adan Baum MD

## 2024-11-27 ENCOUNTER — OFFICE VISIT (OUTPATIENT)
Facility: CLINIC | Age: 69
End: 2024-11-27
Payer: MEDICARE

## 2024-11-27 VITALS
WEIGHT: 125 LBS | BODY MASS INDEX: 23.6 KG/M2 | HEIGHT: 61 IN | HEART RATE: 69 BPM | DIASTOLIC BLOOD PRESSURE: 92 MMHG | TEMPERATURE: 97 F | RESPIRATION RATE: 16 BRPM | SYSTOLIC BLOOD PRESSURE: 156 MMHG

## 2024-11-27 DIAGNOSIS — R71.8 ELEVATED MCV: ICD-10-CM

## 2024-11-27 DIAGNOSIS — B18.2 CHRONIC HEPATITIS C WITHOUT HEPATIC COMA: Primary | ICD-10-CM

## 2024-11-27 DIAGNOSIS — D75.1 POLYCYTHEMIA: ICD-10-CM

## 2024-11-27 DIAGNOSIS — R79.89 ELEVATED FERRITIN: ICD-10-CM

## 2024-11-27 PROCEDURE — 99999 PR PBB SHADOW E&M-EST. PATIENT-LVL III: CPT | Mod: PBBFAC,HCNC,, | Performed by: INTERNAL MEDICINE

## 2024-12-06 ENCOUNTER — HOSPITAL ENCOUNTER (OUTPATIENT)
Dept: RADIOLOGY | Facility: HOSPITAL | Age: 69
Discharge: HOME | End: 2024-12-06
Attending: ORTHOPAEDIC SURGERY
Payer: MEDICARE

## 2024-12-06 ENCOUNTER — TELEPHONE (OUTPATIENT)
Dept: ORTHOPEDICS | Facility: CLINIC | Age: 69
End: 2024-12-06
Payer: MEDICARE

## 2024-12-06 ENCOUNTER — OFFICE VISIT (OUTPATIENT)
Dept: ORTHOPEDICS | Facility: CLINIC | Age: 69
End: 2024-12-06
Payer: MEDICARE

## 2024-12-06 ENCOUNTER — OFFICE VISIT (OUTPATIENT)
Dept: FAMILY MEDICINE | Facility: CLINIC | Age: 69
End: 2024-12-06
Payer: MEDICARE

## 2024-12-06 VITALS — HEIGHT: 61 IN | BODY MASS INDEX: 23.73 KG/M2 | WEIGHT: 125.69 LBS

## 2024-12-06 VITALS
HEIGHT: 61 IN | WEIGHT: 125 LBS | SYSTOLIC BLOOD PRESSURE: 118 MMHG | DIASTOLIC BLOOD PRESSURE: 80 MMHG | BODY MASS INDEX: 23.6 KG/M2

## 2024-12-06 DIAGNOSIS — M81.0 AGE RELATED OSTEOPOROSIS, UNSPECIFIED PATHOLOGICAL FRACTURE PRESENCE: ICD-10-CM

## 2024-12-06 DIAGNOSIS — M16.12 UNILATERAL PRIMARY OSTEOARTHRITIS, LEFT HIP: Primary | ICD-10-CM

## 2024-12-06 DIAGNOSIS — I10 ESSENTIAL HYPERTENSION: ICD-10-CM

## 2024-12-06 DIAGNOSIS — E78.00 HYPERCHOLESTEROLEMIA: ICD-10-CM

## 2024-12-06 DIAGNOSIS — I25.10 CORONARY ARTERY DISEASE, UNSPECIFIED VESSEL OR LESION TYPE, UNSPECIFIED WHETHER ANGINA PRESENT, UNSPECIFIED WHETHER NATIVE OR TRANSPLANTED HEART: ICD-10-CM

## 2024-12-06 DIAGNOSIS — M16.12 UNILATERAL PRIMARY OSTEOARTHRITIS, LEFT HIP: ICD-10-CM

## 2024-12-06 PROCEDURE — 73502 X-RAY EXAM HIP UNI 2-3 VIEWS: CPT | Mod: 26,HCNC,LT, | Performed by: RADIOLOGY

## 2024-12-06 PROCEDURE — 99999 PR PBB SHADOW E&M-EST. PATIENT-LVL III: CPT | Mod: PBBFAC,HCNC,, | Performed by: ORTHOPAEDIC SURGERY

## 2024-12-06 PROCEDURE — 73502 X-RAY EXAM HIP UNI 2-3 VIEWS: CPT | Mod: TC,HCNC,PO,LT

## 2024-12-06 RX ORDER — METOPROLOL TARTRATE 100 MG/1
100 TABLET ORAL 2 TIMES DAILY
Qty: 180 TABLET | Refills: 1 | Status: SHIPPED | OUTPATIENT
Start: 2024-12-06 | End: 2025-06-04

## 2024-12-06 RX ORDER — LOSARTAN POTASSIUM 100 MG/1
100 TABLET ORAL DAILY
Qty: 90 TABLET | Refills: 1 | Status: SHIPPED | OUTPATIENT
Start: 2024-12-06 | End: 2025-06-04

## 2024-12-06 RX ORDER — ATORVASTATIN CALCIUM 40 MG/1
40 TABLET, FILM COATED ORAL NIGHTLY
Qty: 90 TABLET | Refills: 1 | Status: SHIPPED | OUTPATIENT
Start: 2024-12-06

## 2024-12-06 RX ORDER — ALENDRONATE SODIUM 70 MG/1
TABLET ORAL
Qty: 12 TABLET | Refills: 3 | Status: SHIPPED | OUTPATIENT
Start: 2024-12-06

## 2024-12-06 NOTE — PROGRESS NOTES
Subjective:      Patient ID: Suma Butterfield is a 69 y.o. female.    Chief Complaint: Pain of the Left Hip (Increase in pain )    History of Present Illness    CHIEF COMPLAINT:  - Suma presents today for evaluation of left hip pain due to previously diagnosed arthritis and osteoporosis.    HPI:  Suma presents with concerns about her left hip, diagnosed with arthritis and osteoporosis. She reports taking Alendronate once a week for this condition. She has a history of steroid injections, with two shots administered approximately six months apart, which provided relief. She expresses fatigue in dealing with the condition.    She experiences pain that affects her daily activities and sleep. She reports difficulty in standing up, proper hip placement for walking, and occasional severe pain. Regarding sleep disturbances, she mentions frequent awakening and pain upon certain movements. When asked about limitations to daily activities, the patient reports reduced capacity and speed in performing tasks.    She denies pain in the right hip.    IMAGING:  - X-rays left hip    MEDICATIONS:  - Alendronate: once weekly for arthritis/osteoporosis in the left hip      ROS:  Constitutional: +fatigue  Musculoskeletal: +joint pain  Psychiatric: +sleep difficulty        ROS      Objective:    Ortho Exam     Constitutional:   Patient is alert  and oriented in no acute distress  HEENT:  normocephalic atraumatic; PERRL EOMI  Neck:  Supple without adenopathy  Cardiovascular:  Normal rate and rhythm  Pulmonary:  Normal respiratory effort normal chest wall expansion  Abdominal:  Nonprotuberant nondistended  Musculoskeletal:  Patient has a steady moderately antalgic gait  Quite limited rotation of the left hip she has painful range of motion  No leg length discrepancy  Neurological:  No focal defect; cranial nerves 2-12 grossly intact  Psychiatric/behavioral:  Mood and behavior normal      X-Ray Hip 2 or 3 views Left with Pelvis when  performed  Narrative: EXAMINATION:  XR HIP WITH PELVIS WHEN PERFORMED 2 OR 3 VIEWS LEFT    CLINICAL HISTORY:  Unilateral primary osteoarthritis, left hip    TECHNIQUE:  AP view of the pelvis and frog leg lateral view of the left hip were performed.    COMPARISON:  12/21/2023    FINDINGS:  Severe degenerative change and severe joint space narrowing left hip appearing progressive compared to the prior exam.  Moderate degenerative change and joint space narrowing right hip    No acute fracture or dislocation.  Impression: As above    Electronically signed by: Renetta Espinosa MD  Date:    12/06/2024  Time:    14:40       My Radiographs Findings:    I have personally reviewed radiographs and concur with above findings    Assessment:       Encounter Diagnosis   Name Primary?    Unilateral primary osteoarthritis, left hip Yes         Plan:       I have discussed medical condition treatment options with her at length.  She states she has been considering hip replacement now for quite some time.  She would like to discussed this with the her boss and see when she can get time off to accommodate the convalescence.  She has a sit-down office job and I have told her that has soon as she is comfortable ambulating with a walker off narcotics then she could certainly drive herself short distance to work after as early as 10 days to 2 weeks.  Follow up with us can be as needed.        Assessment & Plan    M16.11 Unilateral primary osteoarthritis, right hip  M81.0 Age-related osteoporosis without current pathological fracture  M25.551 Pain in right hip  R26.2 Difficulty in walking, not elsewhere classified  Z79.83 Long term (current) use of bisphosphonates  G47.00 Insomnia, unspecified    PROCEDURES:  - Discussed hip replacement as the only realistic and consistent solution to fix the patient's hip problem. She acknowledged previous discussions about this with Dr. Ramos.          Past Medical History:   Diagnosis Date    Elevated  ferritin 10/13/2022    Elevated MCV 10/13/2022    Hepatitis C     Hypertension     Polycythemia 10/13/2022    SVT (supraventricular tachycardia)      Past Surgical History:   Procedure Laterality Date    CORONARY ANGIOGRAPHY N/A 3/10/2023    Procedure: ANGIOGRAM, CORONARY ARTERY;  Surgeon: Jill Griffiths MD;  Location: [facility] CATH/EP LAB;  Service: Cardiology;  Laterality: N/A;    LEFT HEART CATHETERIZATION Left 3/10/2023    Procedure: Left heart cath;  Surgeon: Jill Griffiths MD;  Location: [facility] CATH/EP LAB;  Service: Cardiology;  Laterality: Left;         Current Outpatient Medications:     alendronate (FOSAMAX) 70 MG tablet, TK 1 T PO 1 TIME Q WK, Disp: 12 tablet, Rfl: 3    amLODIPine (NORVASC) 2.5 MG tablet, Take 1 tablet (2.5 mg total) by mouth once daily., Disp: 30 tablet, Rfl: 11    atorvastatin (LIPITOR) 40 MG tablet, Take 1 tablet (40 mg total) by mouth every evening., Disp: 90 tablet, Rfl: 1    biotin 5,000 mcg TbDL, Take 5,000 mcg by mouth once daily., Disp: , Rfl:     calcium-vitamin D3 (OS- + D3) 500 mg-5 mcg (200 unit) per tablet, Take 1 tablet by mouth once daily., Disp: , Rfl:     finasteride (PROSCAR) 5 mg tablet, Take 1 tablet (5 mg total) by mouth once daily., Disp: 30 tablet, Rfl: 11    fluocinonide (LIDEX) 0.05 % external solution, Apply topically 2 (two) times daily., Disp: 60 mL, Rfl: 2    losartan (COZAAR) 100 MG tablet, Take 1 tablet (100 mg total) by mouth once daily., Disp: 90 tablet, Rfl: 1    metoprolol tartrate (LOPRESSOR) 100 MG tablet, Take 1 tablet (100 mg total) by mouth 2 (two) times daily., Disp: 180 tablet, Rfl: 1    milk thistle 175 mg tablet, Take 175 mg by mouth once daily., Disp: , Rfl:     omega-3 fatty acids/fish oil (FISH OIL-OMEGA-3 FATTY ACIDS) 300-1,000 mg capsule, Take 1 capsule by mouth once daily., Disp: , Rfl:     vitamin E 400 UNIT capsule, Take 400 Units by mouth once daily., Disp: , Rfl:     zinc gluconate 50 mg tablet, Take 50 mg by  mouth once daily., Disp: , Rfl:     Review of patient's allergies indicates:   Allergen Reactions    Succinylcholine      **Pseudocholinesterase**       Family History   Problem Relation Name Age of Onset    Glaucoma Neg Hx      Macular degeneration Neg Hx      Retinal detachment Neg Hx       Social History     Occupational History    Not on file   Tobacco Use    Smoking status: Former     Types: Cigarettes     Passive exposure: Past    Smokeless tobacco: Never   Substance and Sexual Activity    Alcohol use: Yes    Drug use: Never    Sexual activity: Yes     Partners: Male

## 2024-12-06 NOTE — TELEPHONE ENCOUNTER
LVM for pt regarding today's appointment with Dr. Levin.   Pt scheduled through the [facility]an [facility] and we need to know which hip we are seeing today.   Advised she will need an xray this will be done 15 min prior to appointment time.

## 2024-12-06 NOTE — PROGRESS NOTES
SUBJECTIVE:    Patient ID: Suma Butterfield is a 69 y.o. female.    Chief Complaint: Hypertension (Routine 6 month follow up//labs with Dr. Baum//need refills//declines flu//no complaints)    History of Present Illness    CHIEF COMPLAINT:  Suma presents today for follow-up.    MUSCULOSKELETAL - LEFT HIP PAIN:  She reports worsening left hip pain that comes and goes. A steroid injection received approximately six months ago may be wearing off. The pain has become severe enough that she is considering surgery. An x-ray in December of last year showed severely affected left hip and moderately affected but asymptomatic right hip. She has an appointment scheduled with Dr. Levin to discuss treatment options, including potential surgery.    HEMATOLOGY:  She has an appointment with Dr. Brar at the end of November. Phlebotomy every other month is maintaining her hematocrit at 41.8 and hemoglobin at 13.8. AFP levels are trending down, with the most recent value at 2.8.    DERMATOLOGY - ALOPECIA:  She is under Dr. Estrella Kaba's care for alopecia treatment, receiving intralesional Kenalog injections to the scalp. She reports gradual improvement in hair growth, noting her hair appears to be slowly getting thicker.    CARDIOLOGY - SVT:  She reports ongoing issues with Supraventricular Tachycardia (SVT). While palpitations are not as severe as before, they have become more frequent. Episodes are brief, lasting only a split second before subsiding, but remain extremely annoying. She is taking Metoprolol tartrate 100 mg twice daily for management, which has been helpful in controlling symptoms. She has an upcoming appointment with Dr. Rhodes, an electrophysiologist, on January 21st. The possibility of an EP study and radiofrequency ablation for definitive identification and treatment of the SVT has been discussed.    MEDICATIONS:  Current medications include alendronate, losartan, atorvastatin, and metoprolol. The  metoprolol prescription is being changed to a 90-day supply. She confirms discontinuation of aspirin, which was previously stopped due to concerns about bruising and cosmetic appearance.    VACCINATIONS:  She received the first dose of the shingles vaccine at [facility] a few years ago and is due for the second catch-up dose. She was informed about the new RSV vaccine recommendation for her age group.      ROS:  General: -fever, -chills, -fatigue, -weight gain, -weight loss  Eyes: -vision changes, -redness, -discharge  ENT: -ear pain, -nasal congestion, -sore throat  Cardiovascular: -chest pain, +palpitations, -lower extremity edema  Respiratory: -cough, -shortness of breath  Gastrointestinal: -abdominal pain, -nausea, -vomiting, -diarrhea, -constipation, -blood in stool  Genitourinary: -dysuria, -hematuria, -frequency  Musculoskeletal: +joint pain, -muscle pain  Skin: -rash, -lesion  Neurological: -headache, -dizziness, -numbness, -tingling  Psychiatric: -anxiety, -depression, -sleep difficulty         Ancillary Orders on 11/08/2024   Component Date Value Ref Range Status    WBC 11/09/2024 5.9  3.8 - 10.8 Thousand/uL Final    RBC 11/09/2024 4.08  3.80 - 5.10 Million/uL Final    Hemoglobin 11/09/2024 13.8  11.7 - 15.5 g/dL Final    Hematocrit 11/09/2024 41.8  35.0 - 45.0 % Final    MCV 11/09/2024 102.5 (H)  80.0 - 100.0 fL Final    MCH 11/09/2024 33.8 (H)  27.0 - 33.0 pg Final    MCHC 11/09/2024 33.0  32.0 - 36.0 g/dL Final    RDW 11/09/2024 12.4  11.0 - 15.0 % Final    Platelets 11/09/2024 225  140 - 400 Thousand/uL Final    MPV 11/09/2024 9.1  7.5 - 12.5 fL Final    Neutrophils, Abs 11/09/2024 3,210  1,500 - 7,800 cells/uL Final    Lymph # 11/09/2024 1,906  850 - 3,900 cells/uL Final    Mono # 11/09/2024 590  200 - 950 cells/uL Final    Eos # 11/09/2024 142  15 - 500 cells/uL Final    Baso # 11/09/2024 53  0 - 200 cells/uL Final    Neutrophils Relative 11/09/2024 54.4  % Final    Lymph % 11/09/2024 32.3  % Final     Mono % 11/09/2024 10.0  % Final    Eosinophil % 11/09/2024 2.4  % Final    Basophil % 11/09/2024 0.9  % Final    Glucose 11/09/2024 108 (H)  65 - 99 mg/dL Final    BUN 11/09/2024 11  7 - 25 mg/dL Final    Creatinine 11/09/2024 0.72  0.50 - 1.05 mg/dL Final    eGFR 11/09/2024 92  > OR = 60 mL/min/1.73m2 Final    BUN/Creatinine Ratio 11/09/2024 SEE NOTE:  6 - 22 (calc) Final    Sodium 11/09/2024 138  135 - 146 mmol/L Final    Potassium 11/09/2024 4.3  3.5 - 5.3 mmol/L Final    Chloride 11/09/2024 106  98 - 110 mmol/L Final    CO2 11/09/2024 26  20 - 32 mmol/L Final    Calcium 11/09/2024 9.1  8.6 - 10.4 mg/dL Final    Total Protein 11/09/2024 6.8  6.1 - 8.1 g/dL Final    Albumin 11/09/2024 4.5  3.6 - 5.1 g/dL Final    Globulin, Total 11/09/2024 2.3  1.9 - 3.7 g/dL (calc) Final    Albumin/Globulin Ratio 11/09/2024 2.0  1.0 - 2.5 (calc) Final    Total Bilirubin 11/09/2024 0.8  0.2 - 1.2 mg/dL Final    Alkaline Phosphatase 11/09/2024 62  37 - 153 U/L Final    AST 11/09/2024 15  10 - 35 U/L Final    ALT 11/09/2024 11  6 - 29 U/L Final    Iron 11/09/2024 75  45 - 160 mcg/dL Final    TIBC 11/09/2024 428  250 - 450 mcg/dL (calc) Final    Iron Saturation 11/09/2024 18  16 - 45 % (calc) Final    Ferritin 11/09/2024 11 (L)  16 - 288 ng/mL Final   Telephone on 09/16/2024   Component Date Value Ref Range Status    WBC 10/01/2024 5.0  3.8 - 10.8 Thousand/uL Final    RBC 10/01/2024 4.31  3.80 - 5.10 Million/uL Final    Hemoglobin 10/01/2024 14.8  11.7 - 15.5 g/dL Final    Hematocrit 10/01/2024 44.8  35.0 - 45.0 % Final    MCV 10/01/2024 103.9 (H)  80.0 - 100.0 fL Final    MCH 10/01/2024 34.3 (H)  27.0 - 33.0 pg Final    MCHC 10/01/2024 33.0  32.0 - 36.0 g/dL Final    RDW 10/01/2024 11.4  11.0 - 15.0 % Final    Platelets 10/01/2024 220  140 - 400 Thousand/uL Final    MPV 10/01/2024 9.3  7.5 - 12.5 fL Final    Neutrophils, Abs 10/01/2024 2,475  1,500 - 7,800 cells/uL Final    Lymph # 10/01/2024 1,790  850 - 3,900 cells/uL Final     Mono # 10/01/2024 555  200 - 950 cells/uL Final    Eos # 10/01/2024 120  15 - 500 cells/uL Final    Baso # 10/01/2024 60  0 - 200 cells/uL Final    Neutrophils Relative 10/01/2024 49.5  % Final    Lymph % 10/01/2024 35.8  % Final    Mono % 10/01/2024 11.1  % Final    Eosinophil % 10/01/2024 2.4  % Final    Basophil % 10/01/2024 1.2  % Final    Glucose 10/01/2024 117 (H)  65 - 99 mg/dL Final    BUN 10/01/2024 12  7 - 25 mg/dL Final    Creatinine 10/01/2024 0.70  0.50 - 1.05 mg/dL Final    eGFR 10/01/2024 95  > OR = 60 mL/min/1.73m2 Final    BUN/Creatinine Ratio 10/01/2024 SEE NOTE:  6 - 22 (calc) Final    Sodium 10/01/2024 140  135 - 146 mmol/L Final    Potassium 10/01/2024 4.6  3.5 - 5.3 mmol/L Final    Chloride 10/01/2024 104  98 - 110 mmol/L Final    CO2 10/01/2024 28  20 - 32 mmol/L Final    Calcium 10/01/2024 9.5  8.6 - 10.4 mg/dL Final    Total Protein 10/01/2024 6.8  6.1 - 8.1 g/dL Final    Albumin 10/01/2024 4.4  3.6 - 5.1 g/dL Final    Globulin, Total 10/01/2024 2.4  1.9 - 3.7 g/dL (calc) Final    Albumin/Globulin Ratio 10/01/2024 1.8  1.0 - 2.5 (calc) Final    Total Bilirubin 10/01/2024 0.6  0.2 - 1.2 mg/dL Final    Alkaline Phosphatase 10/01/2024 76  37 - 153 U/L Final    AST 10/01/2024 17  10 - 35 U/L Final    ALT 10/01/2024 12  6 - 29 U/L Final    Iron 10/01/2024 73  45 - 160 mcg/dL Final    TIBC 10/01/2024 446  250 - 450 mcg/dL (calc) Final    Iron Saturation 10/01/2024 16  16 - 45 % (calc) Final    Ferritin 10/01/2024 12 (L)  16 - 288 ng/mL Final    AFP 10/01/2024 2.8  ng/mL Final   Office Visit on 07/12/2024   Component Date Value Ref Range Status    QRS Duration 07/12/2024 78  ms Final    [facility] QTC Calculation 07/12/2024 429  ms Final       Past Medical History:   Diagnosis Date    Elevated ferritin 10/13/2022    Elevated MCV 10/13/2022    Hepatitis C     Hypertension     Polycythemia 10/13/2022    SVT (supraventricular tachycardia)      Past Surgical History:   Procedure Laterality Date    CORONARY  ANGIOGRAPHY N/A 3/10/2023    Procedure: ANGIOGRAM, CORONARY ARTERY;  Surgeon: Jill Griffiths MD;  Location: [facility] CATH/EP LAB;  Service: Cardiology;  Laterality: N/A;    LEFT HEART CATHETERIZATION Left 3/10/2023    Procedure: Left heart cath;  Surgeon: Jill Griffiths MD;  Location: [facility] CATH/EP LAB;  Service: Cardiology;  Laterality: Left;     Family History   Problem Relation Name Age of Onset    Glaucoma Neg Hx      Macular degeneration Neg Hx      Retinal detachment Neg Hx         Marital Status:   Alcohol History:  reports current alcohol use.  Tobacco History:  reports that she has quit smoking. Her smoking use included cigarettes. She has been exposed to tobacco smoke. She has never used smokeless tobacco.  Drug History:  reports no history of drug use.    Review of patient's allergies indicates:   Allergen Reactions    Succinylcholine      **Pseudocholinesterase**       Current Outpatient Medications:     biotin 5,000 mcg TbDL, Take 5,000 mcg by mouth once daily., Disp: , Rfl:     calcium-vitamin D3 (OS- + D3) 500 mg-5 mcg (200 unit) per tablet, Take 1 tablet by mouth once daily., Disp: , Rfl:     finasteride (PROSCAR) 5 mg tablet, Take 1 tablet (5 mg total) by mouth once daily., Disp: 30 tablet, Rfl: 11    fluocinonide (LIDEX) 0.05 % external solution, Apply topically 2 (two) times daily., Disp: 60 mL, Rfl: 2    milk thistle 175 mg tablet, Take 175 mg by mouth once daily., Disp: , Rfl:     omega-3 fatty acids/fish oil (FISH OIL-OMEGA-3 FATTY ACIDS) 300-1,000 mg capsule, Take 1 capsule by mouth once daily., Disp: , Rfl:     vitamin E 400 UNIT capsule, Take 400 Units by mouth once daily., Disp: , Rfl:     zinc gluconate 50 mg tablet, Take 50 mg by mouth once daily., Disp: , Rfl:     alendronate (FOSAMAX) 70 MG tablet, TK 1 T PO 1 TIME Q WK, Disp: 12 tablet, Rfl: 3    amLODIPine (NORVASC) 2.5 MG tablet, Take 1 tablet (2.5 mg total) by mouth once daily., Disp: 30 tablet, Rfl:  "11    atorvastatin (LIPITOR) 40 MG tablet, Take 1 tablet (40 mg total) by mouth every evening., Disp: 90 tablet, Rfl: 1    losartan (COZAAR) 100 MG tablet, Take 1 tablet (100 mg total) by mouth once daily., Disp: 90 tablet, Rfl: 1    metoprolol tartrate (LOPRESSOR) 100 MG tablet, Take 1 tablet (100 mg total) by mouth 2 (two) times daily., Disp: 180 tablet, Rfl: 1    Objective:      Vitals:    12/06/24 0743   BP: 118/80   Weight: 56.7 kg (125 lb)   Height: 5' 1" (1.549 m)     Physical Exam    Vitals: Blood pressure: 118/80. Heart rate: 69-70 bpm.  General: No acute distress. Well-developed. Well-nourished.  Eyes: EOMI. Sclerae anicteric.  HENT: Normocephalic. Atraumatic. Nares patent. Moist oral mucosa.  Ears: Bilateral TMs clear. Bilateral EACs clear.  Cardiovascular: Regular rate. Regular rhythm. No murmurs. No rubs. No gallops. Normal S1, S2.  Respiratory: Normal respiratory effort. Clear to auscultation bilaterally. No rales. No rhonchi. No wheezing.  Abdomen: Soft. Non-tender. Non-distended. Normoactive bowel sounds.  Musculoskeletal: No  obvious deformity.  Extremities: No lower extremity edema.  Neurological: Alert & oriented x3. No slurred speech. Normal gait.  Psychiatric: Normal mood. Normal affect. Good insight. Good judgment.  Skin: Warm. Dry. No rash.         Assessment:       Assessment & Plan    - Reviewed left hip pain, considering surgical intervention due to worsening symptoms and diminishing effect of previous steroid injection  - Noted severe left hip osteoarthritis and moderate right hip osteoarthritis based on previous imaging  - Assessed SVT management, currently controlled with metoprolol but considering definitive treatment options  - Evaluated blood pressure and heart rate, both within normal limits  - Reviewed recent labs from Dr. Brar, including hemoglobin and AFP levels  - Considered ongoing alopecia treatment with Dr. Kaba    OSTEOPOROSIS:  - Refilled alendronate.    HYPERTENSION:  - " Refilled losartan.  - Refilled metoprolol tartrate 100 mg twice daily and changed to 90-day supply.    HYPERLIPIDEMIA:  - Refilled atorvastatin.    IMMUNIZATION:  - Discussed new RSV vaccine, explaining its benefits for preventing RSV and associated complications in older adults.  - Emphasized the importance of completing the shingles vaccine series with a 2nd dose.  - Get 2nd dose of shingles vaccine at pharmacy.  - Consider getting new RSV vaccine.    FOLLOW-UP:  - Follow up in about 6 months.  - Contact office via [employer] for any urgent issues or if not feeling well.       Plan:       Essential hypertension  Comments:  BP remains at goal. continue as is.  Orders:  -     metoprolol tartrate (LOPRESSOR) 100 MG tablet; Take 1 tablet (100 mg total) by mouth 2 (two) times daily.  Dispense: 180 tablet; Refill: 1  -     losartan (COZAAR) 100 MG tablet; Take 1 tablet (100 mg total) by mouth once daily.  Dispense: 90 tablet; Refill: 1    Hypercholesterolemia  -     atorvastatin (LIPITOR) 40 MG tablet; Take 1 tablet (40 mg total) by mouth every evening.  Dispense: 90 tablet; Refill: 1    Coronary artery disease, unspecified vessel or lesion type, unspecified whether angina present, unspecified whether native or transplanted heart  Comments:  medically managed. not amenable to stent. will refill meds as needed. keep f/u with cards in 6 mos.  Orders:  -     losartan (COZAAR) 100 MG tablet; Take 1 tablet (100 mg total) by mouth once daily.  Dispense: 90 tablet; Refill: 1    Essential hypertension  Comments:  Pressure is not controlled. Managed by cards. recent med adjustments. will have her come in for NV in a couple weeks to recheck.  Orders:  -     metoprolol tartrate (LOPRESSOR) 100 MG tablet; Take 1 tablet (100 mg total) by mouth 2 (two) times daily.  Dispense: 180 tablet; Refill: 1  -     losartan (COZAAR) 100 MG tablet; Take 1 tablet (100 mg total) by mouth once daily.  Dispense: 90 tablet; Refill: 1    Age related  osteoporosis, unspecified pathological fracture presence  Comments:  maintain on fosamax as is.  Orders:  -     alendronate (FOSAMAX) 70 MG tablet; TK 1 T PO 1 TIME Q WK  Dispense: 12 tablet; Refill: 3      Follow up in about 6 months (around 6/6/2025) for Annual Physical.    This note was generated with the assistance of ambient listening technology. Verbal consent was obtained by the patient and accompanying visitor(s) for the recording of patient appointment to facilitate this note. I attest to having reviewed and edited the generated note for accuracy, though some syntax or spelling errors may persist. Please contact the author of this note for any clarification.          12/6/2024 Hector Colin PA-C

## 2024-12-09 ENCOUNTER — PATIENT MESSAGE (OUTPATIENT)
Dept: FAMILY MEDICINE | Facility: CLINIC | Age: 69
End: 2024-12-09
Payer: MEDICARE

## 2024-12-09 ENCOUNTER — TELEPHONE (OUTPATIENT)
Dept: ORTHOPEDICS | Facility: CLINIC | Age: 69
End: 2024-12-09
Payer: MEDICARE

## 2024-12-09 NOTE — TELEPHONE ENCOUNTER
----- Message from Nurse Ford sent at 12/9/2024  9:15 AM CST -----  Contact: pt [phone]    ----- Message -----  From: Dori Jain  Sent: 12/9/2024   8:06 AM CST  To: Ollie Ospina Staff    Type: Needs Medical Advice  Who Called:  Pt     Best Call Back Number: [phone]    Additional Information: Pt calling to discuss surgery dates. Pt asking 12/19 or 12/20. Pls call back

## 2024-12-09 NOTE — TELEPHONE ENCOUNTER
Returned call to pt advised to return call to clinic     Next available surgery starts in January 2025.

## 2024-12-09 NOTE — TELEPHONE ENCOUNTER
May check with DR. Lewis or James with [facility]. Different health system and may be better accommodating, but it is down to the wire and Im not sure anyone could do it before the end of the year

## 2024-12-09 NOTE — TELEPHONE ENCOUNTER
Per Dr. Levin: I explained that with her concerns of nsaid and steroids affecting her glaucoma and eyes etc she should discuss her pain mgt with pcp.     Portal message sent to pt with provider's advisement.

## 2024-12-09 NOTE — TELEPHONE ENCOUNTER
Spoke with pt- advised the upcoming surgery dates for the [place] location through February 2025, pt took note of this and requesting steroid injection - advised she would have to wait 3 months to schedule surgery if she was to get an injection.   Pt asked about pain medication advised I can send a message to Dr. Levin for further advisement.   Sent via secure chat awaiting advisement.

## 2024-12-13 ENCOUNTER — OFFICE VISIT (OUTPATIENT)
Dept: ORTHOPEDICS | Facility: CLINIC | Age: 69
End: 2024-12-13
Payer: MEDICARE

## 2024-12-13 ENCOUNTER — TELEPHONE (OUTPATIENT)
Dept: ORTHOPEDICS | Facility: CLINIC | Age: 69
End: 2024-12-13

## 2024-12-13 VITALS — BODY MASS INDEX: 23.73 KG/M2 | WEIGHT: 125.69 LBS | HEIGHT: 61 IN

## 2024-12-13 DIAGNOSIS — M16.12 ARTHRITIS OF LEFT HIP: Primary | ICD-10-CM

## 2024-12-13 PROCEDURE — 99999 PR PBB SHADOW E&M-EST. PATIENT-LVL III: CPT | Mod: PBBFAC,HCNC,, | Performed by: ORTHOPAEDIC SURGERY

## 2024-12-13 NOTE — TELEPHONE ENCOUNTER
Billm for pt advising [facility] Colon [place] [facility] [place] is moving pre op appointment to 12/30/2024- 9:00 am. Advised to discard previously scheduled pre op appointment and return call to clinic if any questions.

## 2024-12-13 NOTE — PROGRESS NOTES
Subjective:      Patient ID: Suma Butterfield is a 69 y.o. female.    Chief Complaint: Pain of the Left Hip    HPI  Patient follow up on her left hip.  Pain persists.  She has given this consideration and wishes to proceed with a left total hip replacement has pain is interfering with activities of daily living.  pain is near constant.  ROS      Objective:    Ortho Exam      Constitutional:   Patient is alert  and oriented in no acute distress  HEENT:  normocephalic atraumatic; PERRL EOMI  Neck:  Supple without adenopathy  Cardiovascular:  Normal rate and rhythm  Pulmonary:  Normal respiratory effort normal chest wall expansion  Abdominal:  Nonprotuberant nondistended  Musculoskeletal:  Patient has a steady moderately antalgic gait  Quite limited rotation of the left hip she has painful range of motion  No leg length discrepancy  Neurological:  No focal defect; cranial nerves 2-12 grossly intact  Psychiatric/behavioral:  Mood and behavior normal       X-Ray Hip 2 or 3 views Left with Pelvis when performed  Narrative: EXAMINATION:  XR HIP WITH PELVIS WHEN PERFORMED 2 OR 3 VIEWS LEFT    CLINICAL HISTORY:  Unilateral primary osteoarthritis, left hip    TECHNIQUE:  AP view of the pelvis and frog leg lateral view of the left hip were performed.    COMPARISON:  12/21/2023    FINDINGS:  Severe degenerative change and severe joint space narrowing left hip appearing progressive compared to the prior exam.  Moderate degenerative change and joint space narrowing right hip    No acute fracture or dislocation.  Impression: As above    Electronically signed by: Renetta Espinosa MD  Date:    12/06/2024  Time:    14:40       My Radiographs Findings:    No new radiographs  Assessment:       Encounter Diagnosis   Name Primary?    Arthritis of left hip Yes         Plan:       I have discussed medical condition and treatment options with the patient at length.  I have reviewed radiographs consistent with severe degenerative changes of the  left hip.  At this point with failure to respond to more conservative treatment patient is reasonable candidate for total hip arthroplasty.  I have discussed indications alternatives and potential complications of the planned procedure including but not limited to bleeding infection damage to neurovascular structures hardware implant failure periprosthetic fracture joint stiffness or instability including dislocation leg length discrepancy worsening of any sciatica ongoing pain and need for further surgery as well as anesthetic and medical complications to include but not limited to stroke MI blood clots and wound decubiti etc..  Patient expresses understanding and agrees to proceed.  She understands that she will need medical and anesthetic clearance.  Today are history of physical preoperative paperwork all these all of patient's questions were answered in layman's terms that she could understand.  We will set this up at the patient's convenience and see back postop sooner if there is any questions or problems.        Past Medical History:   Diagnosis Date    Elevated ferritin 10/13/2022    Elevated MCV 10/13/2022    Hepatitis C     Hypertension     Polycythemia 10/13/2022    SVT (supraventricular tachycardia)      Past Surgical History:   Procedure Laterality Date    CORONARY ANGIOGRAPHY N/A 3/10/2023    Procedure: ANGIOGRAM, CORONARY ARTERY;  Surgeon: Jill Griffiths MD;  Location: [facility] CATH/EP LAB;  Service: Cardiology;  Laterality: N/A;    LEFT HEART CATHETERIZATION Left 3/10/2023    Procedure: Left heart cath;  Surgeon: Jill Griffiths MD;  Location: [facility] CATH/EP LAB;  Service: Cardiology;  Laterality: Left;         Current Outpatient Medications:     alendronate (FOSAMAX) 70 MG tablet, TK 1 T PO 1 TIME Q WK, Disp: 12 tablet, Rfl: 3    amLODIPine (NORVASC) 2.5 MG tablet, Take 1 tablet (2.5 mg total) by mouth once daily., Disp: 30 tablet, Rfl: 11    atorvastatin (LIPITOR) 40 MG tablet, Take 1  tablet (40 mg total) by mouth every evening., Disp: 90 tablet, Rfl: 1    biotin 5,000 mcg TbDL, Take 5,000 mcg by mouth once daily., Disp: , Rfl:     calcium-vitamin D3 (OS- + D3) 500 mg-5 mcg (200 unit) per tablet, Take 1 tablet by mouth once daily., Disp: , Rfl:     finasteride (PROSCAR) 5 mg tablet, Take 1 tablet (5 mg total) by mouth once daily., Disp: 30 tablet, Rfl: 11    fluocinonide (LIDEX) 0.05 % external solution, Apply topically 2 (two) times daily., Disp: 60 mL, Rfl: 2    losartan (COZAAR) 100 MG tablet, Take 1 tablet (100 mg total) by mouth once daily., Disp: 90 tablet, Rfl: 1    metoprolol tartrate (LOPRESSOR) 100 MG tablet, Take 1 tablet (100 mg total) by mouth 2 (two) times daily., Disp: 180 tablet, Rfl: 1    milk thistle 175 mg tablet, Take 175 mg by mouth once daily., Disp: , Rfl:     omega-3 fatty acids/fish oil (FISH OIL-OMEGA-3 FATTY ACIDS) 300-1,000 mg capsule, Take 1 capsule by mouth once daily., Disp: , Rfl:     vitamin E 400 UNIT capsule, Take 400 Units by mouth once daily., Disp: , Rfl:     zinc gluconate 50 mg tablet, Take 50 mg by mouth once daily., Disp: , Rfl:     Review of patient's allergies indicates:   Allergen Reactions    Succinylcholine      **Pseudocholinesterase**       Family History   Problem Relation Name Age of Onset    Glaucoma Neg Hx      Macular degeneration Neg Hx      Retinal detachment Neg Hx       Social History     Occupational History    Not on file   Tobacco Use    Smoking status: Former     Types: Cigarettes     Passive exposure: Past    Smokeless tobacco: Never   Substance and Sexual Activity    Alcohol use: Yes    Drug use: Never    Sexual activity: Yes     Partners: Male

## 2024-12-14 ENCOUNTER — PATIENT MESSAGE (OUTPATIENT)
Dept: FAMILY MEDICINE | Facility: CLINIC | Age: 69
End: 2024-12-14
Payer: MEDICARE

## 2024-12-14 ENCOUNTER — PATIENT MESSAGE (OUTPATIENT)
Facility: CLINIC | Age: 69
End: 2024-12-14
Payer: MEDICARE

## 2024-12-15 ENCOUNTER — PATIENT MESSAGE (OUTPATIENT)
Dept: ORTHOPEDICS | Facility: CLINIC | Age: 69
End: 2024-12-15
Payer: MEDICARE

## 2024-12-27 ENCOUNTER — TELEPHONE (OUTPATIENT)
Dept: ORTHOPEDICS | Facility: CLINIC | Age: 69
End: 2024-12-27
Payer: MEDICARE

## 2024-12-27 NOTE — TELEPHONE ENCOUNTER
----- Message from Boo sent at 12/27/2024 10:12 AM CST -----  Patient would like a callback regarding rescheduling her hip surgery due to a death in the family.      [phone]

## 2024-12-30 ENCOUNTER — TELEPHONE (OUTPATIENT)
Facility: CLINIC | Age: 69
End: 2024-12-30
Payer: MEDICARE

## 2024-12-30 ENCOUNTER — HOSPITAL ENCOUNTER (OUTPATIENT)
Dept: PREADMISSION TESTING | Facility: HOSPITAL | Age: 69
Discharge: HOME | End: 2024-12-30

## 2024-12-30 DIAGNOSIS — R79.89 ELEVATED FERRITIN: ICD-10-CM

## 2024-12-30 DIAGNOSIS — Z01.818 PREOP TESTING: Primary | ICD-10-CM

## 2024-12-30 DIAGNOSIS — D75.1 POLYCYTHEMIA: Primary | ICD-10-CM

## 2024-12-30 NOTE — TELEPHONE ENCOUNTER
Spoke with pt , advised I will have to look in the surgery book and at her consent date to ensure the appropriate surgery date is given. Agreed to send via portal message.   Notified pre op team of surgery being rescheduled to later date - no date available at this time.

## 2025-01-06 ENCOUNTER — TELEPHONE (OUTPATIENT)
Dept: ORTHOPEDICS | Facility: CLINIC | Age: 70
End: 2025-01-06
Payer: MEDICARE

## 2025-01-06 NOTE — TELEPHONE ENCOUNTER
Pt returned call to clinic stated she Is not able to R/S surgery at this time due to  breaking his hip and having surgery. Will let us know when she is able to R/S.

## 2025-01-14 ENCOUNTER — OFFICE VISIT (OUTPATIENT)
Dept: DERMATOLOGY | Facility: CLINIC | Age: 70
End: 2025-01-14
Payer: MEDICARE

## 2025-01-14 DIAGNOSIS — F45.8 NEUROGENIC PRURITUS: Primary | ICD-10-CM

## 2025-01-14 DIAGNOSIS — L66.9 CICATRICIAL ALOPECIA: ICD-10-CM

## 2025-01-14 PROCEDURE — 99214 OFFICE O/P EST MOD 30 MIN: CPT | Mod: S$GLB,,, | Performed by: STUDENT IN AN ORGANIZED HEALTH CARE EDUCATION/TRAINING PROGRAM

## 2025-01-14 PROCEDURE — 1160F RVW MEDS BY RX/DR IN RCRD: CPT | Mod: CPTII,S$GLB,, | Performed by: STUDENT IN AN ORGANIZED HEALTH CARE EDUCATION/TRAINING PROGRAM

## 2025-01-14 PROCEDURE — 1126F AMNT PAIN NOTED NONE PRSNT: CPT | Mod: CPTII,S$GLB,, | Performed by: STUDENT IN AN ORGANIZED HEALTH CARE EDUCATION/TRAINING PROGRAM

## 2025-01-14 PROCEDURE — 1159F MED LIST DOCD IN RCRD: CPT | Mod: CPTII,S$GLB,, | Performed by: STUDENT IN AN ORGANIZED HEALTH CARE EDUCATION/TRAINING PROGRAM

## 2025-01-14 PROCEDURE — 1101F PT FALLS ASSESS-DOCD LE1/YR: CPT | Mod: CPTII,S$GLB,, | Performed by: STUDENT IN AN ORGANIZED HEALTH CARE EDUCATION/TRAINING PROGRAM

## 2025-01-14 PROCEDURE — 3288F FALL RISK ASSESSMENT DOCD: CPT | Mod: CPTII,S$GLB,, | Performed by: STUDENT IN AN ORGANIZED HEALTH CARE EDUCATION/TRAINING PROGRAM

## 2025-01-14 PROCEDURE — 4010F ACE/ARB THERAPY RXD/TAKEN: CPT | Mod: CPTII,S$GLB,, | Performed by: STUDENT IN AN ORGANIZED HEALTH CARE EDUCATION/TRAINING PROGRAM

## 2025-01-14 NOTE — PROGRESS NOTES
Subjective:      Patient ID:  Suma Butterfield is a 69 y.o. female who presents for   Chief Complaint   Patient presents with    Hair Loss     LOV 10/22/24    Patient coming in today for f/u on hair loss. Patient states she feels it is slowly getting better.   Taking Finasteride daily. Using Rogaine daily. Using Lidex daily - needs refill.    ILK 3.3mg/cc (4.5 cc total) injected into 1 lesions on the scalp at LOV.    Also complains of spot on upper back x weeks. Was irritating and painful. Has slightly improved.   Using lidocaine cream     Final Pathologic Diagnosis     Skin, scalp, biopsies:   -Cicatricial Alopecia, see comment   Comment: Given the clinical differential central centrifugal cicatricial   alopecia seems most appropriate in this context there are findings of   minaturized hairs present which represent a coexisting androgenic alopecia.   The findings of fibrosis of follicle tracts are nonspecfic, this could   represent a burnt out inflammatory hair process        No hx of cancer, post-menopausal     transaminitis- Has hx of treated hep C s/p treatment, drinks alcohol daily- following with outside GI  Also following with Dr. Baum for elevated ferritin, RBC count  Had liver ultrasound which should hepatic steatosis      Has SVT and uncontrolled htn- but working w/ her PCP to get it under better control             Review of Systems   Constitutional:  Negative for fever, chills and fatigue.   Respiratory:  Negative for cough and shortness of breath.    Gastrointestinal:  Negative for nausea and vomiting.   Skin:  Positive for daily sunscreen use, activity-related sunscreen use and wears hat. Negative for itching.       Objective:   Physical Exam   Constitutional: She appears well-developed and well-nourished.   Neurological: She is alert and oriented to person, place, and time.   Psychiatric: She has a normal mood and affect.   Skin:   Areas Examined (abnormalities noted in diagram):   Back Inspection  Performed            Diagram Legend     Erythematous scaling macule/papule c/w actinic keratosis       Vascular papule c/w angioma      Pigmented verrucoid papule/plaque c/w seborrheic keratosis      Yellow umbilicated papule c/w sebaceous hyperplasia      Irregularly shaped tan macule c/w lentigo     1-2 mm smooth white papules consistent with Milia      Movable subcutaneous cyst with punctum c/w epidermal inclusion cyst      Subcutaneous movable cyst c/w pilar cyst      Firm pink to brown papule c/w dermatofibroma      Pedunculated fleshy papule(s) c/w skin tag(s)      Evenly pigmented macule c/w junctional nevus     Mildly variegated pigmented, slightly irregular-bordered macule c/w mildly atypical nevus      Flesh colored to evenly pigmented papule c/w intradermal nevus       Pink pearly papule/plaque c/w basal cell carcinoma      Erythematous hyperkeratotic cursted plaque c/w SCC      Surgical scar with no sign of skin cancer recurrence      Open and closed comedones      Inflammatory papules and pustules      Verrucoid papule consistent consistent with wart     Erythematous eczematous patches and plaques     Dystrophic onycholytic nail with subungual debris c/w onychomycosis     Umbilicated papule    Erythematous-base heme-crusted tan verrucoid plaque consistent with inflamed seborrheic keratosis     Erythematous Silvery Scaling Plaque c/w Psoriasis     See annotation                Assessment / Plan:        Neurogenic pruritus  No active rash  Consider pramoxine    Cicatricial alopecia  Discussed PO minoxidil 1.25 mg daily for hair regrowth- with cardiac hx recommend discussing with her cardiologist  Taking finasteride 5mg daily, multiple rounds of ILK, uses lidex solution    Discussed side affects of  minoxidil:  Side effects are rare (especially at the low doses used for alopecia) and usually do not require medical attention (report me if they continue or are bothersome):  headache  unusual hair growth, on  the face, arm, and back  swelling ankles or periorbital  Headache  Lightheadedness  Pericardial effusion (very rare)             No follow-ups on file.

## 2025-01-18 DIAGNOSIS — M81.0 AGE RELATED OSTEOPOROSIS, UNSPECIFIED PATHOLOGICAL FRACTURE PRESENCE: ICD-10-CM

## 2025-01-18 DIAGNOSIS — L66.9 CICATRICIAL ALOPECIA: ICD-10-CM

## 2025-01-21 RX ORDER — ALENDRONATE SODIUM 70 MG/1
TABLET ORAL
Qty: 12 TABLET | Refills: 3 | Status: SHIPPED | OUTPATIENT
Start: 2025-01-21

## 2025-01-21 RX ORDER — FLUOCINONIDE TOPICAL SOLUTION USP, 0.05% 0.5 MG/ML
1 SOLUTION TOPICAL 2 TIMES DAILY
Qty: 60 ML | Refills: 2 | Status: SHIPPED | OUTPATIENT
Start: 2025-01-21

## 2025-01-22 ENCOUNTER — OFFICE VISIT (OUTPATIENT)
Dept: CARDIOLOGY | Facility: CLINIC | Age: 70
End: 2025-01-22
Payer: MEDICARE

## 2025-01-22 DIAGNOSIS — D75.1 POLYCYTHEMIA: ICD-10-CM

## 2025-01-22 DIAGNOSIS — M25.611 DECREASED RIGHT SHOULDER RANGE OF MOTION: ICD-10-CM

## 2025-01-22 DIAGNOSIS — R71.8 ELEVATED MCV: ICD-10-CM

## 2025-01-22 DIAGNOSIS — R79.89 ELEVATED FERRITIN: ICD-10-CM

## 2025-01-22 DIAGNOSIS — R00.2 PALPITATIONS: ICD-10-CM

## 2025-01-22 DIAGNOSIS — I25.10 CORONARY ARTERY DISEASE INVOLVING NATIVE CORONARY ARTERY OF NATIVE HEART WITHOUT ANGINA PECTORIS: ICD-10-CM

## 2025-01-22 DIAGNOSIS — I47.10 SVT (SUPRAVENTRICULAR TACHYCARDIA): Primary | ICD-10-CM

## 2025-01-22 DIAGNOSIS — I10 PRIMARY HYPERTENSION: ICD-10-CM

## 2025-01-22 DIAGNOSIS — E78.2 MIXED HYPERLIPIDEMIA: ICD-10-CM

## 2025-01-22 DIAGNOSIS — B18.2 CHRONIC HEPATITIS C WITHOUT HEPATIC COMA: ICD-10-CM

## 2025-01-22 PROBLEM — H11.32 SUBCONJUNCTIVAL HEMORRHAGE OF LEFT EYE: Status: RESOLVED | Noted: 2023-03-09 | Resolved: 2025-01-22

## 2025-01-22 PROBLEM — E78.00 HYPERCHOLESTEROLEMIA: Chronic | Status: RESOLVED | Noted: 2021-07-14 | Resolved: 2025-01-22

## 2025-01-22 NOTE — Clinical Note
Jasbir Rangel,   This patient will need an EP study and ablation for her SVT, but her  recently broke his hip, so she would like to defer this until March or later.   Thank you!

## 2025-01-22 NOTE — PROGRESS NOTES
Electrophysiology Clinic Note    Reason for follow-up patient visit: Ongoing evaluation and recommendations regarding symptoms of palpitations with a history of SVT and frequent events of nonsustained and sustained short-RP tachycardia captured on an ambulatory event monitor.      PRESENTING HISTORY:     History of Present Illness:  Ms. Suma Butterfield is a trey 69-year-old woman who returns to clinic today for ongoing evaluation and recommendations regarding symptoms of palpitations with a history of SVT and frequent events of nonsustained and sustained short-RP tachycardia captured on an ambulatory event monitor. She has a past medical history significant for symptoms of palpitations with a history of SVT and frequent events of nonsustained and sustained short-RP tachycardia captured on an ambulatory event monitor, a prior NSTEMI in March 2023 with single-vessel coronary artery disease with severe disease in a small branch of the RPL which was not amenable to intervention, hypertension, hyperlipidemia, hepatitis C s/p treatment, polycythemia with monthly phlebotomy, elevated ferritin and MCV, and tobacco use.      She is followed in general cardiology with Dr. Griffiths and was previously seen in clinic on 1/30/2024. At that encounter, they discussed her history of SVT with symptoms of occasional intermittent palpitations. She reports that she has occasional palpitations, lasting for a matter of seconds to several minutes. These events would occur about 2-3 times per week prior to her metoprolol initiation, but now occur about once event other week. She denies any associated chest pain, shortness of breath, dizziness or lightheadedness, or limiting symptoms with her palpitation events. She is able to use the Valsalva maneuver to abort these events reliably. She remains on metoprolol tartrate 100mg po BID for improved symptomatic relief, and she reports that since starting metoprolol she is having less palpitations.  She remains on aspirin 81mg po daily, but has discontinued her clopidogrel therapy secondary to frequent bruising. She is anticipating undergoing a hip replacement procedure, and may have to undergo a procedure for her shoulder. 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      In discussion with Ms. Butterfield today, she tells me that she is feeling overall quite well. She denies any episodes of dizziness, lightheadedness, syncope/presyncope, chest pain or chest discomfort, nausea or vomiting, orthopnea, or PND. She reports brief, intermittent palpitations that do not limit her functioning as above. She reports baseline shortness of breath and dyspnea with exertion that has remained stable. She can climb one flight of stairs prior to needing to take a break and continues to attempt to increase her level of physical activity. She is limited by hip, knee, and shoulder pain. She reports that she would like to undergo an ablation prior to consideration for her hip replacement.     Review of Systems:  Review of Systems   Constitutional:  Negative for activity change.   HENT:  Negative for nosebleeds, postnasal drip, rhinorrhea, sinus pressure/congestion, sneezing, sore throat and tinnitus.    Eyes:  Negative for visual disturbance.   Respiratory:  Positive for shortness of breath. Negative for apnea, cough, chest tightness and wheezing.    Cardiovascular:  Positive for palpitations. Negative for chest pain, leg swelling and claudication.   Gastrointestinal:  Negative for abdominal distention, abdominal pain, anal bleeding, change in bowel habit, constipation, diarrhea, nausea and vomiting.   Genitourinary:  Negative for dysuria and hematuria.   Musculoskeletal:  Positive for arthralgias and back pain. Negative for gait problem.   Neurological:  Negative for dizziness, seizures, syncope, weakness,  light-headedness, headaches and coordination difficulties.        PAST HISTORY:     Past Medical History:   Diagnosis Date    Elevated ferritin 10/13/2022    Elevated MCV 10/13/2022    Hepatitis C     Hypertension     Polycythemia 10/13/2022    SVT (supraventricular tachycardia)        Past Surgical History:   Procedure Laterality Date    CORONARY ANGIOGRAPHY N/A 3/10/2023    Procedure: ANGIOGRAM, CORONARY ARTERY;  Surgeon: Jill Griffiths MD;  Location: [facility] CATH/EP LAB;  Service: Cardiology;  Laterality: N/A;    LEFT HEART CATHETERIZATION Left 3/10/2023    Procedure: Left heart cath;  Surgeon: Jill Griffiths MD;  Location: [facility] CATH/EP LAB;  Service: Cardiology;  Laterality: Left;       Family History:  Family History   Problem Relation Name Age of Onset    Glaucoma Neg Hx      Macular degeneration Neg Hx      Retinal detachment Neg Hx         Social History:  She  reports that she has quit smoking. Her smoking use included cigarettes. She has been exposed to tobacco smoke. She has never used smokeless tobacco. She reports current alcohol use. She reports that she does not use drugs.      MEDICATIONS & ALLERGIES:     Review of patient's allergies indicates:   Allergen Reactions    Succinylcholine      **Pseudocholinesterase**       Current Outpatient Medications on File Prior to Visit   Medication Sig Dispense Refill    alendronate (FOSAMAX) 70 MG tablet TK 1 T PO 1 TIME Q WK 12 tablet 3    amLODIPine (NORVASC) 2.5 MG tablet Take 1 tablet (2.5 mg total) by mouth once daily. 30 tablet 11    atorvastatin (LIPITOR) 40 MG tablet Take 1 tablet (40 mg total) by mouth every evening. 90 tablet 1    biotin 5,000 mcg TbDL Take 5,000 mcg by mouth once daily.      calcium-vitamin D3 (OS- + D3) 500 mg-5 mcg (200 unit) per tablet Take 1 tablet by mouth once daily.      finasteride (PROSCAR) 5 mg tablet Take 1 tablet (5 mg total) by mouth once daily. 30 tablet 11    fluocinonide (LIDEX) 0.05 % external  solution APPLY TOPICALLY TWICE A DAY 60 mL 2    losartan (COZAAR) 100 MG tablet Take 1 tablet (100 mg total) by mouth once daily. 90 tablet 1    metoprolol tartrate (LOPRESSOR) 100 MG tablet Take 1 tablet (100 mg total) by mouth 2 (two) times daily. 180 tablet 1    milk thistle 175 mg tablet Take 175 mg by mouth once daily.      omega-3 fatty acids/fish oil (FISH OIL-OMEGA-3 FATTY ACIDS) 300-1,000 mg capsule Take 1 capsule by mouth once daily.      vitamin E 400 UNIT capsule Take 400 Units by mouth once daily.      zinc gluconate 50 mg tablet Take 50 mg by mouth once daily.       No current facility-administered medications on file prior to visit.        OBJECTIVE:     Vital Signs:  There were no vitals taken for this virtual visit.    Physical Exam:  Physical Exam  Constitutional:       General: She is not in acute distress.     Appearance: Normal appearance. She is not ill-appearing or diaphoretic.      Comments: Well-appearing woman in NAD.   HENT:      Head: Normocephalic and atraumatic.      Nose: Nose normal.      Mouth/Throat:      Mouth: Mucous membranes are moist.      Pharynx: Oropharynx is clear.   Eyes:      Pupils: Pupils are equal, round, and reactive to light.   Cardiovascular:      Rate and Rhythm: Normal rate and regular rhythm.      Pulses: Normal pulses.      Heart sounds: Normal heart sounds. No murmur heard.     No friction rub. No gallop.   Pulmonary:      Effort: Pulmonary effort is normal. No respiratory distress.      Breath sounds: Normal breath sounds. No wheezing, rhonchi or rales.   Chest:      Chest wall: No tenderness.   Abdominal:      General: There is no distension.      Palpations: Abdomen is soft.      Tenderness: There is no abdominal tenderness.   Musculoskeletal:         General: No swelling or tenderness.      Cervical back: Normal range of motion.      Right lower leg: No edema.      Left lower leg: No edema.   Skin:     General: Skin is warm and dry.      Findings: No  erythema, lesion or rash.   Neurological:      General: No focal deficit present.      Mental Status: She is alert and oriented to person, place, and time. Mental status is at baseline.      Motor: No weakness.      Gait: Gait normal.   Psychiatric:         Mood and Affect: Mood normal.         Behavior: Behavior normal.        Laboratory Data:  Lab Results   Component Value Date    WBC 5.6 12/17/2024    HGB 14.1 12/17/2024    HCT 42.5 12/17/2024    .2 (H) 12/17/2024     12/17/2024     Lab Results   Component Value Date     (H) 12/17/2024     12/17/2024    K 4.1 12/17/2024     12/17/2024    CO2 27 12/17/2024    BUN 10 12/17/2024    CREATININE 0.72 12/17/2024    CALCIUM 9.2 12/17/2024    MG 2.1 03/11/2023     Lab Results   Component Value Date    INR 1.0 03/08/2023    INR 1.1 06/01/2020       Pertinent Cardiac Data:  Personal interpretation of her ECG: Normal sinus rhythm with rate of 66 bpm,  ms, QRS 78 ms, QT/QTc 410/429 ms.     Resting 2D Transthoracic Echocardiogram - 3/8/2023:  The left ventricle is normal in size with concentric remodeling and normal systolic function.  The estimated ejection fraction is 65%.  Normal left ventricular diastolic function.  Normal right ventricular size with normal right ventricular systolic function.    Coronary Angiogram - 3/10/2023:   Significant 1 vessel coronary artery disease (severe disease in a small branch of RPL which is not amenable to intervention).     14-Day Ambulatory Event Monitor - 2/8/2024:    The predominant rhythm is sinus.    Patient had a min HR of 54 bpm, max HR of 167 bpm, and avg HR of 70 bpm    17 Supraventricular Tachycardia runs occurred.. The run with the fastest interval lasting 40 mins 53 secs with a max rate of 167 bpm (avg 143 bpm). The run with the fastest interval was also the longest    True duration of Supraventricular Tachycardia difficult to ascertain due to artifact.    Supraventricular Tachycardia was  detected within +/- 45 seconds of symptomatic patient event(s).    Isolated SVEs were rare (<1.0%, 590), SVE Couplets were rare (<1.0%, 28), and SVE Triplets were rare (<1.0%, 6).    Isolated VEs were rare (<1.0%, 427), and no VE Couplets or VE Triplets were present.    Patient had 9 auto-triggered events and 7 diaries.    Patient reported symptoms of shortness of breath, heart racing and lightheadedness/dizziness.      ASSESSMENT & PLAN:   Ms. Suma Butterfield is a trey 69-year-old woman who returns to clinic today for ongoing evaluation and recommendations regarding symptoms of palpitations with a history of SVT and frequent events of nonsustained and sustained short-RP tachycardia captured on an ambulatory event monitor. She has a past medical history significant for symptoms of palpitations with a history of SVT and frequent events of nonsustained and sustained short-RP tachycardia captured on an ambulatory event monitor, a prior NSTEMI in March 2023 with single-vessel coronary artery disease with severe disease in a small branch of the RPL which was not amenable to intervention, hypertension, hyperlipidemia, hepatitis C s/p treatment, polycythemia with monthly phlebotomy, elevated ferritin and MCV, and tobacco use.      - We discussed the pathophysiology of supraventricular tachycardia, and the fact that SVT is an umbrella term that encompasses several different types of atrial arrhythmias. We reviewed the results of her 14-day ambulatory event monitor that captured 9 patient-triggered symptomatic events with reported palpitations, often during periods of nonsustained and sustained SVT with a short-RP tachycardia. These SVT episodes may represent AVNRT, AT with first degree AV block, or AVRT with conduction delay. A rudimentary diagram was drawn for the patient to assist in education.   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   - She remains under adequate control on metoprolol tartrate 100mg po BID. We discussed consolidation to metoprolol succinate for ease of use; however, she would prefer to remain on her current regimen. She is wondering if potentially she could discontinue this medication in the event she underwent ablation, and ideally she would like to undergo an ablation prior to her anticipated elective hip replacement.   - We discussed the possibility of undergoing definitive identification of her SVT mechanism with an EP study and possible radiofrequency catheter ablation with slow pathway modification. The procedure itself, risks, benefits, and alternative options were discussed. Ms. Butterfield voices understanding and is in agreement with undergoing an EP study with possible ablation. She would like to defer this procedure until after her  has recovered following his hip replacement.  - We reviewed abortive measures when she has an episode of palpitations, such as forced coughing, Valsalva maneuver, modified Valsalva maneuver with forced exhalation through a high resistance structure such a drinking straw, unilateral carotid sinus massage, and ice/cold water application to the face and eyes. She will attempt physical abortive measures first, and was instructed that should her palpitations persist, she was encouraged to present to the ED for possible adenosine administration.   - Her ambulatory blood pressure has improved following initiation of amlodipine 2.5mg po daily, in addition to losartan 100mg po daily and her metoprolol tartrate 100mg po BID. She continues to follow with her general cardiologist for adjustment of her antihypertensive regimen.   - She remains on her statin therapy for her hyperlipidemia.       This patient will present to the EP laboratory to undergo an EP study with possible radiofrequency catheter ablation of her SVT. She will  continue to follow with her general cardiology team and PCP in the interim. All questions and concerns were addressed at this encounter. Total time spent in this patient encounter: 41 minutes.     Signing Physician:       PROMISE Cha MD  Electrophysiology Attending

## 2025-01-27 ENCOUNTER — TELEPHONE (OUTPATIENT)
Dept: ELECTROPHYSIOLOGY | Facility: CLINIC | Age: 70
End: 2025-01-27
Payer: MEDICARE

## 2025-01-27 ENCOUNTER — PATIENT MESSAGE (OUTPATIENT)
Dept: CARDIOLOGY | Facility: CLINIC | Age: 70
End: 2025-01-27
Payer: MEDICARE

## 2025-01-27 NOTE — TELEPHONE ENCOUNTER
Spoke with pt and rescheduled procedure to 3/26/25 as requested. Arrival time 5:30AM.  Pt will complete blood work on 3/15/25 at [facility]. Verbalized understanding. Denies questions. Pt appreciated the call.

## 2025-01-27 NOTE — TELEPHONE ENCOUNTER
Spoke with patient and scheduled procedure: SVT ablation for 3/31/25 with an arrival time of 5:15AM at [facility].   Labs to be done at: [facility] on 3/22/25; non-fasting.   Confirmed that patient is not on GLP-1 agonist  Confirmed that patient does not have any implanted device that has remote or monitor that could cause electrical interference  Pt informed that procedure is performed using anesthesia and therefore she will need to ensure she has a caregiver available to drive her home afterwards.  Pt stated she has alopecia and had a visit recently with Dr. Kaba, her dermatologist, and Dr. Kaba is interested in starting her on Monoxidil but would like to confirm with Dr. Cha that this is ok and would not cause any interference related to cardiac concerns. Informed pt that I will reach out to Dr. Cha for recommendations.   Pt verbalized understanding.   Instructions will be sent via portal and email, as requested     Follow up call with pt regarding oral Minoxidil. Informed pt that per Dr. Cha it's ok for her to take oral Minoxidil. Instructed pt to monitor BP as oral Minoxidil may cause low BP. Pt verbalized understanding and appreciated the follow up call.

## 2025-02-03 ENCOUNTER — TELEPHONE (OUTPATIENT)
Dept: ELECTROPHYSIOLOGY | Facility: CLINIC | Age: 70
End: 2025-02-03
Payer: MEDICARE

## 2025-02-03 NOTE — TELEPHONE ENCOUNTER
Spoke with pt and informed that procedure originally scheduled on 3/26/25 will need to be rescheduled due to Dr. Cha being out of the office. Pt verbalized understanding and procedure rescheduled for 4/9/25 with an arrival time of 5:30AM. Instructed pt to complete lab work as planned on 3/15/25 at [facility]. Will send pre-procedure instructions via the protal and email as pt requested. Pt verbalized understanding, denied any questions, and appreciated the call.

## 2025-02-05 ENCOUNTER — PATIENT MESSAGE (OUTPATIENT)
Dept: ELECTROPHYSIOLOGY | Facility: CLINIC | Age: 70
End: 2025-02-05
Payer: MEDICARE

## 2025-02-05 DIAGNOSIS — I49.02 VENTRICULAR FLUTTER: ICD-10-CM

## 2025-02-05 DIAGNOSIS — I48.3 TYPICAL ATRIAL FLUTTER: ICD-10-CM

## 2025-02-05 DIAGNOSIS — I48.0 PAROXYSMAL ATRIAL FIBRILLATION: Primary | ICD-10-CM

## 2025-02-22 DIAGNOSIS — Z00.00 ENCOUNTER FOR MEDICARE ANNUAL WELLNESS EXAM: ICD-10-CM

## 2025-03-13 ENCOUNTER — TELEPHONE (OUTPATIENT)
Dept: ELECTROPHYSIOLOGY | Facility: CLINIC | Age: 70
End: 2025-03-13
Payer: MEDICARE

## 2025-03-13 NOTE — TELEPHONE ENCOUNTER
Returned pt's call. Pt requested to cancel procedure at this time due to her sister preparing to undergo a double mastectomy. Ms. Butterfield will call back to reschedule her ablation at a later date.

## 2025-03-13 NOTE — TELEPHONE ENCOUNTER
----- Message from Med Assistant Manriquez sent at 3/13/2025 10:59 AM CDT -----  Regarding: FW: Procedure    ----- Message -----  From: Laurence Farrar  Sent: 3/13/2025   9:54 AM CDT  To: Starla Peters Staff  Subject: Procedure                                        Patent calling to canceled her procedure. Please call back @ [phone]. Thank you Michael

## 2025-04-08 ENCOUNTER — TELEPHONE (OUTPATIENT)
Dept: ORTHOPEDICS | Facility: CLINIC | Age: 70
End: 2025-04-08
Payer: MEDICARE

## 2025-04-08 NOTE — TELEPHONE ENCOUNTER
Returned call assisted in picking new surgery date and scheduling appointment to come in to [place] office and sign consent.

## 2025-04-08 NOTE — TELEPHONE ENCOUNTER
----- Message from Boo sent at 4/8/2025  9:56 AM CDT -----  Patient would like a callback regarding scheduling hip surgery. [phone]

## 2025-04-13 DIAGNOSIS — L64.9 ANDROGENIC ALOPECIA: ICD-10-CM

## 2025-04-16 DIAGNOSIS — L66.9 CICATRICIAL ALOPECIA: ICD-10-CM

## 2025-04-16 RX ORDER — FLUOCINONIDE TOPICAL SOLUTION USP, 0.05% 0.5 MG/ML
1 SOLUTION TOPICAL 2 TIMES DAILY
Qty: 60 ML | Refills: 2 | Status: SHIPPED | OUTPATIENT
Start: 2025-04-16

## 2025-04-16 RX ORDER — FINASTERIDE 5 MG/1
5 TABLET, FILM COATED ORAL
Qty: 90 TABLET | Refills: 3 | Status: SHIPPED | OUTPATIENT
Start: 2025-04-16

## 2025-04-20 DIAGNOSIS — I25.10 CORONARY ARTERY DISEASE, UNSPECIFIED VESSEL OR LESION TYPE, UNSPECIFIED WHETHER ANGINA PRESENT, UNSPECIFIED WHETHER NATIVE OR TRANSPLANTED HEART: ICD-10-CM

## 2025-04-20 DIAGNOSIS — I10 ESSENTIAL HYPERTENSION: ICD-10-CM

## 2025-04-21 ENCOUNTER — TELEPHONE (OUTPATIENT)
Dept: ORTHOPEDICS | Facility: CLINIC | Age: 70
End: 2025-04-21
Payer: MEDICARE

## 2025-04-21 RX ORDER — LOSARTAN POTASSIUM 100 MG/1
100 TABLET ORAL DAILY
Qty: 90 TABLET | Refills: 1 | Status: SHIPPED | OUTPATIENT
Start: 2025-04-21 | End: 2025-10-18

## 2025-04-21 NOTE — TELEPHONE ENCOUNTER
----- Message from Jaxson King sent at 4/21/2025 10:34 AM CDT -----  Contact: pt  Calling on paperwork Call back

## 2025-04-24 ENCOUNTER — TELEPHONE (OUTPATIENT)
Dept: ORTHOPEDICS | Facility: CLINIC | Age: 70
End: 2025-04-24
Payer: MEDICARE

## 2025-04-24 NOTE — TELEPHONE ENCOUNTER
Spoke with pt, advised paperwork can be picked up tomorrow afternoon.   Pt verbalized understanding.

## 2025-04-24 NOTE — TELEPHONE ENCOUNTER
----- Message from Nurse Ford sent at 4/23/2025 10:56 AM CDT -----    ----- Message -----  From: Boo Roth  Sent: 4/23/2025   9:10 AM CDT  To: Ollie Ospina Staff    Type:  Patient Returning CallWho Called:  PatientWho Left Message for Patient:  Mary the patient know what this is regarding?:  PaperworkBest Call Back Number:   [phone] Information:

## 2025-05-02 ENCOUNTER — PATIENT MESSAGE (OUTPATIENT)
Dept: ORTHOPEDICS | Facility: CLINIC | Age: 70
End: 2025-05-02

## 2025-05-02 ENCOUNTER — OFFICE VISIT (OUTPATIENT)
Dept: ORTHOPEDICS | Facility: CLINIC | Age: 70
End: 2025-05-02
Payer: MEDICARE

## 2025-05-02 ENCOUNTER — PATIENT MESSAGE (OUTPATIENT)
Dept: FAMILY MEDICINE | Facility: CLINIC | Age: 70
End: 2025-05-02
Payer: MEDICARE

## 2025-05-02 VITALS — HEIGHT: 61 IN | BODY MASS INDEX: 23.22 KG/M2 | WEIGHT: 123 LBS

## 2025-05-02 DIAGNOSIS — M16.10 ARTHRITIS OF HIP, UNSPECIFIED LATERALITY: Primary | ICD-10-CM

## 2025-05-02 DIAGNOSIS — M16.12 ARTHRITIS OF LEFT HIP: Primary | ICD-10-CM

## 2025-05-02 PROCEDURE — 99999 PR PBB SHADOW E&M-EST. PATIENT-LVL III: CPT | Mod: PBBFAC,HCNC,, | Performed by: ORTHOPAEDIC SURGERY

## 2025-05-02 NOTE — H&P (VIEW-ONLY)
Subjective:      Patient ID: Suma Butterfield is a 69 y.o. female.    Chief Complaint: Pain of the Left Hip    HPI  Patient returns with the ongoing moderate to severe left hip pain.  It is interfering with activities of daily living.  She has reached a point she where she wishes to scheduled total hip replacement.    ROS      Objective:    Ortho Exam     Constitutional:   Patient is alert  and oriented in no acute distress  HEENT:  normocephalic atraumatic; PERRL EOMI  Neck:  Supple without adenopathy  Cardiovascular:  Normal rate and rhythm  Pulmonary:  Normal respiratory effort normal chest wall expansion  Abdominal:  Nonprotuberant nondistended  Musculoskeletal:  Moderately antalgic gait  Very limited internal rotation of the left hip   Neurological:  No focal defect; cranial nerves 2-12 grossly intact  Psychiatric/behavioral:  Mood and behavior normal      X-Ray Hip 2 or 3 views Left with Pelvis when performed  Narrative: EXAMINATION:  XR HIP WITH PELVIS WHEN PERFORMED 2 OR 3 VIEWS LEFT    CLINICAL HISTORY:  Unilateral primary osteoarthritis, left hip    TECHNIQUE:  AP view of the pelvis and frog leg lateral view of the left hip were performed.    COMPARISON:  12/21/2023    FINDINGS:  Severe degenerative change and severe joint space narrowing left hip appearing progressive compared to the prior exam.  Moderate degenerative change and joint space narrowing right hip    No acute fracture or dislocation.  Impression: As above    Electronically signed by: Renetta Espinosa MD  Date:    12/06/2024  Time:    14:40       My Radiographs Findings:    Radiographs of the left hip show severe degenerative changes obliteration of the joint space subchondral sclerosis osteophyte formation misshapen femoral head  Assessment:       Encounter Diagnosis   Name Primary?    Arthritis of left hip Yes         Plan:       I have discussed medical condition and treatment options with the patient at length.  I have reviewed radiographs  consistent with severe degenerative changes of the hip.  At this point with failure to respond to more conservative treatment patient is reasonable candidate for left total hip arthroplasty.  I have discussed indications alternatives and potential complications of the planned procedure including but not limited to bleeding infection damage to neurovascular structures hardware implant failure periprosthetic fracture joint stiffness or instability including dislocation leg length discrepancy worsening of her sciatica ongoing pain and need for further surgery as well as anesthetic and medical complications to include but not limited to stroke MI blood clots and wound decubiti etc..  Patient expresses understanding and agrees to proceed.  She understands that she will need medical and anesthetic clearance.  Today are history of physical preoperative paperwork all these all of patient's questions were answered in layman's terms that she could under stand.  We will set this up at the patient's convenience and see back postop sooner if there is any questions or problems.    The mobility limitation cannot be sufficiently resolved by the use of a cane. Patient's functional mobility deficit can be sufficiently resolved with the use of a (Rolling Walker or Walker). Patient's mobility limitation significantly impairs their ability to participate in one of more activities of daily living. The use of a (RW or Walker) will significantly improve the patient's ability to participate in MRADLS and the patient will use it on regular basis in the home.         Past Medical History:   Diagnosis Date    Elevated ferritin 10/13/2022    Elevated MCV 10/13/2022    Hepatitis C     Hypertension     Polycythemia 10/13/2022    SVT (supraventricular tachycardia)      Past Surgical History:   Procedure Laterality Date    CORONARY ANGIOGRAPHY N/A 3/10/2023    Procedure: ANGIOGRAM, CORONARY ARTERY;  Surgeon: Jill Griffiths MD;  Location:  [facility] CATH/EP LAB;  Service: Cardiology;  Laterality: N/A;    LEFT HEART CATHETERIZATION Left 3/10/2023    Procedure: Left heart cath;  Surgeon: Jill Griffiths MD;  Location: [facility] CATH/EP LAB;  Service: Cardiology;  Laterality: Left;       Current Medications[1]    Review of patient's allergies indicates:   Allergen Reactions    Succinylcholine      **Pseudocholinesterase**       Family History   Problem Relation Name Age of Onset    Glaucoma Neg Hx      Macular degeneration Neg Hx      Retinal detachment Neg Hx       Social History     Occupational History    Not on file   Tobacco Use    Smoking status: Former     Types: Cigarettes     Passive exposure: Past    Smokeless tobacco: Never   Substance and Sexual Activity    Alcohol use: Yes    Drug use: Never    Sexual activity: Yes     Partners: Male            [1]   Current Outpatient Medications:     alendronate (FOSAMAX) 70 MG tablet, TK 1 T PO 1 TIME Q WK, Disp: 12 tablet, Rfl: 3    amLODIPine (NORVASC) 2.5 MG tablet, Take 1 tablet (2.5 mg total) by mouth once daily., Disp: 30 tablet, Rfl: 11    atorvastatin (LIPITOR) 40 MG tablet, Take 1 tablet (40 mg total) by mouth every evening., Disp: 90 tablet, Rfl: 1    biotin 5,000 mcg TbDL, Take 5,000 mcg by mouth once daily., Disp: , Rfl:     calcium-vitamin D3 (OS- + D3) 500 mg-5 mcg (200 unit) per tablet, Take 1 tablet by mouth once daily., Disp: , Rfl:     finasteride (PROSCAR) 5 mg tablet, TAKE 1 TABLET BY MOUTH EVERY DAY, Disp: 90 tablet, Rfl: 3    fluocinonide (LIDEX) 0.05 % external solution, Apply 1 application  topically 2 (two) times daily. Apply to affected area, Disp: 60 mL, Rfl: 2    losartan (COZAAR) 100 MG tablet, Take 1 tablet (100 mg total) by mouth once daily., Disp: 90 tablet, Rfl: 1    metoprolol tartrate (LOPRESSOR) 100 MG tablet, Take 1 tablet (100 mg total) by mouth 2 (two) times daily., Disp: 180 tablet, Rfl: 1    milk thistle 175 mg tablet, Take 175 mg by mouth once daily., Disp:  , Rfl:     omega-3 fatty acids/fish oil (FISH OIL-OMEGA-3 FATTY ACIDS) 300-1,000 mg capsule, Take 1 capsule by mouth once daily., Disp: , Rfl:     vitamin E 400 UNIT capsule, Take 400 Units by mouth once daily., Disp: , Rfl:     zinc gluconate 50 mg tablet, Take 50 mg by mouth once daily., Disp: , Rfl:

## 2025-05-02 NOTE — PROGRESS NOTES
Subjective:      Patient ID: Suma Butterfield is a 69 y.o. female.    Chief Complaint: Pain of the Left Hip    HPI  Patient returns with the ongoing moderate to severe left hip pain.  It is interfering with activities of daily living.  She has reached a point she where she wishes to scheduled total hip replacement.    ROS      Objective:    Ortho Exam     Constitutional:   Patient is alert  and oriented in no acute distress  HEENT:  normocephalic atraumatic; PERRL EOMI  Neck:  Supple without adenopathy  Cardiovascular:  Normal rate and rhythm  Pulmonary:  Normal respiratory effort normal chest wall expansion  Abdominal:  Nonprotuberant nondistended  Musculoskeletal:  Moderately antalgic gait  Very limited internal rotation of the left hip   Neurological:  No focal defect; cranial nerves 2-12 grossly intact  Psychiatric/behavioral:  Mood and behavior normal      X-Ray Hip 2 or 3 views Left with Pelvis when performed  Narrative: EXAMINATION:  XR HIP WITH PELVIS WHEN PERFORMED 2 OR 3 VIEWS LEFT    CLINICAL HISTORY:  Unilateral primary osteoarthritis, left hip    TECHNIQUE:  AP view of the pelvis and frog leg lateral view of the left hip were performed.    COMPARISON:  12/21/2023    FINDINGS:  Severe degenerative change and severe joint space narrowing left hip appearing progressive compared to the prior exam.  Moderate degenerative change and joint space narrowing right hip    No acute fracture or dislocation.  Impression: As above    Electronically signed by: Renetta Espinosa MD  Date:    12/06/2024  Time:    14:40       My Radiographs Findings:    Radiographs of the left hip show severe degenerative changes obliteration of the joint space subchondral sclerosis osteophyte formation misshapen femoral head  Assessment:       Encounter Diagnosis   Name Primary?    Arthritis of left hip Yes         Plan:       I have discussed medical condition and treatment options with the patient at length.  I have reviewed radiographs  consistent with severe degenerative changes of the hip.  At this point with failure to respond to more conservative treatment patient is reasonable candidate for left total hip arthroplasty.  I have discussed indications alternatives and potential complications of the planned procedure including but not limited to bleeding infection damage to neurovascular structures hardware implant failure periprosthetic fracture joint stiffness or instability including dislocation leg length discrepancy worsening of her sciatica ongoing pain and need for further surgery as well as anesthetic and medical complications to include but not limited to stroke MI blood clots and wound decubiti etc..  Patient expresses understanding and agrees to proceed.  She understands that she will need medical and anesthetic clearance.  Today are history of physical preoperative paperwork all these all of patient's questions were answered in layman's terms that she could under stand.  We will set this up at the patient's convenience and see back postop sooner if there is any questions or problems.    The mobility limitation cannot be sufficiently resolved by the use of a cane. Patient's functional mobility deficit can be sufficiently resolved with the use of a (Rolling Walker or Walker). Patient's mobility limitation significantly impairs their ability to participate in one of more activities of daily living. The use of a (RW or Walker) will significantly improve the patient's ability to participate in MRADLS and the patient will use it on regular basis in the home.         Past Medical History:   Diagnosis Date    Elevated ferritin 10/13/2022    Elevated MCV 10/13/2022    Hepatitis C     Hypertension     Polycythemia 10/13/2022    SVT (supraventricular tachycardia)      Past Surgical History:   Procedure Laterality Date    CORONARY ANGIOGRAPHY N/A 3/10/2023    Procedure: ANGIOGRAM, CORONARY ARTERY;  Surgeon: Jill Griffiths MD;  Location:  [facility] CATH/EP LAB;  Service: Cardiology;  Laterality: N/A;    LEFT HEART CATHETERIZATION Left 3/10/2023    Procedure: Left heart cath;  Surgeon: Jill Griffiths MD;  Location: [facility] CATH/EP LAB;  Service: Cardiology;  Laterality: Left;       Current Medications[1]    Review of patient's allergies indicates:   Allergen Reactions    Succinylcholine      **Pseudocholinesterase**       Family History   Problem Relation Name Age of Onset    Glaucoma Neg Hx      Macular degeneration Neg Hx      Retinal detachment Neg Hx       Social History     Occupational History    Not on file   Tobacco Use    Smoking status: Former     Types: Cigarettes     Passive exposure: Past    Smokeless tobacco: Never   Substance and Sexual Activity    Alcohol use: Yes    Drug use: Never    Sexual activity: Yes     Partners: Male            [1]   Current Outpatient Medications:     alendronate (FOSAMAX) 70 MG tablet, TK 1 T PO 1 TIME Q WK, Disp: 12 tablet, Rfl: 3    amLODIPine (NORVASC) 2.5 MG tablet, Take 1 tablet (2.5 mg total) by mouth once daily., Disp: 30 tablet, Rfl: 11    atorvastatin (LIPITOR) 40 MG tablet, Take 1 tablet (40 mg total) by mouth every evening., Disp: 90 tablet, Rfl: 1    biotin 5,000 mcg TbDL, Take 5,000 mcg by mouth once daily., Disp: , Rfl:     calcium-vitamin D3 (OS- + D3) 500 mg-5 mcg (200 unit) per tablet, Take 1 tablet by mouth once daily., Disp: , Rfl:     finasteride (PROSCAR) 5 mg tablet, TAKE 1 TABLET BY MOUTH EVERY DAY, Disp: 90 tablet, Rfl: 3    fluocinonide (LIDEX) 0.05 % external solution, Apply 1 application  topically 2 (two) times daily. Apply to affected area, Disp: 60 mL, Rfl: 2    losartan (COZAAR) 100 MG tablet, Take 1 tablet (100 mg total) by mouth once daily., Disp: 90 tablet, Rfl: 1    metoprolol tartrate (LOPRESSOR) 100 MG tablet, Take 1 tablet (100 mg total) by mouth 2 (two) times daily., Disp: 180 tablet, Rfl: 1    milk thistle 175 mg tablet, Take 175 mg by mouth once daily., Disp:  , Rfl:     omega-3 fatty acids/fish oil (FISH OIL-OMEGA-3 FATTY ACIDS) 300-1,000 mg capsule, Take 1 capsule by mouth once daily., Disp: , Rfl:     vitamin E 400 UNIT capsule, Take 400 Units by mouth once daily., Disp: , Rfl:     zinc gluconate 50 mg tablet, Take 50 mg by mouth once daily., Disp: , Rfl:

## 2025-05-05 RX ORDER — TRAMADOL HYDROCHLORIDE 50 MG/1
50 TABLET ORAL EVERY 6 HOURS PRN
Qty: 28 TABLET | Refills: 0 | Status: SHIPPED | OUTPATIENT
Start: 2025-05-05 | End: 2025-05-12

## 2025-05-11 DIAGNOSIS — E78.00 HYPERCHOLESTEROLEMIA: ICD-10-CM

## 2025-05-12 RX ORDER — ATORVASTATIN CALCIUM 40 MG/1
40 TABLET, FILM COATED ORAL NIGHTLY
Qty: 90 TABLET | Refills: 1 | Status: SHIPPED | OUTPATIENT
Start: 2025-05-12

## 2025-05-14 ENCOUNTER — HOSPITAL ENCOUNTER (OUTPATIENT)
Dept: PREADMISSION TESTING | Facility: HOSPITAL | Age: 70
Discharge: HOME | End: 2025-05-14
Attending: ORTHOPAEDIC SURGERY
Payer: MEDICARE

## 2025-05-14 DIAGNOSIS — Z01.818 PRE-OP TESTING: ICD-10-CM

## 2025-05-14 DIAGNOSIS — M16.12 ARTHRITIS OF LEFT HIP: ICD-10-CM

## 2025-05-14 DIAGNOSIS — Z01.818 PREOP TESTING: Primary | ICD-10-CM

## 2025-05-14 DIAGNOSIS — Z96.642 S/P TOTAL LEFT HIP ARTHROPLASTY: Primary | ICD-10-CM

## 2025-05-14 LAB
ABSOLUTE EOSINOPHIL (SMH): 0.15 K/UL
ABSOLUTE MONOCYTE (SMH): 0.77 K/UL (ref 0.3–1)
ABSOLUTE NEUTROPHIL COUNT (SMH): 3.2 K/UL (ref 1.8–7.7)
ANION GAP (SMH): 13 MMOL/L (ref 8–16)
BASOPHILS # BLD AUTO: 0.05 K/UL
BASOPHILS NFR BLD AUTO: 0.8 %
BUN SERPL-MCNC: 13 MG/DL (ref 8–23)
CALCIUM SERPL-MCNC: 9.8 MG/DL (ref 8.7–10.5)
CHLORIDE SERPL-SCNC: 104 MMOL/L (ref 95–110)
CO2 SERPL-SCNC: 21 MMOL/L (ref 23–29)
CREAT SERPL-MCNC: 0.7 MG/DL (ref 0.5–1.4)
ERYTHROCYTE [DISTWIDTH] IN BLOOD BY AUTOMATED COUNT: 12.4 % (ref 11.5–14.5)
GFR SERPLBLD CREATININE-BSD FMLA CKD-EPI: >60 ML/MIN/1.73/M2
GLUCOSE SERPL-MCNC: 93 MG/DL (ref 70–110)
HCT VFR BLD AUTO: 39.1 % (ref 37–48.5)
HGB BLD-MCNC: 13.4 GM/DL (ref 12–16)
IMM GRANULOCYTES # BLD AUTO: 0.02 K/UL (ref 0–0.04)
IMM GRANULOCYTES NFR BLD AUTO: 0.3 % (ref 0–0.5)
INDIRECT COOMBS: NORMAL
LYMPHOCYTES # BLD AUTO: 2.28 K/UL (ref 1–4.8)
MCH RBC QN AUTO: 35 PG (ref 27–31)
MCHC RBC AUTO-ENTMCNC: 34.3 G/DL (ref 32–36)
MCV RBC AUTO: 102 FL (ref 82–98)
MRSA PCR SCRN (SMH): NOT DETECTED
NUCLEATED RBC (/100WBC) (SMH): 0 /100 WBC
PLATELET # BLD AUTO: 203 K/UL (ref 150–450)
PMV BLD AUTO: 9.3 FL (ref 9.2–12.9)
POTASSIUM SERPL-SCNC: 4.2 MMOL/L (ref 3.5–5.1)
RBC # BLD AUTO: 3.83 M/UL (ref 4–5.4)
RELATIVE EOSINOPHIL (SMH): 2.3 % (ref 0–8)
RELATIVE LYMPHOCYTE (SMH): 35.1 % (ref 18–48)
RELATIVE MONOCYTE (SMH): 11.8 % (ref 4–15)
RELATIVE NEUTROPHIL (SMH): 49.7 % (ref 38–73)
RH BLD: NORMAL
SODIUM SERPL-SCNC: 138 MMOL/L (ref 136–145)
SPECIMEN OUTDATE: NORMAL
WBC # BLD AUTO: 6.5 K/UL (ref 3.9–12.7)

## 2025-05-14 PROCEDURE — 93005 ELECTROCARDIOGRAM TRACING: CPT

## 2025-05-14 PROCEDURE — 82310 ASSAY OF CALCIUM: CPT | Performed by: ORTHOPAEDIC SURGERY

## 2025-05-14 PROCEDURE — 87641 MR-STAPH DNA AMP PROBE: CPT | Performed by: ORTHOPAEDIC SURGERY

## 2025-05-14 PROCEDURE — 86900 BLOOD TYPING SEROLOGIC ABO: CPT | Performed by: ORTHOPAEDIC SURGERY

## 2025-05-14 PROCEDURE — 36415 COLL VENOUS BLD VENIPUNCTURE: CPT | Performed by: ORTHOPAEDIC SURGERY

## 2025-05-14 PROCEDURE — 85025 COMPLETE CBC W/AUTO DIFF WBC: CPT | Performed by: ORTHOPAEDIC SURGERY

## 2025-05-14 RX ORDER — MINOXIDIL 2 %
1 SOLUTION, NON-ORAL TOPICAL DAILY
COMMUNITY

## 2025-05-14 NOTE — DISCHARGE INSTRUCTIONS
To confirm, Your doctor has instructed you that surgery is scheduled for: 5/28/25 with DR. CULVER    Please report to [facility], Registration the morning of surgery. You must check-in and receive a wristband before going to your procedure.  [address] [place] [address] [place] [address]    Pre-Op will call the afternoon prior to surgery between 1:00 and 6:00 PM with the final arrival time.  Phone number: [phone]    PLEASE NOTE:  The surgery schedule has many variables which may affect the time of your surgery case.  Family members should be available if your surgery time changes.  Plan to be here the day of your procedure between 4-6 hours.    MEDICATIONS:  TAKE ONLY THESE MEDICATIONS WITH A SMALL SIP OF WATER THE MORNING OF YOUR PROCEDURE:  SEE MED LIST      DO NOT TAKE THESE MEDICATIONS 5-7 DAYS PRIOR to your procedure or per your surgeon's request:   ASPIRIN, ALEVE, ADVIL, IBUPROFEN, FISH OIL VITAMIN E, HERBALS  (May take Tylenol)    ONLY if you are prescribed any types of blood thinners such as:  Aspirin, Coumadin, Plavix, Pradaxa, Xarelto, Aggrenox, Effient, Eliquis, Savasya, Brilinta, or any other, ask your surgeon whether you should stop taking them and how long before surgery you should stop.  You may also need to verify with the prescribing physician if it is ok to stop your medication.      INSTRUCTIONS IMPORTANT!!  Do not eat or drink anything between midnight and the time of your procedure- this includes gum, mints, and candy.  EXCEPT: you may have clear liquids such as:  WATER, BLACK COFFEE, UNSWEET TEA, OR GATORADE (NO RED OR PURPLE) UP TO 2 HOURS PRIOR TO YOUR ARRIVAL TIME.  Do not smoke or drink alcoholic beverages 24 hours prior to your procedure.  Shower the night before AND the morning of your procedure with a Chlorhexidine wash such as Hibiclens or Dial antibacterial soap from the neck down.  Do not get it on your face or in your eyes.  You may use your own shampoo and face  wash. This helps your skin to be as bacteria free as possible.    If you wear contact lenses, dentures, hearing aids or glasses, bring a container to put them in during surgery and give to a family member for safe keeping.  Please leave all jewelry, piercing's and valuables at home. You must remove your false eyelashes prior to surgery.    DO NOT remove hair from the surgery site.  Do not shave the incision site unless you are given specific instructions to do so.    ONLY if you have been diagnosed with sleep apnea please bring your C-PAP machine.  ONLY if you wear home oxygen please bring your portable oxygen tank the day of your procedure.  ONLY if you have a history of OPEN HEART SURGERY you will need a clearance from your Cardiologist per Anesthesia.      ONLY for patients requiring bowel prep, written instructions will be given by your doctor's office.  ONLY if you have any type of stimulator implant or any type of implanted device with a remote control.  Please bring the controller with you the morning of surgery  If your doctor has scheduled you for an overnight stay, bring a small overnight bag with any personal items you need.  Make arrangements in advance for transportation home by a responsible adult. You can not go home in an uber or a cab per hospital policy.  It is not safe to drive a vehicle during the 24 hours after anesthesia.          All  facilities and properties are tobacco free.  Smoking is NOT allowed.   If you have any questions about these instructions, call Pre-Op Admit  Nursing at [phone] or the Pre-Op Day Surgery Unit at [phone].

## 2025-05-14 NOTE — PRE ADMISSION SCREENING
JOINT CAMP ASSESSMENT    Name Suma Butterfield   MRN [id number]    Age/Sex 69 y.o. female    Surgeon Dr. Bhavesh Levin   Joint Camp Date 2025   Surgery Date 2025   Procedure Left Hip Arthroplasty   Insurance Payor: [employer] MEDICARE / Plan: [employer] MEDICARE HMO / Product Type: Capitation /    Care Team Patient Care Team:  Hector Colin PA-C as PCP - General (Family Medicine)  Kp Mendoza OD as Consulting Physician (Optometry)  Jun, Froylan ARZATE MD as Consulting Physician (Gastroenterology)  Francisco Parra MD as Consulting Physician (Interventional Cardiology)    Pharmacy   [id number] - [place], [place] -  [address]   [address]  [place] [address]  Phone: [phone] Fax: [phone]     AM-PAC Score   24   Risk Assessment Score 0     Past Medical History:   Diagnosis Date    Elevated ferritin 10/13/2022    Elevated MCV 10/13/2022    General anesthetics causing adverse effect in therapeutic use     STATES SHE CODES WITH SUCC    Hepatitis C     Hypertension     Polycythemia 10/13/2022    SVT (supraventricular tachycardia)        Past Surgical History:   Procedure Laterality Date     SECTION      COLONOSCOPY      CORONARY ANGIOGRAPHY N/A 03/10/2023    Procedure: ANGIOGRAM, CORONARY ARTERY;  Surgeon: Jill Griffiths MD;  Location: [facility] CATH/EP LAB;  Service: Cardiology;  Laterality: N/A;    LEFT HEART CATHETERIZATION Left 03/10/2023    Procedure: Left heart cath;  Surgeon: Jill Griffiths MD;  Location: [facility] CATH/EP LAB;  Service: Cardiology;  Laterality: Left;         Home Enviroment     Living Arrangement: Lives with spouse  Home Environment: 1-story house/ trailer, number of outside stairs: 1, tub-shower  Home Safety Concerns: unremarkable    DISCHARGE CAREGIVER/SUPPORT SYSTEM     Identified post-op caregiver: Patient has spouse / significant other.  Patient's caregiver(s) will be able to provide physical assistance. Patient will have  someone to assist overnight.      Caregiver present at pre-op interview:  No      PRE-OPERATIVE FUNCTIONAL STATUS     Employment: Employed full time    Pre-op Functional Status: Patient is independent with mobility/ambulation, transfers, ADL's, IADL's.    Use of assistive device for ambulation: none  ADL: self care  ADL Limitations: difficulty with walking  Medical Restrictions: Unstable ambulation and Decreased range of motions in extremities    POTENTIAL BARRIERS TO DISCHARGE/POTENTIAL POST-OP COMPLICATIONS     Patient with hx of HTN, SVT, previous code blue with succinylcholine (per patient) . POSSIBLE SAME DAY DISCHARGE.    DISCHARGE PLAN     Expected LOS of 1 days or less for joint replacement discussed with patient. We also discussed a discharge path of HH for approximately two weeks with a transition to outpatient PT on the third week given no post-op complications.      Patient in agreement with discharge plan: Yes    Discharge to: Discharge home with home health (PT/OT) x2 weeks with transition to outpatient PT     HH:  [facility] ([place], [place]). Patient disclosure form completed and sent to case management for upload to the medical record.      OP PT: [facility] ( [address]     Home DME: rolling walker, bedside commode, and tub transfer bench    Needed DME at D/C: none     Rx: Per Dr. Levin at discharge     Meds to Beds: Yes  Patient expected to discharge on Aspirin 325 mg by mouth twice daily for DVT prophylaxis.

## 2025-05-14 NOTE — PRE ADMISSION SCREENING
Patient Name: Suma Butterfield  YOB: 1955   MRN: [id number]     [facility]   Basic Mobility Inpatient Short Form 6 Clicks         How much difficulty does the patient currently have  Unable  A Lot  A Little  None      1. Turning over in bed (including adjusting bedclothes, sheets and blankets)?     1 []    2 []    3 []    4 [x]        2. Sitting down on and standing up from a chair with arms (e.g., wheelchair, bedside commode, etc.)     1 []  2 []  3 []     4 [x]      3. Moving from lying on back to sitting on the side of the bed?     1 []  2 []  3 []    4 [x]    How much help from another person does the patient currently need  Total  A Lot  A Little  None      4. Moving to and from a bed to a chair (including a wheelchair)?    1 []  2 []  3 []    4 [x]      5. Need to walk in hospital room?    1 []  2 []  3 []    4 [x]      6. Climbing 3-5 steps with a railing?    1 []  2 []  3 []    4 [x]       Raw Score:      24            CMS 0-100% Score:    0        %   Standardized Score:    61.14           CMS Modifier:      [facility] AM-PAC   Basic Mobility Inpatient Short Form 6 Clicks Score Conversion Table*         *Use this form to convert -PAC Basic Mobility Inpatient Raw Scores.   [facility] Inpatient Basic Mobility Short Form Scoring Example   1. Add the number values associated with the response to each item. For example, items totals yield a Raw Score of 21.   2. Match the raw score to the t-Scale scores (t-Scale score = 50.25, SE = 4.69).   3. Find the associated CMS % (CMS % = 28.97%).   4. Locate the correct CMS Functional Modifier Code, or G Code (G code = CJ)     NOTE: Each -PAC Short Form has a separate conversion table. Make sure that you use the correct conversion table.       Instruction Manual - page 45 contains conversion table

## 2025-05-14 NOTE — PRE ADMISSION SCREENING
"               [facility] Risk Assessment Scale    Patient Name: Suma Butterfield  YOB: 1955  MRN: [id number]            RIsk Factor Measure Recommendation Patient Data Scale/Score   BMI >40 Reconsider surgery, weight loss   Estimated body mass index is 23.24 kg/m² as calculated from the following:    Height as of 5/2/25: 5' 1" (1.549 m).    Weight as of 5/2/25: 55.8 kg (123 lb).   [x] 0 = 1 - 24.9  [] 1 = 25-29.9  [] 2 = 30-34.9  [] 3 = 35-39.9  [] 4 = 40-44.9  [] 5 = 45-99.9   Hemoglobin AIC (if applicable) >9 Delay surgery until DM under control  Refer for:  Nutrition Therapy  Exercise   Medication    No results found for: "LABA1C", "HGBA1C"    Lab Results   Component Value Date    GLU 93 05/14/2025      [] 0 = 4.0-5.6  [] 1 = 5.7-6.4  [] 2 = 6.5-6.9  [] 3 = 7.0-7.9  [] 4 = 8.0-8.9  [] 5 = 9.0-12   Hemoglobin (Anemia) <9 Delay surgery   Correct anemia Lab Results   Component Value Date    HGB 13.4 05/14/2025    [] 20 - <9.0                    Albumin <3 Delay surgery &Workup Lab Results   Component Value Date    ALBUMIN 4.8 03/18/2025    [] 20 - <3.0   Smoking Cessation >4 Weeks Delay Surgery  Refer to OP Cessation Class    Former Smoker   [] 20 - current smoker                                _____ PPD                    Hx of MI, PE, Arrhythmia, CVA, DVT <30 Days Delay Surgery    N/A [] 20      Infection Variable Delay surgery and re-evaluate   N/A [] 20 - recent/current infection     Depression (PHQ) >10 out of 27 Delay Surgery and re-evaluate  Medication  Counseling              [x] 0     []1     []2     []3      []4      [] 5                    (1-4)      (5-9)  (10-14)  (15-19)   (20-27)     Memory Impairment & Memory loss (Mini-Cog Screening Tool) Advanced dementia and/or Parkinson's Reconsider surgery     [x] 0     []1     []2     []3     []4     [] 5     Physical Conditioning (Modified AM-PAC Per Physical Therapy at [facility]) Unable to ambulate on day of surgery Delay surgery and " re-evaluate  Pre-Rehabilitation   (PT evaluation)       [x]  0   []4       []8     []12        []16     []20       (<20%)   (<40%)   (<60%)   (<80% )    (>80%)     Home Environment/Caregiver support  (Per /Navigator Interview)    Availability of basic services and/or approprate assistance during post-operative period Delay surgery and re-evaluate  Safe home environment  Health   1 week post-surgery  Transportation  availability  Ability to obtain DME/Medications post-op    [x] 0     []1     []2     []3     []4     [] 5  [x] 0     []1     []2     []3     []4     [] 5  [x] 0     []1     []2     []3     []4     [] 5  [x] 0     []1     []2     []3     []4     [] 5         MD Contact: Dr. Levin Comments:  Total Score:  0

## 2025-05-26 NOTE — PROGRESS NOTES
[facility]  PROGRESS NOTE - Follow-up Visit      Subjective:       Patient ID:   NAME: Suma Butterfield : 1955     69 y.o. female    Referring Doc: Hector Colin PA*  Other Physicians: Pavan Leonard; Jun        Chief Complaint: polycythemia/elevated MCV f/u      History of Present Illness:     Patient returns today for a regularly scheduled follow-up visit.  The patient is here today to go over the results of the recently ordered labs, tests and studies. She is here by herself.     She is having left hip surgery tomorrow with Dr Bhavesh Levin.        She is otherwise feeling ok; energy levels are fine; no CP, SOB, HA's or N/V; breathing ok    She had been getting phlebotomies pretty much every other month; she had latest one two weeks ago    She saw Dr Kaba on 2025 and Dr Cha with [facility] cardiology in 2025. She cancelled the planned cardiac ablation.    She saw Andrea ESCOBAR with [facility] in Dec 2024         ROS:   GEN: normal without any fever, night sweats or weight loss  HEENT: normal with no HA's, sore throat, stiff neck, changes in vision  CV: normal with no CP, SOB, PND, GAY or orthopnea  PULM: normal with no SOB, cough, hemoptysis, sputum or pleuritic pain  GI: normal with no abdominal pain, nausea, vomiting, constipation, diarrhea, melanotic stools, BRBPR, or hematemesis  : normal with no hematuria, dysuria  BREAST: normal with no mass, discharge, pain  SKIN: normal with no rash, erythema, bruising, or swelling    Pain Scale: 0    Allergies:  Review of patient's allergies indicates:   Allergen Reactions    Succinylcholine Anaphylaxis     **Pseudocholinesterase**   HEART STOPS       Medications:    Current Outpatient Medications:     alendronate (FOSAMAX) 70 MG tablet, TK 1 T PO 1 TIME Q WK, Disp: 12 tablet, Rfl: 3    amLODIPine (NORVASC) 2.5 MG tablet, Take 1 tablet (2.5 mg total) by mouth once daily., Disp: 30 tablet, Rfl: 11    atorvastatin (LIPITOR)  "40 MG tablet, Take 1 tablet (40 mg total) by mouth every evening., Disp: 90 tablet, Rfl: 1    biotin 5,000 mcg TbDL, Take 5,000 mcg by mouth once daily., Disp: , Rfl:     calcium-vitamin D3 (OS- + D3) 500 mg-5 mcg (200 unit) per tablet, Take 1 tablet by mouth once daily., Disp: , Rfl:     finasteride (PROSCAR) 5 mg tablet, TAKE 1 TABLET BY MOUTH EVERY DAY, Disp: 90 tablet, Rfl: 3    fluocinonide (LIDEX) 0.05 % external solution, Apply 1 application  topically 2 (two) times daily. Apply to affected area (Patient taking differently: Apply 1 application  topically every evening. Apply to affected area), Disp: 60 mL, Rfl: 2    losartan (COZAAR) 100 MG tablet, Take 1 tablet (100 mg total) by mouth once daily., Disp: 90 tablet, Rfl: 1    metoprolol tartrate (LOPRESSOR) 100 MG tablet, Take 1 tablet (100 mg total) by mouth 2 (two) times daily., Disp: 180 tablet, Rfl: 1    milk thistle 175 mg tablet, Take 175 mg by mouth once daily., Disp: , Rfl:     minoxidiL (ROGAINE) 2 % external solution, Apply 1 Application topically once daily., Disp: , Rfl:     omega-3 fatty acids/fish oil (FISH OIL-OMEGA-3 FATTY ACIDS) 300-1,000 mg capsule, Take 1 capsule by mouth once daily., Disp: , Rfl:     vitamin E 400 UNIT capsule, Take 400 Units by mouth once daily., Disp: , Rfl:     zinc gluconate 50 mg tablet, Take 50 mg by mouth once daily., Disp: , Rfl:     PMHx/PSHx Updates:  See patient's last visit with me on 11/27/2024  See H&P on 10/14/2022        Pathology:   Cancer Staging   No matching staging information was found for the patient.            Objective:     Vitals:  Blood pressure (!) 137/93, pulse 60, temperature 97.7 °F (36.5 °C), temperature source Temporal, resp. rate 16, height 5' 1" (1.549 m), weight 56.2 kg (124 lb), SpO2 99%.    Physical Examination:   GEN: no apparent distress, comfortable; AAOx3  HEAD: atraumatic and normocephalic; alopecia  EYES: no pallor, no icterus, PERRLA  ENT: OMM, no pharyngeal erythema, " external ears WNL; no nasal discharge; no thrush  NECK: no masses, thyroid normal, trachea midline, no LAD/LN's, supple  CV: RRR with no murmur; normal pulse; normal S1 and S2; no pedal edema  CHEST: Normal respiratory effort; CTAB; normal breath sounds; no wheeze or crackles  ABDOM: nontender and nondistended; soft; normal bowel sounds; no rebound/guarding  MUSC/Skeletal: ROM normal; no crepitus; joints normal; no deformities or arthropathy  EXTREM: no clubbing, cyanosis, inflammation or swelling  SKIN: no rashes, lesions, ulcers, petechiae or subcutaneous nodules  : no cam  NEURO: grossly intact; motor/sensory WNL; AAOx3; no tremors  PSYCH: normal mood, affect and behavior  LYMPH: normal cervical, supraclavicular, axillary and groin LN's            Labs:     Lab Results   Component Value Date    WBC 6.50 05/14/2025    HGB 13.4 05/14/2025    HCT 39.1 05/14/2025     (H) 05/14/2025     05/14/2025       CMP  Sodium   Date Value Ref Range Status   05/14/2025 138 136 - 145 mmol/L Final     Potassium   Date Value Ref Range Status   05/14/2025 4.2 3.5 - 5.1 mmol/L Final     Chloride   Date Value Ref Range Status   05/14/2025 104 95 - 110 mmol/L Final     CO2   Date Value Ref Range Status   05/14/2025 21 (L) 23 - 29 mmol/L Final     Glucose   Date Value Ref Range Status   05/14/2025 93 70 - 110 mg/dL Final     BUN   Date Value Ref Range Status   05/14/2025 13 8 - 23 mg/dL Final     Creatinine   Date Value Ref Range Status   05/14/2025 0.7 0.5 - 1.4 mg/dL Final     Calcium   Date Value Ref Range Status   05/14/2025 9.8 8.7 - 10.5 mg/dL Final     Total Protein   Date Value Ref Range Status   05/13/2025 6.9 6.1 - 8.1 g/dL Final     Albumin   Date Value Ref Range Status   05/13/2025 4.5 3.6 - 5.1 g/dL Final     Total Bilirubin   Date Value Ref Range Status   05/13/2025 0.6 0.2 - 1.2 mg/dL Final     Alkaline Phosphatase   Date Value Ref Range Status   03/11/2023 47 (L) 55 - 135 U/L Final     AST   Date Value  Ref Range Status   05/13/2025 16 10 - 35 U/L Final     ALT   Date Value Ref Range Status   05/13/2025 12 6 - 29 U/L Final     Anion Gap   Date Value Ref Range Status   05/14/2025 13 8 - 16 mmol/L Final     eGFR   Date Value Ref Range Status   05/14/2025 >60 >60 mL/min/1.73/m2 Final   05/13/2025 86 > OR = 60 mL/min/1.73m2 Final     Lab Results   Component Value Date    IRON 72 05/13/2025    TRANSFERRIN 262 05/20/2022    TIBC 406 05/13/2025    LABIRON 18 05/13/2025    FESATURATED 36 05/20/2022         Lab Results   Component Value Date    FERRITIN 13 (L) 05/13/2025         JAK2 V617F Mutation NOT DETECTED     DNA Mutation Analysis See Below    Comment: RESULT: POSITIVE FOR ONE HFE GENE PATHOGENIC VARIANT: H63D (HETEROZYGOTE)    This patient is negative for the   C282Y pathogenic variant    AFP ng/mL 3.0     Erythropoietin 2.6 - 18.5 mIU/mL 10.5       - 250 U/L 182      Retic % 2.1     Folate (packed RBC's) >280 ng/mL       Methylmalonic Acid 87 - 318 nmol/L 145      Folate ng/mL 10.4          Radiology/Diagnostic Studies:    US 4/4/2023:    IMPRESSION:     No significant sonographic abnormality.        CXR    Result Date: 10/28/2022  CHEST PA AND LATERAL CLINICAL DATA:Polycythemia. Comparison to November 2017. FINDINGS: PA and lateral views demonstrate normal heart size. The mediastinum is unremarkable. There are no infiltrates. There is blunting of the lateral costophrenic angle on the left which could reflect a trace amount of pleural fluid or pleural scarring. Osseous structures are unremarkable. IMPRESSION: 1. Slight blunting of the costophrenic angle on the left which could reflect a trace amount of pleural fluid or pleural scarring. 2. No other significant findings.      US Abdomen Complete    Result Date: 10/28/2022  Abdominal ultrasound complete CLINICAL DATA: Polycythemia FINDINGS: Sonographic assessment of the abdomen is compared to November 2020. The pancreas, aorta, and inferior vena cava are  unremarkable. The liver is diffusely echogenic, similar to the prior study, compatible with fatty infiltration. Hepatopedal flow is noted within the portal vein. The gallbladder and biliary tree are within normal limits. The common bile duct measures 3 mm. The spleen and bilateral kidneys are within normal limits. There is no free fluid. IMPRESSION: 1. Hepatic steatosis. 2. No other significant findings.        I have reviewed all available lab results and radiology reports.    Assessment/Plan:   (1) 69 y.o. female with diagnosis of polycythemia and elevated MCV/ferritin who has been referred by Hector Colin PA* for evaluation by medical hematology/oncology.   - polycythemia since 2020 at least - suspect she has a secondary polycythemia process due to the chronic tobacco history  - former smoker who quit about a year ago  - elevated ferritin on labs in  May 2022 - possible acute phase reactant or due to the underlying liver hx  - order hemochromatosis panel    11/22/2022:  - she has H63D heterozygous HFE gene abnormality  - JAK2 was negative  - set up phlebotomy monthly for now    2/28/2023:  - she has been getting phlebotomies monthly - feeling better overall  - due for repeat labs    5/29/2023:  - doing well with no new issues  - checking labs monthly and phlebotomy as directed  - ferritin down to 89    11/28/2023:  - continued on phlebotomes every other month  - latest hgb at 15.9  - ferritin down to 20; iron at 97    5/27/2025:  - continue with current monitoring of labs and phlebotomies as directed - due for one now    11/27/2024:  - continued on phlebotomies pretty much every other month  - latest hgb at 13.8  - ferritin now down to 11    5/27/2025:  - she had phlebotomy 2 weeks ago  - latest CBC is adequate with Hgb WNL  - she gets phlebotomies every other month     (2) HTN and hypercholesterolemia     (3) SVT - followed by Dr Browne     (4) Chronic hep C at age 52 s/p antiviral therapy - followed  by Natalya  - AFP was a little elevated at 7.5  - US with hepatic steatosis    11/28/2023:  - latest AFP at 3.0 and adequate    5/27/2024:  - latest AFP 2.9 in Feb 2024 11/27/2024:  - AFP at 2.8 in Oct 2024    5/27/2025:  - latest AFP from March 2025 stable at 2.8     (5) Severe Alopecia - followed by Dr Estrella Kaba with derm      VISIT DIAGNOSES:      Chronic hepatitis C without hepatic coma    Polycythemia    Elevated MCV    Elevated ferritin          PLAN:  continue phlebotomies as directed (every other month), monitor labs monthly  Discussed the genetic implications of the HFE gene abnormality in siblings and children  Repeat AFP every 6 months   4.. F/u with PCP, Derm and GI  5.  Having left hip surgery tomorrow  6. F/u with cardiology as directed  RTC in  6 months  Fax note to Hector Colin PA*; Estrella Kaba, Jun; Ping    Discussion:     COVID-19 Discussion:    I had long discussion with patient and any applicable family about the COVID-19 coronavirus epidemic and the recommended precautions with regard to cancer and/or hematology patients. I have re-iterated the CDC recommendations for adequate hand washing, use of hand -like products, and coughing into elbow, etc. In addition, especially for our patients who are on chemotherapy and/or our otherwise immunocompromised patients, I have recommended avoidance of crowds, including movie theaters, restaurants, churches, etc. I have recommended avoidance of any sick or symptomatic family members and/or friends. I have also recommended avoidance of any raw and unwashed food products, and general avoidance of food items that have not been prepared by themselves. The patient has been asked to call us immediately with any symptom developments, issues, questions or other general concerns.     I spent over 25 mins of time with the patient. Reviewed results of the recently ordered labs, tests and studies; made directives with  regards to the results. Over half of this time was spent couseling and coordinating care.    I have explained all of the above in detail and the patient understands all of the current recommendation(s). I have answered all of their questions to the best of my ability and to their complete satisfaction.   The patient is to continue with the current management plan.            Electronically signed by Adan Baum MD                      Answers submitted by the patient for this visit:  Review of Systems Questionnaire (Submitted on 5/22/2025)  appetite change : No  unexpected weight change: No  mouth sores: No  visual disturbance: No  cough: No  shortness of breath: No  chest pain: No  abdominal pain: No  diarrhea: No  frequency: No  back pain: No  rash: No  headaches: No  adenopathy: No  nervous/ anxious: No

## 2025-05-27 ENCOUNTER — ANESTHESIA EVENT (OUTPATIENT)
Dept: SURGERY | Facility: HOSPITAL | Age: 70
End: 2025-05-27

## 2025-05-27 ENCOUNTER — OFFICE VISIT (OUTPATIENT)
Facility: CLINIC | Age: 70
End: 2025-05-27
Payer: MEDICARE

## 2025-05-27 VITALS
WEIGHT: 124 LBS | BODY MASS INDEX: 23.41 KG/M2 | RESPIRATION RATE: 16 BRPM | HEIGHT: 61 IN | DIASTOLIC BLOOD PRESSURE: 93 MMHG | OXYGEN SATURATION: 99 % | SYSTOLIC BLOOD PRESSURE: 137 MMHG | HEART RATE: 60 BPM | TEMPERATURE: 98 F

## 2025-05-27 DIAGNOSIS — B18.2 CHRONIC HEPATITIS C WITHOUT HEPATIC COMA: Primary | ICD-10-CM

## 2025-05-27 DIAGNOSIS — R79.89 ELEVATED FERRITIN: ICD-10-CM

## 2025-05-27 DIAGNOSIS — R71.8 ELEVATED MCV: ICD-10-CM

## 2025-05-27 DIAGNOSIS — D75.1 POLYCYTHEMIA: ICD-10-CM

## 2025-05-27 PROCEDURE — 99999 PR PBB SHADOW E&M-EST. PATIENT-LVL IV: CPT | Mod: PBBFAC,,, | Performed by: INTERNAL MEDICINE

## 2025-05-28 ENCOUNTER — HOSPITAL ENCOUNTER (OUTPATIENT)
Facility: HOSPITAL | Age: 70
Discharge: HOME | End: 2025-05-28
Attending: ORTHOPAEDIC SURGERY | Admitting: ORTHOPAEDIC SURGERY
Payer: MEDICARE

## 2025-05-28 ENCOUNTER — ANESTHESIA (OUTPATIENT)
Dept: SURGERY | Facility: HOSPITAL | Age: 70
End: 2025-05-28
Payer: MEDICARE

## 2025-05-28 DIAGNOSIS — M16.12 ARTHRITIS OF LEFT HIP: ICD-10-CM

## 2025-05-28 DIAGNOSIS — Z01.818 PRE-OP TESTING: ICD-10-CM

## 2025-05-28 LAB — ABORH RETYPE: NORMAL

## 2025-05-28 PROCEDURE — 37000009 HC ANESTHESIA EA ADD 15 MINS: Performed by: ORTHOPAEDIC SURGERY

## 2025-05-28 PROCEDURE — 63600175 PHARM REV CODE 636 W HCPCS: Performed by: ORTHOPAEDIC SURGERY

## 2025-05-28 PROCEDURE — 97116 GAIT TRAINING THERAPY: CPT

## 2025-05-28 PROCEDURE — 97110 THERAPEUTIC EXERCISES: CPT

## 2025-05-28 PROCEDURE — 36000711: Performed by: ORTHOPAEDIC SURGERY

## 2025-05-28 PROCEDURE — C1776 JOINT DEVICE (IMPLANTABLE): HCPCS | Performed by: ORTHOPAEDIC SURGERY

## 2025-05-28 PROCEDURE — 94799 UNLISTED PULMONARY SVC/PX: CPT

## 2025-05-28 PROCEDURE — 71000033 HC RECOVERY, INTIAL HOUR: Performed by: ORTHOPAEDIC SURGERY

## 2025-05-28 PROCEDURE — 97165 OT EVAL LOW COMPLEX 30 MIN: CPT

## 2025-05-28 PROCEDURE — 97535 SELF CARE MNGMENT TRAINING: CPT

## 2025-05-28 PROCEDURE — 25000003 PHARM REV CODE 250: Performed by: ANESTHESIOLOGY

## 2025-05-28 PROCEDURE — 25000003 PHARM REV CODE 250: Performed by: ORTHOPAEDIC SURGERY

## 2025-05-28 PROCEDURE — 71000016 HC POSTOP RECOV ADDL HR: Performed by: ORTHOPAEDIC SURGERY

## 2025-05-28 PROCEDURE — 71000015 HC POSTOP RECOV 1ST HR: Performed by: ORTHOPAEDIC SURGERY

## 2025-05-28 PROCEDURE — 37000008 HC ANESTHESIA 1ST 15 MINUTES: Performed by: ORTHOPAEDIC SURGERY

## 2025-05-28 PROCEDURE — 36415 COLL VENOUS BLD VENIPUNCTURE: CPT | Performed by: ORTHOPAEDIC SURGERY

## 2025-05-28 PROCEDURE — 36000710: Performed by: ORTHOPAEDIC SURGERY

## 2025-05-28 PROCEDURE — 97161 PT EVAL LOW COMPLEX 20 MIN: CPT

## 2025-05-28 PROCEDURE — 63600175 PHARM REV CODE 636 W HCPCS: Performed by: ANESTHESIOLOGY

## 2025-05-28 PROCEDURE — 27201423 OPTIME MED/SURG SUP & DEVICES STERILE SUPPLY: Performed by: ORTHOPAEDIC SURGERY

## 2025-05-28 PROCEDURE — 71000039 HC RECOVERY, EACH ADD'L HOUR: Performed by: ORTHOPAEDIC SURGERY

## 2025-05-28 PROCEDURE — 27130 TOTAL HIP ARTHROPLASTY: CPT | Mod: LT,,, | Performed by: ORTHOPAEDIC SURGERY

## 2025-05-28 DEVICE — IMPLANTABLE DEVICE
Type: IMPLANTABLE DEVICE | Site: HIP | Status: FUNCTIONAL
Brand: TAPERLOC COMPLETE PRIMARY FEMORAL

## 2025-05-28 DEVICE — IMPLANTABLE DEVICE
Type: IMPLANTABLE DEVICE | Site: HIP | Status: FUNCTIONAL
Brand: LONGEVITY®

## 2025-05-28 DEVICE — IMPLANTABLE DEVICE
Type: IMPLANTABLE DEVICE | Site: HIP | Status: FUNCTIONAL
Brand: BIOMET® HIP SYSTEM

## 2025-05-28 DEVICE — IMPLANTABLE DEVICE: Type: IMPLANTABLE DEVICE | Site: HIP | Status: FUNCTIONAL

## 2025-05-28 DEVICE — IMPLANTABLE DEVICE
Type: IMPLANTABLE DEVICE | Site: HIP | Status: FUNCTIONAL
Brand: G7® DUAL MOBILITY ACETABULAR SYSTEM

## 2025-05-28 RX ORDER — ONDANSETRON HYDROCHLORIDE 2 MG/ML
INJECTION, SOLUTION INTRAMUSCULAR; INTRAVENOUS
Status: DISCONTINUED | OUTPATIENT
Start: 2025-05-28 | End: 2025-05-28

## 2025-05-28 RX ORDER — FENTANYL CITRATE 50 UG/ML
25 INJECTION, SOLUTION INTRAMUSCULAR; INTRAVENOUS EVERY 5 MIN PRN
Status: DISCONTINUED | OUTPATIENT
Start: 2025-05-28 | End: 2025-05-28 | Stop reason: HOSPADM

## 2025-05-28 RX ORDER — MORPHINE SULFATE 2 MG/ML
2 INJECTION, SOLUTION INTRAMUSCULAR; INTRAVENOUS
Refills: 0 | Status: CANCELLED | OUTPATIENT
Start: 2025-05-28

## 2025-05-28 RX ORDER — ACETAMINOPHEN 10 MG/ML
INJECTION, SOLUTION INTRAVENOUS
Status: DISCONTINUED | OUTPATIENT
Start: 2025-05-28 | End: 2025-05-28

## 2025-05-28 RX ORDER — DEXAMETHASONE SODIUM PHOSPHATE 4 MG/ML
INJECTION, SOLUTION INTRA-ARTICULAR; INTRALESIONAL; INTRAMUSCULAR; INTRAVENOUS; SOFT TISSUE
Status: DISCONTINUED | OUTPATIENT
Start: 2025-05-28 | End: 2025-05-28

## 2025-05-28 RX ORDER — CEFAZOLIN 2 G/1
2 INJECTION, POWDER, FOR SOLUTION INTRAMUSCULAR; INTRAVENOUS
Status: COMPLETED | OUTPATIENT
Start: 2025-05-28 | End: 2025-05-28

## 2025-05-28 RX ORDER — MIDAZOLAM HYDROCHLORIDE 1 MG/ML
INJECTION INTRAMUSCULAR; INTRAVENOUS
Status: DISCONTINUED | OUTPATIENT
Start: 2025-05-28 | End: 2025-05-28

## 2025-05-28 RX ORDER — MORPHINE SULFATE 2 MG/ML
4 INJECTION, SOLUTION INTRAMUSCULAR; INTRAVENOUS EVERY 4 HOURS PRN
Refills: 0 | Status: CANCELLED | OUTPATIENT
Start: 2025-05-28

## 2025-05-28 RX ORDER — BUPIVACAINE HYDROCHLORIDE 7.5 MG/ML
INJECTION, SOLUTION EPIDURAL; RETROBULBAR
Status: COMPLETED | OUTPATIENT
Start: 2025-05-28 | End: 2025-05-28

## 2025-05-28 RX ORDER — HYDROCODONE BITARTRATE AND ACETAMINOPHEN 5; 325 MG/1; MG/1
1 TABLET ORAL EVERY 6 HOURS PRN
Qty: 30 TABLET | Refills: 0 | Status: SHIPPED | OUTPATIENT
Start: 2025-05-28

## 2025-05-28 RX ORDER — ONDANSETRON HYDROCHLORIDE 2 MG/ML
4 INJECTION, SOLUTION INTRAVENOUS EVERY 12 HOURS PRN
Status: CANCELLED | OUTPATIENT
Start: 2025-05-28

## 2025-05-28 RX ORDER — TRANEXAMIC ACID 1 G/10ML
INJECTION, SOLUTION INTRAVENOUS
Status: DISCONTINUED | OUTPATIENT
Start: 2025-05-28 | End: 2025-05-28

## 2025-05-28 RX ORDER — PROPOFOL 10 MG/ML
VIAL (ML) INTRAVENOUS CONTINUOUS PRN
Status: DISCONTINUED | OUTPATIENT
Start: 2025-05-28 | End: 2025-05-28

## 2025-05-28 RX ORDER — OXYCODONE HYDROCHLORIDE 5 MG/1
5 TABLET ORAL ONCE AS NEEDED
Status: COMPLETED | OUTPATIENT
Start: 2025-05-28 | End: 2025-05-28

## 2025-05-28 RX ORDER — LIDOCAINE HYDROCHLORIDE 10 MG/ML
1 INJECTION, SOLUTION EPIDURAL; INFILTRATION; INTRACAUDAL; PERINEURAL ONCE
Status: COMPLETED | OUTPATIENT
Start: 2025-05-28 | End: 2025-05-28

## 2025-05-28 RX ORDER — MUPIROCIN 20 MG/G
OINTMENT TOPICAL
Status: DISCONTINUED | OUTPATIENT
Start: 2025-05-28 | End: 2025-05-28 | Stop reason: HOSPADM

## 2025-05-28 RX ORDER — PHENYLEPHRINE HYDROCHLORIDE 10 MG/ML
INJECTION INTRAVENOUS
Status: DISCONTINUED | OUTPATIENT
Start: 2025-05-28 | End: 2025-05-28

## 2025-05-28 RX ORDER — FENTANYL CITRATE 50 UG/ML
INJECTION, SOLUTION INTRAMUSCULAR; INTRAVENOUS
Status: DISCONTINUED | OUTPATIENT
Start: 2025-05-28 | End: 2025-05-28

## 2025-05-28 RX ORDER — ONDANSETRON HYDROCHLORIDE 2 MG/ML
4 INJECTION, SOLUTION INTRAVENOUS ONCE AS NEEDED
Status: DISCONTINUED | OUTPATIENT
Start: 2025-05-28 | End: 2025-05-28 | Stop reason: HOSPADM

## 2025-05-28 RX ADMIN — BUPIVACAINE HYDROCHLORIDE 1.4 ML: 7.5 INJECTION, SOLUTION EPIDURAL; RETROBULBAR at 07:05

## 2025-05-28 RX ADMIN — MIDAZOLAM HYDROCHLORIDE 2 MG: 1 INJECTION INTRAMUSCULAR; INTRAVENOUS at 07:05

## 2025-05-28 RX ADMIN — FENTANYL CITRATE 50 MCG: 50 INJECTION, SOLUTION INTRAMUSCULAR; INTRAVENOUS at 08:05

## 2025-05-28 RX ADMIN — FENTANYL CITRATE 50 MCG: 50 INJECTION, SOLUTION INTRAMUSCULAR; INTRAVENOUS at 07:05

## 2025-05-28 RX ADMIN — TRANEXAMIC ACID 500 MG: 1 INJECTION, SOLUTION INTRAVENOUS at 09:05

## 2025-05-28 RX ADMIN — PHENYLEPHRINE HYDROCHLORIDE 200 MCG: 10 INJECTION INTRAVENOUS at 07:05

## 2025-05-28 RX ADMIN — DEXAMETHASONE SODIUM PHOSPHATE 4 MG: 4 INJECTION, SOLUTION INTRA-ARTICULAR; INTRALESIONAL; INTRAMUSCULAR; INTRAVENOUS; SOFT TISSUE at 07:05

## 2025-05-28 RX ADMIN — SODIUM CHLORIDE, SODIUM GLUCONATE, SODIUM ACETATE, POTASSIUM CHLORIDE AND MAGNESIUM CHLORIDE: 526; 502; 368; 37; 30 INJECTION, SOLUTION INTRAVENOUS at 09:05

## 2025-05-28 RX ADMIN — SODIUM CHLORIDE, SODIUM GLUCONATE, SODIUM ACETATE, POTASSIUM CHLORIDE AND MAGNESIUM CHLORIDE: 526; 502; 368; 37; 30 INJECTION, SOLUTION INTRAVENOUS at 08:05

## 2025-05-28 RX ADMIN — PHENYLEPHRINE HYDROCHLORIDE 100 MCG: 10 INJECTION INTRAVENOUS at 08:05

## 2025-05-28 RX ADMIN — OXYCODONE 5 MG: 5 TABLET ORAL at 10:05

## 2025-05-28 RX ADMIN — SODIUM CHLORIDE, SODIUM GLUCONATE, SODIUM ACETATE, POTASSIUM CHLORIDE AND MAGNESIUM CHLORIDE: 526; 502; 368; 37; 30 INJECTION, SOLUTION INTRAVENOUS at 10:05

## 2025-05-28 RX ADMIN — ACETAMINOPHEN 1000 MG: 10 INJECTION, SOLUTION INTRAVENOUS at 08:05

## 2025-05-28 RX ADMIN — LIDOCAINE HYDROCHLORIDE 10 MG: 10 INJECTION, SOLUTION EPIDURAL; INFILTRATION; INTRACAUDAL; PERINEURAL at 07:05

## 2025-05-28 RX ADMIN — ONDANSETRON HYDROCHLORIDE 4 MG: 2 INJECTION, SOLUTION INTRAMUSCULAR; INTRAVENOUS at 08:05

## 2025-05-28 RX ADMIN — TRANEXAMIC ACID 500 MG: 1 INJECTION, SOLUTION INTRAVENOUS at 07:05

## 2025-05-28 RX ADMIN — CEFAZOLIN 2 G: 2 INJECTION, POWDER, FOR SOLUTION INTRAMUSCULAR; INTRAVENOUS at 07:05

## 2025-05-28 RX ADMIN — FENTANYL CITRATE 25 MCG: 50 INJECTION INTRAMUSCULAR; INTRAVENOUS at 10:05

## 2025-05-28 RX ADMIN — Medication 50 MCG/KG/MIN: at 07:05

## 2025-05-28 RX ADMIN — SODIUM CHLORIDE, SODIUM GLUCONATE, SODIUM ACETATE, POTASSIUM CHLORIDE AND MAGNESIUM CHLORIDE: 526; 502; 368; 37; 30 INJECTION, SOLUTION INTRAVENOUS at 07:05

## 2025-05-28 RX ADMIN — MUPIROCIN 1 G: 20 OINTMENT TOPICAL at 07:05

## 2025-05-28 NOTE — OP NOTE
[facility]  Brief Operative Note     SUMMARY     Surgery Date: 5/28/2025     Surgeons and Role:     * Bhavesh Levin MD - Primary    First Assisstant: skip hollingsworth    Pre-op Diagnosis:  Arthritis of left hip [M16.12]  Pre-op testing [Z01.818]    Post-op Diagnosis:  Same    Procedure: Procedure(s):  ARTHROPLASTY, left total HIP    Anesthesia: Spinal    Implants:  Implant Name Type Inv. Item Serial No.  Lot No. LRB No. Used Action   3-Hole Acetabular Shell, 48mm, Liner Size C    [id number] Left 1 Implanted   LINER LONGEVITY ACET 38MM SZ C - [id number]  LINER LONGEVITY ACET 38MM SZ C  KATHRYN,INC [id number] Left 1 Implanted   LINER DUAL MOBILITY G7 38MM C - [id number]  LINER DUAL MOBILITY G7 38MM C  KATHRYN,INC [id number] Left 1 Implanted   STEM TAPERLOC FP T1 PPS SO 5 - [id number]  STEM TAPERLOC FP T1 PPS SO 5  KATHRYN,INC [id number] Left 1 Implanted   HEAD COCR MOD HD +6X28MM NK NS - [id number]  HEAD COCR MOD HD +6X28MM NK NS  KATHRYN,INC [id number] Left 1 Implanted       Estimated Blood Loss: 200 mL         Specimens:   Specimen (24h ago, onward)      None            Description of Procedure:   After informed consent was obtained correctly identifying the patient he was taken to the operating room and after adequate anesthesia prepped and draped in usual standard fashion in a lateral decubitus position.  Longitudinal incision was made centered over the greater trochant    Down to the fascia which was exposed and then split longitudinally down to the abductor musculature.  A portion of the anterior proximal of femur was subperiosteally reflected in an anterior arthrotomy was made allowing hip dislocation.  It conservative femoral neck cut was made just above the level of the lesser trochanter using a guide.  With the femoral head was removed I exposed the acetabulum with the superior and posterior and anterior inferior acetabular  retractors began using a conical reamers up to a size 47 and then impacted a 48 shell and nice stable position.  I placed the liner and then  exposed with the proximal femur using a box cut chisel followed by an entry level all and then sequential broaching up to a size 6.  I trialed my components and then placed the appropriate size stem.  I used a + 6 head and 38 mm a 28 mm dual mobility bearing that were impacted together and then I impacted this onto my stem.  Did a trial reduction we had what appeared to be equal leg lengths and nice stability with the appropriate soft tissue tensioning.  At this point irrigated my incision closed my arthrotomy the abductor musculature was in the fascia I will with a 2. Ethibond.  The irrigated the incision once again and reapproximated the subcutaneous tissue with a 0 Vicryl 2-0 Vicryl and a running 3-0 Monocryl stitch.  Patient tolerated the procedure well was taken to the recovery room in stable condition with brisk capillary refill of the digits.

## 2025-05-28 NOTE — TRANSFER OF CARE
"Anesthesia Transfer of Care Note    Patient: Suma Butterfield    Procedure(s) Performed: Procedure(s) (LRB):  ARTHROPLASTY, HIP (Left)    Patient location: PACU    Anesthesia Type: spinal    Transport from OR: Transported from OR on 2-3 L/min O2 by NC with adequate spontaneous ventilation    Post pain: adequate analgesia    Post assessment: no apparent anesthetic complications    Post vital signs: stable    Level of consciousness: awake    Nausea/Vomiting: no nausea/vomiting    Complications: none    Transfer of care protocol was followed      Last vitals: Visit Vitals  /69 (BP Location: Right arm, Patient Position: Lying)   Pulse (!) 57   Temp 36.6 °C (97.9 °F) (Skin)   Resp 16   Ht 5' 2" (1.575 m)   Wt 56.2 kg (124 lb)   SpO2 98%   Breastfeeding No   BMI 22.68 kg/m²     "

## 2025-05-28 NOTE — PT/OT/SLP EVAL
"Occupational Therapy   Evaluation, tx and discharge Note    Name: Smua Butterfield  MRN: [id number]  Admitting Diagnosis: <principal problem not specified>  Recent Surgery: Procedure(s) (LRB):  ARTHROPLASTY, HIP (Left) * Day of Surgery *  Diagnoses of Arthritis of left hip and Pre-op testing were pertinent to this visit.    Recommendations:     Discharge Recommendations: Low Intensity Therapy  Discharge Equipment Recommendations:   (pt had RW, BSC, TB and hip kit from [facility].)  Barriers to discharge:  None    Assessment:     Suma Butterfield is a 69 y.o. female with a medical diagnosis of <principal problem not specified>.  She presents Performance deficits affecting function: decreased lower extremity function, gait instability, pain, orthopedic precautions, impaired self care skills, impaired functional mobility.      Pt seen post op day 0 for s/p L hip arthroplasty, posterior hip precautions, WBAT. Pt minimal 3/10, pre treatment and 4/10 post. Pt c/o feeling slightly lightheaded and queasy" upon standing. She  performed UE dressing Indep, LE dressing CGA w/ reacher to don pants and SBA using sock aid seated BS chair. OT educated pt and spouse on posterior hip precautions. Min vc needed for recall and carryover. Spouse educated in above including simulated car and tub transfer tech. All questions /concerns addressed win scope. Pt ok to dc home from OT's stand point.     Rehab Prognosis: Good; patient would benefit from acute skilled OT services to address these deficits and reach maximum level of function.       Plan:     Patient to be seen   to address the above listed problems via therapeutic activities, self-care/home management  Plan of Care Expires:    Plan of Care Reviewed with: patient, spouse    Subjective     Chief Complaint: L hip pain 3/10.  Patient/Family Comments/goals: pt agreeable to OT.     Occupational Profile:  Living Environment: pt lives w/ spouse SSH/trailer w/ 1 step; t/s combo.   Previous level " of function: Indep ADLS, IADLs, drives, works FT.  Roles and Routines: active worker, wife.   Equipment Used at Home: none  Assistance upon Discharge: spouse    Pain/Comfort:  Pain Rating 1: 3/10  Location - Side 1: Left  Location - Orientation 1: lateral  Location 1: hip  Pain Addressed 1: Pre-medicate for activity, Reposition, Distraction, Cessation of Activity, Nurse notified (ice pk was in place)  Pain Rating Post-Intervention 1: 4/10    Patients cultural, spiritual, Mosque conflicts given the current situation: no    Objective:     Communicated with: nurse prior to session.  Patient found up in chair with hip abduction pillow upon OT entry to room.    General Precautions: Standard, fall  Orthopedic Precautions: LLE weight bearing as tolerated, LLE posterior precautions  Braces: N/A  Respiratory Status: Room air    Occupational Performance:    Functional Mobility/Transfers:  Patient completed Sit <> Stand Transfer with contact guard assistance  with  rolling walker   Functional Mobility: ambulated 10ft in [place] back to bs chair. Min vc to avoid twisting trunk when turning.    Activities of Daily Living:  Feeding:  independence .  Grooming: independence seated  Upper Body Dressing: independence seated for pullover shirt and bra  Lower Body Dressing: contact guard assistance to thread LL int garment first. Initially L foot/nonskid sock snagged on pants OT removed sock then pt able to thread w/ reacher and threaded R leg w/o need for reacher; stood CGA w/ RW to pull over hips. No LOB; vc to placed L heel pl floor. (Pt guarding).  Toileting: no need     Cognitive/Visual Perceptual:  Cognitive/Psychosocial Skills:     -       Oriented to: Person, Place, Time, and Situation   -       Follows Commands/attention:Follows multistep  commands  -       Communication: clear/fluent  -       Memory: No Deficits noted  -       Safety awareness/insight to disability: intact   -       Mood/Affect/Coping skills/emotional  control: Cooperative and Anxious-likely due to pain  Visual/Perceptual:      -Intact     Physical Exam:  Balance:    -       sitting; good  dynamic NA due to no bending posterior hip precautions.  Standing static: good-  dynamic standing: fair  Postural examination/scapula alignment:    -       No postural abnormalities identified  Skin integrity: L hip post op incision dressing intact  Dominant hand:    -       right  Upper Extremity Range of Motion:     -       Right Upper Extremity: WFL  -       Left Upper Extremity: WFL  Upper Extremity Strength:    -       Right Upper Extremity: WFL  -       Left Upper Extremity: WFL   Strength:    -       Right Upper Extremity: WFL  -       Left Upper Extremity: WFL    AMPAC 6 Click ADL:  AMPAC Total Score: 22    Treatment & Education:  Pt seen for safe self care activities and fx mobility retraining as stated above.  All questions/concerns addressed within scope.  Call staff for BTB/OOB assist. Call bell use explained. Pt acknowledged.  Purpose of OT and POC  Pt and spouse educated on posterior hip precautions and WBAT precautions as regards to dressing LE w/ handout issue, adaptive equipment use, simulated /demonstrated car and tub transfer w/ TTB and RW.         Patient left up in chair with all lines intact, call button in reach, nurse notified, and spouse present    GOALS:   Multidisciplinary Problems       Occupational Therapy Goals       Not on file                    DME Justifications:  No DME recommended requiring DME justifications    History:     Past Medical History:   Diagnosis Date    Elevated ferritin 10/13/2022    Elevated MCV 10/13/2022    General anesthetics causing adverse effect in therapeutic use     STATES SHE CODES WITH SUCC    Hepatitis C     Hypertension     Polycythemia 10/13/2022    SVT (supraventricular tachycardia)          Past Surgical History:   Procedure Laterality Date     SECTION      COLONOSCOPY      CORONARY ANGIOGRAPHY N/A  03/10/2023    Procedure: ANGIOGRAM, CORONARY ARTERY;  Surgeon: Jill Griffiths MD;  Location: [facility] CATH/EP LAB;  Service: Cardiology;  Laterality: N/A;    LEFT HEART CATHETERIZATION Left 03/10/2023    Procedure: Left heart cath;  Surgeon: Jill Griffiths MD;  Location: [facility] CATH/EP LAB;  Service: Cardiology;  Laterality: Left;       Time Tracking:     OT Date of Treatment: 05/28/25  OT Start Time: 1220  OT Stop Time: 1245  OT Total Time (min): 25 min    Billable Minutes:Evaluation 8  Self Care/Home Management 17  Total Time 25    5/28/2025

## 2025-05-28 NOTE — PLAN OF CARE
Released from Pacu per Anesthesia when criteria met pain controlled skin w+d No nausea No emesis  AQUACELL dsg dry intact aaox4 encouraged deep breaths instr on IS encouraged use  Pt has all belongings in post op   put top denture back in  ice pk to L hip and teds on both legs and abduction pillow in place +2 pulse noted in L foot needs practice on IS

## 2025-05-28 NOTE — ANESTHESIA PROCEDURE NOTES
Spinal    Diagnosis: left hip pain  Patient location during procedure: OR  Start time: 5/28/2025 7:35 AM  Timeout: 5/28/2025 7:34 AM  End time: 5/28/2025 7:40 AM    Staffing  Authorizing Provider: Jeronimo Sanchez MD  Performing Provider: Jeronimo Sanchez MD    Staffing  Performed by: Jeronimo Sanchez MD  Authorized by: Jeronimo Sanchez MD    Preanesthetic Checklist  Completed: patient identified, IV checked, site marked, risks and benefits discussed, surgical consent, monitors and equipment checked, pre-op evaluation and timeout performed  Spinal Block  Patient position: sitting  Prep: ChloraPrep  Patient monitoring: cardiac monitor, continuous pulse ox and continuous capnometry  Approach: midline  Location: L3-4  Injection technique: single shot  CSF Fluid: clear free-flowing CSF  Needle  Needle type: pencil-tip   Needle gauge: 25 G  Needle length: 3.5 in  Needle localization: anatomical landmarks  Medications:    Medications: bupivacaine (pf) (MARCAINE) injection 0.75% - Intraspinal   1.4 mL - 5/28/2025 7:35:00 AM

## 2025-05-28 NOTE — PLAN OF CARE
Patient received from recovery at this time.  AAOX3.  NAD noted.  Dressing to left hip remains clean, dry and intact. Tolerating po intake well with no complaints of nausea/vomiting.  Patient able to move BLE with no problems noted.  Due to void.

## 2025-05-28 NOTE — ANESTHESIA PREPROCEDURE EVALUATION
05/28/2025  Suma Butterfield is a 69 y.o., female.      Pre-op Assessment    I have reviewed the Patient Summary Reports.    I have reviewed the NPO Status.   I have reviewed the Medications.     Review of Systems  Anesthesia Hx:  No problems with previous Anesthesia              Personal Hx of Anesthesia complications     Pseudocholinesterase Deficiency, based on clinical history per patient, patient history of confirmatory blood testing, result not available                Cardiovascular:     Hypertension   CAD    Dysrhythmias       hyperlipidemia         Coronary Artery Disease:                            Hypertension         Hepatic/GI:      Liver Disease, Hepatitis           Liver Disease, Hepatitis        Neurological:  Neurology Normal                                          Physical Exam  General: Well nourished    Airway:  Mallampati: II   Mouth Opening: Normal  TM Distance: Normal  Neck ROM: Normal ROM        Anesthesia Plan  Type of Anesthesia, risks & benefits discussed:    Anesthesia Type: Spinal  Intra-op Monitoring Plan: Standard ASA Monitors  Post Op Pain Control Plan: multimodal analgesia  Informed Consent: Informed consent signed with the Patient and all parties understand the risks and agree with anesthesia plan.  All questions answered.   ASA Score: 3    Ready For Surgery From Anesthesia Perspective.     .

## 2025-05-28 NOTE — PLAN OF CARE
Patient cleared by PT/OT to discharge to home.  Patient able to void with no problems noted.  Dressing to left hip remains clean dry and intact.  Aquacel dressing sent home with patient.  Patient states that she was told by Dr Levin to start ASA 81 mg twice a day by Dr Levin.  Discharge instructions given to pt and family/friend, verbalized understanding and questions answered. Handouts provided. Belongings given back to pt. IV removed- catheter intact. Discharge via wheelchair.

## 2025-05-28 NOTE — CARE UPDATE
05/28/25 0649   Incentive Spirometer   $ Incentive Spirometer Charges preop instruction   Incentive Spirometer Predicted Level (mL) 1440   Administration (IS) instruction provided, initial   Number of Repetitions (IS) 15   Level Incentive Spirometer (mL) 2250   Patient Tolerance (IS) good     Encouraged patient to use q 1 post-op

## 2025-05-28 NOTE — DISCHARGE SUMMARY
[facility]  Discharge Note  Short Stay    Procedure(s) (LRB):  ARTHROPLASTY, HIP (Left)      OUTCOME: Patient tolerated treatment/procedure well without complication and is now ready for discharge.    DISPOSITION: Home or Self Care    FINAL DIAGNOSIS:  Left hip osteoarthritis status post total hip replacement    FOLLOWUP: In clinic    DISCHARGE INSTRUCTIONS:    Discharge Procedure Orders   Diet general     Call MD for:  temperature >100.4     Call MD for:  persistent nausea and vomiting     Call MD for:  severe uncontrolled pain     Call MD for:  difficulty breathing, headache or visual disturbances     Call MD for:  redness, tenderness, or signs of infection (pain, swelling, redness, odor or green/yellow discharge around incision site)     Call MD for:  hives     Call MD for:  persistent dizziness or light-headedness     Call MD for:  extreme fatigue     Change dressing (specify)   Order Comments: Dressing change:  Change bandage in 4-5 days or as needed for saturation with the Aquacel bandage provided        TIME SPENT ON DISCHARGE: 15 minutes

## 2025-05-28 NOTE — PT/OT/SLP EVAL
Physical Therapy Evaluation and Discharge Note    Patient Name:  Suma Butterfield   MRN:  [id number]    Recommendations:     Discharge Recommendations: Low Intensity Therapy  Discharge Equipment Recommendations: none   Barriers to discharge: None    Assessment:     Suma Butterfield is a 69 y.o. female admitted with a medical diagnosis of <principal problem not specified>. .  Pt seen at post 05, alert and agreeable to PT. Spouse at bedside. Pt with abd pillow on and educated on use and purpose. Pt educated on L ANGIE posterior precautions. Pt completed thera ex in supine. Min assist to sit EOB and to stand with RW. Pt with wet diaper and was changed. OOb chair min assist. Pt then taken to hallway and ambulated 220 ft with RW with 2 seated rests, another person following with chair. Pt ascended and descended 1 threshold step min assist. OOB chair.  LIT    Recent Surgery: Procedure(s) (LRB):  ARTHROPLASTY, HIP (Left) * Day of Surgery *    Plan:     During this hospitalization, patient does not require further acute PT services.  Please re-consult if situation changes.      Subjective   Pt stated that she is employed at [employer] in [facility] and will be returning in 6 weeks.  Lived at home with spouse and 43 y/o son with Down's  Chief Complaint: L incisional discomfort/burning    Patient/Family Comments/goals: get well  Pain/Comfort:  Pain Rating 1: 5/10  Location - Side 1: Left  Location 1: hip  Pain Addressed 1: Pre-medicate for activity, Reposition, Distraction    Patients cultural, spiritual, Restorationist conflicts given the current situation:      Living Environment:  Home with family and 1 threshold step  Prior to admission, patients level of function was ambulatory/employed. Has good and bad days.  Equipment used at home: none.  DME owned (not currently used): rolling walker.  Upon discharge, patient will have assistance from family.    Objective:     Communicated with nurse Antonio/Sissy prior to session.  Patient found HOB elevated  with hip abduction pillow upon PT entry to room.    General Precautions: Standard, fall    Orthopedic Precautions:LLE weight bearing as tolerated, LLE posterior precautions   Braces: N/A  Respiratory Status: Room air    Exams:  Postural Exam:  Patient presented with the following abnormalities:    -       Rounded shoulders  -       Forward head  -       BMI 22.68  RLE ROM: WFL  RLE Strength: WFL  LLE ROM: Deficits: within L posterior hip precautions  LLE Strength: Deficits: 3/5    Functional Mobility:  Bed Mobility:     Scooting: minimum assistance  Supine to Sit: minimum assistance  Transfers:     Sit to Stand:  minimum assistance with rolling walker  Bed to Chair: minimum assistance with  rolling walker  using  Stand Pivot  Gait: 220ft with RW min assist with another person following with chair and 2 seated rest  Stairs:  Pt ascended/descended 1 threshold step stair(s) with Rolling Walker with no handrails with Minimal Assistance.     AM-PAC 6 CLICK MOBILITY  Total Score:18       Treatment and Education:  Patient was educated on the importance of OOB activity and functional mobility to negate negative effects of prolonged bed rest during hospitalization, safe transfers and ambulation, and D/C planning   Educated on L posterior hip precautions  Use of abd pillow especially at nites since she sleeps on R side  HEP education and instruction with AP,QS/GS,HS  OOB chair and reclined with abd pillow on    AM-PAC 6 CLICK MOBILITY  Total Score:18     Patient left up in chair with all lines intact, call button in reach, nurse Tab notified, and spouse present.    GOALS:   Multidisciplinary Problems       Physical Therapy Goals       Not on file                    DME Justifications:  No DME recommended requiring DME justifications    History:     Past Medical History:   Diagnosis Date    Elevated ferritin 10/13/2022    Elevated MCV 10/13/2022    General anesthetics causing adverse effect in therapeutic use     STATES SHE  CODES WITH SUCC    Hepatitis C     Hypertension     Polycythemia 10/13/2022    SVT (supraventricular tachycardia)        Past Surgical History:   Procedure Laterality Date     SECTION      COLONOSCOPY      CORONARY ANGIOGRAPHY N/A 03/10/2023    Procedure: ANGIOGRAM, CORONARY ARTERY;  Surgeon: Jill Griffiths MD;  Location: [facility] CATH/EP LAB;  Service: Cardiology;  Laterality: N/A;    LEFT HEART CATHETERIZATION Left 03/10/2023    Procedure: Left heart cath;  Surgeon: Jill Griffiths MD;  Location: [facility] CATH/EP LAB;  Service: Cardiology;  Laterality: Left;       Time Tracking:     PT Received On: 25  PT Start Time: 1121     PT Stop Time: 1218  PT Total Time (min): 57 min     Billable Minutes: Evaluation 10, Gait Training 30, and Therapeutic Exercise 17      2025

## 2025-05-28 NOTE — DISCHARGE INSTRUCTIONS
"Discharge Instructions: After Your Surgery/Procedure  Youve just had surgery. During surgery you were given medicine called anesthesia to keep you relaxed and free of pain. After surgery you may have some pain or nausea. This is common. Here are some tips for feeling better and getting well after surgery.     Stay on schedule with your medication.   Going home  Your doctor or nurse will show you how to take care of yourself when you go home. He or she will also answer your questions. Have an adult family member or friend drive you home.      For your safety we recommend these precaution for the first 24 hours after your procedure:  Do not drive or use heavy equipment.  Do not make important decisions or sign legal papers.  Do not drink alcohol.  Have someone stay with you, if needed. He or she can watch for problems and help keep you safe.  Your concentration, balance, coordination, and judgement may be impaired for many hours after anesthesia.  Use caution when ambulating or standing up.     You may feel weak and "washed out" after anesthesia and surgery.      Subtle residual effects of general anesthesia or sedation with regional / local anesthesia can last more than 24 hours.  Rest for the remainder of the day or longer if your Doctor/Surgeon has advised you to do so.  Although you may feel normal within the first 24 hours, your reflexes and mental ability may be impaired without you realizing it.  You may feel dizzy, lightheaded or sleepy for 24 hours or longer.      Be sure to go to all follow-up visits with your doctor. And rest after your surgery for as long as your doctor tells you to.  Coping with pain  If you have pain after surgery, pain medicine will help you feel better. Take it as told, before pain becomes severe. Also, ask your doctor or pharmacist about other ways to control pain. This might be with heat, ice, or relaxation. And follow any other instructions your surgeon or nurse gives you.  Tips " for taking pain medicine  To get the best relief possible, remember these points:  Pain medicines can upset your stomach. Taking them with a little food may help.  Most pain relievers taken by mouth need at least 20 to 30 minutes to start to work.  Taking medicine on a schedule can help you remember to take it. Try to time your medicine so that you can take it before starting an activity. This might be before you get dressed, go for a walk, or sit down for dinner.  Constipation is a common side effect of pain medicines. Call your doctor before taking any medicines such as laxatives or stool softeners to help ease constipation. Also ask if you should skip any foods. Drinking lots of fluids and eating foods such as fruits and vegetables that are high in fiber can also help. Remember, do not take laxatives unless your surgeon has prescribed them.  Drinking alcohol and taking pain medicine can cause dizziness and slow your breathing. It can even be deadly. Do not drink alcohol while taking pain medicine.  Pain medicine can make you react more slowly to things. Do not drive or run machinery while taking pain medicine.  Your health care provider may tell you to take acetaminophen to help ease your pain. Ask him or her how much you are supposed to take each day. Acetaminophen or other pain relievers may interact with your prescription medicines or other over-the-counter (OTC) drugs. Some prescription medicines have acetaminophen and other ingredients. Using both prescription and OTC acetaminophen for pain can cause you to overdose. Read the labels on your OTC medicines with care. This will help you to clearly know the list of ingredients, how much to take, and any warnings. It may also help you not take too much acetaminophen. If you have questions or do not understand the information, ask your pharmacist or health care provider to explain it to you before you take the OTC medicine.  Managing nausea  Some people have an  upset stomach after surgery. This is often because of anesthesia, pain, or pain medicine, or the stress of surgery. These tips will help you handle nausea and eat healthy foods as you get better. If you were on a special food plan before surgery, ask your doctor if you should follow it while you get better. These tips may help:  Do not push yourself to eat. Your body will tell you when to eat and how much.  Start off with clear liquids and soup. They are easier to digest.  Next try semi-solid foods, such as mashed potatoes, applesauce, and gelatin, as you feel ready.  Slowly move to solid foods. Dont eat fatty, rich, or spicy foods at first.  Do not force yourself to have 3 large meals a day. Instead eat smaller amounts more often.  Take pain medicines with a small amount of solid food, such as crackers or toast, to avoid nausea.     Call your surgeon if  You still have pain an hour after taking medicine. The medicine may not be strong enough.  You feel too sleepy, dizzy, or groggy. The medicine may be too strong.  You have side effects like nausea, vomiting, or skin changes, such as rash, itching, or hives.       If you have obstructive sleep apnea  You were given anesthesia medicine during surgery to keep you comfortable and free of pain. After surgery, you may have more apnea spells because of this medicine and other medicines you were given. The spells may last longer than usual.   At home:  Keep using the continuous positive airway pressure (CPAP) device when you sleep. Unless your health care provider tells you not to, use it when you sleep, day or night. CPAP is a common device used to treat obstructive sleep apnea.  Talk with your provider before taking any pain medicine, muscle relaxants, or sedatives. Your provider will tell you about the possible dangers of taking these medicines.  © 3384-8253 [employer]. [address] [place] [address], [place] [address]. All rights reserved. This information is  not intended as a substitute for professional medical care. Always follow your healthcare professional's instructions.            Using an Incentive Spirometer    An incentive spirometer is a device that helps you do deep breathing exercises. These exercises expand your lungs, aid in circulation, and help prevent pneumonia. Deep breathing exercises also help you breathe better and improve the function of your lungs by:  Keeping your lungs clear  Strengthening your breathing muscles  Helping prevent respiratory complications or problems  The incentive spirometer gives you a way to take an active part in recover. A nurse or therapist will teach you breathing exercises. To do these exercises, you will breathe in through your mouth and not your nose. The incentive spirometer only works correctly if you breathe in through your mouth.  Steps to clear lungs  Step 1. Exhale normally. Then, inhale normally.  Relax and breathe out.  Step 2. Place your lips tightly around the mouthpiece.  Make sure the device is upright and not tilted.  Step 3. Inhale as much air as you can through the mouthpiece (don't breath through your nose).  Inhale slowly and deeply.  Hold your breath long enough to keep the balls or disk raised for at least 3 to 5 seconds, or as instructed by your healthcare provider.  Some spirometers have an indicator to let you know that you are breathing in too fast. If the indicator goes off, breathe in more slowly.  Step 4. Repeat the exercise regularly.  Do this exercise every hour while you're awake, or as instructed by your healthcare provider.  If you were taught deep breathing and coughing exercises, do them regularly as instructed by your healthcare provider.                   Post op instructions for prevention of DVT  What is deep vein thrombosis?  Deep vein thrombosis (DVT) is the medical term for blood clots in the deep veins of the leg.  These blood clots can be dangerous.  A DVT can block a blood  vessel and keep blood from getting where it needs to go.  Another problem is that the clot can travel to other parts of the body such as the lungs.  A clot that travels to the lungs is called a pulmonary embolus (PE) and can cause serious problems with breathing which can lead to death.  Am I at risk for DVT/PE?  If you are not very active, you are at risk of DVT.  Anyone confined to bed, sitting for long periods of time, recovering from surgery, etc. increases the risk of DVT.  Other risk factors are cancer diagnosis, certain medications, estrogen replacement in any form,older age, obesity, pregnancy, smoking, history of clotting disorders, and dehydration.  How will I know if I have a DVT?  Swelling in the lower leg  Pain  Warmth, redness, hardness or bulging of the vein  If you have any of these symptoms, call your doctors office right away.  Some people will not have any symptoms until the clot moves to the lungs.  What are the symptoms of a PE?  Panting, shortness of breath, or trouble breathing  Sharp, knife-like chest pain when you breathe  Coughing or coughing up blood  Rapid heartbeat  If you have any of these symptoms or get worse quickly, call 911 for emergency treatment.  How can I prevent a DVT?  Avoid long periods of inactivity and dont cross your legs--get up and walk around every hour or so.  Stay active--walking after surgery is highly encouraged.  This means you should get out of the house and walk in the neighborhood.  Going up and down stairs will not impair healing (unless advised against such activity by your doctor).    Drink plenty of noncaffeinated, nonalcoholic fluids each day to prevent dehydration.  Wear special support stockings, if they have been advised by your doctor.  If you travel, stop at least once an hour and walk around.  Avoid smoking (assistance with stopping is available through your healthcare provider)  Always notify your doctor if you are not able to follow the post  operative instructions that are given to you at the time of discharge.  It may be necessary to prescribe one of the medications available to prevent DVT.            We hope your stay was comfortable as you heal now, mend and rest.    For we have enjoyed taking care of you by giving your our best.    And as you get better, by regaining your health and strength;   We count it as a privilege to have served you and hope your time at [facility] was well spent.      Thank  You!!!

## 2025-05-28 NOTE — PLAN OF CARE
Patient prepared for surgery, resting in bed, with family/friend at bedside. Family signed up for text messaging, family will be going home for a little while, but will be back when patient goes into recovery. Belongings brought over to post op cabinet. IS teaching performed. Fall risk agreement reviewed, armband placed. Allergies reviewed, armband placed. Blood bank ID verified and armband placed on patient. TEDs and SCDs on.

## 2025-05-29 VITALS
TEMPERATURE: 98 F | WEIGHT: 124 LBS | HEIGHT: 62 IN | BODY MASS INDEX: 22.82 KG/M2 | SYSTOLIC BLOOD PRESSURE: 128 MMHG | HEART RATE: 51 BPM | DIASTOLIC BLOOD PRESSURE: 59 MMHG | RESPIRATION RATE: 16 BRPM | OXYGEN SATURATION: 100 %

## 2025-05-29 PROCEDURE — G0180 MD CERTIFICATION HHA PATIENT: HCPCS | Mod: ,,, | Performed by: ORTHOPAEDIC SURGERY

## 2025-05-29 NOTE — ANESTHESIA POSTPROCEDURE EVALUATION
Anesthesia Post Evaluation    Patient: Suma Butterfield    Procedure(s) Performed: Procedure(s) (LRB):  ARTHROPLASTY, HIP (Left)    Final Anesthesia Type: spinal      Patient location during evaluation: PACU  Patient participation: Yes- Able to Participate  Level of consciousness: awake and alert  Post-procedure vital signs: reviewed and stable  Pain management: adequate  Airway patency: patent    PONV status at discharge: No PONV  Anesthetic complications: no      Cardiovascular status: blood pressure returned to baseline  Respiratory status: unassisted  Hydration status: euvolemic  Follow-up not needed.              Vitals Value Taken Time   /59 05/28/25 11:30   Temp 36.6 °C (97.9 °F) 05/28/25 10:40   Pulse 51 05/28/25 11:30   Resp 16 05/28/25 11:30   SpO2 100 % 05/28/25 11:30         Event Time   Out of Recovery 11:00:00         Pain/Gaby Score: Pain Rating Prior to Med Admin: 6 (5/28/2025 10:29 AM)  Pain Rating Post Med Admin: 3 (5/28/2025 10:42 AM)  Gaby Score: 9 (5/28/2025 10:42 AM)  Modified Gaby Score: 18 (5/28/2025 11:30 AM)

## 2025-06-01 ENCOUNTER — HOSPITAL ENCOUNTER (EMERGENCY)
Facility: HOSPITAL | Age: 70
Discharge: HOME | End: 2025-06-01
Attending: EMERGENCY MEDICINE
Payer: MEDICARE

## 2025-06-01 VITALS
TEMPERATURE: 98 F | HEART RATE: 70 BPM | HEIGHT: 62 IN | RESPIRATION RATE: 18 BRPM | SYSTOLIC BLOOD PRESSURE: 112 MMHG | OXYGEN SATURATION: 96 % | DIASTOLIC BLOOD PRESSURE: 61 MMHG | BODY MASS INDEX: 22.82 KG/M2 | WEIGHT: 124 LBS

## 2025-06-01 DIAGNOSIS — G89.18 POSTOPERATIVE PAIN OF EXTREMITY: Primary | ICD-10-CM

## 2025-06-01 DIAGNOSIS — M79.609 POSTOPERATIVE PAIN OF EXTREMITY: Primary | ICD-10-CM

## 2025-06-01 DIAGNOSIS — M79.89 LEG SWELLING: ICD-10-CM

## 2025-06-01 PROCEDURE — 99284 EMERGENCY DEPT VISIT MOD MDM: CPT | Mod: 25

## 2025-06-06 ENCOUNTER — OFFICE VISIT (OUTPATIENT)
Dept: FAMILY MEDICINE | Facility: CLINIC | Age: 70
End: 2025-06-06
Payer: MEDICARE

## 2025-06-06 VITALS
RESPIRATION RATE: 18 BRPM | WEIGHT: 126 LBS | SYSTOLIC BLOOD PRESSURE: 104 MMHG | BODY MASS INDEX: 23.19 KG/M2 | HEIGHT: 62 IN | OXYGEN SATURATION: 97 % | DIASTOLIC BLOOD PRESSURE: 60 MMHG | HEART RATE: 69 BPM

## 2025-06-06 DIAGNOSIS — I25.10 CORONARY ARTERY DISEASE, UNSPECIFIED VESSEL OR LESION TYPE, UNSPECIFIED WHETHER ANGINA PRESENT, UNSPECIFIED WHETHER NATIVE OR TRANSPLANTED HEART: ICD-10-CM

## 2025-06-06 DIAGNOSIS — L65.9 ALOPECIA: ICD-10-CM

## 2025-06-06 DIAGNOSIS — I10 ESSENTIAL HYPERTENSION: ICD-10-CM

## 2025-06-06 DIAGNOSIS — E78.00 HYPERCHOLESTEROLEMIA: ICD-10-CM

## 2025-06-06 DIAGNOSIS — M16.10 ARTHRITIS OF HIP, UNSPECIFIED LATERALITY: Primary | ICD-10-CM

## 2025-06-06 DIAGNOSIS — D75.1 POLYCYTHEMIA: ICD-10-CM

## 2025-06-06 RX ORDER — ATORVASTATIN CALCIUM 40 MG/1
40 TABLET, FILM COATED ORAL NIGHTLY
Qty: 90 TABLET | Refills: 1 | Status: SHIPPED | OUTPATIENT
Start: 2025-06-06

## 2025-06-06 RX ORDER — METOPROLOL TARTRATE 100 MG/1
100 TABLET ORAL 2 TIMES DAILY
Qty: 180 TABLET | Refills: 1 | Status: SHIPPED | OUTPATIENT
Start: 2025-06-06 | End: 2025-12-03

## 2025-06-13 ENCOUNTER — OFFICE VISIT (OUTPATIENT)
Dept: ORTHOPEDICS | Facility: CLINIC | Age: 70
End: 2025-06-13
Payer: MEDICARE

## 2025-06-13 VITALS — WEIGHT: 127.88 LBS | HEIGHT: 62 IN | BODY MASS INDEX: 23.53 KG/M2

## 2025-06-13 DIAGNOSIS — Z96.642 S/P TOTAL LEFT HIP ARTHROPLASTY: Primary | ICD-10-CM

## 2025-06-13 PROCEDURE — 99999 PR PBB SHADOW E&M-EST. PATIENT-LVL III: CPT | Mod: PBBFAC,HCNC,, | Performed by: ORTHOPAEDIC SURGERY

## 2025-06-13 NOTE — PROGRESS NOTES
Subjective:      Patient ID: Suma Butterfield is a 69 y.o. female.    Chief Complaint: Post-op Evaluation of the Left Hip (DOS: 05/28/2025)    HPI  The patient presents for her 2 week follow up status post left total hip has very little pain.  ROS      Objective:    Ortho Exam     Ambulates nicely with a walker she has essentially full weight-bearing  She has no significant incisional tenderness has a good active and passive hip range of motion  No leg length discrepancy    US Lower Extremity Veins Left  Narrative: EXAMINATION:  US LOWER EXTREMITY VEINS LEFT    CLINICAL HISTORY:  Other specified soft tissue disorders    TECHNIQUE:  Duplex and color flow Doppler evaluation and graded compression of the left lower extremity veins was performed.    COMPARISON:  None    FINDINGS:  Left thigh veins: The common femoral, femoral, popliteal, upper greater saphenous, and deep femoral veins are patent and free of thrombus. The veins are normally compressible and have normal phasic flow and augmentation response.    Left calf veins: The visualized calf veins are patent.    Contralateral CFV: The contralateral (right) common femoral vein is patent and free of thrombus.    Miscellaneous: There is nonspecific subcutaneous soft tissue edema posterior soft tissues left knee.  Impression: No evidence of deep venous thrombosis in the left lower extremity.    Electronically signed by: Hector Barajas  Date:    06/01/2025  Time:    10:26       My Radiographs Findings:    No new radiographs  Assessment:       Encounter Diagnosis   Name Primary?    S/P total left hip arthroplasty Yes         Plan:       Patient is doing well status post left total hip.  We have discussed use of her Griffin hose.  She comes in today without him.  We have also discussed use of aspirin twice daily for the next several weeks we have discussed general hip precautions local wound care and follow up radiographs in 6 weeks sooner if any questions or problems.         Past Medical History:   Diagnosis Date    Elevated ferritin 10/13/2022    Elevated MCV 10/13/2022    General anesthetics causing adverse effect in therapeutic use     STATES SHE CODES WITH SUCC    Hepatitis C     Hypertension     Polycythemia 10/13/2022    SVT (supraventricular tachycardia)      Past Surgical History:   Procedure Laterality Date     SECTION      COLONOSCOPY      CORONARY ANGIOGRAPHY N/A 03/10/2023    Procedure: ANGIOGRAM, CORONARY ARTERY;  Surgeon: Jill Griffiths MD;  Location: [facility] CATH/EP LAB;  Service: Cardiology;  Laterality: N/A;    HIP ARTHROPLASTY Left 2025    Procedure: ARTHROPLASTY, HIP;  Surgeon: Bhavesh Levin MD;  Location: [facility];  Service: Orthopedics;  Laterality: Left;  Shashank notified 25 ark    LEFT HEART CATHETERIZATION Left 03/10/2023    Procedure: Left heart cath;  Surgeon: Jill Griffiths MD;  Location: [facility] CATH/EP LAB;  Service: Cardiology;  Laterality: Left;       Current Medications[1]    Review of patient's allergies indicates:   Allergen Reactions    Succinylcholine Anaphylaxis     **Pseudocholinesterase**   HEART STOPS       Family History   Problem Relation Name Age of Onset    Glaucoma Neg Hx      Macular degeneration Neg Hx      Retinal detachment Neg Hx       Social History     Occupational History    Not on file   Tobacco Use    Smoking status: Former     Types: Cigarettes     Passive exposure: Past    Smokeless tobacco: Never   Substance and Sexual Activity    Alcohol use: Yes     Alcohol/week: 14.0 standard drinks of alcohol     Types: 14 Glasses of wine per week     Comment: 2 GLASSES DAILY    Drug use: Never    Sexual activity: Yes     Partners: Male            [1]   Current Outpatient Medications:     alendronate (FOSAMAX) 70 MG tablet, TK 1 T PO 1 TIME Q WK, Disp: 12 tablet, Rfl: 3    amLODIPine (NORVASC) 2.5 MG tablet, Take 1 tablet (2.5 mg total) by mouth once daily., Disp: 30 tablet, Rfl: 11    atorvastatin  (LIPITOR) 40 MG tablet, Take 1 tablet (40 mg total) by mouth every evening., Disp: 90 tablet, Rfl: 1    biotin 5,000 mcg TbDL, Take 5,000 mcg by mouth once daily., Disp: , Rfl:     calcium-vitamin D3 (OS- + D3) 500 mg-5 mcg (200 unit) per tablet, Take 1 tablet by mouth once daily., Disp: , Rfl:     finasteride (PROSCAR) 5 mg tablet, TAKE 1 TABLET BY MOUTH EVERY DAY, Disp: 90 tablet, Rfl: 3    fluocinonide (LIDEX) 0.05 % external solution, Apply 1 application  topically 2 (two) times daily. Apply to affected area, Disp: 60 mL, Rfl: 2    losartan (COZAAR) 100 MG tablet, Take 1 tablet (100 mg total) by mouth once daily., Disp: 90 tablet, Rfl: 1    metoprolol tartrate (LOPRESSOR) 100 MG tablet, Take 1 tablet (100 mg total) by mouth 2 (two) times daily., Disp: 180 tablet, Rfl: 1    minoxidiL (ROGAINE) 2 % external solution, Apply 1 Application topically once daily., Disp: , Rfl:

## 2025-06-17 ENCOUNTER — TELEPHONE (OUTPATIENT)
Dept: ORTHOPEDICS | Facility: CLINIC | Age: 70
End: 2025-06-17
Payer: MEDICARE

## 2025-06-17 ENCOUNTER — EXTERNAL HOME HEALTH (OUTPATIENT)
Dept: HOME HEALTH SERVICES | Facility: HOSPITAL | Age: 70
End: 2025-06-17
Payer: MEDICARE

## 2025-06-17 DIAGNOSIS — Z96.642 S/P TOTAL LEFT HIP ARTHROPLASTY: Primary | ICD-10-CM

## 2025-06-17 NOTE — TELEPHONE ENCOUNTER
----- Message from Boo sent at 6/17/2025 12:22 PM CDT -----  Patient would like a callback regarding having a referral sent to [facility]    Fax # pending    Pt--  [phone]

## 2025-06-18 ENCOUNTER — TELEPHONE (OUTPATIENT)
Dept: ORTHOPEDICS | Facility: CLINIC | Age: 70
End: 2025-06-18
Payer: MEDICARE

## 2025-06-18 NOTE — TELEPHONE ENCOUNTER
----- Message from Boo sent at 6/18/2025  8:06 AM CDT -----  Karen / [facility] calling for patient's referral/    Pt is being evaluated at 3:00 this afternoon    Fax [phone]    Phone [phone] ext [phone]

## 2025-06-25 ENCOUNTER — PATIENT MESSAGE (OUTPATIENT)
Dept: ELECTROPHYSIOLOGY | Facility: CLINIC | Age: 70
End: 2025-06-25
Payer: MEDICARE

## 2025-06-26 DIAGNOSIS — L66.9 CICATRICIAL ALOPECIA: ICD-10-CM

## 2025-06-27 RX ORDER — FLUOCINONIDE TOPICAL SOLUTION USP, 0.05% 0.5 MG/ML
1 SOLUTION TOPICAL 2 TIMES DAILY
Qty: 60 ML | Refills: 2 | Status: SHIPPED | OUTPATIENT
Start: 2025-06-27

## 2025-07-02 DIAGNOSIS — Z78.0 MENOPAUSE: ICD-10-CM

## 2025-07-07 ENCOUNTER — PATIENT MESSAGE (OUTPATIENT)
Dept: ADMINISTRATIVE | Facility: HOSPITAL | Age: 70
End: 2025-07-07
Payer: MEDICARE

## 2025-07-13 DIAGNOSIS — M81.0 AGE RELATED OSTEOPOROSIS, UNSPECIFIED PATHOLOGICAL FRACTURE PRESENCE: ICD-10-CM

## 2025-07-14 RX ORDER — ALENDRONATE SODIUM 70 MG/1
TABLET ORAL
Qty: 12 TABLET | Refills: 3 | Status: SHIPPED | OUTPATIENT
Start: 2025-07-14

## 2025-07-18 RX ORDER — AMLODIPINE BESYLATE 2.5 MG/1
2.5 TABLET ORAL
Qty: 30 TABLET | Refills: 0 | Status: SHIPPED | OUTPATIENT
Start: 2025-07-18

## 2025-07-23 ENCOUNTER — HOSPITAL ENCOUNTER (OUTPATIENT)
Dept: RADIOLOGY | Facility: CLINIC | Age: 70
Discharge: HOME | End: 2025-07-23
Attending: PHYSICIAN ASSISTANT
Payer: MEDICARE

## 2025-07-23 DIAGNOSIS — Z78.0 MENOPAUSE: ICD-10-CM

## 2025-07-23 PROCEDURE — 77080 DXA BONE DENSITY AXIAL: CPT | Mod: TC,HCNC,PO

## 2025-07-23 PROCEDURE — 77080 DXA BONE DENSITY AXIAL: CPT | Mod: 26,HCNC,, | Performed by: RADIOLOGY

## 2025-07-25 ENCOUNTER — OFFICE VISIT (OUTPATIENT)
Dept: ORTHOPEDICS | Facility: CLINIC | Age: 70
End: 2025-07-25
Payer: MEDICARE

## 2025-07-25 ENCOUNTER — HOSPITAL ENCOUNTER (OUTPATIENT)
Dept: RADIOLOGY | Facility: HOSPITAL | Age: 70
Discharge: HOME | End: 2025-07-25
Attending: ORTHOPAEDIC SURGERY
Payer: MEDICARE

## 2025-07-25 VITALS — WEIGHT: 127.88 LBS | HEIGHT: 62 IN | BODY MASS INDEX: 23.53 KG/M2

## 2025-07-25 DIAGNOSIS — Z96.642 S/P TOTAL LEFT HIP ARTHROPLASTY: ICD-10-CM

## 2025-07-25 DIAGNOSIS — Z96.642 S/P TOTAL LEFT HIP ARTHROPLASTY: Primary | ICD-10-CM

## 2025-07-25 PROCEDURE — 73502 X-RAY EXAM HIP UNI 2-3 VIEWS: CPT | Mod: TC,HCNC,PO,LT

## 2025-07-25 PROCEDURE — 73502 X-RAY EXAM HIP UNI 2-3 VIEWS: CPT | Mod: 26,HCNC,LT, | Performed by: RADIOLOGY

## 2025-07-25 PROCEDURE — 99999 PR PBB SHADOW E&M-EST. PATIENT-LVL III: CPT | Mod: PBBFAC,HCNC,, | Performed by: ORTHOPAEDIC SURGERY

## 2025-07-25 NOTE — PROGRESS NOTES
Subjective:      Patient ID: Suma Butterfield is a 69 y.o. female.    Chief Complaint: Post-op Evaluation of the Left Hip (DOS: 2025)    HPI  Patient follow up without any complaints status post left total hip.  She states she specifically has no pain.  ROS      Objective:    Ortho Exam       Patient has a steady unassisted nonantalgic gait  Minimal incisional tenderness Good hip range of motion       My Radiographs Findings:    No new radiographs  Assessment:       Encounter Diagnosis   Name Primary?    S/P total left hip arthroplasty Yes         Plan:       The patient is doing very well status post left total hip replacement.  We have discussed general hip precautions local wound care gradually advancing activities as tolerated within reason.  Follow up in 6 weeks for re-evaluation repeat radiographs sooner if any questions or problems.        Past Medical History:   Diagnosis Date    Elevated ferritin 10/13/2022    Elevated MCV 10/13/2022    General anesthetics causing adverse effect in therapeutic use     STATES SHE CODES WITH SUCC    Hepatitis C     Hypertension     Polycythemia 10/13/2022    SVT (supraventricular tachycardia)      Past Surgical History:   Procedure Laterality Date     SECTION      COLONOSCOPY      CORONARY ANGIOGRAPHY N/A 03/10/2023    Procedure: ANGIOGRAM, CORONARY ARTERY;  Surgeon: Jill Griffiths MD;  Location: [facility] CATH/EP LAB;  Service: Cardiology;  Laterality: N/A;    HIP ARTHROPLASTY Left 2025    Procedure: ARTHROPLASTY, HIP;  Surgeon: Bhavesh Levin MD;  Location: [facility] OR;  Service: Orthopedics;  Laterality: Left;  Shashank notified 25 ark    LEFT HEART CATHETERIZATION Left 03/10/2023    Procedure: Left heart cath;  Surgeon: Jill Griffiths MD;  Location: [facility] CATH/EP LAB;  Service: Cardiology;  Laterality: Left;       Current Medications[1]    Review of patient's allergies indicates:   Allergen Reactions    Succinylcholine Anaphylaxis      **Pseudocholinesterase**   HEART STOPS       Family History   Problem Relation Name Age of Onset    Glaucoma Neg Hx      Macular degeneration Neg Hx      Retinal detachment Neg Hx       Social History     Occupational History    Not on file   Tobacco Use    Smoking status: Former     Types: Cigarettes     Passive exposure: Past    Smokeless tobacco: Never   Substance and Sexual Activity    Alcohol use: Yes     Alcohol/week: 14.0 standard drinks of alcohol     Types: 14 Glasses of wine per week     Comment: 2 GLASSES DAILY    Drug use: Never    Sexual activity: Yes     Partners: Male            [1]   Current Outpatient Medications:     alendronate (FOSAMAX) 70 MG tablet, TK 1 T PO 1 TIME Q WK, Disp: 12 tablet, Rfl: 3    amLODIPine (NORVASC) 2.5 MG tablet, TAKE 1 TABLET BY MOUTH EVERY DAY, Disp: 30 tablet, Rfl: 0    atorvastatin (LIPITOR) 40 MG tablet, Take 1 tablet (40 mg total) by mouth every evening., Disp: 90 tablet, Rfl: 1    biotin 5,000 mcg TbDL, Take 5,000 mcg by mouth once daily., Disp: , Rfl:     calcium-vitamin D3 (OS- + D3) 500 mg-5 mcg (200 unit) per tablet, Take 1 tablet by mouth once daily., Disp: , Rfl:     finasteride (PROSCAR) 5 mg tablet, TAKE 1 TABLET BY MOUTH EVERY DAY, Disp: 90 tablet, Rfl: 3    fluocinonide (LIDEX) 0.05 % external solution, Apply 1 application  topically 2 (two) times daily. Apply to affected area, Disp: 60 mL, Rfl: 2    losartan (COZAAR) 100 MG tablet, Take 1 tablet (100 mg total) by mouth once daily., Disp: 90 tablet, Rfl: 1    metoprolol tartrate (LOPRESSOR) 100 MG tablet, Take 1 tablet (100 mg total) by mouth 2 (two) times daily., Disp: 180 tablet, Rfl: 1    minoxidiL (ROGAINE) 2 % external solution, Apply 1 Application topically once daily., Disp: , Rfl:

## 2025-08-04 ENCOUNTER — OFFICE VISIT (OUTPATIENT)
Dept: CARDIOLOGY | Facility: CLINIC | Age: 70
End: 2025-08-04
Payer: MEDICARE

## 2025-08-04 VITALS
HEIGHT: 62 IN | WEIGHT: 125.63 LBS | BODY MASS INDEX: 23.12 KG/M2 | SYSTOLIC BLOOD PRESSURE: 124 MMHG | OXYGEN SATURATION: 98 % | HEART RATE: 59 BPM | DIASTOLIC BLOOD PRESSURE: 88 MMHG

## 2025-08-04 DIAGNOSIS — I10 PRIMARY HYPERTENSION: ICD-10-CM

## 2025-08-04 DIAGNOSIS — I25.10 CORONARY ARTERY DISEASE INVOLVING NATIVE CORONARY ARTERY OF NATIVE HEART WITHOUT ANGINA PECTORIS: ICD-10-CM

## 2025-08-04 DIAGNOSIS — E78.2 MIXED HYPERLIPIDEMIA: ICD-10-CM

## 2025-08-04 DIAGNOSIS — R79.89 ELEVATED FERRITIN: ICD-10-CM

## 2025-08-04 DIAGNOSIS — I47.10 SVT (SUPRAVENTRICULAR TACHYCARDIA): ICD-10-CM

## 2025-08-04 DIAGNOSIS — E78.00 HYPERCHOLESTEROLEMIA: Primary | ICD-10-CM

## 2025-08-04 PROCEDURE — 99999 PR PBB SHADOW E&M-EST. PATIENT-LVL IV: CPT | Mod: PBBFAC,HCNC,,

## 2025-08-04 RX ORDER — AMLODIPINE BESYLATE 2.5 MG/1
2.5 TABLET ORAL DAILY
Qty: 90 TABLET | Refills: 3 | Status: SHIPPED | OUTPATIENT
Start: 2025-08-04

## 2025-08-04 NOTE — PROGRESS NOTES
Subjective:    Patient ID:  Suma Butterfield is a 69 y.o. female patient here for evaluation Hyperlipidemia (Follow up), Hypertension, and Medication Refill      History of Present Illness    CHIEF COMPLAINT:  Patient presents today for follow up.    SUPRAVENTRICULAR TACHYCARDIA:  She reports ongoing SVT with episodes occurring 2 times per week with variable frequency. She experiences palpitations associated with SVT episodes.    RECENT LEFT HIP REPLACEMENT:  She underwent LLE hip replacement on May 28th and feels she is recovering well with adequate mobility. She continues physical therapy with ongoing rehabilitation and improvement in mobility since the procedure. She denies any swelling or bleeding at the surgical site.    CARDIOVASCULAR:  Her cardiac status remains stable. Angiogram in 2023 revealed small branch of RPL with severe disease (too small to intervene), left main with no disease, LAD showing 30-40% disease, circumflex stable with no disease, and RCA without major disease. Echo from 2023 was also stable.  Denies chest pain, shortness of breath, lightheadedness, dizziness, jaw/neck/arm pain, orthopnea, PND, edema, or bleeding.        HIGH IRON LEVELS:  She has ongoing management of elevated iron levels with monthly labs monitored by Dr. Baum.     MEDICATIONS:  She denies taking any medications, which contradicts previous documentation of losartan 100 mg, metoprolol 100 mg twice daily, and atorvastatin 40 mg.    SOCIAL HISTORY:  She consumes 1-2 glasses of wine daily and denies tobacco use.      ROS:  General: -fever, -chills, -fatigue, -weight gain, -weight loss, +alcohol abuse, +increased substance use  Cardiovascular: -chest pain, +palpitations, -lower extremity edema, +feeling of flutter in chest  Respiratory: -cough, -shortness of breath  Gastrointestinal: -abdominal pain, -nausea, -vomiting, -diarrhea, -constipation, -blood in stool  Genitourinary: -dysuria, -hematuria,  -frequency  Musculoskeletal: -joint pain, -muscle pain  Skin: -rash, -lesion  Neurological: -headache, -dizziness, -numbness, -tingling  Psychiatric: -anxiety, -depression, -sleep difficulty                    Review of patient's allergies indicates:   Allergen Reactions    Succinylcholine Anaphylaxis     **Pseudocholinesterase**   HEART STOPS       Past Medical History:   Diagnosis Date    Elevated ferritin 10/13/2022    Elevated MCV 10/13/2022    General anesthetics causing adverse effect in therapeutic use     STATES SHE CODES WITH SUCC    Hepatitis C     Hypertension     Polycythemia 10/13/2022    SVT (supraventricular tachycardia)      Past Surgical History:   Procedure Laterality Date     SECTION      COLONOSCOPY      CORONARY ANGIOGRAPHY N/A 03/10/2023    Procedure: ANGIOGRAM, CORONARY ARTERY;  Surgeon: Jill Griffiths MD;  Location: [facility] CATH/EP LAB;  Service: Cardiology;  Laterality: N/A;    HIP ARTHROPLASTY Left 2025    Procedure: ARTHROPLASTY, HIP;  Surgeon: Bhavesh Levin MD;  Location: [facility] OR;  Service: Orthopedics;  Laterality: Left;  Shashank notified 25 ark    LEFT HEART CATHETERIZATION Left 03/10/2023    Procedure: Left heart cath;  Surgeon: Jill Griffiths MD;  Location: [facility] CATH/EP LAB;  Service: Cardiology;  Laterality: Left;     Social History[1]                Objective        Vitals:    25 0839   BP: 124/88   Pulse: (!) 59       LIPIDS - LAST 2   Lab Results   Component Value Date    CHOL 146 2024    CHOL 226 (H) 2022    HDL 88 2024    HDL 90 2022    LDLCALC 44 2024    LDLCALC 117 (H) 2022    TRIG 55 2024    TRIG 91 2022    CHOLHDL 1.7 2024    CHOLHDL 2.5 2022       CBC - LAST 2  Lab Results   Component Value Date    WBC 6.50 2025    WBC 5.5 2025    RBC 3.83 (L) 2025    RBC 3.83 2025    HGB 13.4 2025    HGB 13.4 2025    HCT 39.1 2025    HCT  40.1 05/13/2025     (H) 05/14/2025    .7 (H) 05/13/2025    MCH 35.0 (H) 05/14/2025    MCH 35.0 (H) 05/13/2025    MCHC 34.3 05/14/2025    MCHC 33.4 05/13/2025    RDW 12.4 05/14/2025    RDW 12.0 05/13/2025     05/14/2025     05/13/2025    MPV 9.3 05/14/2025    MPV 9.8 05/13/2025    GRAN 2.8 03/11/2023    GRAN 46.8 03/11/2023    LYMPH 1,793 05/13/2025    LYMPH 32.6 05/13/2025    MONO 572 05/13/2025    MONO 10.4 05/13/2025    BASO 61 05/13/2025    BASO 54 03/18/2025    NRBC 0 05/14/2025    NRBC 0 03/11/2023       CHEMISTRY & LIVER FUNCTION - LAST 2  Lab Results   Component Value Date     05/14/2025     05/13/2025    K 4.2 05/14/2025    K 4.9 05/13/2025     05/14/2025     05/13/2025    CO2 21 (L) 05/14/2025    CO2 29 05/13/2025    ANIONGAP 13 05/14/2025    ANIONGAP 5 (L) 03/11/2023    BUN 13 05/14/2025    BUN 14 05/13/2025    CREATININE 0.7 05/14/2025    CREATININE 0.76 05/13/2025    GLU 93 05/14/2025     (H) 05/13/2025    CALCIUM 9.8 05/14/2025    CALCIUM 9.9 05/13/2025    MG 2.1 03/11/2023    MG 2.1 03/10/2023    ALBUMIN 4.5 05/13/2025    ALBUMIN 4.8 03/18/2025    PROT 6.9 05/13/2025    PROT 7.2 03/18/2025    ALKPHOS 47 (L) 03/11/2023    ALKPHOS 51 (L) 03/10/2023    ALT 12 05/13/2025    ALT 12 03/18/2025    AST 16 05/13/2025    AST 17 03/18/2025    BILITOT 0.6 05/13/2025    BILITOT 0.6 03/18/2025        CARDIAC PROFILE - LAST 2  Lab Results   Component Value Date     (H) 03/08/2023    BNP 61 03/07/2023     10/14/2022    TROPONINI <0.030 06/01/2020    TROPONINIHS 3017.3 (HH) 03/09/2023    TROPONINIHS 4476.6 (HH) 03/09/2023        COAGULATION - LAST 2  Lab Results   Component Value Date    LABPT 13.5 06/01/2020    INR 1.0 03/08/2023    INR 1.1 06/01/2020    APTT 67.0 (H) 03/10/2023    APTT 35.3 (H) 03/09/2023       ENDOCRINE & PSA - LAST 2  Lab Results   Component Value Date    MICROALBUR 0.2 03/29/2022    MICROALBUR <0.2 05/26/2021    TSH 0.84  02/02/2024        ECHOCARDIOGRAM RESULTS  Results for orders placed during the hospital encounter of 03/08/23    Echo    Interpretation Summary  · The left ventricle is normal in size with concentric remodeling and normal systolic function.  · The estimated ejection fraction is 65%.  · Normal left ventricular diastolic function.  · Normal right ventricular size with normal right ventricular systolic function.      CURRENT/PREVIOUS VISIT EKG  Results for orders placed or performed during the hospital encounter of 05/14/25   EKG 12-lead    Collection Time: 05/14/25  9:59 AM   Result Value Ref Range    QRS Duration 80 ms    [facility] QTC Calculation 416 ms    Narrative    Test Reason : Z01.818    Vent. Rate :  62 BPM     Atrial Rate :  62 BPM     P-R Int : 176 ms          QRS Dur :  80 ms      QT Int : 410 ms       P-R-T Axes :  32  59  59 degrees    QTcB Int : 416 ms    Normal sinus rhythm  Normal ECG  When compared with ECG of 12-Jul-2024 08:46,  No significant change was found  Confirmed by Olman Monk ([id number]) on 5/19/2025 2:40:52 PM    Referred By: SHADY CULVER           Confirmed By: Olman Monk     No valid procedures specified.   No results found for this or any previous visit.    No valid procedures specified.    Physical Exam    Vitals: Blood pressure: 124/88.  General: No acute distress. Well-developed. Well-nourished.  Eyes:  Sclerae anicteric.  HENT: Normocephalic. Atraumatic  Cardiovascular: Regular rate. Regular rhythm. No murmurs. No rubs. No gallops. Normal S1, S2.  Respiratory: Normal respiratory effort. Clear to auscultation bilaterally. No rales. No rhonchi. No wheezing.  Abdomen: Soft. Non-tender. Non-distended. Normoactive bowel sounds.  Musculoskeletal: No  obvious deformity.  Extremities: No lower extremity edema.  Neurological: Alert & oriented x3. No slurred speech. Normal gait.  Psychiatric: Normal mood. Normal affect. Good insight. Good judgment.  Skin: Warm. Dry. No rash.         I HAVE  REVIEWED :    The vital signs, nurses notes, and all the pertinent radiology and labs.        Current Outpatient Medications   Medication Instructions    alendronate (FOSAMAX) 70 MG tablet TK 1 T PO 1 TIME Q WK    amLODIPine (NORVASC) 2.5 mg, Oral, Daily    atorvastatin (LIPITOR) 40 mg, Oral, Nightly    biotin 5,000 mcg, Daily    calcium-vitamin D3 (OS- + D3) 500 mg-5 mcg (200 unit) per tablet 1 tablet, Daily    finasteride (PROSCAR) 5 mg, Oral    fluocinonide (LIDEX) 0.05 % external solution 1 application , Topical (Top), 2 times daily, Apply to affected area    losartan (COZAAR) 100 mg, Oral, Daily    metoprolol tartrate (LOPRESSOR) 100 mg, Oral, 2 times daily    minoxidiL (ROGAINE) 2 % external solution 1 Application, Daily          Assessment & Plan   Assessment & Plan    E78.00 Hypercholesterolemia  E78.2 Mixed hyperlipidemia  I10 Primary hypertension  I47.10 SVT (supraventricular tachycardia)  R79.89 Elevated ferritin  I25.10 Coronary artery disease involving native coronary artery of native heart without angina pectoris    PLAN SUMMARY:  - Ordered lipid panel at [facility] (fasting required)  - Continue atorvastatin 40 mg daily  - Continue metoprolol 100 mg twice daily  - Continue losartan 100 mg daily  - Continue amlodipine 2.5 mg daily  - Recommend alcohol moderation (1-2 drinks daily max)  - Consistently use magnesium supplementation  - Plan for ablation with Dr. Cha after hip surgery recovery    HYPERCHOLESTEROLEMIA:  - Continued atorvastatin 40 mg daily.  - Ordered lipid panel (to be completed at [facility], fasting required).  - Low sodium heart healthy diet      MIXED HYPERLIPIDEMIA:  - Continued atorvastatin 40 mg daily.  - Ordered lipid panel (to be completed at [facility], fasting required).    PRIMARY HYPERTENSION:  - Blood pressure is well-controlled on current regimen.  - Continued amlodipine.  - Continued losartan 100 mg daily.  - Continued metoprolol 100 mg  twice daily.  - Maintain /80 or less  - Hold BP meds if SBP <100    SVT (SUPRAVENTRICULAR TACHYCARDIA):  - Reviewed history of SVT and recent hip replacement, patient wanting to follow up with EP for ablation  - Explained potential for alcohol to trigger SVT episodes.  - Recommend moderation in alcohol consumption, no more than 1-2 drinks daily, patient to consistently use magnesium supplementation.  - Continued metoprolol 100 mg twice daily.           This note was generated with the assistance of ambient listening technology. Verbal consent was obtained by the patient and accompanying visitor(s) for the recording of patient appointment to facilitate this note. I attest to having reviewed and edited the generated note for accuracy, though some syntax or spelling errors may persist. Please contact the author of this note for any clarification.             [1]   Social History  Tobacco Use    Smoking status: Former     Types: Cigarettes     Passive exposure: Past    Smokeless tobacco: Never   Substance Use Topics    Alcohol use: Yes     Alcohol/week: 14.0 standard drinks of alcohol     Types: 14 Glasses of wine per week     Comment: 2 GLASSES DAILY    Drug use: Never

## 2025-08-13 RX ORDER — NITROFURANTOIN 25; 75 MG/1; MG/1
100 CAPSULE ORAL 2 TIMES DAILY
Qty: 10 CAPSULE | Refills: 0 | Status: SHIPPED | OUTPATIENT
Start: 2025-08-13

## 2025-09-05 DIAGNOSIS — Z96.642 S/P TOTAL LEFT HIP ARTHROPLASTY: Primary | ICD-10-CM

## (undated) DEVICE — ADHESIVE MASTISOL VIAL 48/BX

## (undated) DEVICE — DRAPE STERI INSTRUMENT 1018

## (undated) DEVICE — GLOVE SENSICARE PI ALOE 7

## (undated) DEVICE — GLOVE SENSICARE PI GRN 6.5

## (undated) DEVICE — PILLOW HIP ABDUCTION MED

## (undated) DEVICE — BLADE SAW SGTL DL STR 25X1.27X

## (undated) DEVICE — GUIDEWIRE J TIP EXCHANGE FIXED CORE 260

## (undated) DEVICE — SUT 0 VICRYL / CT-1

## (undated) DEVICE — GLOVE SENSICARE PI GRN 6

## (undated) DEVICE — SUT ETHIBOND EXCEL 2 V37 30

## (undated) DEVICE — SPONGE LAP 18X18 PREWASHED

## (undated) DEVICE — TOGA FLYTE PEEL AWAY XLARGE

## (undated) DEVICE — INTERPULSE SET

## (undated) DEVICE — PENCIL SMK EVAC CONNECTOR 10FT

## (undated) DEVICE — PACK CUSTOM UNIV BASIN SLI

## (undated) DEVICE — Device

## (undated) DEVICE — ELECTRODE BLADE TEFLON 6

## (undated) DEVICE — CATHETER DIAGNOSTIC DXTERITY 5FR JL4.0

## (undated) DEVICE — GLOVE SENSICARE PI ALOE 7.5

## (undated) DEVICE — DRAPE STERI U-SHAPED 47X51IN

## (undated) DEVICE — ELECTRODE BLD ULTRA 4IN STRL

## (undated) DEVICE — DRAPE THREE-QTR REINF 53X77IN

## (undated) DEVICE — ALCOHOL RUBBING 70% ISO 4OZ

## (undated) DEVICE — BLADE SURG CARBON STEEL #10

## (undated) DEVICE — PACK SIRUS BASIC V SURG STRL

## (undated) DEVICE — PILLOW ABDUCTION FOAM MED

## (undated) DEVICE — CATHETER DIAGNOSTIC DXTERITY 5FR JR4.0

## (undated) DEVICE — SUT STRATAFIX SPRL PS-2 3-0

## (undated) DEVICE — DRAPE INCISE IOBAN 2 23X33IN

## (undated) DEVICE — SPONGE BULKEE II ABSRB 6X6.75

## (undated) DEVICE — TOWEL OR DISP STRL BLUE 4/PK

## (undated) DEVICE — GOWN TOGA SYS PEELWY ZIP 2 XL

## (undated) DEVICE — GLOVE SENSICARE PI ALOE 6

## (undated) DEVICE — GLOVE SENSICARE PI GRN 7

## (undated) DEVICE — SHEATH INTRODUCER SLENDER 6FX10CM

## (undated) DEVICE — BNDG COFLEX FOAM LF2 ST 6X5YD

## (undated) DEVICE — COVER SURG LIGHT HANDLE

## (undated) DEVICE — CLOSURE SKIN STERI STRIP 1/2X4

## (undated) DEVICE — DRAPE U SPLIT SHEET 54X76IN

## (undated) DEVICE — COVER TABLE 44X90 STERILE

## (undated) DEVICE — GLOVE SENSICARE PI GRN 8

## (undated) DEVICE — DRESSING AQUACEL AG 3.5X10IN

## (undated) DEVICE — SUT 2-0 VICRYL FS-1 UND

## (undated) DEVICE — SOCKINETTE IMPERVIOUS 12X48IN